# Patient Record
Sex: MALE | Race: OTHER | NOT HISPANIC OR LATINO
[De-identification: names, ages, dates, MRNs, and addresses within clinical notes are randomized per-mention and may not be internally consistent; named-entity substitution may affect disease eponyms.]

---

## 2023-03-29 ENCOUNTER — TRANSCRIPTION ENCOUNTER (OUTPATIENT)
Age: 58
End: 2023-03-29

## 2023-03-29 ENCOUNTER — APPOINTMENT (OUTPATIENT)
Dept: NEUROLOGY | Facility: HOSPITAL | Age: 58
End: 2023-03-29

## 2023-03-29 ENCOUNTER — INPATIENT (INPATIENT)
Facility: HOSPITAL | Age: 58
LOS: 12 days | Discharge: NURSING, REHAB OR LONG-TERM CARE | DRG: 23 | End: 2023-04-11
Attending: HOSPITALIST | Admitting: INTERNAL MEDICINE
Payer: MEDICAID

## 2023-03-29 VITALS
DIASTOLIC BLOOD PRESSURE: 88 MMHG | TEMPERATURE: 98 F | RESPIRATION RATE: 18 BRPM | HEIGHT: 70 IN | OXYGEN SATURATION: 96 % | HEART RATE: 102 BPM | WEIGHT: 165.35 LBS | SYSTOLIC BLOOD PRESSURE: 182 MMHG

## 2023-03-29 DIAGNOSIS — I69.30 UNSPECIFIED SEQUELAE OF CEREBRAL INFARCTION: ICD-10-CM

## 2023-03-29 DIAGNOSIS — R09.89 OTHER SPECIFIED SYMPTOMS AND SIGNS INVOLVING THE CIRCULATORY AND RESPIRATORY SYSTEMS: ICD-10-CM

## 2023-03-29 LAB
A1C WITH ESTIMATED AVERAGE GLUCOSE RESULT: 7.3 % — HIGH (ref 4–5.6)
ABO RH CONFIRMATION: SIGNIFICANT CHANGE UP
ANION GAP SERPL CALC-SCNC: 17 MMOL/L — SIGNIFICANT CHANGE UP (ref 5–17)
APTT BLD: 29.9 SEC — SIGNIFICANT CHANGE UP (ref 27.5–35.5)
BLD GP AB SCN SERPL QL: SIGNIFICANT CHANGE UP
BUN SERPL-MCNC: 9.1 MG/DL — SIGNIFICANT CHANGE UP (ref 8–20)
CALCIUM SERPL-MCNC: 8.6 MG/DL — SIGNIFICANT CHANGE UP (ref 8.4–10.5)
CHLORIDE SERPL-SCNC: 98 MMOL/L — SIGNIFICANT CHANGE UP (ref 96–108)
CHOLEST SERPL-MCNC: 270 MG/DL — HIGH
CO2 SERPL-SCNC: 23 MMOL/L — SIGNIFICANT CHANGE UP (ref 22–29)
CREAT SERPL-MCNC: 0.62 MG/DL — SIGNIFICANT CHANGE UP (ref 0.5–1.3)
EGFR: 109 ML/MIN/1.73M2 — SIGNIFICANT CHANGE UP
ESTIMATED AVERAGE GLUCOSE: 163 MG/DL — HIGH (ref 68–114)
GAS PNL BLDA: SIGNIFICANT CHANGE UP
GLUCOSE BLDC GLUCOMTR-MCNC: 196 MG/DL — HIGH (ref 70–99)
GLUCOSE BLDC GLUCOMTR-MCNC: 201 MG/DL — HIGH (ref 70–99)
GLUCOSE BLDC GLUCOMTR-MCNC: 224 MG/DL — HIGH (ref 70–99)
GLUCOSE SERPL-MCNC: 260 MG/DL — HIGH (ref 70–99)
HCT VFR BLD CALC: 45.4 % — SIGNIFICANT CHANGE UP (ref 39–50)
HDLC SERPL-MCNC: 58 MG/DL — SIGNIFICANT CHANGE UP
HGB BLD-MCNC: 15.2 G/DL — SIGNIFICANT CHANGE UP (ref 13–17)
INR BLD: 1.14 RATIO — SIGNIFICANT CHANGE UP (ref 0.88–1.16)
LACTATE SERPL-SCNC: 4.5 MMOL/L — CRITICAL HIGH (ref 0.5–2)
LIPID PNL WITH DIRECT LDL SERPL: 195 MG/DL — HIGH
MCHC RBC-ENTMCNC: 27 PG — SIGNIFICANT CHANGE UP (ref 27–34)
MCHC RBC-ENTMCNC: 33.5 GM/DL — SIGNIFICANT CHANGE UP (ref 32–36)
MCV RBC AUTO: 80.8 FL — SIGNIFICANT CHANGE UP (ref 80–100)
MRSA PCR RESULT.: SIGNIFICANT CHANGE UP
NON HDL CHOLESTEROL: 212 MG/DL — HIGH
PA ADP PRP-ACNC: 9 PRU — LOW (ref 180–376)
PLATELET # BLD AUTO: 264 K/UL — SIGNIFICANT CHANGE UP (ref 150–400)
POTASSIUM SERPL-MCNC: 3.1 MMOL/L — LOW (ref 3.5–5.3)
POTASSIUM SERPL-SCNC: 3.1 MMOL/L — LOW (ref 3.5–5.3)
PROTHROM AB SERPL-ACNC: 13.2 SEC — SIGNIFICANT CHANGE UP (ref 10.5–13.4)
RBC # BLD: 5.62 M/UL — SIGNIFICANT CHANGE UP (ref 4.2–5.8)
RBC # FLD: 13.3 % — SIGNIFICANT CHANGE UP (ref 10.3–14.5)
S AUREUS DNA NOSE QL NAA+PROBE: DETECTED
SARS-COV-2 RNA SPEC QL NAA+PROBE: SIGNIFICANT CHANGE UP
SODIUM SERPL-SCNC: 138 MMOL/L — SIGNIFICANT CHANGE UP (ref 135–145)
TRIGL SERPL-MCNC: 83 MG/DL — SIGNIFICANT CHANGE UP
TSH SERPL-MCNC: 1.62 UIU/ML — SIGNIFICANT CHANGE UP (ref 0.27–4.2)
WBC # BLD: 14.31 K/UL — HIGH (ref 3.8–10.5)
WBC # FLD AUTO: 14.31 K/UL — HIGH (ref 3.8–10.5)

## 2023-03-29 PROCEDURE — 37215 TRANSCATH STENT CCA W/EPS: CPT | Mod: RT

## 2023-03-29 PROCEDURE — 0042T: CPT

## 2023-03-29 PROCEDURE — 70450 CT HEAD/BRAIN W/O DYE: CPT | Mod: 26

## 2023-03-29 PROCEDURE — 93010 ELECTROCARDIOGRAM REPORT: CPT

## 2023-03-29 PROCEDURE — 71045 X-RAY EXAM CHEST 1 VIEW: CPT | Mod: 26,76

## 2023-03-29 PROCEDURE — 61645 PERQ ART M-THROMBECT &/NFS: CPT | Mod: 59,RT

## 2023-03-29 PROCEDURE — 99223 1ST HOSP IP/OBS HIGH 75: CPT | Mod: 25

## 2023-03-29 PROCEDURE — 99291 CRITICAL CARE FIRST HOUR: CPT

## 2023-03-29 PROCEDURE — 70450 CT HEAD/BRAIN W/O DYE: CPT | Mod: 26,77

## 2023-03-29 RX ORDER — PIPERACILLIN AND TAZOBACTAM 4; .5 G/20ML; G/20ML
3.38 INJECTION, POWDER, LYOPHILIZED, FOR SOLUTION INTRAVENOUS EVERY 8 HOURS
Refills: 0 | Status: COMPLETED | OUTPATIENT
Start: 2023-03-30 | End: 2023-04-06

## 2023-03-29 RX ORDER — CHLORHEXIDINE GLUCONATE 213 G/1000ML
1 SOLUTION TOPICAL DAILY
Refills: 0 | Status: DISCONTINUED | OUTPATIENT
Start: 2023-03-29 | End: 2023-04-11

## 2023-03-29 RX ORDER — DEXMEDETOMIDINE HYDROCHLORIDE IN 0.9% SODIUM CHLORIDE 4 UG/ML
0.2 INJECTION INTRAVENOUS
Qty: 200 | Refills: 0 | Status: DISCONTINUED | OUTPATIENT
Start: 2023-03-29 | End: 2023-03-31

## 2023-03-29 RX ORDER — VANCOMYCIN HCL 1 G
1000 VIAL (EA) INTRAVENOUS ONCE
Refills: 0 | Status: COMPLETED | OUTPATIENT
Start: 2023-03-29 | End: 2023-03-30

## 2023-03-29 RX ORDER — ASPIRIN/CALCIUM CARB/MAGNESIUM 324 MG
300 TABLET ORAL DAILY
Refills: 0 | Status: DISCONTINUED | OUTPATIENT
Start: 2023-03-29 | End: 2023-03-29

## 2023-03-29 RX ORDER — FENTANYL CITRATE 50 UG/ML
25 INJECTION INTRAVENOUS ONCE
Refills: 0 | Status: DISCONTINUED | OUTPATIENT
Start: 2023-03-29 | End: 2023-03-29

## 2023-03-29 RX ORDER — DEXMEDETOMIDINE HYDROCHLORIDE IN 0.9% SODIUM CHLORIDE 4 UG/ML
0.2 INJECTION INTRAVENOUS
Qty: 200 | Refills: 0 | Status: DISCONTINUED | OUTPATIENT
Start: 2023-03-29 | End: 2023-03-29

## 2023-03-29 RX ORDER — SENNA PLUS 8.6 MG/1
10 TABLET ORAL AT BEDTIME
Refills: 0 | Status: DISCONTINUED | OUTPATIENT
Start: 2023-03-29 | End: 2023-04-06

## 2023-03-29 RX ORDER — ASPIRIN/CALCIUM CARB/MAGNESIUM 324 MG
81 TABLET ORAL DAILY
Refills: 0 | Status: DISCONTINUED | OUTPATIENT
Start: 2023-03-29 | End: 2023-04-05

## 2023-03-29 RX ORDER — VANCOMYCIN HCL 1 G
VIAL (EA) INTRAVENOUS
Refills: 0 | Status: DISCONTINUED | OUTPATIENT
Start: 2023-03-30 | End: 2023-03-30

## 2023-03-29 RX ORDER — CANGRELOR 50 MG/1
1 INJECTION, POWDER, LYOPHILIZED, FOR SOLUTION INTRAVENOUS
Qty: 50 | Refills: 0 | Status: DISCONTINUED | OUTPATIENT
Start: 2023-03-29 | End: 2023-04-07

## 2023-03-29 RX ORDER — SODIUM CHLORIDE 9 MG/ML
1000 INJECTION, SOLUTION INTRAVENOUS
Refills: 0 | Status: DISCONTINUED | OUTPATIENT
Start: 2023-03-29 | End: 2023-04-06

## 2023-03-29 RX ORDER — PIPERACILLIN AND TAZOBACTAM 4; .5 G/20ML; G/20ML
3.38 INJECTION, POWDER, LYOPHILIZED, FOR SOLUTION INTRAVENOUS ONCE
Refills: 0 | Status: COMPLETED | OUTPATIENT
Start: 2023-03-30 | End: 2023-03-30

## 2023-03-29 RX ORDER — NOREPINEPHRINE BITARTRATE/D5W 8 MG/250ML
0.05 PLASTIC BAG, INJECTION (ML) INTRAVENOUS
Qty: 8 | Refills: 0 | Status: DISCONTINUED | OUTPATIENT
Start: 2023-03-29 | End: 2023-03-31

## 2023-03-29 RX ORDER — ACETAMINOPHEN 500 MG
1000 TABLET ORAL ONCE
Refills: 0 | Status: COMPLETED | OUTPATIENT
Start: 2023-03-29 | End: 2023-03-29

## 2023-03-29 RX ORDER — DEXTROSE 50 % IN WATER 50 %
12.5 SYRINGE (ML) INTRAVENOUS ONCE
Refills: 0 | Status: DISCONTINUED | OUTPATIENT
Start: 2023-03-29 | End: 2023-04-11

## 2023-03-29 RX ORDER — INSULIN LISPRO 100/ML
VIAL (ML) SUBCUTANEOUS EVERY 6 HOURS
Refills: 0 | Status: DISCONTINUED | OUTPATIENT
Start: 2023-03-29 | End: 2023-04-11

## 2023-03-29 RX ORDER — PANTOPRAZOLE SODIUM 20 MG/1
40 TABLET, DELAYED RELEASE ORAL DAILY
Refills: 0 | Status: DISCONTINUED | OUTPATIENT
Start: 2023-03-29 | End: 2023-04-03

## 2023-03-29 RX ORDER — SODIUM CHLORIDE 9 MG/ML
1000 INJECTION INTRAMUSCULAR; INTRAVENOUS; SUBCUTANEOUS ONCE
Refills: 0 | Status: COMPLETED | OUTPATIENT
Start: 2023-03-29 | End: 2023-03-29

## 2023-03-29 RX ORDER — DEXTROSE 50 % IN WATER 50 %
25 SYRINGE (ML) INTRAVENOUS ONCE
Refills: 0 | Status: DISCONTINUED | OUTPATIENT
Start: 2023-03-29 | End: 2023-04-11

## 2023-03-29 RX ORDER — PROPOFOL 10 MG/ML
50 INJECTION, EMULSION INTRAVENOUS
Qty: 1000 | Refills: 0 | Status: DISCONTINUED | OUTPATIENT
Start: 2023-03-29 | End: 2023-03-29

## 2023-03-29 RX ORDER — PIPERACILLIN AND TAZOBACTAM 4; .5 G/20ML; G/20ML
3.38 INJECTION, POWDER, LYOPHILIZED, FOR SOLUTION INTRAVENOUS ONCE
Refills: 0 | Status: COMPLETED | OUTPATIENT
Start: 2023-03-29 | End: 2023-03-30

## 2023-03-29 RX ORDER — GLUCAGON INJECTION, SOLUTION 0.5 MG/.1ML
1 INJECTION, SOLUTION SUBCUTANEOUS ONCE
Refills: 0 | Status: DISCONTINUED | OUTPATIENT
Start: 2023-03-29 | End: 2023-04-06

## 2023-03-29 RX ORDER — MUPIROCIN 20 MG/G
1 OINTMENT TOPICAL
Refills: 0 | Status: COMPLETED | OUTPATIENT
Start: 2023-03-29 | End: 2023-04-03

## 2023-03-29 RX ORDER — HYDRALAZINE HCL 50 MG
10 TABLET ORAL
Refills: 0 | Status: DISCONTINUED | OUTPATIENT
Start: 2023-03-29 | End: 2023-03-29

## 2023-03-29 RX ORDER — SODIUM CHLORIDE 9 MG/ML
500 INJECTION INTRAMUSCULAR; INTRAVENOUS; SUBCUTANEOUS ONCE
Refills: 0 | Status: COMPLETED | OUTPATIENT
Start: 2023-03-29 | End: 2023-03-29

## 2023-03-29 RX ORDER — DEXTROSE 50 % IN WATER 50 %
15 SYRINGE (ML) INTRAVENOUS ONCE
Refills: 0 | Status: DISCONTINUED | OUTPATIENT
Start: 2023-03-29 | End: 2023-04-06

## 2023-03-29 RX ORDER — CANGRELOR 50 MG/1
2 INJECTION, POWDER, LYOPHILIZED, FOR SOLUTION INTRAVENOUS
Qty: 50 | Refills: 0 | Status: DISCONTINUED | OUTPATIENT
Start: 2023-03-29 | End: 2023-03-29

## 2023-03-29 RX ORDER — ATORVASTATIN CALCIUM 80 MG/1
80 TABLET, FILM COATED ORAL AT BEDTIME
Refills: 0 | Status: DISCONTINUED | OUTPATIENT
Start: 2023-03-29 | End: 2023-04-05

## 2023-03-29 RX ORDER — LABETALOL HCL 100 MG
10 TABLET ORAL
Refills: 0 | Status: DISCONTINUED | OUTPATIENT
Start: 2023-03-29 | End: 2023-03-29

## 2023-03-29 RX ORDER — SODIUM CHLORIDE 9 MG/ML
1000 INJECTION INTRAMUSCULAR; INTRAVENOUS; SUBCUTANEOUS
Refills: 0 | Status: DISCONTINUED | OUTPATIENT
Start: 2023-03-29 | End: 2023-03-30

## 2023-03-29 RX ORDER — FENTANYL CITRATE 50 UG/ML
50 INJECTION INTRAVENOUS ONCE
Refills: 0 | Status: DISCONTINUED | OUTPATIENT
Start: 2023-03-29 | End: 2023-03-29

## 2023-03-29 RX ORDER — POLYETHYLENE GLYCOL 3350 17 G/17G
17 POWDER, FOR SOLUTION ORAL DAILY
Refills: 0 | Status: DISCONTINUED | OUTPATIENT
Start: 2023-03-29 | End: 2023-04-05

## 2023-03-29 RX ORDER — VANCOMYCIN HCL 1 G
1000 VIAL (EA) INTRAVENOUS EVERY 12 HOURS
Refills: 0 | Status: DISCONTINUED | OUTPATIENT
Start: 2023-03-30 | End: 2023-03-30

## 2023-03-29 RX ORDER — CHLORHEXIDINE GLUCONATE 213 G/1000ML
15 SOLUTION TOPICAL EVERY 12 HOURS
Refills: 0 | Status: DISCONTINUED | OUTPATIENT
Start: 2023-03-29 | End: 2023-03-31

## 2023-03-29 RX ADMIN — CANGRELOR 22.2 MICROGRAM(S)/KG/MIN: 50 INJECTION, POWDER, LYOPHILIZED, FOR SOLUTION INTRAVENOUS at 17:05

## 2023-03-29 RX ADMIN — DEXMEDETOMIDINE HYDROCHLORIDE IN 0.9% SODIUM CHLORIDE 3.7 MICROGRAM(S)/KG/HR: 4 INJECTION INTRAVENOUS at 15:31

## 2023-03-29 RX ADMIN — MUPIROCIN 1 APPLICATION(S): 20 OINTMENT TOPICAL at 22:42

## 2023-03-29 RX ADMIN — ATORVASTATIN CALCIUM 80 MILLIGRAM(S): 80 TABLET, FILM COATED ORAL at 22:42

## 2023-03-29 RX ADMIN — SODIUM CHLORIDE 1000 MILLILITER(S): 9 INJECTION INTRAMUSCULAR; INTRAVENOUS; SUBCUTANEOUS at 12:35

## 2023-03-29 RX ADMIN — SENNA PLUS 10 MILLILITER(S): 8.6 TABLET ORAL at 22:42

## 2023-03-29 RX ADMIN — CANGRELOR 22.2 MICROGRAM(S)/KG/MIN: 50 INJECTION, POWDER, LYOPHILIZED, FOR SOLUTION INTRAVENOUS at 17:10

## 2023-03-29 RX ADMIN — FENTANYL CITRATE 50 MICROGRAM(S): 50 INJECTION INTRAVENOUS at 15:31

## 2023-03-29 RX ADMIN — Medication 4: at 23:53

## 2023-03-29 RX ADMIN — FENTANYL CITRATE 25 MICROGRAM(S): 50 INJECTION INTRAVENOUS at 13:49

## 2023-03-29 RX ADMIN — CHLORHEXIDINE GLUCONATE 15 MILLILITER(S): 213 SOLUTION TOPICAL at 17:06

## 2023-03-29 RX ADMIN — SODIUM CHLORIDE 500 MILLILITER(S): 9 INJECTION INTRAMUSCULAR; INTRAVENOUS; SUBCUTANEOUS at 19:30

## 2023-03-29 RX ADMIN — Medication 6.94 MICROGRAM(S)/KG/MIN: at 17:04

## 2023-03-29 RX ADMIN — SODIUM CHLORIDE 75 MILLILITER(S): 9 INJECTION INTRAMUSCULAR; INTRAVENOUS; SUBCUTANEOUS at 22:16

## 2023-03-29 RX ADMIN — POLYETHYLENE GLYCOL 3350 17 GRAM(S): 17 POWDER, FOR SOLUTION ORAL at 18:32

## 2023-03-29 RX ADMIN — PANTOPRAZOLE SODIUM 40 MILLIGRAM(S): 20 TABLET, DELAYED RELEASE ORAL at 18:31

## 2023-03-29 RX ADMIN — DEXMEDETOMIDINE HYDROCHLORIDE IN 0.9% SODIUM CHLORIDE 3.7 MICROGRAM(S)/KG/HR: 4 INJECTION INTRAVENOUS at 22:45

## 2023-03-29 RX ADMIN — CHLORHEXIDINE GLUCONATE 1 APPLICATION(S): 213 SOLUTION TOPICAL at 13:21

## 2023-03-29 RX ADMIN — Medication 400 MILLIGRAM(S): at 23:35

## 2023-03-29 RX ADMIN — Medication 4: at 17:10

## 2023-03-29 RX ADMIN — FENTANYL CITRATE 25 MICROGRAM(S): 50 INJECTION INTRAVENOUS at 13:34

## 2023-03-29 RX ADMIN — Medication 300 MILLIGRAM(S): at 13:21

## 2023-03-29 NOTE — CONSULT NOTE ADULT - SUBJECTIVE AND OBJECTIVE BOX
Patient is a 58y old  Male who presents with a chief complaint of   HPI:  59 y/o male with PMHx of HTN and DM transfered from [facility] to [facility] as a code stroke. [facility] reports pt BLAYNE was last night around 20:00 and was c/o right arm pain, today his family found his around 05:00 with left sided hemiparesis, slurred speech and was incontinent of urine, they called EMS as pt arrived at [facility] at 05:56, he was found to have right ICA occlusion and transferred to [facility], code stroke called upon arrival. Pt was on Cardene however per EMS neuro wanted pressure around 200 and it was d/monet, pt sedated with propofol. (29 Mar 2023 09:40)    Patient taken for mechanical thrombectomy, TICI 2B revascularization achieved, required AYAKA stent placement 2/2 occlusive flow within AYAKA s/p thrombectomy, started on cangrelor drip. LUIS CTH post procedure showed contrast vs hemorrhage in stroke bed. Remained intubated post procedure. Course complicated by hypotension requiring pressors. Repeat CTH shows new R MCA infarcts with compression of R ventricle and some MLS. Neurosurgery consulted for hemicrani watch.       PMH:   HTN  DM      REVIEW OF SYSTEMS  Unable to perform 2/2 neurologic status      MEDICATIONS:  Antibiotics:    Neuro:  dexMEDEtomidine Infusion 0.2 MICROgram(s)/kG/Hr IV Continuous <Continuous>    Anticoagulation:  aspirin  chewable 81 milliGRAM(s) Oral daily  cangrelor Infusion 1 MICROgram(s)/kG/Min IV Continuous <Continuous>    OTHER:  atorvastatin 80 milliGRAM(s) Oral at bedtime  chlorhexidine 0.12% Liquid 15 milliLiter(s) Oral Mucosa every 12 hours  chlorhexidine 2% Cloths 1 Application(s) Topical daily  dextrose 50% Injectable 25 Gram(s) IV Push once  dextrose 50% Injectable 12.5 Gram(s) IV Push once  dextrose 50% Injectable 25 Gram(s) IV Push once  dextrose Oral Gel 15 Gram(s) Oral once PRN  glucagon  Injectable 1 milliGRAM(s) IntraMuscular once  insulin lispro (ADMELOG) corrective regimen sliding scale   SubCutaneous every 6 hours  norepinephrine Infusion 0.05 MICROgram(s)/kG/Min IV Continuous <Continuous>  pantoprazole  Injectable 40 milliGRAM(s) IV Push daily  polyethylene glycol 3350 17 Gram(s) Oral daily  senna Syrup 10 milliLiter(s) Oral at bedtime    IVF:  dextrose 5%. 1000 milliLiter(s) IV Continuous <Continuous>  dextrose 5%. 1000 milliLiter(s) IV Continuous <Continuous>  sodium chloride 0.9%. 1000 milliLiter(s) IV Continuous <Continuous>      Vital Signs Last 24 Hrs  T(C): 38.1 (29 Mar 2023 17:50), Max: 38.1 (29 Mar 2023 17:50)  T(F): 100.6 (29 Mar 2023 17:50), Max: 100.6 (29 Mar 2023 17:50)  HR: 80 (29 Mar 2023 17:50) (59 - 104)  BP: 107/64 (29 Mar 2023 17:50) (77/65 - 147/48)  BP(mean): 78 (29 Mar 2023 17:50) (67 - 102)  RR: 16 (29 Mar 2023 17:50) (12 - 25)  SpO2: 100% (29 Mar 2023 17:50) (99% - 100%)    Parameters below as of 29 Mar 2023 15:20  Patient On (Oxygen Delivery Method): ventilator    O2 Concentration (%): 50      Physical Exam:  Constitutional: NAD, lying in bed  Neuro  * Mental Status: no EO, following commands, intubated   * Cranial Nerves: L pupil 4mm brisk, R pupil 4mm sluggish, + cough/gag, + corneals, + overbreathing   * Motor: RUE/RLE AG and following commands, LUE extensor posturing, LLE TF   * Sensory: Sensation grossly intact to noxious stimuli   * Reflexes: not assessed       LABS:                        15.2   14.31 )-----------( 264      ( 29 Mar 2023 10:00 )             45.4     03-29    138  |  98  |  9.1  ----------------------------<  260<H>  3.1<L>   |  23.0  |  0.62    Ca    8.6      29 Mar 2023 10:00    PT/INR - ( 29 Mar 2023 10:00 )   PT: 13.2 sec;   INR: 1.14 ratio    PTT - ( 29 Mar 2023 10:00 )  PTT:29.9 sec      RADIOLOGY & ADDITIONAL STUDIES:  < from: CT Head No Cont (03.29.23 @ 17:47) >  IMPRESSION: New increased density in the right basal ganglia right   frontal temporal cortex and right caudate head since 9:02 AM likely   representing retained contrast in the right middle cerebral artery   infarct although a small amount of hemorrhage is not excluded. There is   new mass effect on the right frontal horn and new minimal midline shift.    --- End of Report ---    SYLVESTER ARMSTRONG MD; Attending Radiologist  This document has been electronically signed. Mar 29 2023  5:59PM    < end of copied text >

## 2023-03-29 NOTE — CONSULT NOTE ADULT - ASSESSMENT
ASSESSMENT: 58y Male with PMH HTN and DM, found by his family 0500 AM on 3/29/23 with right hemiparesis, slurred speech, and incontinent of urine.  Last well known 2000  on 3/28/23. Patient brought in by EMS to the [facility] at 0556, was not a candidate for Tenecteplase due to the time laps. Patient was transferred to [facility] for thrombectomy with NIH 28. CTA with Right ICA occlusion.     NEURO: Continue close monitoring for neurologic deterioration, with stroke checks q 1 hour, permissive HTN as per post thrombectomy protocol. Titrate statin to LDL goal less than 70, MRI Brain w/o, TTE. Physical therapy/OT/Speech eval/treatment.     ANTITHROMBOTIC THERAPY: ASA 81 mg  OH if NPO    PULMONARY:  remains on ventilatory support     CARDIOVASCULAR: check TTE, cardiac monitoring, monitor for afib                            GASTROINTESTINAL: dysphagia screen pending, aspiration precautions     Diet: NPO    RENAL: BUN/Cr 9.1/0.62, monitor urine output, hydrate as tolerated     Na Goal:  135-145     Quispe: Y    HEMATOLOGY: H/H 15.2/45.4, Platelets 264. Trend electrolytes, replenish as needed. Hypokalemic 3.1. Patient should have all age and risk malignancy screening.      DVT ppx: Heparin s.c [] LMWH [] SCD    ID: afebrile, leukocytosis, likely dehydrated or due to acute event, monitor for infection    OTHER: Lipid panel, A1C, diabetic consult, endocrinology consult (HLD  and DM from records).     DISPOSITION: Rehab or home depending on PT eval once stable and workup is complete      CORE MEASURES:        Admission NIHSS: 28     TPA: [] YES [x] NO      LDL/HDL/A1C: pending     Depression Screen: pending     Statin Therapy: pending     Dysphagia Screen: [] PASS [] FAIL pending     Smoking [] YES [] NO      Afib [] YES [] NO     Stroke Education [x] YES [] NO    Obtain screening lower extremity venous ultrasound in patients who meet 1 or more of the following criteria as patient is high risk for DVT/PE on admission:   [] History of DVT/PE  []Hypercoagulable states (Factor V Leiden, Cancer, OCP, etc. )  []Prolonged immobility (hemiplegia/hemiparesis/post operative or any other extended immobilization)  [] Transferred from outside facility (Rehab or Long term care)  [] Age </= to 50 ASSESSMENT: 58y Male with PMH HTN and DM, found by his family 0500 AM on 3/29/23 with right hemiparesis, slurred speech, and incontinent of urine.  Last well known 2000  on 3/28/23. Patient brought in by EMS to the [facility] at 0556, was noted not to be a candidate for Tenecteplase as patient out of time window. Patient was transferred to [facility] for thrombectomy due to right ICA occlusion with tandem right MCA occlusion. NIHSS 28 MRS pre - 0 . Etiology likely large artery atherosclerotic disease.     NEURO: Continue close monitoring for neurologic deterioration, with stroke checks q 1 hour, permissive HTN as per post thrombectomy protocol. Titrate statin to LDL goal less than 70, MRI Brain w/o, TTE. Physical therapy/OT/Speech eval/treatment.     ANTITHROMBOTIC THERAPY: pending post thrombectomy if no acute neuro IR contraindication and no evidence of hemorrhage or large infarct at high risk for hemorrhagic transformation on CT head.     PULMONARY:  remains on ventilatory support due to respiratory failure and aspiration risk as patient was noted to be vomiting     CARDIOVASCULAR: check TTE, cardiac monitoring, monitor for afib                            GASTROINTESTINAL: dysphagia screen pending, aspiration precautions     Diet: NPO    RENAL: BUN/Cr 9.1/0.62, monitor urine output, hydrate as tolerated     Na Goal:  135-145     Quispe: Y    HEMATOLOGY: H/H 15.2/45.4, Platelets 264. Trend electrolytes, replenish as needed. Hypokalemic 3.1. Patient should have all age and risk appropriate  malignancy screening.      DVT ppx: Heparin s.c [] LMWH [] SCD    ID: afebrile, leukocytosis, was vomiting- high concern for aspiration event, monitor for infection    OTHER: Lipid panel, A1C, diabetic consult, endocrinology consult (HLD  and DM from records).     DISPOSITION: Rehab or home depending on PT eval once stable and workup is complete      CORE MEASURES:        Admission NIHSS: 28     TPA: [] YES [x] NO      LDL/HDL/A1C: pending     Depression Screen: pending     Statin Therapy: pending     Dysphagia Screen: [] PASS [] FAIL pending     Smoking [] YES [] NO      Afib [] YES [] NO     Stroke Education [x] YES [] NO    Obtain screening lower extremity venous ultrasound in patients who meet 1 or more of the following criteria as patient is high risk for DVT/PE on admission:   [] History of DVT/PE  []Hypercoagulable states (Factor V Leiden, Cancer, OCP, etc. )  []Prolonged immobility (hemiplegia/hemiparesis/post operative or any other extended immobilization)  [] Transferred from outside facility (Rehab or Long term care)  [] Age </= to 50 ASSESSMENT: 58y Male with PMH HTN and DM, found by his family 0500 AM on 3/29/23 with right hemiparesis, slurred speech, and incontinent of urine.  Last well known 2000  on 3/28/23. Patient brought in by EMS to the [facility] at 0556, was noted not to be a candidate for Tenecteplase as patient out of time window. Patient was transferred to [facility] for thrombectomy due to right ICA occlusion with tandem right MCA occlusion. NIHSS 28 MRS pre - 0 . Etiology likely large artery atherosclerotic disease.     NEURO: Continue close monitoring for neurologic deterioration, with stroke checks q 1 hour, permissive HTN as per post thrombectomy protocol. Titrate statin to LDL goal less than 70, MRI Brain w/o, TTE. Physical therapy/OT/Speech eval/treatment.     ANTITHROMBOTIC THERAPY: post thrombectomy if no acute neuro IR contraindication and no evidence of hemorrhage or large infarct at high risk for hemorrhagic transformation on CT head.     PULMONARY:  remains on ventilatory support due to respiratory failure and aspiration risk as patient was noted to be vomiting     CARDIOVASCULAR: check TTE, cardiac monitoring, monitor for afib                            GASTROINTESTINAL: dysphagia screen pending, aspiration precautions     Diet: NPO    RENAL: BUN/Cr 9.1/0.62, monitor urine output, hydrate as tolerated     Na Goal:  135-145     Quispe: Y    HEMATOLOGY: H/H 15.2/45.4, Platelets 264. Trend electrolytes, replenish as needed. Hypokalemic 3.1. Patient should have all age and risk appropriate  malignancy screening.      DVT ppx: Heparin s.c [] LMWH [] SCD    ID: afebrile, leukocytosis, was vomiting- high concern for aspiration event, monitor for infection    OTHER: Lipid panel, A1C, diabetic consult, endocrinology consult (HLD  and DM from records).     DISPOSITION: Rehab or home depending on PT eval once stable and workup is complete      CORE MEASURES:        Admission NIHSS: 28     TPA: [] YES [x] NO      LDL/HDL/A1C: pending     Depression Screen: pending     Statin Therapy: pending     Dysphagia Screen: [] PASS [] FAIL pending     Smoking [] YES [] NO      Afib [] YES [] NO     Stroke Education [x] YES [] NO    Obtain screening lower extremity venous ultrasound in patients who meet 1 or more of the following criteria as patient is high risk for DVT/PE on admission:   [] History of DVT/PE  []Hypercoagulable states (Factor V Leiden, Cancer, OCP, etc. )  []Prolonged immobility (hemiplegia/hemiparesis/post operative or any other extended immobilization)  [] Transferred from outside facility (Rehab or Long term care)  [] Age </= to 50

## 2023-03-29 NOTE — H&P ADULT - ASSESSMENT
This is a 58y  year old Male  presents with AYAKA and RMCA occlusions. Patient s/p cerebral angiography, transferred to NSICU.        q1h NC  CTH 4 hours from MT  c/w cangrelor ggt, decreased to 1 from 2 for low P2Y12  precedex for sedation with fentanyl PRN  stroke w/u with LDL and HgA1C   SBP goal 100-140  TTE  c/w ACVC, ABG, ET tube advanced

## 2023-03-29 NOTE — H&P ADULT - HISTORY OF PRESENT ILLNESS
58 year old male presents as code biplane    ****INCOMPLETE BELOW **** 57 y/o male with PMHx of HTN and DM transfered from [facility] to [facility] as a code stroke. [facility] EMS reports pt BLAYNE was last night around 20:00 and was c/o right arm pain, today his family found his around 05:00 with left sided hemiparesis, slurred speech and was incontinent of urine, they called EMS as pt arrived at [facility] at 05:56, he was found to have right ICA occlusion and transferred to [facility], code stroke called upon arrival. Pt was on Cardene however per EMS neuro wanted pressure around 200 and it was d/monet, pt sedated with propofol.

## 2023-03-29 NOTE — CONSULT NOTE ADULT - SUBJECTIVE AND OBJECTIVE BOX
[facility]  Neurology Stroke Consult   CC:   HPI:  58y Male who presented    Stroke risk factors:    PAST MEDICAL & SURGICAL HISTORY:      MEDICATIONS  (STANDING):    MEDICATIONS  (PRN):      Allergies      Intolerances        SOCIAL HISTORY:  no tob,   no alcohol   no drugs    FAMILY HISTORY:        ROS: 14 point ROS negative other than what is present in HPI or below    Vital Signs Last 24 Hrs  T(C): --  T(F): --  HR: --  BP: --  BP(mean): --  RR: --  SpO2: --          General: NAD    Detailed Neurologic Exam:    Mental status: The patient is awake and alert and has normal attention span.  The patient is fully oriented in 3 spheres. The patient is oriented to current events. The patient is able to name objects, follow commands, repeat sentences.    Cranial nerves: Pupils equal and react symmetrically to light. There is no visual field deficit to confrontation. Extraocular motion is full with no nystagmus. There is no ptosis. Facial sensation is intact. Facial musculature is symmetric. Palate elevates symmetrically. Tongue is midline.    Motor: There is normal bulk and tone.  There is no tremor.  Strength is 5/5 in the right arm and leg.   Strength is 5/5 in the left arm and leg.    Sensation: Intact to light touch and pin in 4 extremities    Reflexes: 1-2+ throughout and plantar responses are flexor.    Cerebellar: There is no dysmetria on finger to nose testing.    Gait : deferred    NIH SS:  DATE:  TIME:  1A: Level of consciousness (0-3):   1B: Questions (0-2):   1C: Commands (0-2):   2: Gaze (0-2):   3: Visual fields (0-3):   4: Facial palsy (0-3):   MOTOR:  5A: Left arm motor drift (0-4):   5B: Right arm motor drift (0-4):   6A: Left leg motor drift (0-4):   6B: Right leg motor drift (0-4):   7: Limb ataxia (0-2):   SENSORY:  8: Sensation (0-2):   SPEECH:  9: Language (0-3):   10: Dysarthria (0-2):   EXTINCTION:  11: Extinction/inattention (0-2):     TOTAL SCORE:     prehospital mRS=      LABS:                   Lipid panel:    HgbA1c:      RADIOLOGY & ADDITIONAL STUDIES (independently reviewed unless otherwise noted):  CT head:  CTA head: no aneurysm, AVM, LVO or sig stenosis in COW  CTA neck: no sig carotid or vertebral stenosis  CT Perfusion head - CBF<30% volume 0ml, Tmax>6s volume =0ml                              [facility]  Neurology Stroke Consult     CC: left side weakness, slurred speech  HPI:  58y Male with PMH HTN and DM, found by his family 0500 AM on 3/29/23 with right hemiparesis, slurred speech, and incontinent of urine. Last well known 2000 on 3/28/23. Patient brought in by EMS to the [facility] at 0556, was not a candidate for Tenecteplase due to the time laps, intubated at [facility] due to compromised airway. Patient was transferred to [facility] for thrombectomy. NIH 28. CTA with Right ICA occlusion.     Stroke risk factors:  HTN  DM    PAST MEDICAL & SURGICAL HISTORY:  HTN  DM    MEDICATIONS  (STANDING):  MEDICATIONS  (PRN):      Allergies KNDA        SOCIAL HISTORY:  tob,   alcohol   drugs    FAMILY HISTORY:      ROS: 14 point ROS negative other than what is present in HPI or below    Vital Signs Last 24 Hrs  T(C): --  T(F): --  HR: --  BP: --  BP(mean): --  RR: --  SpO2: --        General: NAD, intubated and sedated on Propofol gtt      Detailed Neurologic Exam:    Mental status: sedated     Cranial nerves: bilateral hemianopia, total gaze paresis     Motor: posturing     Strength: left hemiparesis    Sensation: Intact to light touch and pin in 4 extremities     Cerebellar: not tested    Gait : not tested        NIH Stroke Scale Date	29-Mar-2023 09:25      NIH Stroke Scale:   · Intubated	Yes  · Sedated/Unresponsive	Yes  · NIH Stroke Scale: LOC	(3) Responds only with reflex motor or autonomic effects or totally unresponsive, flaccid, and areflexic  · NIH Stroke Scale: LOC Question	(2) Answers neither question correctly  · NIH Stroke Scale: LOC Command	(2) Performs neither task correctly  · NIH Stroke Scale: Gaze	(2) Forced deviation, or total gaze paresis not overcome by the oculocephalic maneuver  · NIH Stroke Scale: Visual	(3) Bilateral hemianopia (blind including cortical blindness)  · NIH Stroke Scale: Facial	(0) Normal symmetrical movements  · NIH Stroke Scale: Arm Left	(4) No movement  · NIH Stroke Scale: Arm Right	(2) Some effort against gravity; limb cannot get to or maintain (if cued) 90 (or 45) degrees, drifts down to bed, but has some effort against gravity  · NIH Stroke Scale: Leg Left	(4) No movement  · NIH Stroke Scale: Leg Right	(2) Some effort against gravity; leg falls to bed by 5 secs, but has some effort against gravity  · NIH Stroke Scale: Ataxia	(0) Absent  · NIH Stroke Scale: Sensory	(1) Mild-to-moderate sensory loss; patient feels pinprick is less sharp or is dull on the affected side; or there is a loss of superficial pain with pinprick, but patient is aware of being touched  · NIH Stroke Scale: Language	(3) Mute, global aphasia; no usable speech or auditory comprehension  · NIH Stroke Scale: Dysarthria	(UN) Intubated or other physical barrier  · NIH Stroke Scale: Extinct Inattention	(0) No abnormality  · NIH Stroke Scale: Total	28        prehospital mRS=0      LABS: pending    Lipid panel: pending    HgbA1c: pending      RADIOLOGY & ADDITIONAL STUDIES (independently reviewed unless otherwise noted):    IMPRESSION: 3/29/23 0912  72 mL area of core infarct in the right MCA territory. 108 cc penumbra in   the right MCA territory                              [facility]  Neurology Stroke Consult     CC: left side weakness, slurred speech  HPI:  58y Male with PMH HTN and DM, found by his family 0500 AM on 3/29/23 with right hemiparesis, slurred speech, and incontinent of urine. Last well known 2000 on 3/28/23. Patient brought in by EMS to the [facility] at 0556, was not a candidate for Tenecteplase due to the time laps, intubated at [facility] due to compromised airway. Patient was transferred to [facility] for thrombectomy. NIH 28. CTA with Right ICA occlusion.     PAST MEDICAL & SURGICAL HISTORY:  HTN  DM    MEDICATIONS  (STANDING):  MEDICATIONS  (PRN):    Allergies KNDA    SOCIAL HISTORY:  tob,   alcohol   drugs    ROS: unable to obtain, patient intubated on sedation.     Vital Signs Last 24 Hrs  see ED flowsheet     General: NAD, intubated and sedated on Propofol gtt    Detailed Neurologic Exam:    Mental status: sedated, eyes closed, no verbal output, not following commands , localizes with right hand to left     Cranial nerves: eyes in primary gaze, no blink to threat b/l, +gag, pupils equal and reactive to light     Motor: right side moves spontaneously against gravity , left hemiplegia with decorticate posturing in UE    Sensation: Intact to light touch on right, able to localize to left     Cerebellar: not tested    Gait : not tested    NIH Stroke Scale Date	29-Mar-2023 09:25  NIH Stroke Scale:   · Intubated	Yes  · Sedated/Unresponsive	Yes  · NIH Stroke Scale: LOC	(3) Responds only with reflex motor or autonomic effects or totally unresponsive, flaccid, and areflexic  · NIH Stroke Scale: LOC Question	(2) Answers neither question correctly  · NIH Stroke Scale: LOC Command	(2) Performs neither task correctly  · NIH Stroke Scale: Gaze	(2) Forced deviation, or total gaze paresis not overcome by the oculocephalic maneuver  · NIH Stroke Scale: Visual	(3) Bilateral hemianopia (blind including cortical blindness)  · NIH Stroke Scale: Facial	(0) Normal symmetrical movements  · NIH Stroke Scale: Arm Left	(4) No movement  · NIH Stroke Scale: Arm Right	(2) Some effort against gravity; limb cannot get to or maintain (if cued) 90 (or 45) degrees, drifts down to bed, but has some effort against gravity  · NIH Stroke Scale: Leg Left	(4) No movement  · NIH Stroke Scale: Leg Right	(2) Some effort against gravity; leg falls to bed by 5 secs, but has some effort against gravity  · NIH Stroke Scale: Ataxia	(0) Absent  · NIH Stroke Scale: Sensory	(1) Mild-to-moderate sensory loss; patient feels pinprick is less sharp or is dull on the affected side; or there is a loss of superficial pain with pinprick, but patient is aware of being touched  · NIH Stroke Scale: Language	(3) Mute, global aphasia; no usable speech or auditory comprehension  · NIH Stroke Scale: Dysarthria	(UN) Intubated or other physical barrier  · NIH Stroke Scale: Extinct Inattention	(0) No abnormality  · NIH Stroke Scale: Total	28      prehospital mRS=0      LABS: pending    Lipid panel: pending    HgbA1c: pending      RADIOLOGY & ADDITIONAL STUDIES (independently reviewed unless otherwise noted):  CT BRAIN STROKE PROTOCOL   03/29/2023    IMPRESSION: Dense right MCA sign with early right temporal lobe   infarction. No acute intracranial hemorrhage.    CT PERFUSION W MAPS IC    03/29/2023    CBF<30% volume: 72 ml right MCA territory.  Tmax>6.0 s volume: 180 ml  Mismatch volume: 108 ml  Mismatch ratio: 2.5   [facility]  Neurology Stroke Consult     CC: left side weakness, slurred speech  HPI:  58y Male with PMH HTN and DM, found by his family 0500 AM on 3/29/23 with right hemiparesis, slurred speech, and incontinent of urine. Last well known 2000 on 3/28/23. Patient brought in by EMS to the [facility] at 0556, was not a candidate for Tenecteplase due to the time laps, intubated at [facility] due to compromised airway. Patient was transferred to [facility] for thrombectomy. NIH 28. CTA with Right ICA occlusion.     PAST MEDICAL & SURGICAL HISTORY:  HTN  DM    MEDICATIONS  (STANDING):  MEDICATIONS  (PRN):    Allergies NKDA    SOCIAL HISTORY:  tob,   alcohol   drugs    ROS: unable to obtain, patient intubated on sedation.     Vital Signs Last 24 Hrs  see ED flowsheet     General: NAD, intubated and sedated on Propofol gtt    Detailed Neurologic Exam:    Mental status: sedated, eyes closed, no verbal output, not following commands , localizes with right hand to left     Cranial nerves: eyes in primary gaze, no blink to threat b/l, +gag, pupils equal and reactive to light     Motor: right side moves spontaneously against gravity , left hemiplegia with decorticate posturing in UE    Sensation: Intact to light touch on right, able to localize to left     Cerebellar: not tested    Gait : not tested    NIH Stroke Scale Date	29-Mar-2023 09:25  NIH Stroke Scale:   · Intubated	Yes  · Sedated/Unresponsive	Yes  · NIH Stroke Scale: LOC	(3) Responds only with reflex motor or autonomic effects or totally unresponsive, flaccid, and areflexic  · NIH Stroke Scale: LOC Question	(2) Answers neither question correctly  · NIH Stroke Scale: LOC Command	(2) Performs neither task correctly  · NIH Stroke Scale: Gaze	(2) Forced deviation, or total gaze paresis not overcome by the oculocephalic maneuver  · NIH Stroke Scale: Visual	(3) Bilateral hemianopia (blind including cortical blindness)  · NIH Stroke Scale: Facial	(0) Normal symmetrical movements  · NIH Stroke Scale: Arm Left	(4) No movement  · NIH Stroke Scale: Arm Right	(2) Some effort against gravity; limb cannot get to or maintain (if cued) 90 (or 45) degrees, drifts down to bed, but has some effort against gravity  · NIH Stroke Scale: Leg Left	(4) No movement  · NIH Stroke Scale: Leg Right	(2) Some effort against gravity; leg falls to bed by 5 secs, but has some effort against gravity  · NIH Stroke Scale: Ataxia	(0) Absent  · NIH Stroke Scale: Sensory	(1) Mild-to-moderate sensory loss; patient feels pinprick is less sharp or is dull on the affected side; or there is a loss of superficial pain with pinprick, but patient is aware of being touched  · NIH Stroke Scale: Language	(3) Mute, global aphasia; no usable speech or auditory comprehension  · NIH Stroke Scale: Dysarthria	(UN) Intubated or other physical barrier  · NIH Stroke Scale: Extinct Inattention	(0) No abnormality  · NIH Stroke Scale: Total	28      prehospital mRS=0      LABS:                       15.2   14.31 )-----------( 264      ( 29 Mar 2023 10:00 )             45.4     03-29    138  |  98  |  9.1  ----------------------------<  260<H>  3.1<L>   |  23.0  |  0.62    Ca    8.6      29 Mar 2023 10:00        PT/INR - ( 29 Mar 2023 10:00 )   PT: 13.2 sec;   INR: 1.14 ratio         PTT - ( 29 Mar 2023 10:00 )  PTT:29.9 sec    Lipid panel:   Lipid Profile (03.29.23 @ 12:31)    Cholesterol, Serum: 270 mg/dL   Triglycerides, Serum: 83: Lipemic. Interpret with caution mg/dL   HDL Cholesterol, Serum: 58 mg/dL   Non HDL Cholesterol: 212:   LDL Cholesterol Calculated: 195 mg/dL      HgbA1c: A1C with Estimated Average Glucose (03.29.23 @ 12:31)    A1C with Estimated Average Glucose Result: 7.3 %   Estimated Average Glucose: 163 mg/dL      RADIOLOGY & ADDITIONAL STUDIES (independently reviewed unless otherwise noted):  CT BRAIN STROKE PROTOCOL   03/29/2023    IMPRESSION: Dense right MCA sign with early right temporal lobe   infarction. No acute intracranial hemorrhage.    CT PERFUSION W MAPS IC    03/29/2023    CBF<30% volume: 72 ml right MCA territory.  Tmax>6.0 s volume: 180 ml  Mismatch volume: 108 ml  Mismatch ratio: 2.5

## 2023-03-29 NOTE — DISCHARGE NOTE NURSING/CASE MANAGEMENT/SOCIAL WORK - NSDCPEFALRISK_GEN_ALL_CORE
For information on Fall & Injury Prevention, visit: [email or web address] OR  [email or web address] OR  https://www.cdc.gov/steadi/patient.html

## 2023-03-29 NOTE — ED PROVIDER NOTE - CLINICAL SUMMARY MEDICAL DECISION MAKING FREE TEXT BOX
59 y/o male with PMHx of HTN and DM presents to the ED as a transfer from [facility] as a code stroke, had CTA there showing right ICA occlusion, pt intubated upon arrival to the ED, neuro IR attending Dr. Anderson at pt bedside, requesting repeat CT, disc from [facility] uploaded to system. tPA was not administered at [facility] due to unknown onset of symptoms. Dr. Anderson requesting code Biplane which was activated, Will admit patient for Biplane and further management

## 2023-03-29 NOTE — H&P ADULT - NS ATTEND AMEND GEN_ALL_CORE FT
59 yo man with HTN and DM, mRS 0 at baseline, LKN 8pm, found at 5:00am by family with L side and confusion. At [facility], NIHSS >20, intubated for persistent vomiting. CTH reportedly wnl, CTA reportedly with R carotid occlusion. Transferred to [facility], NIHSS here 28. CTH with some loss of grey-white in the R temporal lobe. Core 72, penumbra 108, s/p angio demonstrating a R proximal ICA occlusion at the bifurcation with a R M1 occlusion s/p MT with TICI 2B recanalization and IA tPA given, followed by stenting of R ICA. 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 At high risk for hemorrhagic conversion due to a large ischemic core and being on cangrelor.     On exam, intubated, on Precedex, eyes closed, does not follow commands, PERRL but R eye more sluggish than L, gaze midline, + cough, overbreathes the vent, localized with R arm, L arm extends to noxious, withdraws RLE, no movement in LLE to noxious   No R groin hematoma     q1h NC  CTH 4 hours from MT  c/w cangrelor ggt, decreased to 1 from 2 for low P2Y12  precedex for sedation with fentanyl PRN  stroke w/u with LDL and HgA1C   SBP goal 100-140  TTE  c/w ACVC, ABG, ET tube advanced   keep intubated (patient with food particles suction from ET tube)  NPO  NS at 75cc/hr  hold Lov ppx for POD 0  ISS    Patient seen and examined by attending on 3/29/2023.    Patient is critically ill due to acute R MCA stroke s/p MT and at high risk for neurological deterioration or death due to: at high risk of hemorrhagic conversion, requiring intubation due to inability to protect airway.

## 2023-03-29 NOTE — CONSULT NOTE ADULT - NS ATTEND AMEND GEN_ALL_CORE FT
Neurosurgery Attending Attestation:    Patient seen and examined at bedside. Agree with plan and note as documented above.    59 y/o M w/ PMHx significant for HTN, DM who presented with L sided weakness, and found to have tandem AYAKA/RMCA occlusions s/p stent placement and TICI 2b thrombectomy with now bleeding into the infarct area vs contrast staining. On exam, eye opening apraxia, following commands in RUE and RLE, extends in LUE and triple flexion in LLE. Recommend continued NCCU care, neurochecks q1h x8h then space to q2h, keppra, recommend Na goal >140, continue cangrelor gtt for stent per MELANIE, will keep on hemicrani watch.    -Gokul Gayle MD

## 2023-03-29 NOTE — DISCHARGE NOTE NURSING/CASE MANAGEMENT/SOCIAL WORK - NSDCPEFALRISK_GEN_ALL_CORE
For information on Fall & Injury Prevention, visit: [email or web address] OR  [email or web address] OR  https://www.cdc.gov/steadi/patient.html

## 2023-03-29 NOTE — CONSULT NOTE ADULT - NS ATTEND AMEND GEN_ALL_CORE FT
Seen and examined with the PA and NP  Plan discussed with PA and NP  I agree with as written above with modifications as below    Pt with Right ICA occlusion and Right MCA thrombus  NIHSS=28 on my exam  taken to IR suite for thrombectomy and treatment of ICA/MCA occlusions    after procedure will go to NSICU with post thrombectomy orders per MELANIE.    will follow with you    Ivan Reid MD PhD   [phone]

## 2023-03-29 NOTE — ED PROVIDER NOTE - PHYSICAL EXAMINATION
Gen: intubated  Head: NC/AT  Neck: trachea midline  Card: regular rate and rhythm  Resp:  CTAB  Abd: soft, non-distended  Ext: no deformities  Neuro:  see NIHSS  Skin:  Warm and dry as visualized

## 2023-03-29 NOTE — ED PROVIDER NOTE - OBJECTIVE STATEMENT
57 y/o male with PMHx of HTN and DM presents to the ED as a transfer from [facility] as a code stroke. [facility] reports pt BLAYNE was last night around 20:00 and was c/o right arm pain, today his family found his around 05:00 with left sided hemiparesis, slurred speech and was incontinent of urine, they called EMS as pt arrived at [facility] at 05:56, he was found to have right ICA occlusion and transferred to [facility], code stroke called upon arrival. Pt was on Cardene however per EMS neuro wanted pressure around 200 and it was d/monet, pt sedated with propofol.

## 2023-03-29 NOTE — PATIENT PROFILE ADULT - FALL HARM RISK - HARM RISK INTERVENTIONS

## 2023-03-29 NOTE — DISCHARGE NOTE NURSING/CASE MANAGEMENT/SOCIAL WORK - PATIENT PORTAL LINK FT
You can access the FollowMyHealth Patient Portal offered by [facility] by registering at the following website: [email or web address]. By joining [employer]’s FollowMyHealth portal, you will also be able to view your health information using other applications (apps) compatible with our system.
You can access the FollowMyHealth Patient Portal offered by [facility] by registering at the following website: [email or web address]. By joining [employer]’s FollowMyHealth portal, you will also be able to view your health information using other applications (apps) compatible with our system.

## 2023-03-29 NOTE — CHART NOTE - NSCHARTNOTEFT_GEN_A_CORE
Neurointerventional Surgery  Pre-Procedure Note       HPI:  59 y/o male with PMHx of HTN and DM presents to the ED as a transfer from [facility] as a code stroke. [facility] reports pt BLAYNE was last night around 20:00 and was c/o right arm pain, today his family found his around 05:00 with left sided hemiparesis, slurred speech and was incontinent of urine, they called EMS as pt arrived at [facility] at 05:56, he was found to have right ICA occlusion and transferred to [facility], code stroke called upon arrival. Pt was on Cardene however per EMS neuro wanted pressure around 200 and it was d/monet, pt sedated with propofol.      Allergies: Allergy Status Unknown      PAST MEDICAL & SURGICAL HISTORY:  Unknown      Physical Exam:  Constitutional: NAD, lying in bed  Neuro  * Mental Status:  intubated, on propofol, to EO, moving RLE spontaneously, not following commands  * Cranial Nerves: +cough/gag, PERRL  * Motor: LUE 0/5, RUE AG, LLE 0/5, RLE AG   * Sensory: Sensation grossly intact to RUE/RLE   * Reflexes: not assessed   Cardiovascular: tachycardic  Eyes: See neurologic examination with detailed examination of eyes.  ENT: No JVD, Trachea Midline  Respiratory: intubated   Gastrointestinal: Soft, nontender, nondistended.  Genitourinary: [ x  ] Quispe, [ ] No Quispe.   Musculoskeletal: No muscle wasting noted, (See neurologic assessment for full muscle strength assessment) No pretibial edema appreciated, no appreciable calf tenderness.  Skin:  no wounds, no redness, no abrasions noted  Hematologic / Lymph / Immunologic: No bleeding from IV sites or wounds, No lymphadenopathy, No Hives or allergic type skin lesions      NIH SS:  DATE: 3/29/23   TIME: 0925  · Sedated/Unresponsive	Yes  · NIH Stroke Scale: LOC	(3) Responds only with reflex motor or autonomic effects or totally unresponsive, flaccid, and areflexic  · NIH Stroke Scale: LOC Question	(2) Answers neither question correctly  · NIH Stroke Scale: LOC Command	(2) Performs neither task correctly  · NIH Stroke Scale: Gaze	(2) Forced deviation, or total gaze paresis not overcome by the oculocephalic maneuver  · NIH Stroke Scale: Visual	(3) Bilateral hemianopia (blind including cortical blindness)  · NIH Stroke Scale: Facial	(0) Normal symmetrical movements  · NIH Stroke Scale: Arm Left	(4) No movement  · NIH Stroke Scale: Arm Right	(2) Some effort against gravity; limb cannot get to or maintain (if cued) 90 (or 45) degrees, drifts down to bed, but has some effort against gravity  · NIH Stroke Scale: Leg Left	(4) No movement  · NIH Stroke Scale: Leg Right	(2) Some effort against gravity; leg falls to bed by 5 secs, but has some effort against gravity  · NIH Stroke Scale: Ataxia	(0) Absent  · NIH Stroke Scale: Sensory	(1) Mild-to-moderate sensory loss; patient feels pinprick is less sharp or is dull on the affected side; or there is a loss of superficial pain with pinprick, but patient is aware of being touched  · NIH Stroke Scale: Language	(3) Mute, global aphasia; no usable speech or auditory comprehension  · NIH Stroke Scale: Dysarthria	(UN) Intubated or other physical barrier  · NIH Stroke Scale: Extinct Inattention	(0) No abnormality  · NIH Stroke Scale: Total	28      Labs:   Pending      Assessment/Plan:   This is a 58y  year old Male  presents with AYAKA and RMCA occlusions. Patient presents to neuro-IR for selective cerebral angiography.     Procedure, goals, risks, benefits and alternatives  were discussed with patient's sons.  All questions were answered.  Risks include but are not limited to stroke, vessel injury, hemorrhage, and or groin hematoma.  Patient's son demonstrates understanding  of all risks involved with this procedure and wishes to continue.   Appropriate  consent was obtained from patient's son and consent is in the patient's chart.

## 2023-03-29 NOTE — CHART NOTE - NSCHARTNOTEFT_GEN_A_CORE
Neurointerventional Surgery Post Procedure Note    Procedure: Selective Cerebral Angiography     Pre- Procedure Diagnosis: AYAKA occlusion  Post- Procedure Diagnosis: AYAKA occlusion and R MCA occlusion s/p mechanical thrombectomy TICI 2B and 8x21 R carotid stent with angioplasty     : Dr. Allen and Dr. Griffiths  Physician Assistant: Caridad Klein  Nurse: Negar Cerrato  Anesthesiologist: Dr. Tsang                            Radiology Tech: Guille Fernandez     Sheath:  6 Kinyarwanda Sheath    I/Os: estimated blood loss less than 30cc,  IV fluids 1000cc, Urine output cc, Contrast: omnipaque 240  , heparin 4000 units, IA TPA to RMCA 10 mg    Vitals:  /92    Spo2 100 %    Preliminary Report:  Under a 6 Kinyarwanda BMX sheath via the right groin under MAC sedation via right internal carotid artery, a selective cerebral angiography  was performed and reveals AYAKA occlusion and R MCA occlusion s/p mechanical thrombectomy TICI 2B and 8x21 R carotid stent with angioplasty. ( Official note to follow).    Patient tolerated procedure well.  Patient remains hemodynamically stable, no change in neurological status compared to baseline.  Results were discussed with patient, patient's family and Neurosurgery.  Right groin sheath  was discontinued using star close device. Hemostasis was obtained with approximately 13 minutes of manual compression.     No active bleeding, no hematoma, no ecchymosis.   Quick clot and safeguard balloon dressing applied at 1120  Patient transferred to recovery room in stable condition. Neurointerventional Surgery Post Procedure Note    Procedure: Selective Cerebral Angiography     Pre- Procedure Diagnosis: AYAKA occlusion  Post- Procedure Diagnosis: AYAKA occlusion and R MCA occlusion s/p mechanical thrombectomy TICI 2B and 8x21 R carotid stent with angioplasty     : Dr. Allen and Dr. Griffiths  Physician Assistant: Caridad Klein  Nurse: Negar Cerrato  Anesthesiologist: Dr. Tsang                            Radiology Tech: Guille Fernandez     Sheath:  6 Persian Sheath    I/Os: estimated blood loss less than 30cc,  IV fluids 1000cc, Urine output 800cc, Contrast: omnipaque 240 96cc , heparin 4000 units, IA TPA to RMCA 10 mg    Vitals:  /92    Spo2 100 %    Preliminary Report:  Under a 6 Persian BMX sheath via the right groin under MAC sedation via right internal carotid artery, a selective cerebral angiography  was performed and reveals AYAKA occlusion and R MCA occlusion s/p mechanical thrombectomy TICI 2B and 8x21 R carotid stent with angioplasty. ( Official note to follow).    Patient tolerated procedure well.  Patient remains hemodynamically stable, no change in neurological status compared to baseline.  Results were discussed with patient, patient's family and Neurosurgery.  Right groin sheath  was discontinued using star close device. Hemostasis was obtained with approximately 13 minutes of manual compression.     No active bleeding, no hematoma, no ecchymosis.   Quick clot and safeguard balloon dressing applied at 1120  Patient transferred to recovery room in stable condition.

## 2023-03-29 NOTE — CONSULT NOTE ADULT - ASSESSMENT
Assessment:  58M with PMH HTN, DM who presented with L sided weakness, found to have AYAKA/RMCA occlusions s/p thrombectomy TICI 2B recanalization and AYAKA stent on 3/29. Post op CTH shows likely contrast staining within stroke bed with mass effect and MLS. Neurosurgery consulted for hemicrani watch.      Plan:   - Discussed with Dr. Anderson  - Q1 hour neuro checks  - SBP goal  2/2 risk of hemorrhage  - Cangrelor drip per neuro IR for R ICA stent  - DVT prophylaxis: SCDs only  - ASA for stroke ppx  - Recommend Na > 140 to reduce cerebral edema  - Further care per NSICU team

## 2023-03-30 LAB
ANION GAP SERPL CALC-SCNC: 11 MMOL/L — SIGNIFICANT CHANGE UP (ref 5–17)
ANION GAP SERPL CALC-SCNC: 7 MMOL/L — SIGNIFICANT CHANGE UP (ref 5–17)
ANION GAP SERPL CALC-SCNC: 9 MMOL/L — SIGNIFICANT CHANGE UP (ref 5–17)
APPEARANCE UR: ABNORMAL
BACTERIA # UR AUTO: ABNORMAL
BILIRUB UR-MCNC: NEGATIVE — SIGNIFICANT CHANGE UP
BUN SERPL-MCNC: 7.3 MG/DL — LOW (ref 8–20)
BUN SERPL-MCNC: 8.1 MG/DL — SIGNIFICANT CHANGE UP (ref 8–20)
BUN SERPL-MCNC: 9.8 MG/DL — SIGNIFICANT CHANGE UP (ref 8–20)
CALCIUM SERPL-MCNC: 7.6 MG/DL — LOW (ref 8.4–10.5)
CALCIUM SERPL-MCNC: 7.8 MG/DL — LOW (ref 8.4–10.5)
CALCIUM SERPL-MCNC: 8 MG/DL — LOW (ref 8.4–10.5)
CHLORIDE SERPL-SCNC: 106 MMOL/L — SIGNIFICANT CHANGE UP (ref 96–108)
CHLORIDE SERPL-SCNC: 109 MMOL/L — HIGH (ref 96–108)
CHLORIDE SERPL-SCNC: 113 MMOL/L — HIGH (ref 96–108)
CO2 SERPL-SCNC: 22 MMOL/L — SIGNIFICANT CHANGE UP (ref 22–29)
CO2 SERPL-SCNC: 24 MMOL/L — SIGNIFICANT CHANGE UP (ref 22–29)
CO2 SERPL-SCNC: 25 MMOL/L — SIGNIFICANT CHANGE UP (ref 22–29)
COLOR SPEC: YELLOW — SIGNIFICANT CHANGE UP
CREAT SERPL-MCNC: 0.54 MG/DL — SIGNIFICANT CHANGE UP (ref 0.5–1.3)
CREAT SERPL-MCNC: 0.69 MG/DL — SIGNIFICANT CHANGE UP (ref 0.5–1.3)
CREAT SERPL-MCNC: 0.7 MG/DL — SIGNIFICANT CHANGE UP (ref 0.5–1.3)
DIFF PNL FLD: ABNORMAL
EGFR: 105 ML/MIN/1.73M2 — SIGNIFICANT CHANGE UP
EGFR: 106 ML/MIN/1.73M2 — SIGNIFICANT CHANGE UP
EGFR: 114 ML/MIN/1.73M2 — SIGNIFICANT CHANGE UP
EPI CELLS # UR: SIGNIFICANT CHANGE UP
GLUCOSE BLDC GLUCOMTR-MCNC: 181 MG/DL — HIGH (ref 70–99)
GLUCOSE BLDC GLUCOMTR-MCNC: 213 MG/DL — HIGH (ref 70–99)
GLUCOSE SERPL-MCNC: 133 MG/DL — HIGH (ref 70–99)
GLUCOSE SERPL-MCNC: 151 MG/DL — HIGH (ref 70–99)
GLUCOSE SERPL-MCNC: 224 MG/DL — HIGH (ref 70–99)
GLUCOSE UR QL: 100 MG/DL
HCT VFR BLD CALC: 35.9 % — LOW (ref 39–50)
HCV AB S/CO SERPL IA: 0.17 S/CO — SIGNIFICANT CHANGE UP (ref 0–0.99)
HCV AB SERPL-IMP: SIGNIFICANT CHANGE UP
HGB BLD-MCNC: 12 G/DL — LOW (ref 13–17)
KETONES UR-MCNC: ABNORMAL
LACTATE SERPL-SCNC: 2.2 MMOL/L — HIGH (ref 0.5–2)
LEUKOCYTE ESTERASE UR-ACNC: ABNORMAL
MAGNESIUM SERPL-MCNC: 1.7 MG/DL — SIGNIFICANT CHANGE UP (ref 1.6–2.6)
MCHC RBC-ENTMCNC: 27.1 PG — SIGNIFICANT CHANGE UP (ref 27–34)
MCHC RBC-ENTMCNC: 33.4 GM/DL — SIGNIFICANT CHANGE UP (ref 32–36)
MCV RBC AUTO: 81.2 FL — SIGNIFICANT CHANGE UP (ref 80–100)
NITRITE UR-MCNC: NEGATIVE — SIGNIFICANT CHANGE UP
PH UR: 6 — SIGNIFICANT CHANGE UP (ref 5–8)
PHOSPHATE SERPL-MCNC: 2.7 MG/DL — SIGNIFICANT CHANGE UP (ref 2.4–4.7)
PLATELET # BLD AUTO: 255 K/UL — SIGNIFICANT CHANGE UP (ref 150–400)
POTASSIUM SERPL-MCNC: 3.6 MMOL/L — SIGNIFICANT CHANGE UP (ref 3.5–5.3)
POTASSIUM SERPL-MCNC: 4 MMOL/L — SIGNIFICANT CHANGE UP (ref 3.5–5.3)
POTASSIUM SERPL-MCNC: 4 MMOL/L — SIGNIFICANT CHANGE UP (ref 3.5–5.3)
POTASSIUM SERPL-SCNC: 3.6 MMOL/L — SIGNIFICANT CHANGE UP (ref 3.5–5.3)
POTASSIUM SERPL-SCNC: 4 MMOL/L — SIGNIFICANT CHANGE UP (ref 3.5–5.3)
POTASSIUM SERPL-SCNC: 4 MMOL/L — SIGNIFICANT CHANGE UP (ref 3.5–5.3)
PROT UR-MCNC: 30 MG/DL
RBC # BLD: 4.42 M/UL — SIGNIFICANT CHANGE UP (ref 4.2–5.8)
RBC # FLD: 13.7 % — SIGNIFICANT CHANGE UP (ref 10.3–14.5)
RBC CASTS # UR COMP ASSIST: ABNORMAL /HPF (ref 0–4)
SODIUM SERPL-SCNC: 139 MMOL/L — SIGNIFICANT CHANGE UP (ref 135–145)
SODIUM SERPL-SCNC: 143 MMOL/L — SIGNIFICANT CHANGE UP (ref 135–145)
SODIUM SERPL-SCNC: 143 MMOL/L — SIGNIFICANT CHANGE UP (ref 135–145)
SP GR SPEC: 1.02 — SIGNIFICANT CHANGE UP (ref 1.01–1.02)
UROBILINOGEN FLD QL: NEGATIVE MG/DL — SIGNIFICANT CHANGE UP
WBC # BLD: 15.23 K/UL — HIGH (ref 3.8–10.5)
WBC # FLD AUTO: 15.23 K/UL — HIGH (ref 3.8–10.5)
WBC UR QL: ABNORMAL /HPF (ref 0–5)

## 2023-03-30 PROCEDURE — 99232 SBSQ HOSP IP/OBS MODERATE 35: CPT

## 2023-03-30 PROCEDURE — 99233 SBSQ HOSP IP/OBS HIGH 50: CPT | Mod: 24

## 2023-03-30 PROCEDURE — 71045 X-RAY EXAM CHEST 1 VIEW: CPT | Mod: 26

## 2023-03-30 PROCEDURE — 70450 CT HEAD/BRAIN W/O DYE: CPT | Mod: 26

## 2023-03-30 PROCEDURE — 99291 CRITICAL CARE FIRST HOUR: CPT

## 2023-03-30 RX ORDER — HUMAN INSULIN 100 [IU]/ML
2 INJECTION, SUSPENSION SUBCUTANEOUS EVERY 6 HOURS
Refills: 0 | Status: DISCONTINUED | OUTPATIENT
Start: 2023-03-30 | End: 2023-03-31

## 2023-03-30 RX ORDER — ACETAMINOPHEN 500 MG
650 TABLET ORAL EVERY 6 HOURS
Refills: 0 | Status: DISCONTINUED | OUTPATIENT
Start: 2023-03-30 | End: 2023-03-30

## 2023-03-30 RX ORDER — MAGNESIUM SULFATE 500 MG/ML
2 VIAL (ML) INJECTION ONCE
Refills: 0 | Status: COMPLETED | OUTPATIENT
Start: 2023-03-30 | End: 2023-03-30

## 2023-03-30 RX ORDER — ACETYLCYSTEINE 200 MG/ML
4 VIAL (ML) MISCELLANEOUS EVERY 12 HOURS
Refills: 0 | Status: DISCONTINUED | OUTPATIENT
Start: 2023-03-30 | End: 2023-03-31

## 2023-03-30 RX ORDER — ACETAMINOPHEN 500 MG
650 TABLET ORAL EVERY 6 HOURS
Refills: 0 | Status: DISCONTINUED | OUTPATIENT
Start: 2023-03-30 | End: 2023-04-01

## 2023-03-30 RX ORDER — SODIUM CHLORIDE 5 G/100ML
1000 INJECTION, SOLUTION INTRAVENOUS
Refills: 0 | Status: DISCONTINUED | OUTPATIENT
Start: 2023-03-30 | End: 2023-03-31

## 2023-03-30 RX ADMIN — POLYETHYLENE GLYCOL 3350 17 GRAM(S): 17 POWDER, FOR SOLUTION ORAL at 11:14

## 2023-03-30 RX ADMIN — CHLORHEXIDINE GLUCONATE 1 APPLICATION(S): 213 SOLUTION TOPICAL at 11:19

## 2023-03-30 RX ADMIN — Medication 650 MILLIGRAM(S): at 13:16

## 2023-03-30 RX ADMIN — Medication 4: at 11:15

## 2023-03-30 RX ADMIN — HUMAN INSULIN 2 UNIT(S): 100 INJECTION, SUSPENSION SUBCUTANEOUS at 12:18

## 2023-03-30 RX ADMIN — Medication 1 DROP(S): at 18:44

## 2023-03-30 RX ADMIN — MUPIROCIN 1 APPLICATION(S): 20 OINTMENT TOPICAL at 16:47

## 2023-03-30 RX ADMIN — Medication 2: at 16:46

## 2023-03-30 RX ADMIN — Medication 4 MILLILITER(S): at 18:17

## 2023-03-30 RX ADMIN — Medication 1 APPLICATION(S): at 18:46

## 2023-03-30 RX ADMIN — PIPERACILLIN AND TAZOBACTAM 200 GRAM(S): 4; .5 INJECTION, POWDER, LYOPHILIZED, FOR SOLUTION INTRAVENOUS at 01:43

## 2023-03-30 RX ADMIN — Medication 650 MILLIGRAM(S): at 14:15

## 2023-03-30 RX ADMIN — SODIUM CHLORIDE 75 MILLILITER(S): 9 INJECTION INTRAMUSCULAR; INTRAVENOUS; SUBCUTANEOUS at 08:10

## 2023-03-30 RX ADMIN — CANGRELOR 22.2 MICROGRAM(S)/KG/MIN: 50 INJECTION, POWDER, LYOPHILIZED, FOR SOLUTION INTRAVENOUS at 05:31

## 2023-03-30 RX ADMIN — DEXMEDETOMIDINE HYDROCHLORIDE IN 0.9% SODIUM CHLORIDE 3.7 MICROGRAM(S)/KG/HR: 4 INJECTION INTRAVENOUS at 05:31

## 2023-03-30 RX ADMIN — Medication 4: at 05:29

## 2023-03-30 RX ADMIN — DEXMEDETOMIDINE HYDROCHLORIDE IN 0.9% SODIUM CHLORIDE 3.7 MICROGRAM(S)/KG/HR: 4 INJECTION INTRAVENOUS at 11:14

## 2023-03-30 RX ADMIN — PIPERACILLIN AND TAZOBACTAM 25 GRAM(S): 4; .5 INJECTION, POWDER, LYOPHILIZED, FOR SOLUTION INTRAVENOUS at 05:28

## 2023-03-30 RX ADMIN — MUPIROCIN 1 APPLICATION(S): 20 OINTMENT TOPICAL at 05:31

## 2023-03-30 RX ADMIN — HUMAN INSULIN 2 UNIT(S): 100 INJECTION, SUSPENSION SUBCUTANEOUS at 16:46

## 2023-03-30 RX ADMIN — Medication 81 MILLIGRAM(S): at 11:14

## 2023-03-30 RX ADMIN — Medication 1000 MILLIGRAM(S): at 01:07

## 2023-03-30 RX ADMIN — SODIUM CHLORIDE 75 MILLILITER(S): 5 INJECTION, SOLUTION INTRAVENOUS at 13:15

## 2023-03-30 RX ADMIN — SENNA PLUS 10 MILLILITER(S): 8.6 TABLET ORAL at 21:52

## 2023-03-30 RX ADMIN — DEXMEDETOMIDINE HYDROCHLORIDE IN 0.9% SODIUM CHLORIDE 3.7 MICROGRAM(S)/KG/HR: 4 INJECTION INTRAVENOUS at 18:47

## 2023-03-30 RX ADMIN — CHLORHEXIDINE GLUCONATE 15 MILLILITER(S): 213 SOLUTION TOPICAL at 16:48

## 2023-03-30 RX ADMIN — PIPERACILLIN AND TAZOBACTAM 25 GRAM(S): 4; .5 INJECTION, POWDER, LYOPHILIZED, FOR SOLUTION INTRAVENOUS at 13:15

## 2023-03-30 RX ADMIN — Medication 62.5 MILLIMOLE(S): at 08:10

## 2023-03-30 RX ADMIN — CHLORHEXIDINE GLUCONATE 15 MILLILITER(S): 213 SOLUTION TOPICAL at 05:30

## 2023-03-30 RX ADMIN — ATORVASTATIN CALCIUM 80 MILLIGRAM(S): 80 TABLET, FILM COATED ORAL at 21:51

## 2023-03-30 RX ADMIN — Medication 6.94 MICROGRAM(S)/KG/MIN: at 05:32

## 2023-03-30 RX ADMIN — Medication 250 MILLIGRAM(S): at 00:20

## 2023-03-30 RX ADMIN — PIPERACILLIN AND TAZOBACTAM 25 GRAM(S): 4; .5 INJECTION, POWDER, LYOPHILIZED, FOR SOLUTION INTRAVENOUS at 21:52

## 2023-03-30 RX ADMIN — PANTOPRAZOLE SODIUM 40 MILLIGRAM(S): 20 TABLET, DELAYED RELEASE ORAL at 11:15

## 2023-03-30 RX ADMIN — Medication 25 GRAM(S): at 06:13

## 2023-03-30 NOTE — PROGRESS NOTE ADULT - SUBJECTIVE AND OBJECTIVE BOX
Preliminary note, offical recommendations pending attending review/signature   [facility] Stroke Team  Progress Note     HPI:  59 y/o male with PMHx of HTN and DM transfered from [facility] to [facility] as a code stroke. [facility] EMS reports pt LKW was  night prior to admission around 20:00 and was c/o right arm pain, day of admission his family found his around 05:00 with left sided hemiparesis, slurred speech and was incontinent of urine, they called EMS as pt arrived at [facility] at 05:56, he was found to have right ICA occlusion and transferred to [facility], code stroke called upon arrival. Transferred to  for mechanical thrombectomy.    SUBJECTIVE: No events overnight.  No new neurologic complaints.  ROS reported negative unless otherwise noted.    aspirin  chewable 81 milliGRAM(s) Oral daily  atorvastatin 80 milliGRAM(s) Oral at bedtime  cangrelor Infusion 1 MICROgram(s)/kG/Min IV Continuous <Continuous>  chlorhexidine 0.12% Liquid 15 milliLiter(s) Oral Mucosa every 12 hours  chlorhexidine 2% Cloths 1 Application(s) Topical daily  dexMEDEtomidine Infusion 0.2 MICROgram(s)/kG/Hr IV Continuous <Continuous>  dextrose 5%. 1000 milliLiter(s) IV Continuous <Continuous>  dextrose 5%. 1000 milliLiter(s) IV Continuous <Continuous>  dextrose 50% Injectable 25 Gram(s) IV Push once  dextrose 50% Injectable 12.5 Gram(s) IV Push once  dextrose 50% Injectable 25 Gram(s) IV Push once  dextrose Oral Gel 15 Gram(s) Oral once PRN  glucagon  Injectable 1 milliGRAM(s) IntraMuscular once  insulin lispro (ADMELOG) corrective regimen sliding scale   SubCutaneous every 6 hours  mupirocin 2% Nasal 1 Application(s) Both Nostrils two times a day  norepinephrine Infusion 0.05 MICROgram(s)/kG/Min IV Continuous <Continuous>  pantoprazole  Injectable 40 milliGRAM(s) IV Push daily  piperacillin/tazobactam IVPB.. 3.375 Gram(s) IV Intermittent every 8 hours  polyethylene glycol 3350 17 Gram(s) Oral daily  senna Syrup 10 milliLiter(s) Oral at bedtime  sodium chloride 0.9%. 1000 milliLiter(s) IV Continuous <Continuous>  sodium phosphate 15 milliMole(s)/250 mL IVPB 15 milliMole(s) IV Intermittent once  vancomycin  IVPB      vancomycin  IVPB 1000 milliGRAM(s) IV Intermittent every 12 hours      PHYSICAL EXAM:   Vital Signs Last 24 Hrs  T(C): 37.8 (30 Mar 2023 06:45), Max: 38.3 (29 Mar 2023 22:45)  T(F): 100 (30 Mar 2023 06:45), Max: 100.9 (29 Mar 2023 22:45)  HR: 66 (30 Mar 2023 06:45) (52 - 104)  BP: 97/51 (30 Mar 2023 06:45) (77/65 - 147/48)  BP(mean): 64 (30 Mar 2023 06:45) (64 - 102)  RR: 17 (30 Mar 2023 06:45) (12 - 30)  SpO2: 100% (30 Mar 2023 06:45) (99% - 100%)    Parameters below as of 30 Mar 2023 04:00  Patient On (Oxygen Delivery Method): ventilator    O2 Concentration (%): 50    EXAM PENDING   General: NAD, intubated and sedated on Propofol gtt    Detailed Neurologic Exam:    Mental status: sedated, eyes closed, no verbal output, not following commands , localizes with right hand to left     Cranial nerves: eyes in primary gaze, no blink to threat b/l, +gag, pupils equal and reactive to light     Motor: right side moves spontaneously against gravity , left hemiplegia with decorticate posturing in UE    Sensation: Intact to light touch on right, able to localize to left     Cerebellar: not tested    Gait : not tested    LABS:                        12.0   15.23 )-----------( 255      ( 30 Mar 2023 04:20 )             35.9    03-30    139  |  106  |  9.8  ----------------------------<  224<H>  4.0   |  22.0  |  0.70    Ca    7.8<L>      30 Mar 2023 04:20  Phos  2.7     03-30  Mg     1.7     03-30    PT/INR - ( 29 Mar 2023 10:00 )   PT: 13.2 sec;   INR: 1.14 ratio         PTT - ( 29 Mar 2023 10:00 )  PTT:29.9 sec      IMAGING: Reviewed.   RADIOLOGY & ADDITIONAL STUDIES (independently reviewed unless otherwise noted):      CT BRAIN STROKE PROTOCOL   03/29/2023    IMPRESSION: Dense right MCA sign with early right temporal lobe   infarction. No acute intracranial hemorrhage.    CT PERFUSION W MAPS IC    03/29/2023    CBF<30% volume: 72 ml right MCA territory.  Tmax>6.0 s volume: 180 ml  Mismatch volume: 108 ml  Mismatch ratio: 2.5   Preliminary note, offical recommendations pending attending review/signature   [facility] Stroke Team  Progress Note     HPI:  59 y/o male with PMHx of HTN and DM transfered from [facility] to [facility] as a code stroke. [facility] EMS reports pt LKW was  night prior to admission around 20:00 and was c/o right arm pain, day of admission his family found his around 05:00 with left sided hemiparesis, slurred speech and was incontinent of urine, they called EMS as pt arrived at [facility] at 05:56, he was found to have right ICA occlusion and transferred to [facility], code stroke called upon arrival. Transferred to  for mechanical thrombectomy.    SUBJECTIVE: No events overnight.  No new neurologic complaints.  ROS reported negative unless otherwise noted.    aspirin  chewable 81 milliGRAM(s) Oral daily  atorvastatin 80 milliGRAM(s) Oral at bedtime  cangrelor Infusion 1 MICROgram(s)/kG/Min IV Continuous <Continuous>  chlorhexidine 0.12% Liquid 15 milliLiter(s) Oral Mucosa every 12 hours  chlorhexidine 2% Cloths 1 Application(s) Topical daily  dexMEDEtomidine Infusion 0.2 MICROgram(s)/kG/Hr IV Continuous <Continuous>  dextrose 5%. 1000 milliLiter(s) IV Continuous <Continuous>  dextrose 5%. 1000 milliLiter(s) IV Continuous <Continuous>  dextrose 50% Injectable 25 Gram(s) IV Push once  dextrose 50% Injectable 12.5 Gram(s) IV Push once  dextrose 50% Injectable 25 Gram(s) IV Push once  dextrose Oral Gel 15 Gram(s) Oral once PRN  glucagon  Injectable 1 milliGRAM(s) IntraMuscular once  insulin lispro (ADMELOG) corrective regimen sliding scale   SubCutaneous every 6 hours  mupirocin 2% Nasal 1 Application(s) Both Nostrils two times a day  norepinephrine Infusion 0.05 MICROgram(s)/kG/Min IV Continuous <Continuous>  pantoprazole  Injectable 40 milliGRAM(s) IV Push daily  piperacillin/tazobactam IVPB.. 3.375 Gram(s) IV Intermittent every 8 hours  polyethylene glycol 3350 17 Gram(s) Oral daily  senna Syrup 10 milliLiter(s) Oral at bedtime  sodium chloride 0.9%. 1000 milliLiter(s) IV Continuous <Continuous>  sodium phosphate 15 milliMole(s)/250 mL IVPB 15 milliMole(s) IV Intermittent once  vancomycin  IVPB      vancomycin  IVPB 1000 milliGRAM(s) IV Intermittent every 12 hours      PHYSICAL EXAM:   Vital Signs Last 24 Hrs  T(C): 37.8 (30 Mar 2023 06:45), Max: 38.3 (29 Mar 2023 22:45)  T(F): 100 (30 Mar 2023 06:45), Max: 100.9 (29 Mar 2023 22:45)  HR: 66 (30 Mar 2023 06:45) (52 - 104)  BP: 97/51 (30 Mar 2023 06:45) (77/65 - 147/48)  BP(mean): 64 (30 Mar 2023 06:45) (64 - 102)  RR: 17 (30 Mar 2023 06:45) (12 - 30)  SpO2: 100% (30 Mar 2023 06:45) (99% - 100%)    Parameters below as of 30 Mar 2023 04:00  Patient On (Oxygen Delivery Method): ventilator    O2 Concentration (%): 50    General: NAD, intubated and sedated      Detailed Neurologic Exam:    Mental status: sedated, eyes closed, no verbal output, not following commands      Cranial nerves: right eye in primary gaze, left eye exodeviated, pupils 3mm b/l sluggish,  no blink to threat b/l, +gag, pupils equal and reactive to light     Motor: right side moves spontaneously  to noxious stimuli  , left hemiplegia with trace flexion at shoulder and in foot     Reflexes: left equivocal, babinski absent on right     Sensation: Intact to light touch on right, able to localize to left     Cerebellar: not tested    Gait : not tested    LABS:                        12.0   15.23 )-----------( 255      ( 30 Mar 2023 04:20 )             35.9    03-30    139  |  106  |  9.8  ----------------------------<  224<H>  4.0   |  22.0  |  0.70    Ca    7.8<L>      30 Mar 2023 04:20  Phos  2.7     03-30  Mg     1.7     03-30    PT/INR - ( 29 Mar 2023 10:00 )   PT: 13.2 sec;   INR: 1.14 ratio         PTT - ( 29 Mar 2023 10:00 )  PTT:29.9 sec      IMAGING: Reviewed.   RADIOLOGY & ADDITIONAL STUDIES (independently reviewed unless otherwise noted):    CT Head No Cont (03.30.23 @ 03:40)   Compared with 3/29/2023 there is decreasing retained contrast   in the right basal ganglia and right frontal parietal cortex with   evolving lucency consistent with an acute right artery infarct. Residual   high density although decreased compared to the prior exam may represent   retained contrast as well as a small amount of hemorrhage. No increased   hemorrhage identified.    CT BRAIN STROKE PROTOCOL   03/29/2023    IMPRESSION: Dense right MCA sign with early right temporal lobe   infarction. No acute intracranial hemorrhage.    CT PERFUSION W MAPS IC    03/29/2023    CBF<30% volume: 72 ml right MCA territory.  Tmax>6.0 s volume: 180 ml  Mismatch volume: 108 ml  Mismatch ratio: 2.5   [facility] Stroke Team  Progress Note     HPI:  57 y/o male with PMHx of HTN and DM transfered from [facility] to [facility] as a code stroke. [facility] EMS reports pt LKW was  night prior to admission around 20:00 and was c/o right arm pain, day of admission his family found his around 05:00 with left sided hemiparesis, slurred speech and was incontinent of urine, they called EMS as pt arrived at [facility] at 05:56, he was found to have right ICA occlusion and transferred to [facility], code stroke called upon arrival. Transferred to IR for mechanical thrombectomy.    SUBJECTIVE: No events overnight.  No new neurologic complaints. s/p Right MCA thrombectomy, Right ICA stent ROS reported negative unless otherwise noted.    aspirin  chewable 81 milliGRAM(s) Oral daily  atorvastatin 80 milliGRAM(s) Oral at bedtime  cangrelor Infusion 1 MICROgram(s)/kG/Min IV Continuous <Continuous>  chlorhexidine 0.12% Liquid 15 milliLiter(s) Oral Mucosa every 12 hours  chlorhexidine 2% Cloths 1 Application(s) Topical daily  dexMEDEtomidine Infusion 0.2 MICROgram(s)/kG/Hr IV Continuous <Continuous>  dextrose 5%. 1000 milliLiter(s) IV Continuous <Continuous>  dextrose 5%. 1000 milliLiter(s) IV Continuous <Continuous>  dextrose 50% Injectable 25 Gram(s) IV Push once  dextrose 50% Injectable 12.5 Gram(s) IV Push once  dextrose 50% Injectable 25 Gram(s) IV Push once  dextrose Oral Gel 15 Gram(s) Oral once PRN  glucagon  Injectable 1 milliGRAM(s) IntraMuscular once  insulin lispro (ADMELOG) corrective regimen sliding scale   SubCutaneous every 6 hours  mupirocin 2% Nasal 1 Application(s) Both Nostrils two times a day  norepinephrine Infusion 0.05 MICROgram(s)/kG/Min IV Continuous <Continuous>  pantoprazole  Injectable 40 milliGRAM(s) IV Push daily  piperacillin/tazobactam IVPB.. 3.375 Gram(s) IV Intermittent every 8 hours  polyethylene glycol 3350 17 Gram(s) Oral daily  senna Syrup 10 milliLiter(s) Oral at bedtime  sodium chloride 0.9%. 1000 milliLiter(s) IV Continuous <Continuous>  sodium phosphate 15 milliMole(s)/250 mL IVPB 15 milliMole(s) IV Intermittent once  vancomycin  IVPB      vancomycin  IVPB 1000 milliGRAM(s) IV Intermittent every 12 hours      PHYSICAL EXAM:   Vital Signs Last 24 Hrs  T(C): 37.8 (30 Mar 2023 06:45), Max: 38.3 (29 Mar 2023 22:45)  T(F): 100 (30 Mar 2023 06:45), Max: 100.9 (29 Mar 2023 22:45)  HR: 66 (30 Mar 2023 06:45) (52 - 104)  BP: 97/51 (30 Mar 2023 06:45) (77/65 - 147/48)  BP(mean): 64 (30 Mar 2023 06:45) (64 - 102)  RR: 17 (30 Mar 2023 06:45) (12 - 30)  SpO2: 100% (30 Mar 2023 06:45) (99% - 100%)    Parameters below as of 30 Mar 2023 04:00  Patient On (Oxygen Delivery Method): ventilator    O2 Concentration (%): 50    General: NAD, intubated and sedated      Detailed Neurologic Exam:    Mental status: sedated, eyes closed, no verbal output, not following commands      Cranial nerves: right eye in primary gaze, left eye exodeviated, pupils 3mm b/l sluggish,  no blink to threat b/l, +gag, pupils equal and reactive to light     Motor: right side moves spontaneously  to noxious stimuli  , left hemiplegia with trace flexion at shoulder and in foot     Reflexes: left equivocal, babinski absent on right     Sensation: Intact to light touch on right, able to localize to left     Cerebellar: not tested    Gait : not tested    LABS:                        12.0   15.23 )-----------( 255      ( 30 Mar 2023 04:20 )             35.9    03-30    139  |  106  |  9.8  ----------------------------<  224<H>  4.0   |  22.0  |  0.70    Ca    7.8<L>      30 Mar 2023 04:20  Phos  2.7     03-30  Mg     1.7     03-30    PT/INR - ( 29 Mar 2023 10:00 )   PT: 13.2 sec;   INR: 1.14 ratio         PTT - ( 29 Mar 2023 10:00 )  PTT:29.9 sec      IMAGING: Reviewed.   RADIOLOGY & ADDITIONAL STUDIES (independently reviewed unless otherwise noted):    CT Head No Cont (03.30.23 @ 03:40)   Compared with 3/29/2023 there is decreasing retained contrast   in the right basal ganglia and right frontal parietal cortex with   evolving lucency consistent with an acute right artery infarct. Residual   high density although decreased compared to the prior exam may represent   retained contrast as well as a small amount of hemorrhage. No increased   hemorrhage identified.    CT BRAIN STROKE PROTOCOL   03/29/2023    IMPRESSION: Dense right MCA sign with early right temporal lobe   infarction. No acute intracranial hemorrhage.    CT PERFUSION W MAPS IC    03/29/2023    CBF<30% volume: 72 ml right MCA territory.  Tmax>6.0 s volume: 180 ml  Mismatch volume: 108 ml  Mismatch ratio: 2.5

## 2023-03-30 NOTE — PROGRESS NOTE ADULT - SUBJECTIVE AND OBJECTIVE BOX
HPI:  59 y/o male with PMHx of HTN and DM transfered from [facility] to [facility] as a code stroke. [facility] reports pt BLAYNE was last night around 20:00 and was c/o right arm pain, today his family found his around 05:00 with left sided hemiparesis, slurred speech and was incontinent of urine, they called EMS as pt arrived at [facility] at 05:56, he was found to have right ICA occlusion and transferred to [facility], code stroke called upon arrival. Pt was on Cardene however per EMS neuro wanted pressure around 200 and it was d/monet, pt sedated with propofol.   (29 Mar 2023 09:40)    OVERNIGHT EVENTS: Patient remains on levophed gtt. Febrile, cultures sent, started on vanc/zosyn. Sedated on precedex.     Vital Signs Last 24 Hrs  T(C): 38.1 (30 Mar 2023 01:00), Max: 38.3 (29 Mar 2023 22:45)  T(F): 100.6 (30 Mar 2023 01:00), Max: 100.9 (29 Mar 2023 22:45)  HR: 65 (30 Mar 2023 01:00) (54 - 104)  BP: 118/78 (30 Mar 2023 01:00) (77/65 - 147/48)  BP(mean): 90 (30 Mar 2023 01:00) (67 - 102)  RR: 15 (30 Mar 2023 01:00) (12 - 30)  SpO2: 100% (30 Mar 2023 01:00) (99% - 100%)    Parameters below as of 30 Mar 2023 00:00  Patient On (Oxygen Delivery Method): ventilator    O2 Concentration (%): 50    I&O's Summary    29 Mar 2023 07:01  -  30 Mar 2023 01:38  --------------------------------------------------------  IN: 3056.2 mL / OUT: 572 mL / NET: 2484.2 mL        PHYSICAL EXAM:  Gen:  HEENT:  Neck:  Lungs:  Heart:  Abd:  Exts:  Neuro:  Wound/drains:    TUBES/LINES:  [] Quispe  [] Wound Drains  [] Others      DIET:  [] NPO  [] Mechanical  [] Tube feeds    LABS:                        15.2   14.31 )-----------( 264      ( 29 Mar 2023 10:00 )             45.4         138  |  98  |  9.1  ----------------------------<  260<H>  3.1<L>   |  23.0  |  0.62    Ca    8.6      29 Mar 2023 10:00      PT/INR - ( 29 Mar 2023 10:00 )   PT: 13.2 sec;   INR: 1.14 ratio         PTT - ( 29 Mar 2023 10:00 )  PTT:29.9 sec  Urinalysis Basic - ( 29 Mar 2023 23:40 )    Color: Yellow / Appearance: Slightly Turbid / S.020 / pH: x  Gluc: x / Ketone: Trace  / Bili: Negative / Urobili: Negative mg/dL   Blood: x / Protein: 30 mg/dL / Nitrite: Negative   Leuk Esterase: Small / RBC: 11-25 /HPF / WBC 6-10 /HPF   Sq Epi: x / Non Sq Epi: Few / Bacteria: Occasional          CAPILLARY BLOOD GLUCOSE  249 (29 Mar 2023 09:33)      POCT Blood Glucose.: 201 mg/dL (29 Mar 2023 23:52)  POCT Blood Glucose.: 224 mg/dL (29 Mar 2023 17:07)  POCT Blood Glucose.: 196 mg/dL (29 Mar 2023 12:00)  POCT Blood Glucose.: 249 mg/dL (29 Mar 2023 08:45)      Drug Levels: [] N/A    CSF Analysis: [] N/A      Allergies    Allergy Status Unknown    Intolerances      MEDICATIONS:  Antibiotics:  piperacillin/tazobactam IVPB. 3.375 Gram(s) IV Intermittent once  piperacillin/tazobactam IVPB.- 3.375 Gram(s) IV Intermittent once  piperacillin/tazobactam IVPB.. 3.375 Gram(s) IV Intermittent every 8 hours  vancomycin  IVPB      vancomycin  IVPB 1000 milliGRAM(s) IV Intermittent every 12 hours    Neuro:  dexMEDEtomidine Infusion 0.2 MICROgram(s)/kG/Hr IV Continuous <Continuous>    Anticoagulation:  aspirin  chewable 81 milliGRAM(s) Oral daily  cangrelor Infusion 1 MICROgram(s)/kG/Min IV Continuous <Continuous>    OTHER:  atorvastatin 80 milliGRAM(s) Oral at bedtime  chlorhexidine 0.12% Liquid 15 milliLiter(s) Oral Mucosa every 12 hours  chlorhexidine 2% Cloths 1 Application(s) Topical daily  dextrose 50% Injectable 25 Gram(s) IV Push once  dextrose 50% Injectable 12.5 Gram(s) IV Push once  dextrose 50% Injectable 25 Gram(s) IV Push once  dextrose Oral Gel 15 Gram(s) Oral once PRN  glucagon  Injectable 1 milliGRAM(s) IntraMuscular once  insulin lispro (ADMELOG) corrective regimen sliding scale   SubCutaneous every 6 hours  mupirocin 2% Nasal 1 Application(s) Both Nostrils two times a day  norepinephrine Infusion 0.05 MICROgram(s)/kG/Min IV Continuous <Continuous>  pantoprazole  Injectable 40 milliGRAM(s) IV Push daily  polyethylene glycol 3350 17 Gram(s) Oral daily  senna Syrup 10 milliLiter(s) Oral at bedtime    IVF:  dextrose 5%. 1000 milliLiter(s) IV Continuous <Continuous>  dextrose 5%. 1000 milliLiter(s) IV Continuous <Continuous>  sodium chloride 0.9%. 1000 milliLiter(s) IV Continuous <Continuous>    CULTURES:    RADIOLOGY & ADDITIONAL TESTS:      ASSESSMENT:  58y Male s/p    PLAN:  NEURO:    CARDIOVASCULAR:    PULMONARY:    RENAL:    GI:    HEME:    ID:    ENDO:    DVT PROPHYLAXIS:  [] Venodynes                                [] Heparin/Lovenox    DISPOSITION:   HPI:  57 y/o male with PMHx of HTN and DM transfered from [facility] to [facility] as a code stroke. [facility] EMS reports pt BLAYNE was last night around 20:00 and was c/o right arm pain, today his family found his around 05:00 with left sided hemiparesis, slurred speech and was incontinent of urine, they called EMS as pt arrived at [facility] at 05:56, he was found to have right ICA occlusion and transferred to [facility], code stroke called upon arrival. Pt was on Cardene however per EMS neuro wanted pressure around 200 and it was d/monet, pt sedated with propofol.   (29 Mar 2023 09:40)    OVERNIGHT EVENTS: Patient remains on levophed gtt. Febrile, cultures sent, started on vanc/zosyn. Sedated on precedex.     Vital Signs Last 24 Hrs  T(C): 38.1 (30 Mar 2023 01:00), Max: 38.3 (29 Mar 2023 22:45)  T(F): 100.6 (30 Mar 2023 01:00), Max: 100.9 (29 Mar 2023 22:45)  HR: 65 (30 Mar 2023 01:00) (54 - 104)  BP: 118/78 (30 Mar 2023 01:00) (77/65 - 147/48)  BP(mean): 90 (30 Mar 2023 01:00) (67 - 102)  RR: 15 (30 Mar 2023 01:00) (12 - 30)  SpO2: 100% (30 Mar 2023 01:00) (99% - 100%)    Parameters below as of 30 Mar 2023 00:00  Patient On (Oxygen Delivery Method): ventilator    O2 Concentration (%): 50    I&O's Summary    29 Mar 2023 07:01  -  30 Mar 2023 01:38  --------------------------------------------------------  IN: 3056.2 mL / OUT: 572 mL / NET: 2484.2 mL        PHYSICAL EXAM:  General: NAD, sedated on precedex  HEENT: atraumatic head, PERRL 4mm b/l, R eye chemosis  Cardiovascular: Regular rate and rhythm  Respiratory: nonlabored breathing, normal chest rise, intubated  GI: abdomen soft, nontender, nondistended  Neuro: attempts to open R eye to voice limited by edema, does not open L eye  +cough/gag/corneals, blinks to threat, unable to track   RUE 5/5, RLE 2/5, follows commands on R (squeeze hand, 2 fingers, wiggles toes)  LUE extensor, LLE TF  Extremities: distal pulses 2+ x4  Wound/incision: R groin site c/d/i        TUBES/LINES:  [X] Quispe  [] Wound Drains  [] Others      DIET:  [x] NPO  [] Mechanical  [] Tube feeds    LABS:                        15.2   14.31 )-----------( 264      ( 29 Mar 2023 10:00 )             45.4         138  |  98  |  9.1  ----------------------------<  260<H>  3.1<L>   |  23.0  |  0.62    Ca    8.6      29 Mar 2023 10:00      PT/INR - ( 29 Mar 2023 10:00 )   PT: 13.2 sec;   INR: 1.14 ratio         PTT - ( 29 Mar 2023 10:00 )  PTT:29.9 sec  Urinalysis Basic - ( 29 Mar 2023 23:40 )    Color: Yellow / Appearance: Slightly Turbid / S.020 / pH: x  Gluc: x / Ketone: Trace  / Bili: Negative / Urobili: Negative mg/dL   Blood: x / Protein: 30 mg/dL / Nitrite: Negative   Leuk Esterase: Small / RBC: 11-25 /HPF / WBC 6-10 /HPF   Sq Epi: x / Non Sq Epi: Few / Bacteria: Occasional          CAPILLARY BLOOD GLUCOSE  249 (29 Mar 2023 09:33)      POCT Blood Glucose.: 201 mg/dL (29 Mar 2023 23:52)  POCT Blood Glucose.: 224 mg/dL (29 Mar 2023 17:07)  POCT Blood Glucose.: 196 mg/dL (29 Mar 2023 12:00)  POCT Blood Glucose.: 249 mg/dL (29 Mar 2023 08:45)        Allergies  Allergy Status Unknown    MEDICATIONS:  Antibiotics:  piperacillin/tazobactam IVPB. 3.375 Gram(s) IV Intermittent once  piperacillin/tazobactam IVPB.- 3.375 Gram(s) IV Intermittent once  piperacillin/tazobactam IVPB.. 3.375 Gram(s) IV Intermittent every 8 hours  vancomycin  IVPB      vancomycin  IVPB 1000 milliGRAM(s) IV Intermittent every 12 hours    Neuro:  dexMEDEtomidine Infusion 0.2 MICROgram(s)/kG/Hr IV Continuous <Continuous>    Anticoagulation:  aspirin  chewable 81 milliGRAM(s) Oral daily  cangrelor Infusion 1 MICROgram(s)/kG/Min IV Continuous <Continuous>    OTHER:  atorvastatin 80 milliGRAM(s) Oral at bedtime  chlorhexidine 0.12% Liquid 15 milliLiter(s) Oral Mucosa every 12 hours  chlorhexidine 2% Cloths 1 Application(s) Topical daily  dextrose 50% Injectable 25 Gram(s) IV Push once  dextrose 50% Injectable 12.5 Gram(s) IV Push once  dextrose 50% Injectable 25 Gram(s) IV Push once  dextrose Oral Gel 15 Gram(s) Oral once PRN  glucagon  Injectable 1 milliGRAM(s) IntraMuscular once  insulin lispro (ADMELOG) corrective regimen sliding scale   SubCutaneous every 6 hours  mupirocin 2% Nasal 1 Application(s) Both Nostrils two times a day  norepinephrine Infusion 0.05 MICROgram(s)/kG/Min IV Continuous <Continuous>  pantoprazole  Injectable 40 milliGRAM(s) IV Push daily  polyethylene glycol 3350 17 Gram(s) Oral daily  senna Syrup 10 milliLiter(s) Oral at bedtime    IVF:  dextrose 5%. 1000 milliLiter(s) IV Continuous <Continuous>  dextrose 5%. 1000 milliLiter(s) IV Continuous <Continuous>  sodium chloride 0.9%. 1000 milliLiter(s) IV Continuous <Continuous>      RADIOLOGY & ADDITIONAL TESTS:  < from: CT Head No Cont (23 @ 17:47) >    IMPRESSION: New increased density in the right basal ganglia right   frontal temporal cortex and right caudate head since 9:02 AM likely   representing retained contrast in the right middle cerebral artery   infarct although a small amount of hemorrhage is not excluded. There is   new mass effect on the right frontal horn and new minimal midline shift.        ASSESSMENT:  57 y/o male with PMHx of HTN and DM transfered from [facility] on 3/29, [id number] ~20:00 c/o right arm pain, found by family ~05:00 with L hemiparesis, slurred speech and was incontinent of urine, found to have AYAKA/RMCA occlusions s/p thrombectomy TICI 2B recanalization and AYAKA stent on 3/29. Post op CTH shows likely contrast staining within stroke bed with mass effect and MLS. Neurosurgery consulted for hemicrani watch.      Plan:   - Q1 hour neuro checks  - SBP goal 100-140 2/2 risk of hemorrhage, levophed gtt  - Cangrelor drip per neuro IR for R ICA stent  - DVT prophylaxis: SCDs only  - ASA for stroke ppx  - Recommend Na > 140 to reduce cerebral edema  - Further medical management as per NSICU   - pend discussion with surgeon in AM       HPI:  57 y/o male with PMHx of HTN and DM transfered from [facility] to [facility] as a code stroke. [facility] EMS reports pt BLAYNE was last night around 20:00 and was c/o right arm pain, today his family found his around 05:00 with left sided hemiparesis, slurred speech and was incontinent of urine, they called EMS as pt arrived at [facility] at 05:56, he was found to have right ICA occlusion and transferred to [facility], code stroke called upon arrival. Pt was on Cardene however per EMS neuro wanted pressure around 200 and it was d/monet, pt sedated with propofol.   (29 Mar 2023 09:40)    OVERNIGHT EVENTS: Patient remains on levophed and cangrelor gtt. Febrile, cultures sent, started on vanc/zosyn. Sedated on precedex.    Vital Signs Last 24 Hrs  T(C): 38.1 (30 Mar 2023 01:00), Max: 38.3 (29 Mar 2023 22:45)  T(F): 100.6 (30 Mar 2023 01:00), Max: 100.9 (29 Mar 2023 22:45)  HR: 65 (30 Mar 2023 01:00) (54 - 104)  BP: 118/78 (30 Mar 2023 01:00) (77/65 - 147/48)  BP(mean): 90 (30 Mar 2023 01:00) (67 - 102)  RR: 15 (30 Mar 2023 01:00) (12 - 30)  SpO2: 100% (30 Mar 2023 01:00) (99% - 100%)    Parameters below as of 30 Mar 2023 00:00  Patient On (Oxygen Delivery Method): ventilator    O2 Concentration (%): 50    I&O's Summary    29 Mar 2023 07:01  -  30 Mar 2023 01:38  --------------------------------------------------------  IN: 3056.2 mL / OUT: 572 mL / NET: 2484.2 mL        PHYSICAL EXAM:  General: NAD, sedated on precedex  HEENT: atraumatic head, PERRL 4mm b/l, R eye chemosis  Cardiovascular: Regular rate and rhythm  Respiratory: nonlabored breathing, normal chest rise, intubated  GI: abdomen soft, nontender, nondistended  Neuro: attempts to open R eye to voice limited by edema, does not open L eye  +cough/gag/corneals, blinks to threat, unable to track   RUE 5/5, RLE 2/5, follows commands on R (squeeze hand, 2 fingers, wiggles toes)  LUE extensor, LLE TF  Extremities: distal pulses 2+ x4  Wound/incision: R groin site c/d/i        TUBES/LINES:  [X] Quispe  [] Wound Drains  [] Others      DIET:  [x] NPO  [] Mechanical  [] Tube feeds    LABS:                        15.2   14.31 )-----------( 264      ( 29 Mar 2023 10:00 )             45.4         138  |  98  |  9.1  ----------------------------<  260<H>  3.1<L>   |  23.0  |  0.62    Ca    8.6      29 Mar 2023 10:00      PT/INR - ( 29 Mar 2023 10:00 )   PT: 13.2 sec;   INR: 1.14 ratio         PTT - ( 29 Mar 2023 10:00 )  PTT:29.9 sec  Urinalysis Basic - ( 29 Mar 2023 23:40 )    Color: Yellow / Appearance: Slightly Turbid / S.020 / pH: x  Gluc: x / Ketone: Trace  / Bili: Negative / Urobili: Negative mg/dL   Blood: x / Protein: 30 mg/dL / Nitrite: Negative   Leuk Esterase: Small / RBC: 11-25 /HPF / WBC 6-10 /HPF   Sq Epi: x / Non Sq Epi: Few / Bacteria: Occasional          CAPILLARY BLOOD GLUCOSE  249 (29 Mar 2023 09:33)      POCT Blood Glucose.: 201 mg/dL (29 Mar 2023 23:52)  POCT Blood Glucose.: 224 mg/dL (29 Mar 2023 17:07)  POCT Blood Glucose.: 196 mg/dL (29 Mar 2023 12:00)  POCT Blood Glucose.: 249 mg/dL (29 Mar 2023 08:45)        Allergies  Allergy Status Unknown    MEDICATIONS:  Antibiotics:  piperacillin/tazobactam IVPB. 3.375 Gram(s) IV Intermittent once  piperacillin/tazobactam IVPB.- 3.375 Gram(s) IV Intermittent once  piperacillin/tazobactam IVPB.. 3.375 Gram(s) IV Intermittent every 8 hours  vancomycin  IVPB      vancomycin  IVPB 1000 milliGRAM(s) IV Intermittent every 12 hours    Neuro:  dexMEDEtomidine Infusion 0.2 MICROgram(s)/kG/Hr IV Continuous <Continuous>    Anticoagulation:  aspirin  chewable 81 milliGRAM(s) Oral daily  cangrelor Infusion 1 MICROgram(s)/kG/Min IV Continuous <Continuous>    OTHER:  atorvastatin 80 milliGRAM(s) Oral at bedtime  chlorhexidine 0.12% Liquid 15 milliLiter(s) Oral Mucosa every 12 hours  chlorhexidine 2% Cloths 1 Application(s) Topical daily  dextrose 50% Injectable 25 Gram(s) IV Push once  dextrose 50% Injectable 12.5 Gram(s) IV Push once  dextrose 50% Injectable 25 Gram(s) IV Push once  dextrose Oral Gel 15 Gram(s) Oral once PRN  glucagon  Injectable 1 milliGRAM(s) IntraMuscular once  insulin lispro (ADMELOG) corrective regimen sliding scale   SubCutaneous every 6 hours  mupirocin 2% Nasal 1 Application(s) Both Nostrils two times a day  norepinephrine Infusion 0.05 MICROgram(s)/kG/Min IV Continuous <Continuous>  pantoprazole  Injectable 40 milliGRAM(s) IV Push daily  polyethylene glycol 3350 17 Gram(s) Oral daily  senna Syrup 10 milliLiter(s) Oral at bedtime    IVF:  dextrose 5%. 1000 milliLiter(s) IV Continuous <Continuous>  dextrose 5%. 1000 milliLiter(s) IV Continuous <Continuous>  sodium chloride 0.9%. 1000 milliLiter(s) IV Continuous <Continuous>      RADIOLOGY & ADDITIONAL TESTS:  < from: CT Head No Cont (23 @ 17:47) >    IMPRESSION: New increased density in the right basal ganglia right   frontal temporal cortex and right caudate head since 9:02 AM likely   representing retained contrast in the right middle cerebral artery   infarct although a small amount of hemorrhage is not excluded. There is   new mass effect on the right frontal horn and new minimal midline shift.        ASSESSMENT:  57 y/o male with PMHx of HTN and DM transfered from [facility] on 3/29, LKW ~20:00 c/o right arm pain, found by family ~05:00 with L hemiparesis, slurred speech and was incontinent of urine, found to have AYAKA/RMCA occlusions s/p thrombectomy TICI 2B recanalization and AYAKA stent on 3/29. Post op CTH shows likely contrast staining within stroke bed with mass effect and MLS. Neurosurgery consulted for hemicrani watch.      Plan:   - Q1 hour neuro checks  - pend CTH in AM   - pend MRI   - SBP goal 100-140 2/2 risk of hemorrhage, levophed gtt  - Cangrelor drip per neuro IR for R ICA stent, will transition to DAPT  - DVT prophylaxis: SCDs only  - ASA for stroke ppx  - Recommend Na > 140 to reduce cerebral edema  - Further medical management as per NSICU   - pend discussion with surgeon in AM

## 2023-03-30 NOTE — PROGRESS NOTE ADULT - SUBJECTIVE AND OBJECTIVE BOX
Interval events:  CTH with some hemorrhagic transformation in the R temporal lobe and some hyperdensity (contrast) in the R caudate and basal ganglia, as well as patchy strokes throughout the R MCA territory     VITALS:  T(C): , Max: 38.3 (03-29-23 @ 22:45)  HR:  (52 - 104)  BP:  (77/65 - 147/48)  ABP: --  RR:  (12 - 30)  SpO2:  (99% - 100%)  Wt(kg): --  Device: Avea, Mode: CPAP with PS, FiO2: 50, PEEP: 6, PS: 10, MAP: 9    03-29-23 @ 07:01  -  03-30-23 @ 07:00  --------------------------------------------------------  IN: 4061.9 mL / OUT: 867 mL / NET: 3194.9 mL      LABS:  Na: 139 (03-30 @ 04:20), 138 (03-29 @ 10:00)  K: 4.0 (03-30 @ 04:20), 3.1 (03-29 @ 10:00)  Cl: 106 (03-30 @ 04:20), 98 (03-29 @ 10:00)  CO2: 22.0 (03-30 @ 04:20), 23.0 (03-29 @ 10:00)  BUN: 9.8 (03-30 @ 04:20), 9.1 (03-29 @ 10:00)  Cr: 0.70 (03-30 @ 04:20), 0.62 (03-29 @ 10:00)  Glu: 224(03-30 @ 04:20), 260(03-29 @ 10:00)    Hgb: 12.0 (03-30 @ 04:20), 15.2 (03-29 @ 10:00)  Hct: 35.9 (03-30 @ 04:20), 45.4 (03-29 @ 10:00)  WBC: 15.23 (03-30 @ 04:20), 14.31 (03-29 @ 10:00)  Plt: 255 (03-30 @ 04:20), 264 (03-29 @ 10:00)    INR: 1.14 03-29-23 @ 10:00  PTT: 29.9 03-29-23 @ 10:00    MEDICATIONS:  aspirin  chewable 81 milliGRAM(s) Oral daily  atorvastatin 80 milliGRAM(s) Oral at bedtime  cangrelor Infusion 1 MICROgram(s)/kG/Min IV Continuous <Continuous>  chlorhexidine 0.12% Liquid 15 milliLiter(s) Oral Mucosa every 12 hours  chlorhexidine 2% Cloths 1 Application(s) Topical daily  dexMEDEtomidine Infusion 0.2 MICROgram(s)/kG/Hr IV Continuous <Continuous>  dextrose 5%. 1000 milliLiter(s) IV Continuous <Continuous>  dextrose 5%. 1000 milliLiter(s) IV Continuous <Continuous>  dextrose 50% Injectable 25 Gram(s) IV Push once  dextrose 50% Injectable 12.5 Gram(s) IV Push once  dextrose 50% Injectable 25 Gram(s) IV Push once  dextrose Oral Gel 15 Gram(s) Oral once PRN  glucagon  Injectable 1 milliGRAM(s) IntraMuscular once  insulin lispro (ADMELOG) corrective regimen sliding scale   SubCutaneous every 6 hours  mupirocin 2% Nasal 1 Application(s) Both Nostrils two times a day  norepinephrine Infusion 0.05 MICROgram(s)/kG/Min IV Continuous <Continuous>  pantoprazole  Injectable 40 milliGRAM(s) IV Push daily  piperacillin/tazobactam IVPB.. 3.375 Gram(s) IV Intermittent every 8 hours  polyethylene glycol 3350 17 Gram(s) Oral daily  senna Syrup 10 milliLiter(s) Oral at bedtime  sodium chloride 0.9%. 1000 milliLiter(s) IV Continuous <Continuous>  sodium phosphate 15 milliMole(s)/250 mL IVPB 15 milliMole(s) IV Intermittent once  vancomycin  IVPB      vancomycin  IVPB 1000 milliGRAM(s) IV Intermittent every 12 hours    EXAMINATION:  General:  in NAD  HEENT:  MMM  Neuro:  awake, alert, oriented x 3, follows commands, EOMI, face symmetric, no PD, DF 5/5   Cards:  RRR  Respiratory:  no respiratory distress  Abdomen:  soft  Extremities:  no LE edema    Assessment/Plan:   57 yo man with HTN and DM, mRS 0 at baseline, LKN 8pm, found at 5:00am by family with L side and confusion. At [facility], NIHSS >20, intubated for persistent vomiting. CTH reportedly wnl, CTA reportedly with R carotid occlusion. Transferred to [facility], NIHSS here 28. CTH with some loss of grey-white in the R temporal lobe. Core 72, penumbra 108, s/p angio demonstrating a R proximal ICA occlusion at the bifurcation with a R M1 occlusion s/p MT with TICI 2B recanalization and IA tPA given, followed by stenting of R ICA. 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 At high risk for hemorrhagic conversion due to a large ischemic core and being on cangrelor.     Neuro:  q1h NC  CTH 4 hours from MT  c/w cangrelor ggt, decreased to 1 from 2 for low P2Y12  start Atorvastatin 80 mg daily  precedex for sedation with fentanyl PRN  stroke w/u with LDL and HgA1C     CV:   SBP goal 100-140  TTE    Pulm:    GI:    Renal:  Na goal    Heme:  SCDs    ID:    Endo: HgA1C 7.3  FS goal 120-180  ISS    Patient seen and examined by attending on 3/30/2023.    Patient is critically ill due to acute R MCA stroke s/p MT and at high risk for neurological deterioration or death due to: at high risk of hemorrhagic conversion, requiring intubation due to inability to protect airway.    Interval events:  CTH with some hemorrhagic transformation in the R temporal lobe and some hyperdensity (contrast) in the R caudate and basal ganglia, as well as patchy strokes throughout the R MCA territory.  Febrile to 38.3. Abx started overnight.     VITALS:  T(C): , Max: 38.3 (03-29-23 @ 22:45)  HR:  (52 - 104)  BP:  (77/65 - 147/48)  ABP: --  RR:  (12 - 30)  SpO2:  (99% - 100%)  Wt(kg): --  Device: Avea, Mode: CPAP with PS, FiO2: 50, PEEP: 6, PS: 10, MAP: 9    03-29-23 @ 07:01  -  03-30-23 @ 07:00  --------------------------------------------------------  IN: 4061.9 mL / OUT: 867 mL / NET: 3194.9 mL      LABS:  Na: 139 (03-30 @ 04:20), 138 (03-29 @ 10:00)  K: 4.0 (03-30 @ 04:20), 3.1 (03-29 @ 10:00)  Cl: 106 (03-30 @ 04:20), 98 (03-29 @ 10:00)  CO2: 22.0 (03-30 @ 04:20), 23.0 (03-29 @ 10:00)  BUN: 9.8 (03-30 @ 04:20), 9.1 (03-29 @ 10:00)  Cr: 0.70 (03-30 @ 04:20), 0.62 (03-29 @ 10:00)  Glu: 224(03-30 @ 04:20), 260(03-29 @ 10:00)    Hgb: 12.0 (03-30 @ 04:20), 15.2 (03-29 @ 10:00)  Hct: 35.9 (03-30 @ 04:20), 45.4 (03-29 @ 10:00)  WBC: 15.23 (03-30 @ 04:20), 14.31 (03-29 @ 10:00)  Plt: 255 (03-30 @ 04:20), 264 (03-29 @ 10:00)    INR: 1.14 03-29-23 @ 10:00  PTT: 29.9 03-29-23 @ 10:00    MEDICATIONS:  aspirin  chewable 81 milliGRAM(s) Oral daily  atorvastatin 80 milliGRAM(s) Oral at bedtime  cangrelor Infusion 1 MICROgram(s)/kG/Min IV Continuous <Continuous>  chlorhexidine 0.12% Liquid 15 milliLiter(s) Oral Mucosa every 12 hours  chlorhexidine 2% Cloths 1 Application(s) Topical daily  dexMEDEtomidine Infusion 0.2 MICROgram(s)/kG/Hr IV Continuous <Continuous>  dextrose 5%. 1000 milliLiter(s) IV Continuous <Continuous>  dextrose 5%. 1000 milliLiter(s) IV Continuous <Continuous>  dextrose 50% Injectable 25 Gram(s) IV Push once  dextrose 50% Injectable 12.5 Gram(s) IV Push once  dextrose 50% Injectable 25 Gram(s) IV Push once  dextrose Oral Gel 15 Gram(s) Oral once PRN  glucagon  Injectable 1 milliGRAM(s) IntraMuscular once  insulin lispro (ADMELOG) corrective regimen sliding scale   SubCutaneous every 6 hours  mupirocin 2% Nasal 1 Application(s) Both Nostrils two times a day  norepinephrine Infusion 0.05 MICROgram(s)/kG/Min IV Continuous <Continuous>  pantoprazole  Injectable 40 milliGRAM(s) IV Push daily  piperacillin/tazobactam IVPB.. 3.375 Gram(s) IV Intermittent every 8 hours  polyethylene glycol 3350 17 Gram(s) Oral daily  senna Syrup 10 milliLiter(s) Oral at bedtime  sodium chloride 0.9%. 1000 milliLiter(s) IV Continuous <Continuous>  sodium phosphate 15 milliMole(s)/250 mL IVPB 15 milliMole(s) IV Intermittent once  vancomycin  IVPB      vancomycin  IVPB 1000 milliGRAM(s) IV Intermittent every 12 hours    EXAMINATION:  General:  in NAD  HEENT:  MMM  Neuro:  awake, alert, oriented x 3, follows commands, EOMI, face symmetric, no PD, DF 5/5   Cards:  RRR  Respiratory:  no respiratory distress  Abdomen:  soft  Extremities:  no LE edema    Assessment/Plan:   57 yo man with HTN and DM, mRS 0 at baseline, LKN 8pm, found at 5:00am by family with L side and confusion. At [facility], NIHSS >20, intubated for persistent vomiting. CTH reportedly wnl, CTA reportedly with R carotid occlusion. Transferred to [facility], NIHSS here 28. CTH with some loss of grey-white in the R temporal lobe. Core 72, penumbra 108, s/p angio demonstrating a R proximal ICA occlusion at the bifurcation with a R M1 occlusion s/p MT with TICI 2B recanalization and IA tPA given, followed by stenting of R ICA. 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 At high risk for hemorrhagic conversion due to a large ischemic core and being on cangrelor.     Neuro:  q1h NC  c/w ASA 81 mg daily   c/w cangrelor ggt at 1 mcg/kg/min   start Atorvastatin 80 mg daily  precedex for sedation with fentanyl PRN  hemicraniectomy watch     CV: , TTE with EF 75%  SBP goal 100-140, on Levo  Levo    Pulm:  SBT trial 6/6, 35%  CXR today     GI:  NPO   with NG tube     Renal:  Na goal 145-155  HTS 2% at 75cc/hr, BMP q6h     Heme:  SCDs, hold Lov ppx     ID: MRSA neg  c/w Zosyn, d/c vanc     Endo: HgA1C 7.3  start NPH 2U q6h, moderate ISS   FS goal 120-180    ambrocio bradford     Patient seen and examined by attending on 3/30/2023.    Patient is critically ill due to acute R MCA stroke s/p MT and at high risk for neurological deterioration or death due to: at high risk of hemorrhagic conversion, requiring intubation due to inability to protect airway.    Interval events:  CTH with some hemorrhagic transformation in the R temporal lobe and some hyperdensity (likely contrast) in the R caudate and basal ganglia, as well as patchy strokes throughout the R MCA territory, with minimal MLS.  Febrile to 38.3. Abx started overnight.   Tolerating CPAP 10/6. Minimal secretions.     VITALS:  T(C): , Max: 38.3 (03-29-23 @ 22:45)  HR:  (52 - 104)  BP:  (77/65 - 147/48)  ABP: --  RR:  (12 - 30)  SpO2:  (99% - 100%)  Wt(kg): --  Device: Avea, Mode: CPAP with PS, FiO2: 50, PEEP: 6, PS: 10, MAP: 9    03-29-23 @ 07:01  -  03-30-23 @ 07:00  --------------------------------------------------------  IN: 4061.9 mL / OUT: 867 mL / NET: 3194.9 mL      LABS:  Na: 139 (03-30 @ 04:20), 138 (03-29 @ 10:00)  K: 4.0 (03-30 @ 04:20), 3.1 (03-29 @ 10:00)  Cl: 106 (03-30 @ 04:20), 98 (03-29 @ 10:00)  CO2: 22.0 (03-30 @ 04:20), 23.0 (03-29 @ 10:00)  BUN: 9.8 (03-30 @ 04:20), 9.1 (03-29 @ 10:00)  Cr: 0.70 (03-30 @ 04:20), 0.62 (03-29 @ 10:00)  Glu: 224(03-30 @ 04:20), 260(03-29 @ 10:00)    Hgb: 12.0 (03-30 @ 04:20), 15.2 (03-29 @ 10:00)  Hct: 35.9 (03-30 @ 04:20), 45.4 (03-29 @ 10:00)  WBC: 15.23 (03-30 @ 04:20), 14.31 (03-29 @ 10:00)  Plt: 255 (03-30 @ 04:20), 264 (03-29 @ 10:00)    INR: 1.14 03-29-23 @ 10:00  PTT: 29.9 03-29-23 @ 10:00    MEDICATIONS:  aspirin  chewable 81 milliGRAM(s) Oral daily  atorvastatin 80 milliGRAM(s) Oral at bedtime  cangrelor Infusion 1 MICROgram(s)/kG/Min IV Continuous <Continuous>  chlorhexidine 0.12% Liquid 15 milliLiter(s) Oral Mucosa every 12 hours  chlorhexidine 2% Cloths 1 Application(s) Topical daily  dexMEDEtomidine Infusion 0.2 MICROgram(s)/kG/Hr IV Continuous <Continuous>  dextrose 5%. 1000 milliLiter(s) IV Continuous <Continuous>  dextrose 5%. 1000 milliLiter(s) IV Continuous <Continuous>  dextrose 50% Injectable 25 Gram(s) IV Push once  dextrose 50% Injectable 12.5 Gram(s) IV Push once  dextrose 50% Injectable 25 Gram(s) IV Push once  dextrose Oral Gel 15 Gram(s) Oral once PRN  glucagon  Injectable 1 milliGRAM(s) IntraMuscular once  insulin lispro (ADMELOG) corrective regimen sliding scale   SubCutaneous every 6 hours  mupirocin 2% Nasal 1 Application(s) Both Nostrils two times a day  norepinephrine Infusion 0.05 MICROgram(s)/kG/Min IV Continuous <Continuous>  pantoprazole  Injectable 40 milliGRAM(s) IV Push daily  piperacillin/tazobactam IVPB.. 3.375 Gram(s) IV Intermittent every 8 hours  polyethylene glycol 3350 17 Gram(s) Oral daily  senna Syrup 10 milliLiter(s) Oral at bedtime  sodium chloride 0.9%. 1000 milliLiter(s) IV Continuous <Continuous>  sodium phosphate 15 milliMole(s)/250 mL IVPB 15 milliMole(s) IV Intermittent once  vancomycin  IVPB      vancomycin  IVPB 1000 milliGRAM(s) IV Intermittent every 12 hours    EXAMINATION:  General:  in NAD  HEENT:  intubated  Neuro:  with eyelid opening apraxia, able to follow simple commands including showing a thumbs up, 2 fingers and wiggling toes, pupils 3 mm and reactive but R is more sluggish, EOMI, no BTT on the L, + cough, able to raise R side AG, L arm 1/5 spontaneously, L leg 2/5 spontaneously   Cards:  RRR  Respiratory:  no respiratory distress  Abdomen:  soft  Extremities:  no LE edema    Assessment/Plan:   57 yo man with HTN and DM, mRS 0 at baseline, admitted 3/29 with L side and confusion, NIHSS 28, CTH with some loss of grey-white in the R temporal lobe. Core 72, penumbra 108, s/p angio demonstrating a R proximal ICA occlusion at the bifurcation with a R M1 occlusion s/p MT with TICI 2B recanalization and IA tPA given, followed by stenting of R ICA. 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 At risk for hemorrhagic conversion due to a large ischemic core and being on cangrelor. On hemicraniectomy watch.     Neuro:  q1h NC  c/w ASA 81 mg daily   c/w cangrelor ggt at 1 mcg/kg/min   c/w Atorvastatin 80 mg daily  precedex for sedation with fentanyl PRN  hemicraniectomy watch     CV: , TTE with EF 75%  MAP >65, on Levo    Pulm:  SBT trial 6/6, 35%  CXR today     GI:  NPO for hemicrani watch   with NG tube     Renal:  Na goal 145-155  HTS 2% at 75cc/hr, BMP q6h     Heme:  SCDs, hold Lov ppx for hemicrani watch     ID: MRSA neg  c/w Zosyn, d/c vanc     Endo: HgA1C 7.3  start NPH 2U q6h, moderate ISS   FS goal 120-180    bradford, peripherals (unable to place A line into b/l radial arteries)    Patient seen and examined by attending on 3/30/2023.    Patient is critically ill due to acute R MCA stroke s/p MT and at high risk for neurological deterioration or death due to: at high risk of hemorrhagic conversion, requiring intubation due to inability to protect airway.

## 2023-03-30 NOTE — PROGRESS NOTE ADULT - ASSESSMENT
INCOMPLETE   ASSESSMENT: 58y Male with PMH HTN and DM, found by his family 0500 AM on 3/29/23 with right hemiparesis, slurred speech, and incontinent of urine.  Last well known 2000  on 3/28/23. Patient brought in by EMS to the [facility] at 0556, was noted not to be a candidate for Tenecteplase as patient out of time window. Patient was transferred to [facility] for thrombectomy due to right ICA occlusion with tandem right MCA occlusion. NIHSS 28 MRS pre - 0 . Etiology likely large artery atherosclerotic disease. Post mechanical thrombectomy TICI 2B and Right carotid stent with angioplasty.       NEURO: Continue close monitoring for neurologic deterioration, with stroke checks per protocol, then per ICU, permissive HTN as per post thrombectomy recommendations. Titrate statin to LDL goal less than 70, MRI Brain w/o, TTE. Physical therapy/OT/Speech eval/treatment.     ANTITHROMBOTIC THERAPY: ASA 81mg daily, cangrelor gtt post stenting- transition to PO  agent pending clinical course and P2Y12 monitoring.    PULMONARY:  remains on ventilatory support due to respiratory failure     CARDIOVASCULAR: check TTE, cardiac monitoring     GASTROINTESTINAL: dysphagia screen pending, aspiration precautions     Diet: NPO/NGT per ICU    RENAL: BUN/Cr within range, maintain adequate hydration,  monitor urine output       Na Goal:  135-145     Quispe: Y    HEMATOLOGY: H/H with downtrend- monitor for si/sx of bleeding, serial CBC, Platelets 255.   Patient should have all age and risk appropriate  malignancy screening.      DVT ppx     ID: afebrile, leukocytosis, was vomiting- high concern for aspiration event, monitor for infection    OTHER:  , A1C 7.3- continue glycemic control    DISPOSITION: Rehab or home depending on PT eval once stable and workup is complete      CORE MEASURES:        Admission NIHSS: 28     TPA: [] YES [x] NO      LDL/HDL/A1C: 165/58/7.3     Depression Screen: pending     Statin Therapy: pending     Dysphagia Screen: [] PASS [] FAIL pending     Smoking [] YES [] NO      Afib [] YES [x] NO     Stroke Education [x] YES [] NO    Obtain screening lower extremity venous ultrasound in patients who meet 1 or more of the following criteria as patient is high risk for DVT/PE on admission:   [] History of DVT/PE  []Hypercoagulable states (Factor V Leiden, Cancer, OCP, etc. )  []Prolonged immobility (hemiplegia/hemiparesis/post operative or any other extended immobilization)  [] Transferred from outside facility (Rehab or Long term care)  [] Age </= to 50 INCOMPLETE   ASSESSMENT: 58y Male with PMH HTN and DM, found by his family 0500 AM on 3/29/23 with right hemiparesis, slurred speech, and incontinent of urine.  Last well known 2000  on 3/28/23. Patient brought in by EMS to the [facility] at 0556, was noted not to be a candidate for Tenecteplase as patient out of time window. Patient was transferred to [facility] for thrombectomy due to right ICA occlusion with tandem right MCA occlusion. NIHSS 28 MRS pre - 0 . Etiology likely large artery atherosclerotic disease. Post mechanical thrombectomy TICI 2B and Right carotid stent with angioplasty.       NEURO: Continue close monitoring for neurologic deterioration, with stroke checks per protocol, then per ICU, q1 there after minimum in setting of cerebral edema with mass effect and brain compression, hypertonic saline to Na goal gradually of 140-145- avoid hyponatremia and rapid fluctuations,  hemicraniectomy watch, permissive HTN as per post thrombectomy recommendations. Titrate statin to LDL goal less than 70, MRI Brain w/o, TTE. Physical therapy/OT/Speech eval/treatment.     ANTITHROMBOTIC THERAPY: ASA 81mg daily, cangrelor gtt post stenting- transition to PO  agent pending clinical course and P2Y12 monitoring.    PULMONARY:  remains on ventilatory support due to respiratory failure     CARDIOVASCULAR: check TTE, cardiac monitoring     GASTROINTESTINAL: dysphagia screen pending, aspiration precautions     Diet: NPO/NGT per ICU    RENAL: BUN/Cr within range, maintain adequate hydration,  monitor urine output       Na Goal:  135-145     Quispe: Y    HEMATOLOGY: H/H with downtrend- monitor for si/sx of bleeding, serial CBC, Platelets 255.   Patient should have all age and risk appropriate  malignancy screening.      DVT ppx     ID: afebrile, leukocytosis, was vomiting- high concern for aspiration event, monitor for infection    OTHER:  , A1C 7.3- continue glycemic control    DISPOSITION: Rehab or home depending on PT eval once stable and workup is complete      CORE MEASURES:        Admission NIHSS: 28     TPA: [] YES [x] NO      LDL/HDL/A1C: 165/58/7.3     Depression Screen: pending     Statin Therapy: pending     Dysphagia Screen: [] PASS [] FAIL pending     Smoking [] YES [] NO      Afib [] YES [x] NO     Stroke Education [x] YES [] NO    Obtain screening lower extremity venous ultrasound in patients who meet 1 or more of the following criteria as patient is high risk for DVT/PE on admission:   [] History of DVT/PE  []Hypercoagulable states (Factor V Leiden, Cancer, OCP, etc. )  []Prolonged immobility (hemiplegia/hemiparesis/post operative or any other extended immobilization)  [] Transferred from outside facility (Rehab or Long term care)  [] Age </= to 50 INCOMPLETE   ASSESSMENT: 58y Male with PMH HTN and DM, found by his family 0500 AM on 3/29/23 with right hemiparesis, slurred speech, and incontinent of urine.  Last well known 2000  on 3/28/23. Patient brought in by EMS to the [facility] at 0556, was noted not to be a candidate for Tenecteplase as patient out of time window. Patient was transferred to [facility] for thrombectomy due to right ICA occlusion with tandem right MCA occlusion. NIHSS 28 MRS pre - 0 . Etiology likely large artery atherosclerotic disease. Post mechanical thrombectomy TICI 2B and Right carotid stent with angioplasty.       NEURO: Continue close monitoring for neurologic deterioration, with stroke checks per protocol, then per ICU, q1 there after minimum in setting of cerebral edema with mass effect and brain compression, hypertonic saline to Na goal gradually of 140-145 for now- avoid hyponatremia and rapid fluctuations,  q 6 hr bmp, hemicraniectomy watch, permissive HTN as per post thrombectomy recommendations 110-140mmHg in setting of recent revascularization as to avoid hypo/hyperperfusion injury. Titrate statin to LDL goal less than 70, MRI Brain w/o, TTE. Physical therapy/OT/Speech eval/treatment.     ANTITHROMBOTIC THERAPY: ASA 81mg daily, cangrelor gtt post stenting- transition to PO  agent pending clinical course and P2Y12 monitoring.    PULMONARY:  remains on ventilatory support due to respiratory failure     CARDIOVASCULAR: check TTE, cardiac monitoring     GASTROINTESTINAL: dysphagia screen pending, aspiration precautions     Diet: NPO/NGT per ICU    RENAL: BUN/Cr within range, maintain adequate hydration,  monitor urine output       Na Goal:  135-145     Quispe: Y    HEMATOLOGY: H/H with downtrend- monitor for si/sx of bleeding, serial CBC, Platelets 255.   Patient should have all age and risk appropriate  malignancy screening.      DVT ppx     ID: afebrile, leukocytosis, was vomiting- high concern for aspiration event, monitor for infection    OTHER:  , A1C 7.3- continue glycemic control    DISPOSITION: Rehab or home depending on PT eval once stable and workup is complete      CORE MEASURES:        Admission NIHSS: 28     TPA: [] YES [x] NO      LDL/HDL/A1C: 165/58/7.3     Depression Screen: pending     Statin Therapy: pending     Dysphagia Screen: [] PASS [] FAIL pending     Smoking [] YES [] NO      Afib [] YES [x] NO     Stroke Education [x] YES [] NO    Obtain screening lower extremity venous ultrasound in patients who meet 1 or more of the following criteria as patient is high risk for DVT/PE on admission:   [] History of DVT/PE  []Hypercoagulable states (Factor V Leiden, Cancer, OCP, etc. )  []Prolonged immobility (hemiplegia/hemiparesis/post operative or any other extended immobilization)  [] Transferred from outside facility (Rehab or Long term care)  [] Age </= to 50

## 2023-03-30 NOTE — PROGRESS NOTE ADULT - ASSESSMENT
Assessment:  58M with PMH HTN, DM who presented with L sided weakness, found to have AYAKA/RMCA occlusions s/p thrombectomy TICI 2B recanalization and AYAKA stent on 3/29. Post op CTH showed contrast vs hemorrhage.   - PSD 1/2, POD 1  - Exam improving  - Repeat CTH stable      Plan:  - Discussed with Dr. Allen  - Q1 hour neuro checks  - MAP >65  - CTH reviewed, likely contrast staining, possible slight hemorrhage in R frontal/parietal region   - Repeat CTH in am   - Continue cangrelor @ 1, possible transition to Brilinta tomorrow pending CTH results  - Appreciate neurosurgery consult for possible hemicrani  - , on atorvastatin 80  - A1C 7.3%  - TTE results as above  - Further care per NSICU team  - PA only visit  Assessment:  58M with PMH HTN, DM who presented with L sided weakness, found to have AYAKA/RMCA occlusions s/p thrombectomy TICI 2B recanalization and AYAKA stent on 3/29. Post op CTH showed contrast vs hemorrhage.   - PSD 1/2, POD 1  - Exam improving  - Repeat CTH stable      Plan:  - Discussed with Dr. Allen  - Q1 hour neuro checks  - MAP >65  - CTH reviewed, likely contrast staining, possible slight hemorrhage in R frontal/parietal region   - Repeat CTH in am   - Continue cangrelor @ 1, possible transition to Brilinta tomorrow pending CTH results  - Hypertonic saline per NSICU to reduce cerebral edema  - Appreciate neurosurgery consult for possible hemicrani  - , on atorvastatin 80  - A1C 7.3%  - TTE results as above  - Further care per NSICU team  - PA only visit

## 2023-03-30 NOTE — PROGRESS NOTE ADULT - SUBJECTIVE AND OBJECTIVE BOX
Patient is a 58y old  Male who presents with a chief complaint of thrombectomy (29 Mar 2023 09:42)    HPI:  59 y/o male with PMHx of HTN and DM transfered from [facility] to [facility] as a code stroke. [facility] reports pt BLAYNE was last night around 20:00 and was c/o right arm pain, today his family found his around 05:00 with left sided hemiparesis, slurred speech and was incontinent of urine, they called EMS as pt arrived at [facility] at 05:56, he was found to have right ICA occlusion and transferred to [facility], code stroke called upon arrival. Pt was on Cardene however per EMS neuro wanted pressure around 200 and it was d/monet, pt sedated with propofol. (29 Mar 2023 09:40)      Interval history:  Patient seen and examined by neuro IR team. Patient is doing well, neuro exam improving. CTH performed this am grossly stable. No other acute events reported.       Vital Signs Last 24 Hrs  T(C): 37.9 (30 Mar 2023 15:20), Max: 38.3 (29 Mar 2023 22:45)  T(F): 100.2 (30 Mar 2023 15:20), Max: 100.9 (29 Mar 2023 22:45)  HR: 72 (30 Mar 2023 15:20) (52 - 95)  BP: 110/94 (30 Mar 2023 15:20) (87/52 - 142/67)  BP(mean): 101 (30 Mar 2023 15:20) (63 - 101)  RR: 22 (30 Mar 2023 15:20) (13 - 30)  SpO2: 100% (30 Mar 2023 15:20) (100% - 100%)    Parameters below as of 30 Mar 2023 14:50  Patient On (Oxygen Delivery Method): ventilator    O2 Concentration (%): 35      Physical Exam:  Constitutional: NAD, lying in bed  Neuro  * Mental Status: EO to voice, following commands, intubated, nodding appropriately  * Cranial Nerves: L pupil 3mm brisk, R pupil 4mm sluggish, slight dysconjugate gaze, + cough/gag, + overbreathing   * Motor: RUE 5/5, LUE HG 1/5 & able to show thumbs up, otherwise 0/5, RLE 5/5, LLE 2/5  * Sensory: Sensation grossly intact to noxious   * Reflexes: not assessed       LABS:                        12.0   15.23 )-----------( 255      ( 30 Mar 2023 04:20 )             35.9     03-30    139  |  106  |  9.8  ----------------------------<  224<H>  4.0   |  22.0  |  0.70    Ca    7.8<L>      30 Mar 2023 04:20  Phos  2.7     03-30  Mg     1.7     03-30    PT/INR - ( 29 Mar 2023 10:00 )   PT: 13.2 sec;   INR: 1.14 ratio    PTT - ( 29 Mar 2023 10:00 )  PTT:29.9 sec    Urinalysis Basic - ( 29 Mar 2023 23:40 )  Color: Yellow / Appearance: Slightly Turbid / S.020 / pH: x  Gluc: x / Ketone: Trace  / Bili: Negative / Urobili: Negative mg/dL   Blood: x / Protein: 30 mg/dL / Nitrite: Negative   Leuk Esterase: Small / RBC: 11-25 /HPF / WBC 6-10 /HPF   Sq Epi: x / Non Sq Epi: Few / Bacteria: Occasional      Medications:  MEDICATIONS  (STANDING):  aspirin  chewable 81 milliGRAM(s) Oral daily  atorvastatin 80 milliGRAM(s) Oral at bedtime  cangrelor Infusion 1 MICROgram(s)/kG/Min (22.2 mL/Hr) IV Continuous <Continuous>  chlorhexidine 0.12% Liquid 15 milliLiter(s) Oral Mucosa every 12 hours  chlorhexidine 2% Cloths 1 Application(s) Topical daily  dexMEDEtomidine Infusion 0.2 MICROgram(s)/kG/Hr (3.7 mL/Hr) IV Continuous <Continuous>  dextrose 5%. 1000 milliLiter(s) (100 mL/Hr) IV Continuous <Continuous>  dextrose 5%. 1000 milliLiter(s) (50 mL/Hr) IV Continuous <Continuous>  dextrose 50% Injectable 25 Gram(s) IV Push once  dextrose 50% Injectable 12.5 Gram(s) IV Push once  dextrose 50% Injectable 25 Gram(s) IV Push once  glucagon  Injectable 1 milliGRAM(s) IntraMuscular once  insulin lispro (ADMELOG) corrective regimen sliding scale   SubCutaneous every 6 hours  insulin NPH human recombinant 2 Unit(s) SubCutaneous every 6 hours  mupirocin 2% Nasal 1 Application(s) Both Nostrils two times a day  norepinephrine Infusion 0.05 MICROgram(s)/kG/Min (6.94 mL/Hr) IV Continuous <Continuous>  pantoprazole  Injectable 40 milliGRAM(s) IV Push daily  piperacillin/tazobactam IVPB.. 3.375 Gram(s) IV Intermittent every 8 hours  polyethylene glycol 3350 17 Gram(s) Oral daily  senna Syrup 10 milliLiter(s) Oral at bedtime  sodium chloride 2% . 1000 milliLiter(s) (75 mL/Hr) IV Continuous <Continuous>    MEDICATIONS  (PRN):  acetaminophen     Tablet .. 650 milliGRAM(s) Oral every 6 hours PRN Temp greater or equal to 38C (100.4F), Mild Pain (1 - 3)  dextrose Oral Gel 15 Gram(s) Oral once PRN Blood Glucose LESS THAN 70 milliGRAM(s)/deciliter      RADIOLOGY & ADDITIONAL STUDIES:  < from: CT Head No Cont (23 @ 03:40) >  IMPRESSION: Compared with 3/29/2023 there is decreasing retained contrast   in the right basal ganglia and right frontal parietal cortex with   evolving lucency consistent with an acute right artery infarct. Residual   high density although decreased compared to the prior exam may represent   retained contrast as well as a small amount of hemorrhage. No increased   hemorrhage identified.    --- End of Report ---    SYLVESTER ARMSTRONG MD; Attending Radiologist  This document has been electronically signed. Mar 30 2023  9:02AM    < end of copied text >    TTE 3/29  Summary:   1. Left ventricular ejection fraction, by visual estimation, is >75%.   2. Technically difficult study.   3. Hyperdynamic global left ventricular systolic function.   4. The left ventricular diastolic function could not be assessed in this   study.   5. There is mild concentric left ventricular hypertrophy.   6. There is no evidence of pericardial effusion.   7. Mild mitral annular calcification.   8. Trace mitral valve regurgitation.    MD Tangela Electronically signed on 3/30/2023 at 8:22:02 AM

## 2023-03-31 LAB
ANION GAP SERPL CALC-SCNC: 10 MMOL/L — SIGNIFICANT CHANGE UP (ref 5–17)
ANION GAP SERPL CALC-SCNC: 8 MMOL/L — SIGNIFICANT CHANGE UP (ref 5–17)
ANION GAP SERPL CALC-SCNC: 9 MMOL/L — SIGNIFICANT CHANGE UP (ref 5–17)
BUN SERPL-MCNC: 10.3 MG/DL — SIGNIFICANT CHANGE UP (ref 8–20)
BUN SERPL-MCNC: 10.7 MG/DL — SIGNIFICANT CHANGE UP (ref 8–20)
BUN SERPL-MCNC: 9.9 MG/DL — SIGNIFICANT CHANGE UP (ref 8–20)
CALCIUM SERPL-MCNC: 7.8 MG/DL — LOW (ref 8.4–10.5)
CALCIUM SERPL-MCNC: 8 MG/DL — LOW (ref 8.4–10.5)
CALCIUM SERPL-MCNC: 8 MG/DL — LOW (ref 8.4–10.5)
CHLORIDE SERPL-SCNC: 111 MMOL/L — HIGH (ref 96–108)
CHLORIDE SERPL-SCNC: 115 MMOL/L — HIGH (ref 96–108)
CHLORIDE SERPL-SCNC: 115 MMOL/L — HIGH (ref 96–108)
CO2 SERPL-SCNC: 22 MMOL/L — SIGNIFICANT CHANGE UP (ref 22–29)
CO2 SERPL-SCNC: 22 MMOL/L — SIGNIFICANT CHANGE UP (ref 22–29)
CO2 SERPL-SCNC: 23 MMOL/L — SIGNIFICANT CHANGE UP (ref 22–29)
CREAT SERPL-MCNC: 0.45 MG/DL — LOW (ref 0.5–1.3)
CREAT SERPL-MCNC: 0.58 MG/DL — SIGNIFICANT CHANGE UP (ref 0.5–1.3)
CREAT SERPL-MCNC: 0.62 MG/DL — SIGNIFICANT CHANGE UP (ref 0.5–1.3)
EGFR: 109 ML/MIN/1.73M2 — SIGNIFICANT CHANGE UP
EGFR: 112 ML/MIN/1.73M2 — SIGNIFICANT CHANGE UP
EGFR: 121 ML/MIN/1.73M2 — SIGNIFICANT CHANGE UP
GLUCOSE BLDC GLUCOMTR-MCNC: 104 MG/DL — HIGH (ref 70–99)
GLUCOSE BLDC GLUCOMTR-MCNC: 104 MG/DL — HIGH (ref 70–99)
GLUCOSE SERPL-MCNC: 120 MG/DL — HIGH (ref 70–99)
GLUCOSE SERPL-MCNC: 131 MG/DL — HIGH (ref 70–99)
GLUCOSE SERPL-MCNC: 144 MG/DL — HIGH (ref 70–99)
GRAM STN FLD: SIGNIFICANT CHANGE UP
HCT VFR BLD CALC: 29.8 % — LOW (ref 39–50)
HGB BLD-MCNC: 9.8 G/DL — LOW (ref 13–17)
MAGNESIUM SERPL-MCNC: 2.2 MG/DL — SIGNIFICANT CHANGE UP (ref 1.6–2.6)
MCHC RBC-ENTMCNC: 26.9 PG — LOW (ref 27–34)
MCHC RBC-ENTMCNC: 32.9 GM/DL — SIGNIFICANT CHANGE UP (ref 32–36)
MCV RBC AUTO: 81.9 FL — SIGNIFICANT CHANGE UP (ref 80–100)
PA ADP PRP-ACNC: 117 PRU — LOW (ref 180–376)
PHOSPHATE SERPL-MCNC: 2.7 MG/DL — SIGNIFICANT CHANGE UP (ref 2.4–4.7)
PLATELET # BLD AUTO: 172 K/UL — SIGNIFICANT CHANGE UP (ref 150–400)
POTASSIUM SERPL-MCNC: 3.6 MMOL/L — SIGNIFICANT CHANGE UP (ref 3.5–5.3)
POTASSIUM SERPL-MCNC: 3.7 MMOL/L — SIGNIFICANT CHANGE UP (ref 3.5–5.3)
POTASSIUM SERPL-MCNC: 4.2 MMOL/L — SIGNIFICANT CHANGE UP (ref 3.5–5.3)
POTASSIUM SERPL-SCNC: 3.6 MMOL/L — SIGNIFICANT CHANGE UP (ref 3.5–5.3)
POTASSIUM SERPL-SCNC: 3.7 MMOL/L — SIGNIFICANT CHANGE UP (ref 3.5–5.3)
POTASSIUM SERPL-SCNC: 4.2 MMOL/L — SIGNIFICANT CHANGE UP (ref 3.5–5.3)
RBC # BLD: 3.64 M/UL — LOW (ref 4.2–5.8)
RBC # FLD: 14.2 % — SIGNIFICANT CHANGE UP (ref 10.3–14.5)
SODIUM SERPL-SCNC: 143 MMOL/L — SIGNIFICANT CHANGE UP (ref 135–145)
SODIUM SERPL-SCNC: 145 MMOL/L — SIGNIFICANT CHANGE UP (ref 135–145)
SODIUM SERPL-SCNC: 147 MMOL/L — HIGH (ref 135–145)
SPECIMEN SOURCE: SIGNIFICANT CHANGE UP
WBC # BLD: 9.07 K/UL — SIGNIFICANT CHANGE UP (ref 3.8–10.5)
WBC # FLD AUTO: 9.07 K/UL — SIGNIFICANT CHANGE UP (ref 3.8–10.5)

## 2023-03-31 PROCEDURE — 99232 SBSQ HOSP IP/OBS MODERATE 35: CPT | Mod: 24

## 2023-03-31 PROCEDURE — 99291 CRITICAL CARE FIRST HOUR: CPT

## 2023-03-31 PROCEDURE — 99232 SBSQ HOSP IP/OBS MODERATE 35: CPT

## 2023-03-31 PROCEDURE — 70450 CT HEAD/BRAIN W/O DYE: CPT | Mod: 26

## 2023-03-31 RX ORDER — POTASSIUM CHLORIDE 20 MEQ
40 PACKET (EA) ORAL ONCE
Refills: 0 | Status: COMPLETED | OUTPATIENT
Start: 2023-03-31 | End: 2023-03-31

## 2023-03-31 RX ORDER — SODIUM CHLORIDE 5 G/100ML
1000 INJECTION, SOLUTION INTRAVENOUS
Refills: 0 | Status: DISCONTINUED | OUTPATIENT
Start: 2023-03-31 | End: 2023-04-04

## 2023-03-31 RX ORDER — IPRATROPIUM/ALBUTEROL SULFATE 18-103MCG
3 AEROSOL WITH ADAPTER (GRAM) INHALATION ONCE
Refills: 0 | Status: COMPLETED | OUTPATIENT
Start: 2023-03-31 | End: 2023-03-31

## 2023-03-31 RX ORDER — ACETAMINOPHEN 500 MG
1000 TABLET ORAL ONCE
Refills: 0 | Status: COMPLETED | OUTPATIENT
Start: 2023-03-31 | End: 2023-03-31

## 2023-03-31 RX ORDER — HYDRALAZINE HCL 50 MG
10 TABLET ORAL
Refills: 0 | Status: DISCONTINUED | OUTPATIENT
Start: 2023-03-31 | End: 2023-04-08

## 2023-03-31 RX ORDER — POTASSIUM CHLORIDE 20 MEQ
30 PACKET (EA) ORAL ONCE
Refills: 0 | Status: COMPLETED | OUTPATIENT
Start: 2023-03-31 | End: 2023-03-31

## 2023-03-31 RX ORDER — IPRATROPIUM/ALBUTEROL SULFATE 18-103MCG
3 AEROSOL WITH ADAPTER (GRAM) INHALATION EVERY 6 HOURS
Refills: 0 | Status: DISCONTINUED | OUTPATIENT
Start: 2023-03-31 | End: 2023-04-07

## 2023-03-31 RX ORDER — HUMAN INSULIN 100 [IU]/ML
4 INJECTION, SUSPENSION SUBCUTANEOUS EVERY 6 HOURS
Refills: 0 | Status: DISCONTINUED | OUTPATIENT
Start: 2023-03-31 | End: 2023-03-31

## 2023-03-31 RX ADMIN — SODIUM CHLORIDE 75 MILLILITER(S): 5 INJECTION, SOLUTION INTRAVENOUS at 05:40

## 2023-03-31 RX ADMIN — HUMAN INSULIN 2 UNIT(S): 100 INJECTION, SUSPENSION SUBCUTANEOUS at 06:30

## 2023-03-31 RX ADMIN — HUMAN INSULIN 2 UNIT(S): 100 INJECTION, SUSPENSION SUBCUTANEOUS at 00:19

## 2023-03-31 RX ADMIN — SODIUM CHLORIDE 75 MILLILITER(S): 5 INJECTION, SOLUTION INTRAVENOUS at 13:09

## 2023-03-31 RX ADMIN — Medication 3 MILLILITER(S): at 10:10

## 2023-03-31 RX ADMIN — PIPERACILLIN AND TAZOBACTAM 25 GRAM(S): 4; .5 INJECTION, POWDER, LYOPHILIZED, FOR SOLUTION INTRAVENOUS at 21:14

## 2023-03-31 RX ADMIN — CANGRELOR 22.2 MICROGRAM(S)/KG/MIN: 50 INJECTION, POWDER, LYOPHILIZED, FOR SOLUTION INTRAVENOUS at 05:40

## 2023-03-31 RX ADMIN — ATORVASTATIN CALCIUM 80 MILLIGRAM(S): 80 TABLET, FILM COATED ORAL at 21:14

## 2023-03-31 RX ADMIN — MUPIROCIN 1 APPLICATION(S): 20 OINTMENT TOPICAL at 18:36

## 2023-03-31 RX ADMIN — Medication 400 MILLIGRAM(S): at 18:42

## 2023-03-31 RX ADMIN — Medication 1 DROP(S): at 05:42

## 2023-03-31 RX ADMIN — Medication 1 DROP(S): at 18:36

## 2023-03-31 RX ADMIN — PIPERACILLIN AND TAZOBACTAM 25 GRAM(S): 4; .5 INJECTION, POWDER, LYOPHILIZED, FOR SOLUTION INTRAVENOUS at 14:29

## 2023-03-31 RX ADMIN — Medication 40 MILLIEQUIVALENT(S): at 21:14

## 2023-03-31 RX ADMIN — MUPIROCIN 1 APPLICATION(S): 20 OINTMENT TOPICAL at 05:22

## 2023-03-31 RX ADMIN — PANTOPRAZOLE SODIUM 40 MILLIGRAM(S): 20 TABLET, DELAYED RELEASE ORAL at 12:42

## 2023-03-31 RX ADMIN — Medication 10 MILLIGRAM(S): at 18:42

## 2023-03-31 RX ADMIN — Medication 1000 MILLIGRAM(S): at 19:13

## 2023-03-31 RX ADMIN — CHLORHEXIDINE GLUCONATE 15 MILLILITER(S): 213 SOLUTION TOPICAL at 05:21

## 2023-03-31 RX ADMIN — Medication 81 MILLIGRAM(S): at 12:41

## 2023-03-31 RX ADMIN — SENNA PLUS 10 MILLILITER(S): 8.6 TABLET ORAL at 21:15

## 2023-03-31 RX ADMIN — PIPERACILLIN AND TAZOBACTAM 25 GRAM(S): 4; .5 INJECTION, POWDER, LYOPHILIZED, FOR SOLUTION INTRAVENOUS at 05:48

## 2023-03-31 RX ADMIN — CHLORHEXIDINE GLUCONATE 1 APPLICATION(S): 213 SOLUTION TOPICAL at 12:42

## 2023-03-31 RX ADMIN — POLYETHYLENE GLYCOL 3350 17 GRAM(S): 17 POWDER, FOR SOLUTION ORAL at 12:42

## 2023-03-31 RX ADMIN — DEXMEDETOMIDINE HYDROCHLORIDE IN 0.9% SODIUM CHLORIDE 3.7 MICROGRAM(S)/KG/HR: 4 INJECTION INTRAVENOUS at 06:09

## 2023-03-31 RX ADMIN — Medication 3 MILLILITER(S): at 21:30

## 2023-03-31 RX ADMIN — Medication 1 APPLICATION(S): at 18:36

## 2023-03-31 RX ADMIN — Medication 30 MILLIEQUIVALENT(S): at 14:29

## 2023-03-31 RX ADMIN — CANGRELOR 22.2 MICROGRAM(S)/KG/MIN: 50 INJECTION, POWDER, LYOPHILIZED, FOR SOLUTION INTRAVENOUS at 16:05

## 2023-03-31 RX ADMIN — Medication 1 APPLICATION(S): at 05:41

## 2023-03-31 NOTE — PROGRESS NOTE ADULT - ASSESSMENT
Assessment:  58M with PMH HTN, DM who presented with L sided weakness, found to have AYAKA/RMCA occlusions s/p thrombectomy TICI 2B recanalization and AYAKA stent on 3/29. Post op CTH showed contrast vs hemorrhage.   - PSD 2/3, POD 2  - Repeat CTH stable      Plan:  - Discussed with Dr. Allen  - Q1 hour neuro checks  - MAP >65  - CTH reviewed, hemorrhage stable    - Continue cangrelor @ 1 during hemicrani watch period   - Hypertonic saline per NSICU to reduce cerebral edema, Na goal 145-155  - Appreciate neurosurgery consult for possible hemicrani  - , on atorvastatin 80  - A1C 7.3%  - TTE results as above  - Further care per NSICU team

## 2023-03-31 NOTE — DIETITIAN INITIAL EVALUATION ADULT - PERTINENT LABORATORY DATA
03-31    145  |  115<H>  |  9.9  ----------------------------<  131<H>  4.2   |  22.0  |  0.45<L>    Ca    7.8<L>      31 Mar 2023 05:39  Phos  2.7     03-31  Mg     2.2     03-31    POCT Blood Glucose.: 181 mg/dL (03-30-23 @ 16:29)  A1C with Estimated Average Glucose Result: 7.3 % (03-29-23 @ 12:31)

## 2023-03-31 NOTE — PROGRESS NOTE ADULT - SUBJECTIVE AND OBJECTIVE BOX
Interval events:  Tolerating CPAP 10/6. Minimal secretions.     VITALS:  T(C): , Max: 38.1 (03-30-23 @ 12:20)  HR:  (59 - 93)  BP:  (87/52 - 128/64)  ABP: --  RR:  (16 - 24)  SpO2:  (94% - 100%)  Wt(kg): --  Device: Avea, Mode: CPAP with PS, FiO2: 35, PEEP: 6, PS: 6, MAP: 9    03-30-23 @ 07:01  -  03-31-23 @ 07:00  --------------------------------------------------------  IN: 3105 mL / OUT: 942 mL / NET: 2163 mL      LABS:  Na: 145 (03-31 @ 05:39), 143 (03-30 @ 23:11), 143 (03-30 @ 17:20), 139 (03-30 @ 04:20), 138 (03-29 @ 10:00)  K: 4.2 (03-31 @ 05:39), 3.6 (03-30 @ 23:11), 4.0 (03-30 @ 17:20), 4.0 (03-30 @ 04:20), 3.1 (03-29 @ 10:00)  Cl: 115 (03-31 @ 05:39), 113 (03-30 @ 23:11), 109 (03-30 @ 17:20), 106 (03-30 @ 04:20), 98 (03-29 @ 10:00)  CO2: 22.0 (03-31 @ 05:39), 24.0 (03-30 @ 23:11), 25.0 (03-30 @ 17:20), 22.0 (03-30 @ 04:20), 23.0 (03-29 @ 10:00)  BUN: 9.9 (03-31 @ 05:39), 8.1 (03-30 @ 23:11), 7.3 (03-30 @ 17:20), 9.8 (03-30 @ 04:20), 9.1 (03-29 @ 10:00)  Cr: 0.45 (03-31 @ 05:39), 0.54 (03-30 @ 23:11), 0.69 (03-30 @ 17:20), 0.70 (03-30 @ 04:20), 0.62 (03-29 @ 10:00)  Glu: 131(03-31 @ 05:39), 151(03-30 @ 23:11), 133(03-30 @ 17:20), 224(03-30 @ 04:20), 260(03-29 @ 10:00)    Hgb: 9.8 (03-31 @ 05:39), 12.0 (03-30 @ 04:20), 15.2 (03-29 @ 10:00)  Hct: 29.8 (03-31 @ 05:39), 35.9 (03-30 @ 04:20), 45.4 (03-29 @ 10:00)  WBC: 9.07 (03-31 @ 05:39), 15.23 (03-30 @ 04:20), 14.31 (03-29 @ 10:00)  Plt: 172 (03-31 @ 05:39), 255 (03-30 @ 04:20), 264 (03-29 @ 10:00)    INR: 1.14 03-29-23 @ 10:00  PTT: 29.9 03-29-23 @ 10:00    MEDICATIONS:  acetaminophen     Tablet .. 650 milliGRAM(s) Oral every 6 hours PRN  artificial  tears Solution 1 Drop(s) Both EYES two times a day  aspirin  chewable 81 milliGRAM(s) Oral daily  atorvastatin 80 milliGRAM(s) Oral at bedtime  cangrelor Infusion 1 MICROgram(s)/kG/Min IV Continuous <Continuous>  chlorhexidine 0.12% Liquid 15 milliLiter(s) Oral Mucosa every 12 hours  chlorhexidine 2% Cloths 1 Application(s) Topical daily  dexMEDEtomidine Infusion 0.2 MICROgram(s)/kG/Hr IV Continuous <Continuous>  dextrose 5%. 1000 milliLiter(s) IV Continuous <Continuous>  dextrose 5%. 1000 milliLiter(s) IV Continuous <Continuous>  dextrose 50% Injectable 25 Gram(s) IV Push once  dextrose 50% Injectable 12.5 Gram(s) IV Push once  dextrose 50% Injectable 25 Gram(s) IV Push once  dextrose Oral Gel 15 Gram(s) Oral once PRN  glucagon  Injectable 1 milliGRAM(s) IntraMuscular once  insulin lispro (ADMELOG) corrective regimen sliding scale   SubCutaneous every 6 hours  insulin NPH human recombinant 2 Unit(s) SubCutaneous every 6 hours  mupirocin 2% Nasal 1 Application(s) Both Nostrils two times a day  pantoprazole  Injectable 40 milliGRAM(s) IV Push daily  petrolatum Ophthalmic Ointment 1 Application(s) Right EYE every 12 hours  piperacillin/tazobactam IVPB.. 3.375 Gram(s) IV Intermittent every 8 hours  polyethylene glycol 3350 17 Gram(s) Oral daily  senna Syrup 10 milliLiter(s) Oral at bedtime  sodium chloride 2% . 1000 milliLiter(s) IV Continuous <Continuous>    EXAMINATION:  General:  in NAD  HEENT:  intubated  Neuro:  with eyelid opening apraxia, able to follow simple commands including showing a thumbs up, 2 fingers and wiggling toes, pupils 3 mm and reactive but R is more sluggish, EOMI, no BTT on the L, + cough, able to raise R side AG, L arm 1/5 spontaneously, L leg 2/5 spontaneously   Cards:  RRR  Respiratory:  no respiratory distress  Abdomen:  soft  Extremities:  no LE edema    Assessment/Plan:   59 yo man with HTN and DM, mRS 0 at baseline, admitted 3/29 with L side and confusion, NIHSS 28, CTH with some loss of grey-white in the R temporal lobe. Core 72, penumbra 108, s/p angio demonstrating a R proximal ICA occlusion at the bifurcation with a R M1 occlusion s/p MT with TICI 2B recanalization and IA tPA given, followed by stenting of R ICA. 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 At risk for hemorrhagic conversion due to a large ischemic core and being on cangrelor. On hemicraniectomy watch.     Neuro:  q1h NC  c/w ASA 81 mg daily   c/w cangrelor ggt at 1 mcg/kg/min   c/w Atorvastatin 80 mg daily  precedex for sedation with fentanyl PRN  hemicraniectomy watch     CV: , TTE with EF 75%  MAP >65, on Levo    Pulm:  SBT trial 6/6, 35%  CXR today     GI:  NPO for hemicrani watch   with NG tube     Renal:  Na goal 145-155  HTS 2% at 75cc/hr, BMP q6h     Heme:  SCDs, hold Lov ppx for hemicrani watch     ID: MRSA neg  c/w Zosyn, d/c vanc     Endo: HgA1C 7.3  start NPH 2U q6h, moderate ISS   FS goal 120-180    bradford, peripherals (unable to place A line into b/l radial arteries)    Patient seen and examined by attending on 3/31/2023.    Patient is critically ill due to acute R MCA stroke s/p MT and at high risk for neurological deterioration or death due to: at high risk of hemorrhagic conversion, requiring intubation due to inability to protect airway.      Interval events:  NAEO.  CTH this AM stable, with 7mm MLS.   Afebrile. Tolerating CPAP 6/6 at 35%. Minimal secretions.     VITALS:  T(C): , Max: 38.1 (03-30-23 @ 12:20)  HR:  (59 - 93)  BP:  (87/52 - 128/64)  ABP: --  RR:  (16 - 24)  SpO2:  (94% - 100%)  Wt(kg): --  Device: Avea, Mode: CPAP with PS, FiO2: 35, PEEP: 6, PS: 6, MAP: 9    03-30-23 @ 07:01  -  03-31-23 @ 07:00  --------------------------------------------------------  IN: 3105 mL / OUT: 942 mL / NET: 2163 mL      LABS:  Na: 145 (03-31 @ 05:39), 143 (03-30 @ 23:11), 143 (03-30 @ 17:20), 139 (03-30 @ 04:20), 138 (03-29 @ 10:00)  K: 4.2 (03-31 @ 05:39), 3.6 (03-30 @ 23:11), 4.0 (03-30 @ 17:20), 4.0 (03-30 @ 04:20), 3.1 (03-29 @ 10:00)  Cl: 115 (03-31 @ 05:39), 113 (03-30 @ 23:11), 109 (03-30 @ 17:20), 106 (03-30 @ 04:20), 98 (03-29 @ 10:00)  CO2: 22.0 (03-31 @ 05:39), 24.0 (03-30 @ 23:11), 25.0 (03-30 @ 17:20), 22.0 (03-30 @ 04:20), 23.0 (03-29 @ 10:00)  BUN: 9.9 (03-31 @ 05:39), 8.1 (03-30 @ 23:11), 7.3 (03-30 @ 17:20), 9.8 (03-30 @ 04:20), 9.1 (03-29 @ 10:00)  Cr: 0.45 (03-31 @ 05:39), 0.54 (03-30 @ 23:11), 0.69 (03-30 @ 17:20), 0.70 (03-30 @ 04:20), 0.62 (03-29 @ 10:00)  Glu: 131(03-31 @ 05:39), 151(03-30 @ 23:11), 133(03-30 @ 17:20), 224(03-30 @ 04:20), 260(03-29 @ 10:00)    Hgb: 9.8 (03-31 @ 05:39), 12.0 (03-30 @ 04:20), 15.2 (03-29 @ 10:00)  Hct: 29.8 (03-31 @ 05:39), 35.9 (03-30 @ 04:20), 45.4 (03-29 @ 10:00)  WBC: 9.07 (03-31 @ 05:39), 15.23 (03-30 @ 04:20), 14.31 (03-29 @ 10:00)  Plt: 172 (03-31 @ 05:39), 255 (03-30 @ 04:20), 264 (03-29 @ 10:00)    INR: 1.14 03-29-23 @ 10:00  PTT: 29.9 03-29-23 @ 10:00    MEDICATIONS:  acetaminophen     Tablet .. 650 milliGRAM(s) Oral every 6 hours PRN  artificial  tears Solution 1 Drop(s) Both EYES two times a day  aspirin  chewable 81 milliGRAM(s) Oral daily  atorvastatin 80 milliGRAM(s) Oral at bedtime  cangrelor Infusion 1 MICROgram(s)/kG/Min IV Continuous <Continuous>  chlorhexidine 0.12% Liquid 15 milliLiter(s) Oral Mucosa every 12 hours  chlorhexidine 2% Cloths 1 Application(s) Topical daily  dexMEDEtomidine Infusion 0.2 MICROgram(s)/kG/Hr IV Continuous <Continuous>  dextrose 5%. 1000 milliLiter(s) IV Continuous <Continuous>  dextrose 5%. 1000 milliLiter(s) IV Continuous <Continuous>  dextrose 50% Injectable 25 Gram(s) IV Push once  dextrose 50% Injectable 12.5 Gram(s) IV Push once  dextrose 50% Injectable 25 Gram(s) IV Push once  dextrose Oral Gel 15 Gram(s) Oral once PRN  glucagon  Injectable 1 milliGRAM(s) IntraMuscular once  insulin lispro (ADMELOG) corrective regimen sliding scale   SubCutaneous every 6 hours  insulin NPH human recombinant 2 Unit(s) SubCutaneous every 6 hours  mupirocin 2% Nasal 1 Application(s) Both Nostrils two times a day  pantoprazole  Injectable 40 milliGRAM(s) IV Push daily  petrolatum Ophthalmic Ointment 1 Application(s) Right EYE every 12 hours  piperacillin/tazobactam IVPB.. 3.375 Gram(s) IV Intermittent every 8 hours  polyethylene glycol 3350 17 Gram(s) Oral daily  senna Syrup 10 milliLiter(s) Oral at bedtime  sodium chloride 2% . 1000 milliLiter(s) IV Continuous <Continuous>    EXAMINATION:  General:  in NAD  HEENT:  intubated  Neuro:  with eyelid opening apraxia, able to follow simple commands including showing a thumbs up, 2 fingers and wiggling toes, pupils 3 mm and reactive but R is more sluggish, EOMI, no BTT on the L, + cough, able to raise R side AG, L arm 1/5 spontaneously, L leg 2/5 spontaneously   Cards:  RRR  Respiratory:  no respiratory distress  Abdomen:  soft  Extremities:  no LE edema    Assessment/Plan:   59 yo man with HTN and DM, mRS 0 at baseline, admitted 3/29 with L side and confusion, NIHSS 28, CTH with some loss of grey-white in the R temporal lobe. Core 72, penumbra 108, s/p angio demonstrating a R proximal ICA occlusion at the bifurcation with a R M1 occlusion s/p MT with TICI 2B recanalization and IA tPA given, followed by stenting of R ICA. 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 At risk for hemorrhagic conversion due to a large ischemic core and being on cangrelor. On hemicraniectomy watch.     Neuro:  q1h NC  c/w ASA 81 mg daily   c/w cangrelor ggt at 1 mcg/kg/min while on hemicrani watch   c/w Atorvastatin 80 mg daily  precedex for sedation with fentanyl PRN  hemicraniectomy watch     CV: , TTE with EF 75%  MAP >65, on Levo    Pulm:  trail of extubation today to HFNC    GI:  NPO for hemicrani watch and extubation   with NG tube     Renal:  Na goal 145-155  HTS 2% at 75cc/hr with acetate, BMP q6h     Heme:  SCDs, hold Lov ppx for hemicrani watch     ID: MRSA neg  c/w Zosyn for aspiration PNA    Endo: HgA1C 7.3  start NPH 4U q6h, moderate ISS   FS goal 120-180    remove bradford, TOV, peripherals (unable to place A line into b/l radial arteries)    Patient seen and examined by attending on 3/31/2023.    Patient is critically ill due to acute R MCA stroke s/p MT and at high risk for neurological deterioration or death due to: at high risk of hemorrhagic conversion, requiring intubation due to inability to protect airway.      Interval events:  NAEO.  CTH this AM stable, with 7mm MLS.   Afebrile. Tolerating CPAP 6/6 at 35%. Minimal secretions.     VITALS:  T(C): , Max: 38.1 (03-30-23 @ 12:20)  HR:  (59 - 93)  BP:  (87/52 - 128/64)  ABP: --  RR:  (16 - 24)  SpO2:  (94% - 100%)  Wt(kg): --  Device: Avea, Mode: CPAP with PS, FiO2: 35, PEEP: 6, PS: 6, MAP: 9    03-30-23 @ 07:01  -  03-31-23 @ 07:00  --------------------------------------------------------  IN: 3105 mL / OUT: 942 mL / NET: 2163 mL      LABS:  Na: 145 (03-31 @ 05:39), 143 (03-30 @ 23:11), 143 (03-30 @ 17:20), 139 (03-30 @ 04:20), 138 (03-29 @ 10:00)  K: 4.2 (03-31 @ 05:39), 3.6 (03-30 @ 23:11), 4.0 (03-30 @ 17:20), 4.0 (03-30 @ 04:20), 3.1 (03-29 @ 10:00)  Cl: 115 (03-31 @ 05:39), 113 (03-30 @ 23:11), 109 (03-30 @ 17:20), 106 (03-30 @ 04:20), 98 (03-29 @ 10:00)  CO2: 22.0 (03-31 @ 05:39), 24.0 (03-30 @ 23:11), 25.0 (03-30 @ 17:20), 22.0 (03-30 @ 04:20), 23.0 (03-29 @ 10:00)  BUN: 9.9 (03-31 @ 05:39), 8.1 (03-30 @ 23:11), 7.3 (03-30 @ 17:20), 9.8 (03-30 @ 04:20), 9.1 (03-29 @ 10:00)  Cr: 0.45 (03-31 @ 05:39), 0.54 (03-30 @ 23:11), 0.69 (03-30 @ 17:20), 0.70 (03-30 @ 04:20), 0.62 (03-29 @ 10:00)  Glu: 131(03-31 @ 05:39), 151(03-30 @ 23:11), 133(03-30 @ 17:20), 224(03-30 @ 04:20), 260(03-29 @ 10:00)    Hgb: 9.8 (03-31 @ 05:39), 12.0 (03-30 @ 04:20), 15.2 (03-29 @ 10:00)  Hct: 29.8 (03-31 @ 05:39), 35.9 (03-30 @ 04:20), 45.4 (03-29 @ 10:00)  WBC: 9.07 (03-31 @ 05:39), 15.23 (03-30 @ 04:20), 14.31 (03-29 @ 10:00)  Plt: 172 (03-31 @ 05:39), 255 (03-30 @ 04:20), 264 (03-29 @ 10:00)    INR: 1.14 03-29-23 @ 10:00  PTT: 29.9 03-29-23 @ 10:00    MEDICATIONS:  acetaminophen     Tablet .. 650 milliGRAM(s) Oral every 6 hours PRN  artificial  tears Solution 1 Drop(s) Both EYES two times a day  aspirin  chewable 81 milliGRAM(s) Oral daily  atorvastatin 80 milliGRAM(s) Oral at bedtime  cangrelor Infusion 1 MICROgram(s)/kG/Min IV Continuous <Continuous>  chlorhexidine 0.12% Liquid 15 milliLiter(s) Oral Mucosa every 12 hours  chlorhexidine 2% Cloths 1 Application(s) Topical daily  dexMEDEtomidine Infusion 0.2 MICROgram(s)/kG/Hr IV Continuous <Continuous>  dextrose 5%. 1000 milliLiter(s) IV Continuous <Continuous>  dextrose 5%. 1000 milliLiter(s) IV Continuous <Continuous>  dextrose 50% Injectable 25 Gram(s) IV Push once  dextrose 50% Injectable 12.5 Gram(s) IV Push once  dextrose 50% Injectable 25 Gram(s) IV Push once  dextrose Oral Gel 15 Gram(s) Oral once PRN  glucagon  Injectable 1 milliGRAM(s) IntraMuscular once  insulin lispro (ADMELOG) corrective regimen sliding scale   SubCutaneous every 6 hours  insulin NPH human recombinant 2 Unit(s) SubCutaneous every 6 hours  mupirocin 2% Nasal 1 Application(s) Both Nostrils two times a day  pantoprazole  Injectable 40 milliGRAM(s) IV Push daily  petrolatum Ophthalmic Ointment 1 Application(s) Right EYE every 12 hours  piperacillin/tazobactam IVPB.. 3.375 Gram(s) IV Intermittent every 8 hours  polyethylene glycol 3350 17 Gram(s) Oral daily  senna Syrup 10 milliLiter(s) Oral at bedtime  sodium chloride 2% . 1000 milliLiter(s) IV Continuous <Continuous>    EXAMINATION:  General:  in NAD  HEENT:  intubated  Neuro:  with eyelid opening apraxia but able to open lids slightly, consistently follows simple commands on both sides, pupils 3 mm and reactive but R is more sluggish, EOMI, no BTT on the L, able to slightly open his mouth, + cough, able to raise R side AG, L arm 2/5 spontaneously, R leg 3/5, L leg 2/5 spontaneously   Cards:  RRR  Respiratory:  no respiratory distress, with some rhonchi   Abdomen:  soft  Extremities:  no LE edema    Assessment/Plan:   57 yo man with HTN and DM, mRS 0 at baseline, admitted 3/29 with L sided weakness and confusion, NIHSS 28, CTH with some loss of grey-white in the R temporal lobe. Core 72, penumbra 108, s/p angio demonstrating a R proximal ICA occlusion at the bifurcation with a R M1 occlusion s/p MT with TICI 2B recanalization and IA tPA given, followed by stenting of R ICA. 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 At risk for hemorrhagic conversion due to a large ischemic core and being on cangrelor. On hemicraniectomy watch.     Neuro:  q1h NC  hemicraniectomy watch   c/w ASA 81 mg daily   c/w cangrelor ggt at 1 mcg/kg/min while on hemicrani watch, send P2Y12   carotid doppler   c/w Atorvastatin 80 mg daily  off sedation     CV: , TTE with EF 75%  MAP >65, off Levo     Pulm:  trail of extubation today to HFNC, pretreat with duo nebs     GI:  NPO for hemicrani watch and extubation   with NG tube     Renal:  Na goal 145-155  HTS 2% at 75cc/hr, add acetate, BMP q6h     Heme:  SCDs, hold Lov ppx for hemicrani watch     ID: MRSA neg  c/w Zosyn for aspiration PNA    Endo: HgA1C 7.3  increase NPH to 4U q6h, moderate ISS   FS goal 120-180    remove bradford, TOV, peripherals    Patient seen and examined by attending on 3/31/2023.    Patient is critically ill due to acute R MCA stroke s/p MT and at high risk for neurological deterioration or death due to: at high risk of hemorrhagic conversion, with midline shift of brain, requiring intubation due to inability to protect airway.

## 2023-03-31 NOTE — PROGRESS NOTE ADULT - SUBJECTIVE AND OBJECTIVE BOX
[facility] Stroke Team    Progress Note    Preliminary note, official note pending attending review/signature     HPI:  59 y/o male with PMHx of HTN and DM transfered from [facility] to [facility] as a code stroke. [facility] reports pt BLAYNE was last night around 20:00 and was c/o right arm pain, today his family found his around 05:00 with left sided hemiparesis, slurred speech and was incontinent of urine, they called EMS as pt arrived at [facility] at 05:56, he was found to have right ICA occlusion and transferred to [facility], code stroke called upon arrival. Pt was on Cardene however per EMS neuro wanted pressure around 200 and it was d/monet, pt sedated with propofol.         (29 Mar 2023 09:40)      Admission NIHSS  Pre-MRS  ICH Score (if applicable)    SUBJECTIVE: No events overnight.  No new neurologic complaints.  ROS reported negative unless otherwise noted.    acetaminophen     Tablet .. 650 milliGRAM(s) Oral every 6 hours PRN  artificial  tears Solution 1 Drop(s) Both EYES two times a day  aspirin  chewable 81 milliGRAM(s) Oral daily  atorvastatin 80 milliGRAM(s) Oral at bedtime  cangrelor Infusion 1 MICROgram(s)/kG/Min IV Continuous <Continuous>  chlorhexidine 0.12% Liquid 15 milliLiter(s) Oral Mucosa every 12 hours  chlorhexidine 2% Cloths 1 Application(s) Topical daily  dexMEDEtomidine Infusion 0.2 MICROgram(s)/kG/Hr IV Continuous <Continuous>  dextrose 5%. 1000 milliLiter(s) IV Continuous <Continuous>  dextrose 5%. 1000 milliLiter(s) IV Continuous <Continuous>  dextrose 50% Injectable 25 Gram(s) IV Push once  dextrose 50% Injectable 12.5 Gram(s) IV Push once  dextrose 50% Injectable 25 Gram(s) IV Push once  dextrose Oral Gel 15 Gram(s) Oral once PRN  glucagon  Injectable 1 milliGRAM(s) IntraMuscular once  insulin lispro (ADMELOG) corrective regimen sliding scale   SubCutaneous every 6 hours  insulin NPH human recombinant 2 Unit(s) SubCutaneous every 6 hours  mupirocin 2% Nasal 1 Application(s) Both Nostrils two times a day  pantoprazole  Injectable 40 milliGRAM(s) IV Push daily  petrolatum Ophthalmic Ointment 1 Application(s) Right EYE every 12 hours  piperacillin/tazobactam IVPB.. 3.375 Gram(s) IV Intermittent every 8 hours  polyethylene glycol 3350 17 Gram(s) Oral daily  senna Syrup 10 milliLiter(s) Oral at bedtime  sodium chloride 2% . 1000 milliLiter(s) IV Continuous <Continuous>      PHYSICAL EXAM:   Vital Signs Last 24 Hrs  T(C): 37.4 (31 Mar 2023 06:30), Max: 38.1 (30 Mar 2023 12:20)  T(F): 99.3 (31 Mar 2023 06:30), Max: 100.6 (30 Mar 2023 12:20)  HR: 65 (31 Mar 2023 06:30) (59 - 93)  BP: 101/62 (31 Mar 2023 06:30) (87/52 - 128/64)  BP(mean): 74 (31 Mar 2023 06:30) (63 - 101)  RR: 17 (31 Mar 2023 06:30) (16 - 24)  SpO2: 100% (31 Mar 2023 06:30) (94% - 100%)    Parameters below as of 31 Mar 2023 06:00  Patient On (Oxygen Delivery Method): ventilator        General: No acute distress    NEUROLOGICAL EXAM:  Mental status: Awake, alert, oriented x3, speech fluent, follows commands, no neglect, normal memory   Cranial Nerves: No facial asymmetry, no nystagmus, no dysarthria,  tongue midline  Motor exam: Normal tone, no drift, 5/5 RUE, 5/5 RLE, 5/5 LUE, 5/5 LLE, normal fine finger movements.  Sensation: Intact to light touch   Coordination/ Gait: No dysmetria, gait not tested    LABS:                        9.8    9.07  )-----------( 172      ( 31 Mar 2023 05:39 )             29.8    03-31    145  |  115<H>  |  9.9  ----------------------------<  131<H>  4.2   |  22.0  |  0.45<L>    Ca    7.8<L>      31 Mar 2023 05:39  Phos  2.7     03-31  Mg     2.2     03-31    PT/INR - ( 29 Mar 2023 10:00 )   PT: 13.2 sec;   INR: 1.14 ratio         PTT - ( 29 Mar 2023 10:00 )  PTT:29.9 sec      IMAGING: Reviewed by me.      [facility] Stroke Team    Progress Note    Preliminary note, official note pending attending review/signature     HPI:  57 y/o male with PMHx of HTN and DM transfered from [facility] to [facility] as a code stroke. [facility] reports pt BLAYNE was last night around 20:00 and was c/o right arm pain, today his family found his around 05:00 with left sided hemiparesis, slurred speech and was incontinent of urine, they called EMS as pt arrived at [facility] at 05:56, he was found to have right ICA occlusion and transferred to [facility], code stroke called upon arrival. Pt was on Cardene however per EMS neuro wanted pressure around 200 and it was d/monet, pt sedated with propofol.    SUBJECTIVE: No events overnight.  No new neurologic complaints. Pt remains on cangrelor drip, will transition to PO with dual antiplatelets. P2y12: 9. Repeat CTH 3/30 stable. MRI brain pending. ROS reported negative unless otherwise noted.    acetaminophen     Tablet .. 650 milliGRAM(s) Oral every 6 hours PRN  artificial  tears Solution 1 Drop(s) Both EYES two times a day  aspirin  chewable 81 milliGRAM(s) Oral daily  atorvastatin 80 milliGRAM(s) Oral at bedtime  cangrelor Infusion 1 MICROgram(s)/kG/Min IV Continuous <Continuous>  chlorhexidine 0.12% Liquid 15 milliLiter(s) Oral Mucosa every 12 hours  chlorhexidine 2% Cloths 1 Application(s) Topical daily  dexMEDEtomidine Infusion 0.2 MICROgram(s)/kG/Hr IV Continuous <Continuous>  dextrose 5%. 1000 milliLiter(s) IV Continuous <Continuous>  dextrose 5%. 1000 milliLiter(s) IV Continuous <Continuous>  dextrose 50% Injectable 25 Gram(s) IV Push once  dextrose 50% Injectable 12.5 Gram(s) IV Push once  dextrose 50% Injectable 25 Gram(s) IV Push once  dextrose Oral Gel 15 Gram(s) Oral once PRN  glucagon  Injectable 1 milliGRAM(s) IntraMuscular once  insulin lispro (ADMELOG) corrective regimen sliding scale   SubCutaneous every 6 hours  insulin NPH human recombinant 2 Unit(s) SubCutaneous every 6 hours  mupirocin 2% Nasal 1 Application(s) Both Nostrils two times a day  pantoprazole  Injectable 40 milliGRAM(s) IV Push daily  petrolatum Ophthalmic Ointment 1 Application(s) Right EYE every 12 hours  piperacillin/tazobactam IVPB.. 3.375 Gram(s) IV Intermittent every 8 hours  polyethylene glycol 3350 17 Gram(s) Oral daily  senna Syrup 10 milliLiter(s) Oral at bedtime  sodium chloride 2% . 1000 milliLiter(s) IV Continuous <Continuous>    PHYSICAL EXAM:   Vital Signs Last 24 Hrs  T(C): 37.4 (31 Mar 2023 06:30), Max: 38.1 (30 Mar 2023 12:20)  T(F): 99.3 (31 Mar 2023 06:30), Max: 100.6 (30 Mar 2023 12:20)  HR: 65 (31 Mar 2023 06:30) (59 - 93)  BP: 101/62 (31 Mar 2023 06:30) (87/52 - 128/64)  BP(mean): 74 (31 Mar 2023 06:30) (63 - 101)  RR: 17 (31 Mar 2023 06:30) (16 - 24)  SpO2: 100% (31 Mar 2023 06:30) (94% - 100%)    Parameters below as of 31 Mar 2023 06:00  Patient On (Oxygen Delivery Method): ventilator    General: No acute distress    NEUROLOGICAL EXAM:  Pending    LABS:                        9.8    9.07  )-----------( 172      ( 31 Mar 2023 05:39 )             29.8    03-31    145  |  115<H>  |  9.9  ----------------------------<  131<H>  4.2   |  22.0  |  0.45<L>    Ca    7.8<L>      31 Mar 2023 05:39  Phos  2.7     03-31  Mg     2.2     03-31    PT/INR - ( 29 Mar 2023 10:00 )   PT: 13.2 sec;   INR: 1.14 ratio       PTT - ( 29 Mar 2023 10:00 )  PTT:29.9 sec    IMAGING: Reviewed by me.   CT Head No Cont (03.30.23 @ 03:40)   Compared with 3/29/2023 there is decreasing retained contrast   in the right basal ganglia and right frontal parietal cortex with   evolving lucency consistent with an acute right artery infarct. Residual   high density although decreased compared to the prior exam may represent   retained contrast as well as a small amount of hemorrhage. No increased   hemorrhage identified.    CT BRAIN STROKE PROTOCOL   03/29/2023    IMPRESSION: Dense right MCA sign with early right temporal lobe   infarction. No acute intracranial hemorrhage.    CT PERFUSION W MAPS IC    03/29/2023    CBF<30% volume: 72 ml right MCA territory.  Tmax>6.0 s volume: 180 ml  Mismatch volume: 108 ml  Mismatch ratio: 2.5    TTE  3/29/2023  Left ventricular ejection fraction, by visual estimation, is >75%. Technically difficult study. Hyperdynamic global left ventricular systolic function. The left ventricular diastolic function could not be assessed in this study. There is mild concentric left ventricular hypertrophy. There is no evidence of pericardial effusion. Mild mitral annular calcification. Trace mitral valve regurgitation. [facility] Stroke Team  Progress Note    HPI:  57 y/o male with PMHx of HTN and DM transferred from [facility] to [facility] as a code stroke. [facility] reports pt BLAYNE was last night around 20:00 and was c/o right arm pain, today his family found his around 05:00 with left sided hemiparesis, slurred speech and was incontinent of urine, they called EMS as pt arrived at [facility] at 05:56, he was found to have right ICA occlusion and transferred to [facility], code stroke called upon arrival. Pt was on Cardene however per EMS neuro wanted pressure around 200 and it was d/monet, pt sedated with propofol.    SUBJECTIVE: No events overnight.  No new neurologic complaints. Pt remains on cangrelor drip, will transition to PO with dual antiplatelets. P2y12: 9. Repeat CTH 3/30 stable. MRI brain pending. ROS reported negative unless otherwise noted.    acetaminophen     Tablet .. 650 milliGRAM(s) Oral every 6 hours PRN  artificial  tears Solution 1 Drop(s) Both EYES two times a day  aspirin  chewable 81 milliGRAM(s) Oral daily  atorvastatin 80 milliGRAM(s) Oral at bedtime  cangrelor Infusion 1 MICROgram(s)/kG/Min IV Continuous <Continuous>  chlorhexidine 0.12% Liquid 15 milliLiter(s) Oral Mucosa every 12 hours  chlorhexidine 2% Cloths 1 Application(s) Topical daily  dexMEDEtomidine Infusion 0.2 MICROgram(s)/kG/Hr IV Continuous <Continuous>  dextrose 5%. 1000 milliLiter(s) IV Continuous <Continuous>  dextrose 5%. 1000 milliLiter(s) IV Continuous <Continuous>  dextrose 50% Injectable 25 Gram(s) IV Push once  dextrose 50% Injectable 12.5 Gram(s) IV Push once  dextrose 50% Injectable 25 Gram(s) IV Push once  dextrose Oral Gel 15 Gram(s) Oral once PRN  glucagon  Injectable 1 milliGRAM(s) IntraMuscular once  insulin lispro (ADMELOG) corrective regimen sliding scale   SubCutaneous every 6 hours  insulin NPH human recombinant 2 Unit(s) SubCutaneous every 6 hours  mupirocin 2% Nasal 1 Application(s) Both Nostrils two times a day  pantoprazole  Injectable 40 milliGRAM(s) IV Push daily  petrolatum Ophthalmic Ointment 1 Application(s) Right EYE every 12 hours  piperacillin/tazobactam IVPB.. 3.375 Gram(s) IV Intermittent every 8 hours  polyethylene glycol 3350 17 Gram(s) Oral daily  senna Syrup 10 milliLiter(s) Oral at bedtime  sodium chloride 2% . 1000 milliLiter(s) IV Continuous <Continuous>    PHYSICAL EXAM:   Vital Signs Last 24 Hrs  T(C): 37.4 (31 Mar 2023 06:30), Max: 38.1 (30 Mar 2023 12:20)  T(F): 99.3 (31 Mar 2023 06:30), Max: 100.6 (30 Mar 2023 12:20)  HR: 65 (31 Mar 2023 06:30) (59 - 93)  BP: 101/62 (31 Mar 2023 06:30) (87/52 - 128/64)  BP(mean): 74 (31 Mar 2023 06:30) (63 - 101)  RR: 17 (31 Mar 2023 06:30) (16 - 24)  SpO2: 100% (31 Mar 2023 06:30) (94% - 100%)    Parameters below as of 31 Mar 2023 06:00  Patient On (Oxygen Delivery Method): ventilator    General: No acute distress    NEUROLOGICAL EXAM:  Mental status: Alert follows commands, oriented x 3 no aphasia  CN: perrl, blinks to threat on right, not left. right gaze preference, UMN left facial weakness,   motor: moves right UE/LE 5/5  Moves left UE 0-1/5, LLE 4+5  Sensory left neglect  coord no dymetria on roght  gait deferred      LABS:                        9.8    9.07  )-----------( 172      ( 31 Mar 2023 05:39 )             29.8    03-31    145  |  115<H>  |  9.9  ----------------------------<  131<H>  4.2   |  22.0  |  0.45<L>    Ca    7.8<L>      31 Mar 2023 05:39  Phos  2.7     03-31  Mg     2.2     03-31    PT/INR - ( 29 Mar 2023 10:00 )   PT: 13.2 sec;   INR: 1.14 ratio       PTT - ( 29 Mar 2023 10:00 )  PTT:29.9 sec    IMAGING: Reviewed by me.   CT Head No Cont (03.30.23 @ 03:40)   Compared with 3/29/2023 there is decreasing retained contrast   in the right basal ganglia and right frontal parietal cortex with   evolving lucency consistent with an acute right artery infarct. Residual   high density although decreased compared to the prior exam may represent   retained contrast as well as a small amount of hemorrhage. No increased   hemorrhage identified.    CT BRAIN STROKE PROTOCOL   03/29/2023    IMPRESSION: Dense right MCA sign with early right temporal lobe   infarction. No acute intracranial hemorrhage.    CT PERFUSION W MAPS IC    03/29/2023    CBF<30% volume: 72 ml right MCA territory.  Tmax>6.0 s volume: 180 ml  Mismatch volume: 108 ml  Mismatch ratio: 2.5    TTE  3/29/2023  Left ventricular ejection fraction, by visual estimation, is >75%. Technically difficult study. Hyperdynamic global left ventricular systolic function. The left ventricular diastolic function could not be assessed in this study. There is mild concentric left ventricular hypertrophy. There is no evidence of pericardial effusion. Mild mitral annular calcification. Trace mitral valve regurgitation.

## 2023-03-31 NOTE — PROGRESS NOTE ADULT - ASSESSMENT
ASSESSMENT: 58y Male with PMH HTN and DM, found by his family 0500 AM on 3/29/23 with right hemiparesis, slurred speech, and incontinent of urine.  Last well known 2000  on 3/28/23. Patient brought in by EMS to the [facility] at 0556, was noted not to be a candidate for Tenecteplase as patient out of time window. Patient was transferred to [facility] for thrombectomy due to right ICA occlusion with tandem right MCA occlusion. NIHSS 28 MRS pre - 0 . Etiology likely large artery atherosclerotic disease. Post mechanical thrombectomy TICI 2B and Right carotid stent with angioplasty.     NEURO: Continue close monitoring for neurologic deterioration, with stroke checks per protocol, then per ICU, q1 there after minimum in setting of cerebral edema with mass effect and brain compression, hypertonic saline to Na goal gradually of 140-145 for now- avoid hyponatremia and rapid fluctuations, q 6 hr bmp, hemicraniectomy watch, permissive HTN as per post thrombectomy recommendations 110-140mmHg in setting of recent revascularization as to avoid hypo/hyperperfusion injury. Titrate statin to LDL goal less than 70, MRI Brain w/o. Recommend post stent carotid duplex. Physical therapy/OT/Speech eval/treatment.     ANTITHROMBOTIC THERAPY: ASA 81mg daily, cangrelor gtt post stenting- transition to PO agent pending clinical course. P2y12: 9.    PULMONARY:  remains on ventilatory support due to respiratory failure     CARDIOVASCULAR: cardiac monitoring     GASTROINTESTINAL: dysphagia screen pending, aspiration precautions     Diet: NPO/NGT per ICU    RENAL: BUN/Cr within range, maintain adequate hydration,  monitor urine output       Na Goal:  135-145     Quispe: Y    HEMATOLOGY: H/H with downtrend- monitor for si/sx of bleeding, serial CBC, Platelets 170. Patient should have all age and risk appropriate malignancy screening.      DVT ppx     ID: Tmax 100.9, leukocytosis. Continues on zosyn, likely UTI. CXR 3/30 stable.     OTHER:  , A1C 7.3- continue glycemic control    DISPOSITION: Rehab or home depending on PT eval once stable and workup is complete    CORE MEASURES:        Admission NIHSS: 28     TPA: [] YES [x] NO      LDL/HDL/A1C: 165/58/7.3     Depression Screen: pending     Statin Therapy: pending     Dysphagia Screen: [] PASS [] FAIL pending     Smoking [] YES [] NO      Afib [] YES [x] NO     Stroke Education [x] YES [] NO    Obtain screening lower extremity venous ultrasound in patients who meet 1 or more of the following criteria as patient is high risk for DVT/PE on admission:   [] History of DVT/PE  []Hypercoagulable states (Factor V Leiden, Cancer, OCP, etc. )  []Prolonged immobility (hemiplegia/hemiparesis/post operative or any other extended immobilization)  [] Transferred from outside facility (Rehab or Long term care)  [] Age </= to 50

## 2023-03-31 NOTE — DIETITIAN INITIAL EVALUATION ADULT - OTHER INFO
Pt is a 59 yo man with HTN and DM, mRS 0 at baseline, admitted 3/29 with L side and confusion, NIHSS 28, CTH with some loss of grey-white in the R temporal lobe. Core 72, penumbra 108, s/p angio demonstrating a R proximal ICA occlusion at the bifurcation with a R M1 occlusion s/p MT with TICI 2B recanalization and IA tPA given, followed by stenting of R ICA. 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 At risk for hemorrhagic conversion due to a large ischemic core and being on cangrelor. On hemicraniectomy watch.

## 2023-03-31 NOTE — PROGRESS NOTE ADULT - SUBJECTIVE AND OBJECTIVE BOX
HPI:  59 y/o male with PMHx of HTN and DM transfered from [facility] to [facility] as a code stroke. [facility] EMS reports pt BLAYNE was last night around 20:00 and was c/o right arm pain, today his family found his around 05:00 with left sided hemiparesis, slurred speech and was incontinent of urine, they called EMS as pt arrived at [facility] at 05:56, he was found to have right ICA occlusion and transferred to [facility], code stroke called upon arrival. Pt was on Cardene however per EMS neuro wanted pressure around 200 and it was d/monet, pt sedated with propofol.   (29 Mar 2023 09:40)    OVERNIGHT EVENTS: Remains on cangrelor gtt. On low dose precedex for vent synchrony, tolerating CPAP 6/6. Neuro exam improving.       Vital Signs Last 24 Hrs  T(C): 37.7 (31 Mar 2023 01:00), Max: 38.1 (30 Mar 2023 01:30)  T(F): 99.9 (31 Mar 2023 01:), Max: 100.6 (30 Mar 2023 01:30)  HR: 64 (31 Mar 2023 01:) (52 - 93)  BP: 96/55 (31 Mar 2023 01:) (87/52 - 128/71)  BP(mean): 67 (31 Mar 2023 01:00) (63 - 101)  RR: 19 (31 Mar 2023 01:) (13 - 24)  SpO2: 100% (31 Mar 2023 01:) (100% - 100%)    Parameters below as of 31 Mar 2023 01:00  Patient On (Oxygen Delivery Method): ventilator        I&O's Summary    29 Mar 2023 07:01  -  30 Mar 2023 07:00  --------------------------------------------------------  IN: 4061.9 mL / OUT: 867 mL / NET: 3194.9 mL    30 Mar 2023 07:01  -  31 Mar 2023 01:24  --------------------------------------------------------  IN: 2097.8 mL / OUT: 547 mL / NET: 1550.8 mL        PHYSICAL EXAM:  General: NAD, sedated on precedex  HEENT: atraumatic head, PERRL 4mm b/l, R eye chemosis  Cardiovascular: Regular rate and rhythm  Respiratory: nonlabored breathing, normal chest rise, intubated  GI: abdomen soft, nontender, nondistended  Neuro: OE to voice, +cough/gag/corneals, blinks to threat, unable to track   RUE 5/5, RLE 3/5, able to follow commands   LUE D/B/T 2/5, HG 1/5, attempts to lift LUE antigrav distally, LLE 3/5  Extremities: distal pulses 2+ x4  Wound/incision: R groin site c/d/i        TUBES/LINES:  [X] Quispe  [] Wound Drains  [x] NGT      DIET:  [x] NPO  [] Mechanical  [] Tube feeds    LABS:                        12.0   15.23 )-----------( 255      ( 30 Mar 2023 04:20 )             35.9     03-30    143  |  113<H>  |  8.1  ----------------------------<  151<H>  3.6   |  24.0  |  0.54    Ca    7.6<L>      30 Mar 2023 23:11  Phos  2.7     03-30  Mg     1.7     03-30      PT/INR - ( 29 Mar 2023 10:00 )   PT: 13.2 sec;   INR: 1.14 ratio         PTT - ( 29 Mar 2023 10:00 )  PTT:29.9 sec  Urinalysis Basic - ( 29 Mar 2023 23:40 )    Color: Yellow / Appearance: Slightly Turbid / S.020 / pH: x  Gluc: x / Ketone: Trace  / Bili: Negative / Urobili: Negative mg/dL   Blood: x / Protein: 30 mg/dL / Nitrite: Negative   Leuk Esterase: Small / RBC: 11-25 /HPF / WBC 6-10 /HPF   Sq Epi: x / Non Sq Epi: Few / Bacteria: Occasional          CAPILLARY BLOOD GLUCOSE  POCT Blood Glucose.: 181 mg/dL (30 Mar 2023 16:29)  POCT Blood Glucose.: 213 mg/dL (30 Mar 2023 10:55)      Allergies  Allergy Status Unknown      MEDICATIONS:  Antibiotics:  piperacillin/tazobactam IVPB.. 3.375 Gram(s) IV Intermittent every 8 hours    Neuro:  acetaminophen     Tablet .. 650 milliGRAM(s) Oral every 6 hours PRN  dexMEDEtomidine Infusion 0.2 MICROgram(s)/kG/Hr IV Continuous <Continuous>    Anticoagulation:  aspirin  chewable 81 milliGRAM(s) Oral daily  cangrelor Infusion 1 MICROgram(s)/kG/Min IV Continuous <Continuous>    OTHER:  acetylcysteine 20%  Inhalation 4 milliLiter(s) Inhalation every 12 hours  artificial  tears Solution 1 Drop(s) Both EYES two times a day  atorvastatin 80 milliGRAM(s) Oral at bedtime  chlorhexidine 0.12% Liquid 15 milliLiter(s) Oral Mucosa every 12 hours  chlorhexidine 2% Cloths 1 Application(s) Topical daily  dextrose 50% Injectable 25 Gram(s) IV Push once  dextrose 50% Injectable 12.5 Gram(s) IV Push once  dextrose 50% Injectable 25 Gram(s) IV Push once  dextrose Oral Gel 15 Gram(s) Oral once PRN  glucagon  Injectable 1 milliGRAM(s) IntraMuscular once  insulin lispro (ADMELOG) corrective regimen sliding scale   SubCutaneous every 6 hours  insulin NPH human recombinant 2 Unit(s) SubCutaneous every 6 hours  mupirocin 2% Nasal 1 Application(s) Both Nostrils two times a day  norepinephrine Infusion 0.05 MICROgram(s)/kG/Min IV Continuous <Continuous>  pantoprazole  Injectable 40 milliGRAM(s) IV Push daily  petrolatum Ophthalmic Ointment 1 Application(s) Right EYE every 12 hours  polyethylene glycol 3350 17 Gram(s) Oral daily  senna Syrup 10 milliLiter(s) Oral at bedtime    IVF:  dextrose 5%. 1000 milliLiter(s) IV Continuous <Continuous>  dextrose 5%. 1000 milliLiter(s) IV Continuous <Continuous>  sodium chloride 2% . 1000 milliLiter(s) IV Continuous <Continuous>    CULTURES:    RADIOLOGY & ADDITIONAL TESTS:  < from: CT Head No Cont (23 @ 03:40) >    IMPRESSION: Compared with 3/29/2023 there is decreasing retained contrast   in the right basal ganglia and right frontal parietal cortex with   evolving lucency consistent with an acute right artery infarct. Residual   high density although decreased compared to the prior exam may represent   retained contrast as well as a small amount of hemorrhage. No increased   hemorrhage identified.          ASSESSMENT:  59 y/o male with PMHx of HTN and DM transfered from [facility] on 3/29, LKW ~20:00 c/o right arm pain, found by family ~05:00 with L hemiparesis, slurred speech and was incontinent of urine, found to have AYAKA/RMCA occlusions s/p thrombectomy TICI 2B recanalization and AYAKA stent on 3/29. Post op CTH shows likely contrast staining within stroke bed with mass effect and MLS. Neurosurgery consulted for hemicrani watch.      Plan:   - Q1 hour neuro checks  - CTH 3/30 stable   - precedex gtt for sedation, avoid oversedation   - pend MRI   - SBP goal 100-140 2/2 risk of hemorrhage, levophed gtt  - Cangrelor drip per neuro IR for R ICA stent, will transition to DAPT  - DVT prophylaxis: SCDs only  - ASA for stroke ppx  - NS 2% @75, Na goal >140  - Further medical management as per NSICU   - pend discussion with surgeon in AM

## 2023-03-31 NOTE — PROGRESS NOTE ADULT - SUBJECTIVE AND OBJECTIVE BOX
Patient is a 58y old  Male who presents with a chief complaint of Sequelae of cerebral infarction     (31 Mar 2023 09:05)    HPI:  59 y/o male with PMHx of HTN and DM transfered from [facility] to [facility] as a code stroke. [facility] EMS reports pt BLAYNE was last night around 20:00 and was c/o right arm pain, today his family found his around 05:00 with left sided hemiparesis, slurred speech and was incontinent of urine, they called EMS as pt arrived at [facility] at 05:56, he was found to have right ICA occlusion and transferred to [facility], code stroke called upon arrival. Pt was on Cardene however per EMS neuro wanted pressure around 200 and it was d/monet, pt sedated with propofol. (29 Mar 2023 09:40)      Interval history:  Patient seen and examined by neuro IR team. Patient extubated to high flow NC today, tolerating well. Repeat CTH performed this am, stable. No other acute events reported.       Vital Signs Last 24 Hrs  T(C): 37.3 (31 Mar 2023 11:00), Max: 38.1 (30 Mar 2023 14:20)  T(F): 99.1 (31 Mar 2023 11:00), Max: 100.6 (30 Mar 2023 14:20)  HR: 70 (31 Mar 2023 13:00) (62 - 93)  BP: 138/77 (31 Mar 2023 13:00) (91/55 - 138/77)  BP(mean): 97 (31 Mar 2023 13:00) (64 - 101)  RR: 22 (31 Mar 2023 13:00) (16 - 24)  SpO2: 100% (31 Mar 2023 13:00) (94% - 100%)    Parameters below as of 31 Mar 2023 13:00  O2 Concentration (%): 40      Physical Exam:  Constitutional: NAD, lying in bed  Neuro  * Mental Status: EO to voice, following commands, nodding appropriately  * Cranial Nerves: L pupil 3mm brisk, R pupil 4mm sluggish, no facial, L visual field deficit   * Motor: RUE 5/5, LUE HG extensor with intermittent FC thumbs up, RLE 5/5, LLE 2/5  * Sensory: Sensation grossly intact to noxious   * Reflexes: not assessed       LABS:                        9.8    9.07  )-----------( 172      ( 31 Mar 2023 05:39 )             29.8     -    147<H>  |  115<H>  |  10.7  ----------------------------<  120<H>  3.7   |  22.0  |  0.62    Ca    8.0<L>      31 Mar 2023 12:39  Phos  2.7       Mg     2.2         Urinalysis Basic - ( 29 Mar 2023 23:40 )  Color: Yellow / Appearance: Slightly Turbid / S.020 / pH: x  Gluc: x / Ketone: Trace  / Bili: Negative / Urobili: Negative mg/dL   Blood: x / Protein: 30 mg/dL / Nitrite: Negative   Leuk Esterase: Small / RBC: 11-25 /HPF / WBC 6-10 /HPF   Sq Epi: x / Non Sq Epi: Few / Bacteria: Occasional    CULTURES:  Culture Results:   No growth to date. ( @ 23:30)  Culture Results:   No growth to date. ( @ 23:30)      Medications:  MEDICATIONS  (STANDING):  artificial  tears Solution 1 Drop(s) Both EYES two times a day  aspirin  chewable 81 milliGRAM(s) Oral daily  atorvastatin 80 milliGRAM(s) Oral at bedtime  cangrelor Infusion 1 MICROgram(s)/kG/Min (22.2 mL/Hr) IV Continuous <Continuous>  chlorhexidine 2% Cloths 1 Application(s) Topical daily  dextrose 5%. 1000 milliLiter(s) (100 mL/Hr) IV Continuous <Continuous>  dextrose 5%. 1000 milliLiter(s) (50 mL/Hr) IV Continuous <Continuous>  dextrose 50% Injectable 25 Gram(s) IV Push once  dextrose 50% Injectable 12.5 Gram(s) IV Push once  dextrose 50% Injectable 25 Gram(s) IV Push once  glucagon  Injectable 1 milliGRAM(s) IntraMuscular once  insulin lispro (ADMELOG) corrective regimen sliding scale   SubCutaneous every 6 hours  insulin NPH human recombinant 4 Unit(s) SubCutaneous every 6 hours  mupirocin 2% Nasal 1 Application(s) Both Nostrils two times a day  pantoprazole  Injectable 40 milliGRAM(s) IV Push daily  petrolatum Ophthalmic Ointment 1 Application(s) Right EYE every 12 hours  piperacillin/tazobactam IVPB.. 3.375 Gram(s) IV Intermittent every 8 hours  polyethylene glycol 3350 17 Gram(s) Oral daily  potassium chloride   Powder 30 milliEquivalent(s) Oral once  senna Syrup 10 milliLiter(s) Oral at bedtime  sodium chloride 2% + sodium acetate 50:50 1000 milliLiter(s) (75 mL/Hr) IV Continuous <Continuous>    MEDICATIONS  (PRN):  acetaminophen     Tablet .. 650 milliGRAM(s) Oral every 6 hours PRN Temp greater or equal to 38C (100.4F), Mild Pain (1 - 3)  dextrose Oral Gel 15 Gram(s) Oral once PRN Blood Glucose LESS THAN 70 milliGRAM(s)/deciliter      RADIOLOGY & ADDITIONAL STUDIES:  < from: CT Head No Cont (23 @ 03:02) >  IMPRESSION: Hemorrhagic infarct involving the right middle cerebral   artery territory is again seen.    --- End of Report ---    MAKENNA DENSON MD; Attending Radiologist  This document has been electronically signed. Mar 31 2023  8:07AM    < end of copied text >   Patient is a 58y old  Male who presents with a chief complaint of Sequelae of cerebral infarction     (31 Mar 2023 09:05)    HPI:  59 y/o male with PMHx of HTN and DM transfered from [facility] to [facility] as a code stroke. [facility] EMS reports pt BLAYNE was last night around 20:00 and was c/o right arm pain, today his family found his around 05:00 with left sided hemiparesis, slurred speech and was incontinent of urine, they called EMS as pt arrived at [facility] at 05:56, he was found to have right ICA occlusion and transferred to [facility], code stroke called upon arrival. Pt was on Cardene however per EMS neuro wanted pressure around 200 and it was d/monet, pt sedated with propofol. (29 Mar 2023 09:40)      Interval history:  Patient seen and examined by neuro IR team. Patient extubated to high flow NC today, tolerating well. Repeat CTH performed this am, stable. No other acute events reported.       Vital Signs Last 24 Hrs  T(C): 37.3 (31 Mar 2023 11:00), Max: 38.1 (30 Mar 2023 14:20)  T(F): 99.1 (31 Mar 2023 11:00), Max: 100.6 (30 Mar 2023 14:20)  HR: 70 (31 Mar 2023 13:00) (62 - 93)  BP: 138/77 (31 Mar 2023 13:00) (91/55 - 138/77)  BP(mean): 97 (31 Mar 2023 13:00) (64 - 101)  RR: 22 (31 Mar 2023 13:00) (16 - 24)  SpO2: 100% (31 Mar 2023 13:00) (94% - 100%)    Parameters below as of 31 Mar 2023 13:00  O2 Concentration (%): 40      Physical Exam:  Constitutional: NAD, lying in bed  Neuro  * Mental Status: EO to voice, following commands, nodding appropriately  * Cranial Nerves: L pupil 3mm brisk, R pupil 4mm sluggish, no facial, L visual field deficit   * Motor: RUE 5/5, LUE HG extensor with intermittent FC thumbs up, RLE 5/5, LLE 2/5  * Sensory: Sensation grossly intact to noxious   * Reflexes: not assessed       LABS:                        9.8    9.07  )-----------( 172      ( 31 Mar 2023 05:39 )             29.8         147<H>  |  115<H>  |  10.7  ----------------------------<  120<H>  3.7   |  22.0  |  0.62    Ca    8.0<L>      31 Mar 2023 12:39  Phos  2.7       Mg     2.2         P2Y12   117       Urinalysis Basic - ( 29 Mar 2023 23:40 )  Color: Yellow / Appearance: Slightly Turbid / S.020 / pH: x  Gluc: x / Ketone: Trace  / Bili: Negative / Urobili: Negative mg/dL   Blood: x / Protein: 30 mg/dL / Nitrite: Negative   Leuk Esterase: Small / RBC: 11-25 /HPF / WBC 6-10 /HPF   Sq Epi: x / Non Sq Epi: Few / Bacteria: Occasional    CULTURES:  Culture Results:   No growth to date. ( @ 23:30)  Culture Results:   No growth to date. ( @ 23:30)      Medications:  MEDICATIONS  (STANDING):  artificial  tears Solution 1 Drop(s) Both EYES two times a day  aspirin  chewable 81 milliGRAM(s) Oral daily  atorvastatin 80 milliGRAM(s) Oral at bedtime  cangrelor Infusion 1 MICROgram(s)/kG/Min (22.2 mL/Hr) IV Continuous <Continuous>  chlorhexidine 2% Cloths 1 Application(s) Topical daily  dextrose 5%. 1000 milliLiter(s) (100 mL/Hr) IV Continuous <Continuous>  dextrose 5%. 1000 milliLiter(s) (50 mL/Hr) IV Continuous <Continuous>  dextrose 50% Injectable 25 Gram(s) IV Push once  dextrose 50% Injectable 12.5 Gram(s) IV Push once  dextrose 50% Injectable 25 Gram(s) IV Push once  glucagon  Injectable 1 milliGRAM(s) IntraMuscular once  insulin lispro (ADMELOG) corrective regimen sliding scale   SubCutaneous every 6 hours  insulin NPH human recombinant 4 Unit(s) SubCutaneous every 6 hours  mupirocin 2% Nasal 1 Application(s) Both Nostrils two times a day  pantoprazole  Injectable 40 milliGRAM(s) IV Push daily  petrolatum Ophthalmic Ointment 1 Application(s) Right EYE every 12 hours  piperacillin/tazobactam IVPB.. 3.375 Gram(s) IV Intermittent every 8 hours  polyethylene glycol 3350 17 Gram(s) Oral daily  potassium chloride   Powder 30 milliEquivalent(s) Oral once  senna Syrup 10 milliLiter(s) Oral at bedtime  sodium chloride 2% + sodium acetate 50:50 1000 milliLiter(s) (75 mL/Hr) IV Continuous <Continuous>    MEDICATIONS  (PRN):  acetaminophen     Tablet .. 650 milliGRAM(s) Oral every 6 hours PRN Temp greater or equal to 38C (100.4F), Mild Pain (1 - 3)  dextrose Oral Gel 15 Gram(s) Oral once PRN Blood Glucose LESS THAN 70 milliGRAM(s)/deciliter      RADIOLOGY & ADDITIONAL STUDIES:  < from: CT Head No Cont (23 @ 03:02) >  IMPRESSION: Hemorrhagic infarct involving the right middle cerebral   artery territory is again seen.    --- End of Report ---    MAKENNA DENSON MD; Attending Radiologist  This document has been electronically signed. Mar 31 2023  8:07AM    < end of copied text >

## 2023-04-01 LAB
ANION GAP SERPL CALC-SCNC: 10 MMOL/L — SIGNIFICANT CHANGE UP (ref 5–17)
ANION GAP SERPL CALC-SCNC: 13 MMOL/L — SIGNIFICANT CHANGE UP (ref 5–17)
ANION GAP SERPL CALC-SCNC: 13 MMOL/L — SIGNIFICANT CHANGE UP (ref 5–17)
ANION GAP SERPL CALC-SCNC: 14 MMOL/L — SIGNIFICANT CHANGE UP (ref 5–17)
APPEARANCE UR: CLEAR — SIGNIFICANT CHANGE UP
BACTERIA # UR AUTO: ABNORMAL
BASE EXCESS BLDA CALC-SCNC: 3.2 MMOL/L — HIGH (ref -2–3)
BILIRUB UR-MCNC: NEGATIVE — SIGNIFICANT CHANGE UP
BLOOD GAS COMMENTS ARTERIAL: SIGNIFICANT CHANGE UP
BUN SERPL-MCNC: 10.1 MG/DL — SIGNIFICANT CHANGE UP (ref 8–20)
BUN SERPL-MCNC: 10.8 MG/DL — SIGNIFICANT CHANGE UP (ref 8–20)
BUN SERPL-MCNC: 9.3 MG/DL — SIGNIFICANT CHANGE UP (ref 8–20)
BUN SERPL-MCNC: 9.7 MG/DL — SIGNIFICANT CHANGE UP (ref 8–20)
CALCIUM SERPL-MCNC: 7.6 MG/DL — LOW (ref 8.4–10.5)
CALCIUM SERPL-MCNC: 7.9 MG/DL — LOW (ref 8.4–10.5)
CALCIUM SERPL-MCNC: 8 MG/DL — LOW (ref 8.4–10.5)
CALCIUM SERPL-MCNC: 8.1 MG/DL — LOW (ref 8.4–10.5)
CHLORIDE SERPL-SCNC: 105 MMOL/L — SIGNIFICANT CHANGE UP (ref 96–108)
CHLORIDE SERPL-SCNC: 107 MMOL/L — SIGNIFICANT CHANGE UP (ref 96–108)
CHLORIDE SERPL-SCNC: 111 MMOL/L — HIGH (ref 96–108)
CHLORIDE SERPL-SCNC: 112 MMOL/L — HIGH (ref 96–108)
CO2 SERPL-SCNC: 20 MMOL/L — LOW (ref 22–29)
CO2 SERPL-SCNC: 21 MMOL/L — LOW (ref 22–29)
CO2 SERPL-SCNC: 21 MMOL/L — LOW (ref 22–29)
CO2 SERPL-SCNC: 22 MMOL/L — SIGNIFICANT CHANGE UP (ref 22–29)
COLOR SPEC: YELLOW — SIGNIFICANT CHANGE UP
CREAT SERPL-MCNC: 0.55 MG/DL — SIGNIFICANT CHANGE UP (ref 0.5–1.3)
CREAT SERPL-MCNC: 0.55 MG/DL — SIGNIFICANT CHANGE UP (ref 0.5–1.3)
CREAT SERPL-MCNC: 0.59 MG/DL — SIGNIFICANT CHANGE UP (ref 0.5–1.3)
CREAT SERPL-MCNC: 0.6 MG/DL — SIGNIFICANT CHANGE UP (ref 0.5–1.3)
CULTURE RESULTS: SIGNIFICANT CHANGE UP
DIFF PNL FLD: ABNORMAL
EGFR: 111 ML/MIN/1.73M2 — SIGNIFICANT CHANGE UP
EGFR: 111 ML/MIN/1.73M2 — SIGNIFICANT CHANGE UP
EGFR: 113 ML/MIN/1.73M2 — SIGNIFICANT CHANGE UP
EGFR: 113 ML/MIN/1.73M2 — SIGNIFICANT CHANGE UP
EPI CELLS # UR: SIGNIFICANT CHANGE UP
FERRITIN SERPL-MCNC: 221 NG/ML — SIGNIFICANT CHANGE UP (ref 30–400)
FOLATE SERPL-MCNC: 13 NG/ML — SIGNIFICANT CHANGE UP
GLUCOSE BLDC GLUCOMTR-MCNC: 104 MG/DL — HIGH (ref 70–99)
GLUCOSE BLDC GLUCOMTR-MCNC: 126 MG/DL — HIGH (ref 70–99)
GLUCOSE BLDC GLUCOMTR-MCNC: 133 MG/DL — HIGH (ref 70–99)
GLUCOSE BLDC GLUCOMTR-MCNC: 145 MG/DL — HIGH (ref 70–99)
GLUCOSE SERPL-MCNC: 139 MG/DL — HIGH (ref 70–99)
GLUCOSE SERPL-MCNC: 143 MG/DL — HIGH (ref 70–99)
GLUCOSE SERPL-MCNC: 150 MG/DL — HIGH (ref 70–99)
GLUCOSE SERPL-MCNC: 202 MG/DL — HIGH (ref 70–99)
GLUCOSE UR QL: NEGATIVE MG/DL — SIGNIFICANT CHANGE UP
HCO3 BLDA-SCNC: 26 MMOL/L — SIGNIFICANT CHANGE UP (ref 21–28)
HCT VFR BLD CALC: 29.8 % — LOW (ref 39–50)
HGB BLD-MCNC: 9.9 G/DL — LOW (ref 13–17)
HOROWITZ INDEX BLDA+IHG-RTO: SIGNIFICANT CHANGE UP
KETONES UR-MCNC: NEGATIVE — SIGNIFICANT CHANGE UP
LACTATE SERPL-SCNC: 1.5 MMOL/L — SIGNIFICANT CHANGE UP (ref 0.5–2)
LEUKOCYTE ESTERASE UR-ACNC: NEGATIVE — SIGNIFICANT CHANGE UP
MAGNESIUM SERPL-MCNC: 2.1 MG/DL — SIGNIFICANT CHANGE UP (ref 1.6–2.6)
MCHC RBC-ENTMCNC: 27 PG — SIGNIFICANT CHANGE UP (ref 27–34)
MCHC RBC-ENTMCNC: 33.2 GM/DL — SIGNIFICANT CHANGE UP (ref 32–36)
MCV RBC AUTO: 81.4 FL — SIGNIFICANT CHANGE UP (ref 80–100)
NITRITE UR-MCNC: NEGATIVE — SIGNIFICANT CHANGE UP
PCO2 BLDA: 30 MMHG — LOW (ref 35–48)
PH BLDA: 7.54 — HIGH (ref 7.35–7.45)
PH UR: 8 — SIGNIFICANT CHANGE UP (ref 5–8)
PHOSPHATE SERPL-MCNC: 2.6 MG/DL — SIGNIFICANT CHANGE UP (ref 2.4–4.7)
PLATELET # BLD AUTO: 170 K/UL — SIGNIFICANT CHANGE UP (ref 150–400)
PO2 BLDA: 68 MMHG — LOW (ref 83–108)
POTASSIUM SERPL-MCNC: 3.3 MMOL/L — LOW (ref 3.5–5.3)
POTASSIUM SERPL-MCNC: 3.3 MMOL/L — LOW (ref 3.5–5.3)
POTASSIUM SERPL-MCNC: 3.5 MMOL/L — SIGNIFICANT CHANGE UP (ref 3.5–5.3)
POTASSIUM SERPL-MCNC: 3.6 MMOL/L — SIGNIFICANT CHANGE UP (ref 3.5–5.3)
POTASSIUM SERPL-SCNC: 3.3 MMOL/L — LOW (ref 3.5–5.3)
POTASSIUM SERPL-SCNC: 3.3 MMOL/L — LOW (ref 3.5–5.3)
POTASSIUM SERPL-SCNC: 3.5 MMOL/L — SIGNIFICANT CHANGE UP (ref 3.5–5.3)
POTASSIUM SERPL-SCNC: 3.6 MMOL/L — SIGNIFICANT CHANGE UP (ref 3.5–5.3)
PROCALCITONIN SERPL-MCNC: 0.06 NG/ML — SIGNIFICANT CHANGE UP (ref 0.02–0.1)
PROT UR-MCNC: NEGATIVE — SIGNIFICANT CHANGE UP
RBC # BLD: 3.66 M/UL — LOW (ref 4.2–5.8)
RBC # BLD: 3.95 M/UL — LOW (ref 4.2–5.8)
RBC # FLD: 13.9 % — SIGNIFICANT CHANGE UP (ref 10.3–14.5)
RBC CASTS # UR COMP ASSIST: ABNORMAL /HPF (ref 0–4)
RETICS #: 58.1 K/UL — SIGNIFICANT CHANGE UP (ref 25–125)
RETICS/RBC NFR: 1.5 % — SIGNIFICANT CHANGE UP (ref 0.5–2.5)
SAO2 % BLDA: 97.9 % — SIGNIFICANT CHANGE UP (ref 94–98)
SODIUM SERPL-SCNC: 139 MMOL/L — SIGNIFICANT CHANGE UP (ref 135–145)
SODIUM SERPL-SCNC: 141 MMOL/L — SIGNIFICANT CHANGE UP (ref 135–145)
SODIUM SERPL-SCNC: 144 MMOL/L — SIGNIFICANT CHANGE UP (ref 135–145)
SODIUM SERPL-SCNC: 145 MMOL/L — SIGNIFICANT CHANGE UP (ref 135–145)
SP GR SPEC: 1.01 — SIGNIFICANT CHANGE UP (ref 1.01–1.02)
SPECIMEN SOURCE: SIGNIFICANT CHANGE UP
TRANSFERRIN SERPL-MCNC: 209 MG/DL — SIGNIFICANT CHANGE UP (ref 180–329)
UROBILINOGEN FLD QL: NEGATIVE MG/DL — SIGNIFICANT CHANGE UP
VIT B12 SERPL-MCNC: 343 PG/ML — SIGNIFICANT CHANGE UP (ref 232–1245)
WBC # BLD: 6.44 K/UL — SIGNIFICANT CHANGE UP (ref 3.8–10.5)
WBC # FLD AUTO: 6.44 K/UL — SIGNIFICANT CHANGE UP (ref 3.8–10.5)
WBC UR QL: SIGNIFICANT CHANGE UP /HPF (ref 0–5)

## 2023-04-01 PROCEDURE — 99233 SBSQ HOSP IP/OBS HIGH 50: CPT | Mod: 24

## 2023-04-01 PROCEDURE — 99232 SBSQ HOSP IP/OBS MODERATE 35: CPT

## 2023-04-01 PROCEDURE — 99291 CRITICAL CARE FIRST HOUR: CPT

## 2023-04-01 PROCEDURE — 93880 EXTRACRANIAL BILAT STUDY: CPT | Mod: 26

## 2023-04-01 PROCEDURE — 71045 X-RAY EXAM CHEST 1 VIEW: CPT | Mod: 26

## 2023-04-01 PROCEDURE — 93970 EXTREMITY STUDY: CPT | Mod: 26

## 2023-04-01 RX ORDER — ALBUMIN HUMAN 25 %
50 VIAL (ML) INTRAVENOUS ONCE
Refills: 0 | Status: COMPLETED | OUTPATIENT
Start: 2023-04-01 | End: 2023-04-01

## 2023-04-01 RX ORDER — POTASSIUM CHLORIDE 20 MEQ
40 PACKET (EA) ORAL ONCE
Refills: 0 | Status: COMPLETED | OUTPATIENT
Start: 2023-04-01 | End: 2023-04-01

## 2023-04-01 RX ORDER — FUROSEMIDE 40 MG
10 TABLET ORAL ONCE
Refills: 0 | Status: COMPLETED | OUTPATIENT
Start: 2023-04-01 | End: 2023-04-01

## 2023-04-01 RX ORDER — ACETYLCYSTEINE 200 MG/ML
4 VIAL (ML) MISCELLANEOUS EVERY 6 HOURS
Refills: 0 | Status: DISCONTINUED | OUTPATIENT
Start: 2023-04-01 | End: 2023-04-06

## 2023-04-01 RX ORDER — ACETAMINOPHEN 500 MG
975 TABLET ORAL EVERY 6 HOURS
Refills: 0 | Status: DISCONTINUED | OUTPATIENT
Start: 2023-04-01 | End: 2023-04-06

## 2023-04-01 RX ORDER — POTASSIUM CHLORIDE 20 MEQ
40 PACKET (EA) ORAL ONCE
Refills: 0 | Status: DISCONTINUED | OUTPATIENT
Start: 2023-04-01 | End: 2023-04-01

## 2023-04-01 RX ADMIN — Medication 1 APPLICATION(S): at 06:08

## 2023-04-01 RX ADMIN — Medication 10 MILLIGRAM(S): at 15:34

## 2023-04-01 RX ADMIN — Medication 62.5 MILLIMOLE(S): at 06:07

## 2023-04-01 RX ADMIN — ATORVASTATIN CALCIUM 80 MILLIGRAM(S): 80 TABLET, FILM COATED ORAL at 21:34

## 2023-04-01 RX ADMIN — Medication 1 APPLICATION(S): at 17:07

## 2023-04-01 RX ADMIN — Medication 3 MILLILITER(S): at 04:14

## 2023-04-01 RX ADMIN — Medication 10 MILLIGRAM(S): at 06:08

## 2023-04-01 RX ADMIN — PIPERACILLIN AND TAZOBACTAM 25 GRAM(S): 4; .5 INJECTION, POWDER, LYOPHILIZED, FOR SOLUTION INTRAVENOUS at 13:05

## 2023-04-01 RX ADMIN — Medication 4 MILLILITER(S): at 20:10

## 2023-04-01 RX ADMIN — Medication 1 DROP(S): at 06:08

## 2023-04-01 RX ADMIN — Medication 3 MILLILITER(S): at 08:21

## 2023-04-01 RX ADMIN — Medication 4 MILLILITER(S): at 14:52

## 2023-04-01 RX ADMIN — PIPERACILLIN AND TAZOBACTAM 25 GRAM(S): 4; .5 INJECTION, POWDER, LYOPHILIZED, FOR SOLUTION INTRAVENOUS at 06:07

## 2023-04-01 RX ADMIN — MUPIROCIN 1 APPLICATION(S): 20 OINTMENT TOPICAL at 17:06

## 2023-04-01 RX ADMIN — PIPERACILLIN AND TAZOBACTAM 25 GRAM(S): 4; .5 INJECTION, POWDER, LYOPHILIZED, FOR SOLUTION INTRAVENOUS at 21:34

## 2023-04-01 RX ADMIN — PANTOPRAZOLE SODIUM 40 MILLIGRAM(S): 20 TABLET, DELAYED RELEASE ORAL at 11:08

## 2023-04-01 RX ADMIN — SODIUM CHLORIDE 75 MILLILITER(S): 5 INJECTION, SOLUTION INTRAVENOUS at 17:08

## 2023-04-01 RX ADMIN — CANGRELOR 22.2 MICROGRAM(S)/KG/MIN: 50 INJECTION, POWDER, LYOPHILIZED, FOR SOLUTION INTRAVENOUS at 17:08

## 2023-04-01 RX ADMIN — Medication 40 MILLIEQUIVALENT(S): at 21:34

## 2023-04-01 RX ADMIN — POLYETHYLENE GLYCOL 3350 17 GRAM(S): 17 POWDER, FOR SOLUTION ORAL at 11:08

## 2023-04-01 RX ADMIN — Medication 1 DROP(S): at 17:06

## 2023-04-01 RX ADMIN — Medication 50 MILLILITER(S): at 15:35

## 2023-04-01 RX ADMIN — Medication 650 MILLIGRAM(S): at 18:05

## 2023-04-01 RX ADMIN — Medication 3 MILLILITER(S): at 14:52

## 2023-04-01 RX ADMIN — Medication 3 MILLILITER(S): at 20:10

## 2023-04-01 RX ADMIN — MUPIROCIN 1 APPLICATION(S): 20 OINTMENT TOPICAL at 06:06

## 2023-04-01 RX ADMIN — Medication 10 MILLIGRAM(S): at 09:05

## 2023-04-01 RX ADMIN — Medication 10 MILLIGRAM(S): at 10:57

## 2023-04-01 RX ADMIN — Medication 650 MILLIGRAM(S): at 17:07

## 2023-04-01 RX ADMIN — Medication 40 MILLIEQUIVALENT(S): at 15:54

## 2023-04-01 RX ADMIN — Medication 81 MILLIGRAM(S): at 11:07

## 2023-04-01 RX ADMIN — Medication 40 MILLIEQUIVALENT(S): at 06:07

## 2023-04-01 RX ADMIN — Medication 10 MILLIGRAM(S): at 15:08

## 2023-04-01 NOTE — PROGRESS NOTE ADULT - ASSESSMENT
Assessment/Plan:   57 yo M with acute CVA due to R ICA/MCA occlusion, with hemorrhagic transformation, vasogenic edema and MLS; no thrombolytics as was out of the window for administration.  S/p TICI 2b MT, AYAKA stenting 3/29.  Respiratory distress due to bulbar impairment, pulm congestion; concern for aspiration PNA vs pneumonitis.   PMH of HTN and DM.    Neuro:  neurochecks  hemicraniectomy watch   CTh in am  c/w ASA 81 mg daily; c/w cangrelor ggt at 1 mcg/kg/min while on hemicrani watch  carotid doppler pending  c/w Atorvastatin 80 mg daily    CV: , TTE with EF 75%  MAP >65, off Levo     Pulm:  cont Duoneb + mucomyst, chest PT  lasix IV  maintain OSats >92, monitor EtCO2    GI:  NPO for now for hemicrani watch   with NG tube   BM regimen    Renal:  Na goal 145-155  cont HTS 2% at 75cc/hr, BMP q6h     Heme:  SCDs, hold Lov ppx for hemicrani watch     ID: MRSA neg  c/w Zosyn for aspiration PNA    Endo: HgA1C 7.3  cont ISS  FS goal 120-180    remove bradford, TOV, peripherals    Patient seen and examined by attending on 3/31/2023.    Patient is critically ill due to acute R MCA stroke s/p MT and at high risk for neurological deterioration or death due to: at high risk of hemorrhagic conversion, with midline shift of brain, requiring intubation due to inability to protect airway.      Assessment/Plan:   57 yo M with acute CVA due to R ICA/MCA occlusion, with hemorrhagic transformation, vasogenic edema and MLS; no thrombolytics as was out of the window for administration.  S/p TICI 2b MT, AYAKA stenting 3/29.  Respiratory distress due to bulbar impairment, pulm congestion; concern for aspiration PNA vs pneumonitis.   PMH of HTN and DM.    Neuro:  neurochecks  hemicraniectomy watch   CTh in am  c/w ASA 81 mg daily; c/w cangrelor ggt at 1 mcg/kg/min while on hemicrani watch  carotid doppler pending  c/w Atorvastatin 80 mg daily    CV: , TTE with EF 75%  MAP >65, off Levo     Pulm:  cont Duoneb + mucomyst, chest PT  lasix IV  maintain OSats >92, monitor EtCO2    GI:  NPO for now for hemicrani watch   with NG tube   BM regimen    Renal:  Na goal 145-155  cont HTS 2% at 75cc/hr, BMP q6h     Heme:  SCDs, hold Lov ppx for hemicrani watch     ID: MRSA neg  c/w Zosyn for aspiration PNA    Endo: HgA1C 7.3  cont ISS  FS goal 120-180

## 2023-04-01 NOTE — PROGRESS NOTE ADULT - SUBJECTIVE AND OBJECTIVE BOX
[facility] Stroke Team  Progress Note    HPI:  59 y/o male with PMHx of HTN and DM transferred from [facility] to [facility] as a code stroke. [facility] EMS reports pt BLAYNE was last night around 20:00 and was c/o right arm pain, today his family found his around 05:00 with left sided hemiparesis, slurred speech and was incontinent of urine, they called EMS as pt arrived at [facility] at 05:56, he was found to have right ICA occlusion and transferred to [facility], code stroke called upon arrival. Pt was on Cardene however per EMS neuro wanted pressure around 200 and it was d/monet, pt sedated with propofol.    SUBJECTIVE: No changes overnight.  No new neurologic complaints. Pt remains on cangrelor drip as he's still unable to swallow,  MRI brain pending. ROS reported negative unless otherwise noted.    acetaminophen     Tablet .. 650 milliGRAM(s) Oral every 6 hours PRN  artificial  tears Solution 1 Drop(s) Both EYES two times a day  aspirin  chewable 81 milliGRAM(s) Oral daily  atorvastatin 80 milliGRAM(s) Oral at bedtime  cangrelor Infusion 1 MICROgram(s)/kG/Min IV Continuous <Continuous>  chlorhexidine 0.12% Liquid 15 milliLiter(s) Oral Mucosa every 12 hours  chlorhexidine 2% Cloths 1 Application(s) Topical daily  dexMEDEtomidine Infusion 0.2 MICROgram(s)/kG/Hr IV Continuous <Continuous>  dextrose 5%. 1000 milliLiter(s) IV Continuous <Continuous>  dextrose 5%. 1000 milliLiter(s) IV Continuous <Continuous>  dextrose 50% Injectable 25 Gram(s) IV Push once  dextrose 50% Injectable 12.5 Gram(s) IV Push once  dextrose 50% Injectable 25 Gram(s) IV Push once  dextrose Oral Gel 15 Gram(s) Oral once PRN  glucagon  Injectable 1 milliGRAM(s) IntraMuscular once  insulin lispro (ADMELOG) corrective regimen sliding scale   SubCutaneous every 6 hours  insulin NPH human recombinant 2 Unit(s) SubCutaneous every 6 hours  mupirocin 2% Nasal 1 Application(s) Both Nostrils two times a day  pantoprazole  Injectable 40 milliGRAM(s) IV Push daily  petrolatum Ophthalmic Ointment 1 Application(s) Right EYE every 12 hours  piperacillin/tazobactam IVPB.. 3.375 Gram(s) IV Intermittent every 8 hours  polyethylene glycol 3350 17 Gram(s) Oral daily  senna Syrup 10 milliLiter(s) Oral at bedtime  sodium chloride 2% . 1000 milliLiter(s) IV Continuous <Continuous>    PHYSICAL EXAM:   Vital Signs Last 24 Hrs  ICU Vital Signs Last 24 Hrs  T(C): 37.8 (01 Apr 2023 13:00), Max: 37.8 (01 Apr 2023 12:00)  T(F): 100 (01 Apr 2023 13:00), Max: 100 (01 Apr 2023 12:00)  HR: 80 (01 Apr 2023 14:52) (65 - 92)  BP: 137/72 (01 Apr 2023 13:00) (107/75 - 147/90)  BP(mean): 90 (01 Apr 2023 13:00) (76 - 106)  ABP: --  ABP(mean): --  RR: 28 (01 Apr 2023 13:00) (19 - 33)  SpO2: 98% (01 Apr 2023 14:52) (95% - 100%)    O2 Parameters below as of 01 Apr 2023 14:52  Patient On (Oxygen Delivery Method): nasal cannula    General: No acute distress  NEUROLOGICAL EXAM:  Mental status: Alert follows commands, oriented x 3 no aphasia  CN: perrl, blinks to threat on right, not left. gaze midline, UMN left facial weakness,   motor: moves right UE/LE 5/5  Moves left UE 1/5, LLE 3/5  Sensory: left hemineglect  coord no dymetria on roght  gait deferred      LABS:  CBC:            9.9    6.44  )-----------( 170      ( 04-01-23 @ 01:25 )             29.8         Chem:         ( 04-01-23 @ 11:15 )    145  |  111<H>  |  9.7  ----------------------------<  139<H>  3.6   |  20.0<L>  |  0.55        Liver Functions:     Type & Screen:               IMAGING: Reviewed by me.   CT Head No Cont (03.30.23 @ 03:40)   Compared with 3/29/2023 there is decreasing retained contrast   in the right basal ganglia and right frontal parietal cortex with   evolving lucency consistent with an acute right artery infarct. Residual   high density although decreased compared to the prior exam may represent   retained contrast as well as a small amount of hemorrhage. No increased   hemorrhage identified.    CT BRAIN STROKE PROTOCOL   03/29/2023    IMPRESSION: Dense right MCA sign with early right temporal lobe   infarction. No acute intracranial hemorrhage.    CT PERFUSION W MAPS IC    03/29/2023    CBF<30% volume: 72 ml right MCA territory.  Tmax>6.0 s volume: 180 ml  Mismatch volume: 108 ml  Mismatch ratio: 2.5    TTE  3/29/2023  Left ventricular ejection fraction, by visual estimation, is >75%. Technically difficult study. Hyperdynamic global left ventricular systolic function. The left ventricular diastolic function could not be assessed in this study. There is mild concentric left ventricular hypertrophy. There is no evidence of pericardial effusion. Mild mitral annular calcification. Trace mitral valve regurgitation.

## 2023-04-01 NOTE — PROGRESS NOTE ADULT - ASSESSMENT
ASSESSMENT:  59 y/o male with PMHx of HTN and DM transfered from [facility] on 3/29, LKW ~20:00 c/o right arm pain, found by family ~05:00 with L hemiparesis, slurred speech and was incontinent of urine, found to have AYAKA/RMCA occlusions s/p thrombectomy TICI 2B recanalization and AYAKA stent on 3/29. Post op CTH shows likely contrast staining within stroke bed with mass effect and MLS. Neurosurgery consulted for hemicrani watch.      Plan:   - Q1 hour neuro checks  - CTH 3/31 stable   - pend MRI   - SBP goal 100-140 2/2 risk of hemorrhage,   - Cangrelor drip per neuro IR for R ICA stent, will transition to DAPT  - DVT prophylaxis: SCDs only  - ASA for stroke ppx  - NS 2% @75, Na goal >140  - Further medical management as per NSICU   - pend discussion with surgeon in AM

## 2023-04-01 NOTE — PROGRESS NOTE ADULT - ASSESSMENT
ASSESSMENT: 58y Male with PMH HTN and DM, found by his family 0500 AM on 3/29/23 with right hemiparesis, slurred speech, and incontinent of urine.  Last well known 2000  on 3/28/23. Patient brought in by EMS to the [facility] at 0556, was noted not to be a candidate for Tenecteplase as patient out of time window. Patient was transferred to [facility] for thrombectomy due to right ICA occlusion with tandem right MCA occlusion. NIHSS 28 MRS pre - 0 . Etiology likely large artery atherosclerotic disease. Post mechanical thrombectomy TICI 2B and Right carotid stent with angioplasty.     NEURO: Continue close monitoring for neurologic deterioration, with stroke checks per protocol, then per ICU, q1 there after minimum in setting of cerebral edema with mass effect and brain compression, hypertonic saline to Na goal gradually of 140-145 for now- avoid hyponatremia and rapid fluctuations, q 6 hr bmp, hemicraniectomy watch, permissive HTN as per post thrombectomy recommendations 110-140mmHg in setting of recent revascularization as to avoid hypo/hyperperfusion injury. Titrate statin to LDL goal less than 70,   - MRI Brain w/o pending  - post stent carotid duplex  - Physical therapy/OT/Speech eval/treatment.   - Swallow study on Monday - if passes then transition Cangrelor IV to Brilinta PO    ANTITHROMBOTIC THERAPY: ASA 81mg daily, cangrelor gtt post stenting- transition to PO agent pending clinical course. P2y12: 9.    PULMONARY:  extubated but remains O2 support due to respiratory issues    CARDIOVASCULAR: cardiac monitoring     GASTROINTESTINAL: dysphagia screen pending, aspiration precautions     Diet: NPO/NGT per ICU    RENAL: BUN/Cr within range, maintain adequate hydration,  monitor urine output       Na Goal:  135-145     Quispe: Y    HEMATOLOGY: H/H with downtrend- monitor for si/sx of bleeding, serial CBC, Platelets 170. Patient should have all age and risk appropriate malignancy screening.      DVT ppx     ID: Tmax 100.9, leukocytosis. Continues on zosyn, likely UTI. CXR 3/30 stable.     OTHER:  , A1C 7.3- continue glycemic control    DISPOSITION: Rehab or home depending on PT eval once stable and workup is complete    CORE MEASURES:        Admission NIHSS: 28     TPA: [] YES [x] NO      LDL/HDL/A1C: 165/58/7.3     Depression Screen: pending     Statin Therapy: pending     Dysphagia Screen: [] PASS [] FAIL pending     Smoking [] YES [] NO      Afib [] YES [x] NO     Stroke Education [x] YES [] NO    Obtain screening lower extremity venous ultrasound in patients who meet 1 or more of the following criteria as patient is high risk for DVT/PE on admission:   [] History of DVT/PE  []Hypercoagulable states (Factor V Leiden, Cancer, OCP, etc. )  []Prolonged immobility (hemiplegia/hemiparesis/post operative or any other extended immobilization)  [] Transferred from outside facility (Rehab or Long term care)  [] Age </= to 50

## 2023-04-01 NOTE — PROGRESS NOTE ADULT - SUBJECTIVE AND OBJECTIVE BOX
HPI:  57 y/o male with PMHx of HTN and DM transfered from [facility] to [facility] as a code stroke. [facility] EMS reports pt BLAYNE was last night around 20:00 and was c/o right arm pain, today his family found his around 05:00 with left sided hemiparesis, slurred speech and was incontinent of urine, they called EMS as pt arrived at [facility] at 05:56, he was found to have right ICA occlusion and transferred to [facility], code stroke called upon arrival. Pt was on Cardene however per EMS neuro wanted pressure around 200 and it was d/monet, pt sedated with propofol. (29 Mar 2023 09:40)    O/n events: no events.    ICU Vital Signs Last 24 Hrs  T(C): 37.8 (01 Apr 2023 13:00), Max: 37.8 (01 Apr 2023 12:00)  T(F): 100 (01 Apr 2023 13:00), Max: 100 (01 Apr 2023 12:00)  HR: 79 (01 Apr 2023 13:00) (65 - 92)  BP: 137/72 (01 Apr 2023 13:00) (107/75 - 147/90)  BP(mean): 90 (01 Apr 2023 13:00) (76 - 106)  ABP: --  ABP(mean): --  RR: 28 (01 Apr 2023 13:00) (19 - 33)  SpO2: 99% (01 Apr 2023 13:00) (95% - 100%)    O2 Parameters below as of 01 Apr 2023 13:00  Patient On (Oxygen Delivery Method): nasal cannula  O2 Flow (L/min): 3  O2 Concentration (%): 29      EXAMINATION:  General:  in NAD  Neuro:  with eyelid opening apraxia but able to open lids slightly, remains alert and awake during exam,  follows simple commands on both sides, pupils 3 mm and reactive but R is more sluggish, EOMI, no BTT on the L, able to slightly open his mouth, + but weak cough, able to raise R side AG, L arm 2/5 spontaneously, R leg 3/5, L leg 2/5 spontaneously   Cards:  RRR  Respiratory:  no respiratory distress, with some rhonchi   Abdomen:  soft  Extremities:  no LE edema    Bedside sono with BL B-lines, small BL pleural effusion.

## 2023-04-01 NOTE — PROGRESS NOTE ADULT - SUBJECTIVE AND OBJECTIVE BOX
HPI:  HPI:  57 y/o male with PMHx of HTN and DM transfered from [facility] to [facility] as a code stroke. [facility] EMS reports pt BLAYNE was last night around 20:00 and was c/o right arm pain, today his family found his around 05:00 with left sided hemiparesis, slurred speech and was incontinent of urine, they called EMS as pt arrived at [facility] at 05:56, he was found to have right ICA occlusion and transferred to [facility], code stroke called upon arrival. Pt was on Cardene however per EMS neuro wanted pressure around 200 and it was d/monet, pt sedated with propofol.         (29 Mar 2023 09:40)        INTERVAL HPI/OVERNIGHT EVENTS:    OVERNIGHT EVENTS: Remains on cangrelor gtt. extubated on 3/31, on high flow NC. patient tolerating extubation. Neuro exam stable.     Vital Signs Last 24 Hrs  T(C): 36.8 (31 Mar 2023 23:47), Max: 37.7 (31 Mar 2023 00:30)  T(F): 98.3 (31 Mar 2023 23:47), Max: 99.9 (31 Mar 2023 00:30)  HR: 74 (31 Mar 2023 23:00) (62 - 80)  BP: 107/75 (31 Mar 2023 23:00) (96/54 - 145/72)  BP(mean): 87 (31 Mar 2023 23:00) (64 - 97)  RR: 24 (31 Mar 2023 23:00) (16 - 27)  SpO2: 99% (31 Mar 2023 23:00) (94% - 100%)    Parameters below as of 31 Mar 2023 21:34  Patient On (Oxygen Delivery Method): nasal cannula    PHYSICAL EXAM:  General: NAD,  HEENT: atraumatic head, PERRL 4mm b/l, R eye chemosis  Cardiovascular: Regular rate and rhythm  Respiratory: nonlabored breathing, normal chest rise, intubated  GI: abdomen soft, nontender, nondistended  Neuro: OE to voice, +cough/gag/corneals, blinks to threat, unable to track   RUE 5/5, RLE 3/5, able to follow commands   LUE D/B/T 2/5, HG 1/5, attempts to lift LUE antigrav distally, LLE 3/5  Extremities: distal pulses 2+ x4  Wound/incision: R groin site c/d/i        LABS:                        9.8    9.07  )-----------( 172      ( 31 Mar 2023 05:39 )             29.8     03-31    143  |  111<H>  |  10.3  ----------------------------<  144<H>  3.6   |  23.0  |  0.58    Ca    8.0<L>      31 Mar 2023 18:03  Phos  2.7     03-31  Mg     2.2     03-31 03-30 @ 07:01  -  03-31 @ 07:00  --------------------------------------------------------  IN: 3105 mL / OUT: 942 mL / NET: 2163 mL    03-31 @ 07:01 - 04-01 @ 00:16  --------------------------------------------------------  IN: 1420.1 mL / OUT: 610 mL / NET: 810.1 mL        RADIOLOGY & ADDITIONAL TESTS:

## 2023-04-02 LAB
ANION GAP SERPL CALC-SCNC: 12 MMOL/L — SIGNIFICANT CHANGE UP (ref 5–17)
ANION GAP SERPL CALC-SCNC: 13 MMOL/L — SIGNIFICANT CHANGE UP (ref 5–17)
BUN SERPL-MCNC: 10 MG/DL — SIGNIFICANT CHANGE UP (ref 8–20)
BUN SERPL-MCNC: 10.2 MG/DL — SIGNIFICANT CHANGE UP (ref 8–20)
BUN SERPL-MCNC: 9.3 MG/DL — SIGNIFICANT CHANGE UP (ref 8–20)
BUN SERPL-MCNC: 9.8 MG/DL — SIGNIFICANT CHANGE UP (ref 8–20)
CALCIUM SERPL-MCNC: 8.1 MG/DL — LOW (ref 8.4–10.5)
CALCIUM SERPL-MCNC: 8.1 MG/DL — LOW (ref 8.4–10.5)
CALCIUM SERPL-MCNC: 8.4 MG/DL — SIGNIFICANT CHANGE UP (ref 8.4–10.5)
CALCIUM SERPL-MCNC: 8.6 MG/DL — SIGNIFICANT CHANGE UP (ref 8.4–10.5)
CHLORIDE SERPL-SCNC: 105 MMOL/L — SIGNIFICANT CHANGE UP (ref 96–108)
CHLORIDE SERPL-SCNC: 106 MMOL/L — SIGNIFICANT CHANGE UP (ref 96–108)
CHLORIDE SERPL-SCNC: 107 MMOL/L — SIGNIFICANT CHANGE UP (ref 96–108)
CHLORIDE SERPL-SCNC: 108 MMOL/L — SIGNIFICANT CHANGE UP (ref 96–108)
CO2 SERPL-SCNC: 21 MMOL/L — LOW (ref 22–29)
CO2 SERPL-SCNC: 22 MMOL/L — SIGNIFICANT CHANGE UP (ref 22–29)
CREAT SERPL-MCNC: 0.49 MG/DL — LOW (ref 0.5–1.3)
CREAT SERPL-MCNC: 0.51 MG/DL — SIGNIFICANT CHANGE UP (ref 0.5–1.3)
CREAT SERPL-MCNC: 0.53 MG/DL — SIGNIFICANT CHANGE UP (ref 0.5–1.3)
CREAT SERPL-MCNC: 0.59 MG/DL — SIGNIFICANT CHANGE UP (ref 0.5–1.3)
EGFR: 111 ML/MIN/1.73M2 — SIGNIFICANT CHANGE UP
EGFR: 115 ML/MIN/1.73M2 — SIGNIFICANT CHANGE UP
EGFR: 116 ML/MIN/1.73M2 — SIGNIFICANT CHANGE UP
EGFR: 117 ML/MIN/1.73M2 — SIGNIFICANT CHANGE UP
GLUCOSE BLDC GLUCOMTR-MCNC: 111 MG/DL — HIGH (ref 70–99)
GLUCOSE BLDC GLUCOMTR-MCNC: 115 MG/DL — HIGH (ref 70–99)
GLUCOSE BLDC GLUCOMTR-MCNC: 119 MG/DL — HIGH (ref 70–99)
GLUCOSE BLDC GLUCOMTR-MCNC: 131 MG/DL — HIGH (ref 70–99)
GLUCOSE BLDC GLUCOMTR-MCNC: 133 MG/DL — HIGH (ref 70–99)
GLUCOSE SERPL-MCNC: 120 MG/DL — HIGH (ref 70–99)
GLUCOSE SERPL-MCNC: 126 MG/DL — HIGH (ref 70–99)
GLUCOSE SERPL-MCNC: 134 MG/DL — HIGH (ref 70–99)
GLUCOSE SERPL-MCNC: 134 MG/DL — HIGH (ref 70–99)
HCT VFR BLD CALC: 33.3 % — LOW (ref 39–50)
HGB BLD-MCNC: 11.2 G/DL — LOW (ref 13–17)
MAGNESIUM SERPL-MCNC: 2 MG/DL — SIGNIFICANT CHANGE UP (ref 1.6–2.6)
MCHC RBC-ENTMCNC: 27.2 PG — SIGNIFICANT CHANGE UP (ref 27–34)
MCHC RBC-ENTMCNC: 33.6 GM/DL — SIGNIFICANT CHANGE UP (ref 32–36)
MCV RBC AUTO: 80.8 FL — SIGNIFICANT CHANGE UP (ref 80–100)
OSMOLALITY SERPL: 293 MOSMOL/KG — SIGNIFICANT CHANGE UP (ref 280–301)
OSMOLALITY UR: 256 MOSM/KG — LOW (ref 300–1000)
PHOSPHATE SERPL-MCNC: 3.5 MG/DL — SIGNIFICANT CHANGE UP (ref 2.4–4.7)
PLATELET # BLD AUTO: 226 K/UL — SIGNIFICANT CHANGE UP (ref 150–400)
POTASSIUM SERPL-MCNC: 3.4 MMOL/L — LOW (ref 3.5–5.3)
POTASSIUM SERPL-MCNC: 3.5 MMOL/L — SIGNIFICANT CHANGE UP (ref 3.5–5.3)
POTASSIUM SERPL-MCNC: 3.7 MMOL/L — SIGNIFICANT CHANGE UP (ref 3.5–5.3)
POTASSIUM SERPL-MCNC: 3.7 MMOL/L — SIGNIFICANT CHANGE UP (ref 3.5–5.3)
POTASSIUM SERPL-SCNC: 3.4 MMOL/L — LOW (ref 3.5–5.3)
POTASSIUM SERPL-SCNC: 3.5 MMOL/L — SIGNIFICANT CHANGE UP (ref 3.5–5.3)
POTASSIUM SERPL-SCNC: 3.7 MMOL/L — SIGNIFICANT CHANGE UP (ref 3.5–5.3)
POTASSIUM SERPL-SCNC: 3.7 MMOL/L — SIGNIFICANT CHANGE UP (ref 3.5–5.3)
RBC # BLD: 4.12 M/UL — LOW (ref 4.2–5.8)
RBC # FLD: 13.6 % — SIGNIFICANT CHANGE UP (ref 10.3–14.5)
SODIUM SERPL-SCNC: 139 MMOL/L — SIGNIFICANT CHANGE UP (ref 135–145)
SODIUM SERPL-SCNC: 139 MMOL/L — SIGNIFICANT CHANGE UP (ref 135–145)
SODIUM SERPL-SCNC: 140 MMOL/L — SIGNIFICANT CHANGE UP (ref 135–145)
SODIUM SERPL-SCNC: 141 MMOL/L — SIGNIFICANT CHANGE UP (ref 135–145)
WBC # BLD: 8.25 K/UL — SIGNIFICANT CHANGE UP (ref 3.8–10.5)
WBC # FLD AUTO: 8.25 K/UL — SIGNIFICANT CHANGE UP (ref 3.8–10.5)

## 2023-04-02 PROCEDURE — 99291 CRITICAL CARE FIRST HOUR: CPT

## 2023-04-02 PROCEDURE — 71045 X-RAY EXAM CHEST 1 VIEW: CPT | Mod: 26,76

## 2023-04-02 PROCEDURE — 70450 CT HEAD/BRAIN W/O DYE: CPT | Mod: 26

## 2023-04-02 PROCEDURE — 99232 SBSQ HOSP IP/OBS MODERATE 35: CPT

## 2023-04-02 PROCEDURE — 99233 SBSQ HOSP IP/OBS HIGH 50: CPT | Mod: 24

## 2023-04-02 RX ORDER — POTASSIUM CHLORIDE 20 MEQ
40 PACKET (EA) ORAL EVERY 4 HOURS
Refills: 0 | Status: DISCONTINUED | OUTPATIENT
Start: 2023-04-02 | End: 2023-04-02

## 2023-04-02 RX ORDER — SODIUM CHLORIDE 9 MG/ML
2 INJECTION INTRAMUSCULAR; INTRAVENOUS; SUBCUTANEOUS EVERY 6 HOURS
Refills: 0 | Status: DISCONTINUED | OUTPATIENT
Start: 2023-04-02 | End: 2023-04-02

## 2023-04-02 RX ORDER — SODIUM CHLORIDE 9 MG/ML
2 INJECTION INTRAMUSCULAR; INTRAVENOUS; SUBCUTANEOUS ONCE
Refills: 0 | Status: COMPLETED | OUTPATIENT
Start: 2023-04-02 | End: 2023-04-02

## 2023-04-02 RX ORDER — POTASSIUM CHLORIDE 20 MEQ
10 PACKET (EA) ORAL
Refills: 0 | Status: COMPLETED | OUTPATIENT
Start: 2023-04-02 | End: 2023-04-02

## 2023-04-02 RX ORDER — SODIUM CHLORIDE 9 MG/ML
250 INJECTION INTRAMUSCULAR; INTRAVENOUS; SUBCUTANEOUS ONCE
Refills: 0 | Status: COMPLETED | OUTPATIENT
Start: 2023-04-02 | End: 2023-04-02

## 2023-04-02 RX ORDER — SODIUM CHLORIDE 9 MG/ML
2 INJECTION INTRAMUSCULAR; INTRAVENOUS; SUBCUTANEOUS EVERY 6 HOURS
Refills: 0 | Status: DISCONTINUED | OUTPATIENT
Start: 2023-04-02 | End: 2023-04-05

## 2023-04-02 RX ORDER — POTASSIUM CHLORIDE 20 MEQ
40 PACKET (EA) ORAL ONCE
Refills: 0 | Status: DISCONTINUED | OUTPATIENT
Start: 2023-04-02 | End: 2023-04-02

## 2023-04-02 RX ORDER — POTASSIUM CHLORIDE 20 MEQ
20 PACKET (EA) ORAL ONCE
Refills: 0 | Status: COMPLETED | OUTPATIENT
Start: 2023-04-02 | End: 2023-04-02

## 2023-04-02 RX ADMIN — Medication 100 MILLIEQUIVALENT(S): at 10:00

## 2023-04-02 RX ADMIN — Medication 20 MILLIEQUIVALENT(S): at 15:18

## 2023-04-02 RX ADMIN — Medication 81 MILLIGRAM(S): at 12:33

## 2023-04-02 RX ADMIN — MUPIROCIN 1 APPLICATION(S): 20 OINTMENT TOPICAL at 18:12

## 2023-04-02 RX ADMIN — ATORVASTATIN CALCIUM 80 MILLIGRAM(S): 80 TABLET, FILM COATED ORAL at 23:23

## 2023-04-02 RX ADMIN — Medication 3 MILLILITER(S): at 08:43

## 2023-04-02 RX ADMIN — Medication 10 MILLIGRAM(S): at 13:00

## 2023-04-02 RX ADMIN — PANTOPRAZOLE SODIUM 40 MILLIGRAM(S): 20 TABLET, DELAYED RELEASE ORAL at 12:33

## 2023-04-02 RX ADMIN — SODIUM CHLORIDE 250 MILLILITER(S): 9 INJECTION INTRAMUSCULAR; INTRAVENOUS; SUBCUTANEOUS at 23:23

## 2023-04-02 RX ADMIN — Medication 4 MILLILITER(S): at 08:43

## 2023-04-02 RX ADMIN — PIPERACILLIN AND TAZOBACTAM 25 GRAM(S): 4; .5 INJECTION, POWDER, LYOPHILIZED, FOR SOLUTION INTRAVENOUS at 05:52

## 2023-04-02 RX ADMIN — Medication 4 MILLILITER(S): at 03:13

## 2023-04-02 RX ADMIN — CHLORHEXIDINE GLUCONATE 1 APPLICATION(S): 213 SOLUTION TOPICAL at 05:52

## 2023-04-02 RX ADMIN — SODIUM CHLORIDE 2 GRAM(S): 9 INJECTION INTRAMUSCULAR; INTRAVENOUS; SUBCUTANEOUS at 18:14

## 2023-04-02 RX ADMIN — Medication 100 MILLIEQUIVALENT(S): at 09:11

## 2023-04-02 RX ADMIN — Medication 1 DROP(S): at 18:12

## 2023-04-02 RX ADMIN — Medication 4 MILLILITER(S): at 15:04

## 2023-04-02 RX ADMIN — SODIUM CHLORIDE 2 GRAM(S): 9 INJECTION INTRAMUSCULAR; INTRAVENOUS; SUBCUTANEOUS at 13:24

## 2023-04-02 RX ADMIN — SODIUM CHLORIDE 2 GRAM(S): 9 INJECTION INTRAMUSCULAR; INTRAVENOUS; SUBCUTANEOUS at 23:23

## 2023-04-02 RX ADMIN — Medication 1 APPLICATION(S): at 06:00

## 2023-04-02 RX ADMIN — CANGRELOR 22.2 MICROGRAM(S)/KG/MIN: 50 INJECTION, POWDER, LYOPHILIZED, FOR SOLUTION INTRAVENOUS at 12:46

## 2023-04-02 RX ADMIN — Medication 1 APPLICATION(S): at 18:14

## 2023-04-02 RX ADMIN — PIPERACILLIN AND TAZOBACTAM 25 GRAM(S): 4; .5 INJECTION, POWDER, LYOPHILIZED, FOR SOLUTION INTRAVENOUS at 15:18

## 2023-04-02 RX ADMIN — SENNA PLUS 10 MILLILITER(S): 8.6 TABLET ORAL at 23:24

## 2023-04-02 RX ADMIN — Medication 1 DROP(S): at 05:52

## 2023-04-02 RX ADMIN — Medication 3 MILLILITER(S): at 03:13

## 2023-04-02 RX ADMIN — POLYETHYLENE GLYCOL 3350 17 GRAM(S): 17 POWDER, FOR SOLUTION ORAL at 12:33

## 2023-04-02 RX ADMIN — MUPIROCIN 1 APPLICATION(S): 20 OINTMENT TOPICAL at 05:52

## 2023-04-02 RX ADMIN — Medication 100 MILLIEQUIVALENT(S): at 10:39

## 2023-04-02 RX ADMIN — Medication 3 MILLILITER(S): at 20:32

## 2023-04-02 RX ADMIN — PIPERACILLIN AND TAZOBACTAM 25 GRAM(S): 4; .5 INJECTION, POWDER, LYOPHILIZED, FOR SOLUTION INTRAVENOUS at 23:23

## 2023-04-02 RX ADMIN — Medication 4 MILLILITER(S): at 20:32

## 2023-04-02 NOTE — PROGRESS NOTE ADULT - ASSESSMENT
ASSESSMENT: 58y Male with PMH HTN and DM, found by his family 0500 AM on 3/29/23 with right hemiparesis, slurred speech, and incontinent of urine.  Last well known 2000  on 3/28/23. Patient brought in by EMS to the [facility] at 0556, was noted not to be a candidate for Tenecteplase as patient out of time window. Patient was transferred to [facility] for thrombectomy due to right ICA occlusion with tandem right MCA occlusion. NIHSS 28 MRS pre - 0 . Etiology likely large artery atherosclerotic disease. Post mechanical thrombectomy TICI 2B and Right carotid stent with angioplasty.     NEURO: Continue close monitoring for neurologic deterioration, with stroke checks per protocol, then per ICU, q1 there after minimum in setting of cerebral edema with mass effect and brain compression, hypertonic saline to Na goal gradually of 140-145 for now- avoid hyponatremia and rapid fluctuations, q 6 hr bmp, hemicraniectomy watch, permissive HTN as per post thrombectomy recommendations 110-140mmHg in setting of recent revascularization as to avoid hypo/hyperperfusion injury. Titrate statin to LDL goal less than 70,   - CT head from this AM reviewed - stable right hemispheric hemorrhage  - MRI Brain w/o pending  - post stent carotid duplex  - Physical therapy/OT/Speech eval/treatment.   - Swallow study on Monday - if passes then transition Cangrelor IV to Brilinta PO    ANTITHROMBOTIC THERAPY: ASA 81mg daily, cangrelor gtt post stenting- transition to PO agent pending clinical course. P2y12: 9.    PULMONARY:  extubated but remains O2 support due to respiratory issues    CARDIOVASCULAR: cardiac monitoring     GASTROINTESTINAL: dysphagia screen pending, aspiration precautions     Diet: NPO/NGT per ICU    RENAL: BUN/Cr within range, maintain adequate hydration,  monitor urine output       Na Goal:  135-145     Quispe: Y    HEMATOLOGY: H/H with downtrend- monitor for si/sx of bleeding, serial CBC, Platelets 170. Patient should have all age and risk appropriate malignancy screening.      DVT ppx     ID: Tmax 100.9, leukocytosis. Continues on zosyn, likely UTI. CXR 3/30 stable.     OTHER:  , A1C 7.3- continue glycemic control    DISPOSITION: Rehab or home depending on PT eval once stable and workup is complete    CORE MEASURES:        Admission NIHSS: 28     TPA: [] YES [x] NO      LDL/HDL/A1C: 165/58/7.3     Depression Screen: pending     Statin Therapy: pending     Dysphagia Screen: [] PASS [] FAIL pending     Smoking [] YES [] NO      Afib [] YES [x] NO     Stroke Education [x] YES [] NO

## 2023-04-02 NOTE — PROGRESS NOTE ADULT - SUBJECTIVE AND OBJECTIVE BOX
Sakina was awake in bed and family members were present as I fit his LUE with a Resting hand brace, and his LLE with a PRAFO. Interface socks for both braces were provided. Braces should be wiped clean daily.   [facility]  [phone]

## 2023-04-02 NOTE — PROGRESS NOTE ADULT - SUBJECTIVE AND OBJECTIVE BOX
HPI:    59 y/o male with PMHx of HTN and DM transfered from [facility] to [facility] as a code stroke. [facility] EMS reports pt BLAYNE was last night around 20:00 and was c/o right arm pain, today his family found his around 05:00 with left sided hemiparesis, slurred speech and was incontinent of urine, they called EMS as pt arrived at [facility] at 05:56, he was found to have right ICA occlusion and transferred to [facility], code stroke called upon arrival. Pt was on Cardene however per EMS neuro wanted pressure around 200 and it was d/monet, pt sedated with propofol.         (29 Mar 2023 09:40)        INTERVAL HPI:  OVERNIGHT EVENTS: patient is conversant asking about his family members. Tachypnic at intervals but able to cough secretions.     Vital Signs Last 24 Hrs  T(C): 37.9 (2023 21:00), Max: 38 (2023 17:00)  T(F): 100.2 (2023 21:00), Max: 100.4 (2023 17:00)  HR: 79 (2023 21:00) (71 - 109)  BP: 126/74 (2023 21:00) (115/60 - 149/74)  BP(mean): 90 (2023 21:00) (76 - 106)  RR: 29 (2023 21:00) (19 - 33)  SpO2: 99% (2023 21:00) (95% - 100%)    Parameters below as of 2023 20:10  Patient On (Oxygen Delivery Method): nasal cannula, high flow  O2 Flow (L/min): 30  O2 Concentration (%): 30          PHYSICAL EXAM:  General: NAD,  HEENT: atraumatic head, PERRL 4mm b/l, R eye chemosis  Cardiovascular: Regular rate and rhythm  Respiratory: tachypnic intermittently.   GI: abdomen soft, nontender, nondistended  Neuro: OE to voice, +cough/gag/corneals, blinks to threat, unable to track   RUE 5/5, RLE 3/5, able to follow commands   LUE D/B/T 2/5, HG 1/5, attempts to lift LUE antigrav distally, LLE 3/5  Extremities: distal pulses 2+ x4  Wound/incision: R groin site c/d/i    LABS:                        9.9    6.44  )-----------( 170      ( 2023 01:25 )             29.8     04-    139  |  105  |  9.3  ----------------------------<  143<H>  3.3<L>   |  21.0<L>  |  0.59    Ca    8.1<L>      2023 17:25  Phos  2.6     04  Mg     2.1             Urinalysis Basic - ( 2023 17:25 )    Color: Yellow / Appearance: Clear / S.010 / pH: x  Gluc: x / Ketone: Negative  / Bili: Negative / Urobili: Negative mg/dL   Blood: x / Protein: Negative / Nitrite: Negative   Leuk Esterase: Negative / RBC: 6-10 /HPF / WBC 0-2 /HPF   Sq Epi: x / Non Sq Epi: Occasional / Bacteria: Occasional         @ 07: @ 07:00  --------------------------------------------------------  IN: 2973.8 mL / OUT: 1210 mL / NET: 1763.8 mL     @ 07:02 @ 00:15  --------------------------------------------------------  IN: 2177.5 mL / OUT: 3375 mL / NET: -1197.5 mL        RADIOLOGY & ADDITIONAL TESTS:

## 2023-04-02 NOTE — PROGRESS NOTE ADULT - ASSESSMENT
Assessment/Plan:   59 yo M with acute CVA due to R ICA/MCA occlusion, with hemorrhagic transformation, vasogenic edema and MLS; no thrombolytics as was out of the window for administration.  S/p TICI 2b MT, AYAKA stenting 3/29.  Respiratory distress due to bulbar impairment, pulm congestion; concern for aspiration PNA vs pneumonitis.   PMH of HTN and DM.    Neuro:  neurochecks  hemicraniectomy watch   c/w ASA 81 mg daily; c/w cangrelor ggt at 1 mcg/kg/min while on hemicrani watch; plan to switch to Brilinta likely 4/3  c/w Atorvastatin 80 mg daily    CV: , TTE with EF 75%  MAP >65, off Levo     Pulm:  cont Duoneb + mucomyst, chest PT  maintain OSats >92, monitor EtCO2; HFNC    GI:  start low-rate TF for 24 hrs  BM regimen    Renal:  Na goal 145-155  cont HTS 2% at 75cc/hr, BMP q6h     Heme:  SCDs, hold Lov ppx for hemicrani watch     ID: MRSA neg  c/w Zosyn for aspiration PNA    Endo: HgA1C 7.3  cont ISS  FS goal 120-180

## 2023-04-02 NOTE — PROGRESS NOTE ADULT - SUBJECTIVE AND OBJECTIVE BOX
[facility] Stroke Team  Progress Note    HPI:  59 y/o male with PMHx of HTN and DM transferred from [facility] to [facility] as a code stroke. [facility] EMS reports pt BLAYNE was last night around 20:00 and was c/o right arm pain, today his family found his around 05:00 with left sided hemiparesis, slurred speech and was incontinent of urine, they called EMS as pt arrived at [facility] at 05:56, he was found to have right ICA occlusion and transferred to [facility], code stroke called upon arrival. Pt was on Cardene however per EMS neuro wanted pressure around 200 and it was d/monet, pt sedated with propofol.    SUBJECTIVE: No changes overnight. MRI brain pending. ROS reported negative unless otherwise noted.    acetaminophen     Tablet .. 650 milliGRAM(s) Oral every 6 hours PRN  artificial  tears Solution 1 Drop(s) Both EYES two times a day  aspirin  chewable 81 milliGRAM(s) Oral daily  atorvastatin 80 milliGRAM(s) Oral at bedtime  cangrelor Infusion 1 MICROgram(s)/kG/Min IV Continuous <Continuous>  chlorhexidine 0.12% Liquid 15 milliLiter(s) Oral Mucosa every 12 hours  chlorhexidine 2% Cloths 1 Application(s) Topical daily  dexMEDEtomidine Infusion 0.2 MICROgram(s)/kG/Hr IV Continuous <Continuous>  dextrose 5%. 1000 milliLiter(s) IV Continuous <Continuous>  dextrose 5%. 1000 milliLiter(s) IV Continuous <Continuous>  dextrose 50% Injectable 25 Gram(s) IV Push once  dextrose 50% Injectable 12.5 Gram(s) IV Push once  dextrose 50% Injectable 25 Gram(s) IV Push once  dextrose Oral Gel 15 Gram(s) Oral once PRN  glucagon  Injectable 1 milliGRAM(s) IntraMuscular once  insulin lispro (ADMELOG) corrective regimen sliding scale   SubCutaneous every 6 hours  insulin NPH human recombinant 2 Unit(s) SubCutaneous every 6 hours  mupirocin 2% Nasal 1 Application(s) Both Nostrils two times a day  pantoprazole  Injectable 40 milliGRAM(s) IV Push daily  petrolatum Ophthalmic Ointment 1 Application(s) Right EYE every 12 hours  piperacillin/tazobactam IVPB.. 3.375 Gram(s) IV Intermittent every 8 hours  polyethylene glycol 3350 17 Gram(s) Oral daily  senna Syrup 10 milliLiter(s) Oral at bedtime  sodium chloride 2% . 1000 milliLiter(s) IV Continuous <Continuous>    PHYSICAL EXAM:   ICU Vital Signs Last 24 Hrs  T(C): 37.6 (02 Apr 2023 15:00), Max: 38 (01 Apr 2023 17:55)  T(F): 99.7 (02 Apr 2023 15:00), Max: 100.4 (01 Apr 2023 17:55)  HR: 71 (02 Apr 2023 15:06) (60 - 88)  BP: 139/76 (02 Apr 2023 15:00) (118/85 - 152/107)  BP(mean): 97 (02 Apr 2023 15:00) (73 - 118)  ABP: --  ABP(mean): --  RR: 28 (02 Apr 2023 15:00) (16 - 34)  SpO2: 100% (02 Apr 2023 15:06) (93% - 100%)    O2 Parameters below as of 02 Apr 2023 15:06  Patient On (Oxygen Delivery Method): nasal cannula, high flow      General: No acute distress  NEUROLOGICAL EXAM:  Mental status: Alert follows commands, oriented x 3, +dysarthria, no aphasia  CN: perrl, blinks to threat on right, not left. gaze midline, UMN left facial weakness,   motor: moves right UE/LE 5/5  Moves left UE 1/5, LLE 3/5  Sensory: left hemineglect  coord no dysmetria on right  gait deferred      LABS:    CBC:            11.2   8.25  )-----------( 226      ( 04-02-23 @ 05:45 )             33.3         Chem:         ( 04-02-23 @ 12:25 )    139  |  105  |  9.3  ----------------------------<  126<H>  3.7   |  22.0  |  0.49<L>        Liver Functions:     Type & Screen:                   IMAGING: Reviewed by me.   CT Head No Cont (03.30.23 @ 03:40)   Compared with 3/29/2023 there is decreasing retained contrast   in the right basal ganglia and right frontal parietal cortex with   evolving lucency consistent with an acute right artery infarct. Residual   high density although decreased compared to the prior exam may represent   retained contrast as well as a small amount of hemorrhage. No increased   hemorrhage identified.    CT BRAIN STROKE PROTOCOL   03/29/2023    IMPRESSION: Dense right MCA sign with early right temporal lobe   infarction. No acute intracranial hemorrhage.    CT PERFUSION W MAPS IC    03/29/2023    CBF<30% volume: 72 ml right MCA territory.  Tmax>6.0 s volume: 180 ml  Mismatch volume: 108 ml  Mismatch ratio: 2.5    TTE  3/29/2023  Left ventricular ejection fraction, by visual estimation, is >75%. Technically difficult study. Hyperdynamic global left ventricular systolic function. The left ventricular diastolic function could not be assessed in this study. There is mild concentric left ventricular hypertrophy. There is no evidence of pericardial effusion. Mild mitral annular calcification. Trace mitral valve regurgitation.    CT head from 4/2/23  IMPRESSION: Unchanged hemorrhagic RIGHT MCA infarction with effacement of the RIGHT lateral ventricle and 6 mm subfalcine herniation to the LEFT.

## 2023-04-02 NOTE — PROGRESS NOTE ADULT - SUBJECTIVE AND OBJECTIVE BOX
HPI:  59 y/o male with PMHx of HTN and DM transfered from [facility] to [facility] as a code stroke. [facility] EMS reports pt BLAYNE was last night around 20:00 and was c/o right arm pain, today his family found his around 05:00 with left sided hemiparesis, slurred speech and was incontinent of urine, they called EMS as pt arrived at [facility] at 05:56, he was found to have right ICA occlusion and transferred to [facility], code stroke called upon arrival. Pt was on Cardene however per EMS neuro wanted pressure around 200 and it was d/monet, pt sedated with propofol. (29 Mar 2023 09:40)    O/n events: started on HFNC - concern for increased WOB.    ICU Vital Signs Last 24 Hrs  T(C): 37.6 (02 Apr 2023 11:00), Max: 38 (01 Apr 2023 17:00)  T(F): 99.7 (02 Apr 2023 11:00), Max: 100.4 (01 Apr 2023 17:00)  HR: 60 (02 Apr 2023 11:00) (60 - 109)  BP: 138/94 (02 Apr 2023 11:00) (115/78 - 149/74)  BP(mean): 106 (02 Apr 2023 11:00) (73 - 111)  ABP: --  ABP(mean): --  RR: 26 (02 Apr 2023 11:00) (19 - 34)  SpO2: 100% (02 Apr 2023 11:00) (95% - 100%)    O2 Parameters below as of 02 Apr 2023 08:45  Patient On (Oxygen Delivery Method): nasal cannula, high flow      EXAMINATION: stable  General:  NAD  Neuro:  with eyelid opening apraxia but able to open lids slightly, remains alert and awake during exam,  follows simple commands on both sides, pupils 3 mm and reactive but R is more sluggish, EOMI, no BTT on the L, able to slightly open his mouth, + but weak cough, able to raise R side AG, L arm 2/5 spontaneously, R leg 3/5, L leg 2/5 spontaneously   Cards:  RRR  Respiratory:  no respiratory distress, with some rhonchi   Abdomen:  soft  Extremities:  no LE edema    Bedside sono with BL B-lines, small BL pleural effusion.

## 2023-04-02 NOTE — PROGRESS NOTE ADULT - ASSESSMENT
ASSESSMENT:  59 y/o male with PMHx of HTN and DM transfered from [facility] on 3/29, LKW ~20:00 c/o right arm pain, found by family ~05:00 with L hemiparesis, slurred speech and was incontinent of urine, found to have AYAKA/RMCA occlusions s/p thrombectomy TICI 2B recanalization and AYAKA stent on 3/29. Post op CTH shows likely contrast staining within stroke bed with mass effect and MLS. Neurosurgery consulted for hemicrani watch.      Plan:   - Q1 hour neuro checks  - CTH 3/31 stable   - pend MRI   - SBP goal 100-140 2/2 risk of hemorrhage,  -  low threshold for reintubation.   - currently on high flow, intermittent Chest PT, mucomyst x 24 hours.    - Cangrelor drip per neuro IR for R ICA stent, will transition to DAPT  - DVT prophylaxis: SCDs only  - ASA for stroke ppx  - NS 2% @75, Na goal >140  - Further medical management as per NSICU   - pend discussion with surgeon in AM

## 2023-04-03 LAB
ALBUMIN SERPL ELPH-MCNC: 3.4 G/DL — SIGNIFICANT CHANGE UP (ref 3.3–5.2)
ALP SERPL-CCNC: 30 U/L — LOW (ref 40–120)
ALT FLD-CCNC: 23 U/L — SIGNIFICANT CHANGE UP
ANION GAP SERPL CALC-SCNC: 12 MMOL/L — SIGNIFICANT CHANGE UP (ref 5–17)
ANION GAP SERPL CALC-SCNC: 12 MMOL/L — SIGNIFICANT CHANGE UP (ref 5–17)
ANION GAP SERPL CALC-SCNC: 13 MMOL/L — SIGNIFICANT CHANGE UP (ref 5–17)
ANION GAP SERPL CALC-SCNC: 13 MMOL/L — SIGNIFICANT CHANGE UP (ref 5–17)
AST SERPL-CCNC: 25 U/L — SIGNIFICANT CHANGE UP
BASE EXCESS BLDA CALC-SCNC: 0.7 MMOL/L — SIGNIFICANT CHANGE UP (ref -2–3)
BILIRUB SERPL-MCNC: 1.2 MG/DL — SIGNIFICANT CHANGE UP (ref 0.4–2)
BLOOD GAS COMMENTS ARTERIAL: SIGNIFICANT CHANGE UP
BUN SERPL-MCNC: 10.9 MG/DL — SIGNIFICANT CHANGE UP (ref 8–20)
BUN SERPL-MCNC: 11.4 MG/DL — SIGNIFICANT CHANGE UP (ref 8–20)
BUN SERPL-MCNC: 11.7 MG/DL — SIGNIFICANT CHANGE UP (ref 8–20)
BUN SERPL-MCNC: 11.9 MG/DL — SIGNIFICANT CHANGE UP (ref 8–20)
CALCIUM SERPL-MCNC: 8.1 MG/DL — LOW (ref 8.4–10.5)
CALCIUM SERPL-MCNC: 8.1 MG/DL — LOW (ref 8.4–10.5)
CALCIUM SERPL-MCNC: 8.4 MG/DL — SIGNIFICANT CHANGE UP (ref 8.4–10.5)
CALCIUM SERPL-MCNC: 8.4 MG/DL — SIGNIFICANT CHANGE UP (ref 8.4–10.5)
CHLORIDE SERPL-SCNC: 104 MMOL/L — SIGNIFICANT CHANGE UP (ref 96–108)
CHLORIDE SERPL-SCNC: 106 MMOL/L — SIGNIFICANT CHANGE UP (ref 96–108)
CHLORIDE SERPL-SCNC: 107 MMOL/L — SIGNIFICANT CHANGE UP (ref 96–108)
CHLORIDE SERPL-SCNC: 109 MMOL/L — HIGH (ref 96–108)
CO2 SERPL-SCNC: 21 MMOL/L — LOW (ref 22–29)
CO2 SERPL-SCNC: 21 MMOL/L — LOW (ref 22–29)
CO2 SERPL-SCNC: 22 MMOL/L — SIGNIFICANT CHANGE UP (ref 22–29)
CO2 SERPL-SCNC: 22 MMOL/L — SIGNIFICANT CHANGE UP (ref 22–29)
CREAT SERPL-MCNC: 0.49 MG/DL — LOW (ref 0.5–1.3)
CREAT SERPL-MCNC: 0.5 MG/DL — SIGNIFICANT CHANGE UP (ref 0.5–1.3)
CREAT SERPL-MCNC: 0.53 MG/DL — SIGNIFICANT CHANGE UP (ref 0.5–1.3)
CREAT SERPL-MCNC: 0.54 MG/DL — SIGNIFICANT CHANGE UP (ref 0.5–1.3)
EGFR: 114 ML/MIN/1.73M2 — SIGNIFICANT CHANGE UP
EGFR: 115 ML/MIN/1.73M2 — SIGNIFICANT CHANGE UP
EGFR: 117 ML/MIN/1.73M2 — SIGNIFICANT CHANGE UP
EGFR: 117 ML/MIN/1.73M2 — SIGNIFICANT CHANGE UP
GAS PNL BLDA: SIGNIFICANT CHANGE UP
GLUCOSE BLDC GLUCOMTR-MCNC: 121 MG/DL — HIGH (ref 70–99)
GLUCOSE BLDC GLUCOMTR-MCNC: 134 MG/DL — HIGH (ref 70–99)
GLUCOSE SERPL-MCNC: 117 MG/DL — HIGH (ref 70–99)
GLUCOSE SERPL-MCNC: 121 MG/DL — HIGH (ref 70–99)
GLUCOSE SERPL-MCNC: 124 MG/DL — HIGH (ref 70–99)
GLUCOSE SERPL-MCNC: 131 MG/DL — HIGH (ref 70–99)
HCO3 BLDA-SCNC: 23 MMOL/L — SIGNIFICANT CHANGE UP (ref 21–28)
HCT VFR BLD CALC: 35.5 % — LOW (ref 39–50)
HGB BLD-MCNC: 11.8 G/DL — LOW (ref 13–17)
HOROWITZ INDEX BLDA+IHG-RTO: 0.3 — SIGNIFICANT CHANGE UP
MAGNESIUM SERPL-MCNC: 2.3 MG/DL — SIGNIFICANT CHANGE UP (ref 1.6–2.6)
MCHC RBC-ENTMCNC: 26.5 PG — LOW (ref 27–34)
MCHC RBC-ENTMCNC: 33.2 GM/DL — SIGNIFICANT CHANGE UP (ref 32–36)
MCV RBC AUTO: 79.6 FL — LOW (ref 80–100)
OSMOLALITY SERPL: 289 MOSMOL/KG — SIGNIFICANT CHANGE UP (ref 280–301)
OSMOLALITY UR: 457 MOSM/KG — SIGNIFICANT CHANGE UP (ref 300–1000)
PCO2 BLDA: 28 MMHG — LOW (ref 35–48)
PH BLDA: 7.53 — HIGH (ref 7.35–7.45)
PHOSPHATE SERPL-MCNC: 3.9 MG/DL — SIGNIFICANT CHANGE UP (ref 2.4–4.7)
PLATELET # BLD AUTO: 255 K/UL — SIGNIFICANT CHANGE UP (ref 150–400)
PO2 BLDA: 122 MMHG — HIGH (ref 83–108)
POTASSIUM SERPL-MCNC: 3.4 MMOL/L — LOW (ref 3.5–5.3)
POTASSIUM SERPL-MCNC: 3.5 MMOL/L — SIGNIFICANT CHANGE UP (ref 3.5–5.3)
POTASSIUM SERPL-MCNC: 3.5 MMOL/L — SIGNIFICANT CHANGE UP (ref 3.5–5.3)
POTASSIUM SERPL-MCNC: 3.7 MMOL/L — SIGNIFICANT CHANGE UP (ref 3.5–5.3)
POTASSIUM SERPL-SCNC: 3.4 MMOL/L — LOW (ref 3.5–5.3)
POTASSIUM SERPL-SCNC: 3.5 MMOL/L — SIGNIFICANT CHANGE UP (ref 3.5–5.3)
POTASSIUM SERPL-SCNC: 3.5 MMOL/L — SIGNIFICANT CHANGE UP (ref 3.5–5.3)
POTASSIUM SERPL-SCNC: 3.7 MMOL/L — SIGNIFICANT CHANGE UP (ref 3.5–5.3)
PROT SERPL-MCNC: 6.4 G/DL — LOW (ref 6.6–8.7)
RBC # BLD: 4.46 M/UL — SIGNIFICANT CHANGE UP (ref 4.2–5.8)
RBC # FLD: 13.3 % — SIGNIFICANT CHANGE UP (ref 10.3–14.5)
SODIUM SERPL-SCNC: 139 MMOL/L — SIGNIFICANT CHANGE UP (ref 135–145)
SODIUM SERPL-SCNC: 140 MMOL/L — SIGNIFICANT CHANGE UP (ref 135–145)
SODIUM SERPL-SCNC: 141 MMOL/L — SIGNIFICANT CHANGE UP (ref 135–145)
SODIUM SERPL-SCNC: 141 MMOL/L — SIGNIFICANT CHANGE UP (ref 135–145)
WBC # BLD: 7.48 K/UL — SIGNIFICANT CHANGE UP (ref 3.8–10.5)
WBC # FLD AUTO: 7.48 K/UL — SIGNIFICANT CHANGE UP (ref 3.8–10.5)

## 2023-04-03 PROCEDURE — 99233 SBSQ HOSP IP/OBS HIGH 50: CPT | Mod: 24

## 2023-04-03 PROCEDURE — 95720 EEG PHY/QHP EA INCR W/VEEG: CPT

## 2023-04-03 PROCEDURE — 71045 X-RAY EXAM CHEST 1 VIEW: CPT | Mod: 26

## 2023-04-03 PROCEDURE — 99291 CRITICAL CARE FIRST HOUR: CPT

## 2023-04-03 RX ORDER — PANTOPRAZOLE SODIUM 20 MG/1
40 TABLET, DELAYED RELEASE ORAL DAILY
Refills: 0 | Status: DISCONTINUED | OUTPATIENT
Start: 2023-04-03 | End: 2023-04-05

## 2023-04-03 RX ORDER — LABETALOL HCL 100 MG
10 TABLET ORAL ONCE
Refills: 0 | Status: COMPLETED | OUTPATIENT
Start: 2023-04-03 | End: 2023-04-03

## 2023-04-03 RX ORDER — LANOLIN ALCOHOL/MO/W.PET/CERES
5 CREAM (GRAM) TOPICAL AT BEDTIME
Refills: 0 | Status: DISCONTINUED | OUTPATIENT
Start: 2023-04-03 | End: 2023-04-05

## 2023-04-03 RX ADMIN — Medication 10 MILLIGRAM(S): at 16:35

## 2023-04-03 RX ADMIN — Medication 975 MILLIGRAM(S): at 08:51

## 2023-04-03 RX ADMIN — Medication 975 MILLIGRAM(S): at 07:51

## 2023-04-03 RX ADMIN — MUPIROCIN 1 APPLICATION(S): 20 OINTMENT TOPICAL at 06:27

## 2023-04-03 RX ADMIN — PANTOPRAZOLE SODIUM 40 MILLIGRAM(S): 20 TABLET, DELAYED RELEASE ORAL at 11:52

## 2023-04-03 RX ADMIN — Medication 81 MILLIGRAM(S): at 11:49

## 2023-04-03 RX ADMIN — Medication 4 MILLILITER(S): at 20:12

## 2023-04-03 RX ADMIN — Medication 3 MILLILITER(S): at 08:58

## 2023-04-03 RX ADMIN — Medication 5 MILLIGRAM(S): at 23:27

## 2023-04-03 RX ADMIN — CHLORHEXIDINE GLUCONATE 1 APPLICATION(S): 213 SOLUTION TOPICAL at 05:28

## 2023-04-03 RX ADMIN — PIPERACILLIN AND TAZOBACTAM 25 GRAM(S): 4; .5 INJECTION, POWDER, LYOPHILIZED, FOR SOLUTION INTRAVENOUS at 08:56

## 2023-04-03 RX ADMIN — Medication 10 MILLIGRAM(S): at 16:06

## 2023-04-03 RX ADMIN — Medication 1 DROP(S): at 05:28

## 2023-04-03 RX ADMIN — Medication 975 MILLIGRAM(S): at 18:00

## 2023-04-03 RX ADMIN — Medication 4 MILLILITER(S): at 08:58

## 2023-04-03 RX ADMIN — SODIUM CHLORIDE 75 MILLILITER(S): 5 INJECTION, SOLUTION INTRAVENOUS at 12:04

## 2023-04-03 RX ADMIN — Medication 3 MILLILITER(S): at 20:12

## 2023-04-03 RX ADMIN — PIPERACILLIN AND TAZOBACTAM 25 GRAM(S): 4; .5 INJECTION, POWDER, LYOPHILIZED, FOR SOLUTION INTRAVENOUS at 23:27

## 2023-04-03 RX ADMIN — Medication 1 APPLICATION(S): at 17:25

## 2023-04-03 RX ADMIN — ATORVASTATIN CALCIUM 80 MILLIGRAM(S): 80 TABLET, FILM COATED ORAL at 23:27

## 2023-04-03 RX ADMIN — Medication 10 MILLIGRAM(S): at 17:25

## 2023-04-03 RX ADMIN — SODIUM CHLORIDE 2 GRAM(S): 9 INJECTION INTRAMUSCULAR; INTRAVENOUS; SUBCUTANEOUS at 23:27

## 2023-04-03 RX ADMIN — Medication 1 DROP(S): at 17:26

## 2023-04-03 RX ADMIN — SODIUM CHLORIDE 2 GRAM(S): 9 INJECTION INTRAMUSCULAR; INTRAVENOUS; SUBCUTANEOUS at 11:51

## 2023-04-03 RX ADMIN — SODIUM CHLORIDE 2 GRAM(S): 9 INJECTION INTRAMUSCULAR; INTRAVENOUS; SUBCUTANEOUS at 17:25

## 2023-04-03 RX ADMIN — Medication 975 MILLIGRAM(S): at 19:00

## 2023-04-03 RX ADMIN — Medication 4 MILLILITER(S): at 15:31

## 2023-04-03 RX ADMIN — Medication 3 MILLILITER(S): at 15:31

## 2023-04-03 RX ADMIN — MUPIROCIN 1 APPLICATION(S): 20 OINTMENT TOPICAL at 17:25

## 2023-04-03 RX ADMIN — Medication 4 MILLILITER(S): at 04:02

## 2023-04-03 RX ADMIN — POLYETHYLENE GLYCOL 3350 17 GRAM(S): 17 POWDER, FOR SOLUTION ORAL at 11:49

## 2023-04-03 RX ADMIN — Medication 1 APPLICATION(S): at 05:29

## 2023-04-03 RX ADMIN — CANGRELOR 22.2 MICROGRAM(S)/KG/MIN: 50 INJECTION, POWDER, LYOPHILIZED, FOR SOLUTION INTRAVENOUS at 15:17

## 2023-04-03 RX ADMIN — Medication 10 MILLIGRAM(S): at 09:08

## 2023-04-03 RX ADMIN — Medication 3 MILLILITER(S): at 04:02

## 2023-04-03 RX ADMIN — SODIUM CHLORIDE 2 GRAM(S): 9 INJECTION INTRAMUSCULAR; INTRAVENOUS; SUBCUTANEOUS at 05:27

## 2023-04-03 RX ADMIN — PIPERACILLIN AND TAZOBACTAM 25 GRAM(S): 4; .5 INJECTION, POWDER, LYOPHILIZED, FOR SOLUTION INTRAVENOUS at 15:16

## 2023-04-03 NOTE — PROGRESS NOTE ADULT - SUBJECTIVE AND OBJECTIVE BOX
HPI:    57 y/o male with PMHx of HTN and DM transfered from [facility] to [facility] as a code stroke. [facility] EMS reports pt BLAYNE was last night around 20:00 and was c/o right arm pain, today his family found his around 05:00 with left sided hemiparesis, slurred speech and was incontinent of urine, they called EMS as pt arrived at [facility] at 05:56, he was found to have right ICA occlusion and transferred to [facility], code stroke called upon arrival. Pt was on Cardene however per EMS neuro wanted pressure around 200 and it was d/monet, pt sedated with propofol.         (29 Mar 2023 09:40)        INTERVAL HPI/OVERNIGHT EVENTS:  OVERNIGHT EVENTS: patient seen and examined at bedside. States he feels overall better. Respiratory status is improving despite tachypnea.   Vital Signs Last 24 Hrs  T(C): 37.6 (2023 23:00), Max: 37.9 (2023 17:00)  T(F): 99.7 (2023 23:00), Max: 100.2 (2023 17:00)  HR: 82 (2023 23:00) (60 - 86)  BP: 134/83 (2023 23:00) (121/74 - 152/107)  BP(mean): 98 (2023 23:00) (73 - 118)  RR: 28 (2023 23:58) (16 - 34)  SpO2: 98% (2023 23:58) (93% - 100%)    Parameters below as of 2023 23:58  Patient On (Oxygen Delivery Method): nasal cannula, high flow  O2 Flow (L/min): 35  O2 Concentration (%): 30  PHYSICAL EXAM:  General: NAD,  HEENT: atraumatic head, PERRL 4mm b/l, R eye chemosis  Cardiovascular: Regular rate and rhythm  Respiratory: tachypnic  GI: abdomen soft, nontender, nondistended  Neuro: OE to voice, +cough/gag/corneals, blinks to threat tack.   RUE 5/5, RLE 3/5, able to follow commands   LUE D/B/T 2/5, HG 1/5, attempts to lift LUE antigrav distally, LLE 3/5  Extremities: distal pulses 2+ x4  Wound/incision: R groin site c/d/i    LABS:                        11.2   8.25  )-----------( 226      ( 2023 05:45 )             33.3     04-02    141  |  109<H>  |  10.9  ----------------------------<  121<H>  3.5   |  21.0<L>  |  0.50    Ca    8.1<L>      2023 23:35  Phos  3.5     04-  Mg     2.0     04-02        Urinalysis Basic - ( 2023 17:25 )    Color: Yellow / Appearance: Clear / S.010 / pH: x  Gluc: x / Ketone: Negative  / Bili: Negative / Urobili: Negative mg/dL   Blood: x / Protein: Negative / Nitrite: Negative   Leuk Esterase: Negative / RBC: 6-10 /HPF / WBC 0-2 /HPF   Sq Epi: x / Non Sq Epi: Occasional / Bacteria: Occasional         @ 07: @ 07:00  --------------------------------------------------------  IN: 3050.5 mL / OUT: 4175 mL / NET: -1124.5 mL     @ 07:03 @ 00:15  --------------------------------------------------------  IN: 2292 mL / OUT: 3500 mL / NET: -1208 mL        RADIOLOGY & ADDITIONAL TESTS:

## 2023-04-03 NOTE — PROGRESS NOTE ADULT - ASSESSMENT
ASSESSMENT:  59 y/o male with PMHx of HTN and DM transfered from [facility] on 3/29, LKW ~20:00 c/o right arm pain, found by family ~05:00 with L hemiparesis, slurred speech and was incontinent of urine, found to have AYAKA/RMCA occlusions s/p thrombectomy TICI 2B recanalization and AYAKA stent on 3/29. Post op CTH shows likely contrast staining within stroke bed with mass effect and MLS. Neurosurgery consulted for hemicrani watch.      Plan:   - Q1 hour neuro checks  - CTH 4/2 stable   - pend MRI   - SBP goal 100-140 2/2 risk of hemorrhage,  -  low threshold for reintubation.   - Pulm toileting currently on high flow, intermittent Chest PT, mucomyst x 24 hours.     - Cangrelor drip per neuro IR for R ICA stent, will transition to DAPT  - DVT prophylaxis: SCDs only  - ASA for stroke ppx  - NS 2% @75, Na goal >140  - Further medical management as per NSICU   - pend discussion with surgeon in AM

## 2023-04-03 NOTE — CHART NOTE - NSCHARTNOTEFT_GEN_A_CORE
EEG preliminary read (not final) on the initial recording hour(s) = x 3    No seizures recorded.  Right sided attenuation and slowing noted, nonspecific.  Final report to follow tomorrow morning after completion of study.    [facility] EEG Reading Room Ph#: [phone]  Epilepsy Answering Service after 5PM and before 8:30AM: Ph#: [phone]

## 2023-04-03 NOTE — PROGRESS NOTE ADULT - ASSESSMENT
Assessment/Plan:   59 yo M with acute CVA due to R ICA/MCA occlusion, with hemorrhagic transformation, vasogenic edema and MLS; no thrombolytics as was out of the window for administration.  S/p TICI 2b MT, AYAKA stenting 3/29. On Cangrelor and ASA now.  Respiratory distress due to bulbar impairment, pulm congestion; concern for aspiration PNA vs pneumonitis.   PMH of HTN and DM.    Neuro:  neurochecks  hemicraniectomy watch   cEEG for 24 hrs in view of episode of worsening neuro exam  c/w ASA 81 mg daily; c/w cangrelor ggt at 1 mcg/kg/min while on hemicrani watch; plan to keep on for another 24 hrs  c/w Atorvastatin 80 mg daily    CV: , TTE with EF 75%  MAP >65    Pulm:  cont Duoneb + mucomyst, chest PT  maintain OSats >92, monitor EtCO2; switch to NC    GI:  cont TF; BM regimen  S/S eval, will likely need PEG    Renal:  Na goal 145-155  cont HTS 2% at 75cc/hr, BMP q6h   monitor UOP, get Uosm    Heme:  SCDs, hold Lov ppx for hemicrani watch     ID: MRSA neg  c/w Zosyn for aspiration PNA, 7 days in total    Endo: HgA1C 7.3  cont ISS  FS goal 120-180

## 2023-04-03 NOTE — PROGRESS NOTE ADULT - ASSESSMENT
ASSESSMENT: 58y Male with PMH HTN and DM, found by his family 0500 AM on 3/29/23 with right hemiparesis, slurred speech, and incontinent of urine.  Last well known 2000  on 3/28/23. Patient brought in by EMS to the [facility] at 0556, was noted not to be a candidate for Tenecteplase as patient out of time window. 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 NIHSS 28 MRS pre - 0 . Etiology likely large artery atherosclerotic disease. Post mechanical thrombectomy TICI 2B and Right carotid stent with angioplasty.     NEURO: Continue close monitoring for neurologic deterioration, with stroke checks per ICU, no acute changes noted on CT head of cerebral edema with mass effect, hemorrhagic transformation,  and brain compression, hypertonic saline to Na goal gradually of 140-145 for now- avoid hyponatremia and rapid fluctuations, q 6 hr bmp,   permissive HTN as per post thrombectomy recommendations 110-140mmHg in setting of recent revascularization/hemorrhagic transformation as to avoid hypo/hyperperfusion injury. Titrate statin to LDL goal less than 70,   - MRI Brain w/o pending  - Physical therapy/OT/Speech eval/treatment.       ANTITHROMBOTIC THERAPY: ASA 81mg daily, cangrelor gtt post stenting- transition to PO agent (Brilinta) pending clinical course/stable enteral access and stable repeat imaging. P2y12: 9.     PULMONARY:  extubated, care per ICU, aspiration precautions, incentive spirometry as tolerated     CARDIOVASCULAR: cardiac monitoring     GASTROINTESTINAL: dysphagia screen flailed SLP eval     Diet: NPO/NGT per ICU    RENAL: BUN/Cr within range, maintain adequate hydration,  monitor urine output       Na Goal:  135-145     Quispe: Y    HEMATOLOGY: H/H with downtrend- monitor for si/sx of bleeding, serial CBC, Platelets 170. Patient should have all age and risk appropriate malignancy screening.      DVT ppx      ID: previously febrile., leukocytosis resolved. Continues on zosyn for anticipated  7 day course     OTHER:  , A1C 7.3- continue glycemic control    DISPOSITION: Rehab or home depending on PT eval once stable and workup is complete    CORE MEASURES:        Admission NIHSS: 28     TPA: [] YES [x] NO      LDL/HDL/A1C: 165/58/7.3     Depression Screen: pending     Statin Therapy: pending     Dysphagia Screen: [] PASS [x] FAIL       Smoking [] YES [] NO      Afib [] YES [x] NO     Stroke Education [x] YES [] NO   ASSESSMENT: 58y Male with PMH HTN and DM, found by his family 0500 AM on 3/29/23 with right hemiparesis, slurred speech, and incontinent of urine.  Last well known 2000  on 3/28/23. Patient brought in by EMS to the [facility] at 0556, was noted not to be a candidate for Tenecteplase as patient out of time window. 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 NIHSS 28 MRS pre - 0 . Etiology likely large artery atherosclerotic disease. Post mechanical thrombectomy TICI 2B and Right carotid stent with angioplasty.     NEURO: Continue close monitoring for neurologic deterioration, with stroke checks per ICU, no acute changes noted on CT head of cerebral edema with mass effect, hemorrhagic transformation,  and brain compression, hypertonic saline to Na goal gradually of 140-145 for now- avoid hyponatremia and rapid fluctuations, frequent bmp,   permissive HTN as per post thrombectomy recommendations 110-140mmHg in setting of recent revascularization/hemorrhagic transformation as to avoid hypo/hyperperfusion injury. Titrate statin to LDL goal less than 70,   - MRI Brain w/o pending  - Physical therapy/OT/Speech eval/treatment.       ANTITHROMBOTIC THERAPY: ASA 81mg daily, cangrelor gtt post stenting- transition to PO agent (Brilinta) pending clinical course/stable enteral access and stable repeat imaging. P2y12: 9.     PULMONARY:  extubated, care per ICU, aspiration precautions, incentive spirometry as tolerated     CARDIOVASCULAR: cardiac monitoring     GASTROINTESTINAL: dysphagia screen flailed SLP eval     Diet: NPO/NGT per ICU    RENAL: BUN/Cr within range, maintain adequate hydration,  monitor urine output       Na Goal:  135-145     Quispe: Y    HEMATOLOGY: H/H with downtrend- monitor for si/sx of bleeding, serial CBC, Platelets 255. Patient should have all age and risk appropriate malignancy screening.      DVT ppx      ID: previously febrile., leukocytosis resolved. Continues on zosyn for noted 7 day course     OTHER:  , A1C 7.3- continue glycemic control    DISPOSITION: Rehab or home depending on PT eval once stable and workup is complete    CORE MEASURES:        Admission NIHSS: 28     TPA: [] YES [x] NO      LDL/HDL/A1C: 165/58/7.3     Depression Screen: pending     Statin Therapy: pending     Dysphagia Screen: [] PASS [x] FAIL       Smoking [] YES [x] NO      Afib [] YES [x] NO     Stroke Education [x] YES [] NO

## 2023-04-03 NOTE — PROGRESS NOTE ADULT - SUBJECTIVE AND OBJECTIVE BOX
HPI:  57 y/o male with PMHx of HTN and DM transfered from [facility] to [facility] as a code stroke. [facility] reports pt BLAYNE was last night around 20:00 and was c/o right arm pain, today his family found his around 05:00 with left sided hemiparesis, slurred speech and was incontinent of urine, they called EMS as pt arrived at [facility] at 05:56, he was found to have right ICA occlusion and transferred to [facility], code stroke called upon arrival. Pt was on Cardene however per EMS neuro wanted pressure around 200 and it was d/monet, pt sedated with propofol. (29 Mar 2023 09:40)    O/n events: episode of being somnolent this am, difficult to wake up; improved.    ICU Vital Signs Last 24 Hrs  T(C): 36.4 (03 Apr 2023 11:59), Max: 37.9 (02 Apr 2023 17:00)  T(F): 97.6 (03 Apr 2023 11:59), Max: 100.2 (02 Apr 2023 17:00)  HR: 83 (03 Apr 2023 12:00) (65 - 91)  BP: 116/80 (03 Apr 2023 12:00) (110/85 - 152/107)  BP(mean): 88 (03 Apr 2023 12:00) (83 - 118)    RR: 27 (03 Apr 2023 12:00) (16 - 31)  SpO2: 97% (03 Apr 2023 12:00) (93% - 100%)    O2 Parameters below as of 03 Apr 2023 12:00  Patient On (Oxygen Delivery Method): nasal cannula  O2 Flow (L/min): 2    Labs, imaging reviewed.    EXAMINATION: stable  General:  NAD  Neuro:  with eyelid opening apraxia but able to open lids slightly, remains alert and awake during exam,  follows simple commands on both sides, pupils 3 mm and reactive but R is more sluggish, EOMI, no BTT on the L, able to open his mouth, better cough today, lifts head off the bed, RUE 4-/5, L arm 2/5 spontaneously, R leg 3/5, L leg 2/5 spontaneously   Cards:  RRR  Respiratory:  mild tachypnea, with some rhonchi   Abdomen:  soft, NT, ND  Extremities:  no LE edema

## 2023-04-03 NOTE — PROGRESS NOTE ADULT - SUBJECTIVE AND OBJECTIVE BOX
Preliminary note, offical recommendations pending attending review/signature   [facility] Stroke Team  Progress Note     HPI:  57 y/o male with PMHx of HTN and DM transferred from [facility] to [facility] as a code stroke. [facility] EMS reports pt BLAYNE was last night around 20:00 and was c/o right arm pain, today his family found his around 05:00 with left sided hemiparesis, slurred speech and was incontinent of urine, they called EMS as pt arrived at [facility] at 05:56, he was found to have right ICA occlusion and transferred to [facility], code stroke called upon arrival. Pt was on Cardene however per EMS neuro wanted pressure around 200 and it was d/monet, pt sedated with propofol.    SUBJECTIVE: No events overnight.  No new neurologic complaints.  ROS reported negative unless otherwise noted.    acetaminophen     Tablet .. 975 milliGRAM(s) Oral every 6 hours PRN  acetylcysteine 20%  Inhalation 4 milliLiter(s) Inhalation every 6 hours  albuterol/ipratropium for Nebulization 3 milliLiter(s) Nebulizer every 6 hours  artificial  tears Solution 1 Drop(s) Both EYES two times a day  aspirin  chewable 81 milliGRAM(s) Oral daily  atorvastatin 80 milliGRAM(s) Oral at bedtime  bisacodyl Suppository 10 milliGRAM(s) Rectal daily PRN  cangrelor Infusion 1 MICROgram(s)/kG/Min IV Continuous <Continuous>  chlorhexidine 2% Cloths 1 Application(s) Topical daily  dextrose 5%. 1000 milliLiter(s) IV Continuous <Continuous>  dextrose 5%. 1000 milliLiter(s) IV Continuous <Continuous>  dextrose 50% Injectable 25 Gram(s) IV Push once  dextrose 50% Injectable 12.5 Gram(s) IV Push once  dextrose 50% Injectable 25 Gram(s) IV Push once  dextrose Oral Gel 15 Gram(s) Oral once PRN  glucagon  Injectable 1 milliGRAM(s) IntraMuscular once  hydrALAZINE Injectable 10 milliGRAM(s) IV Push every 2 hours PRN  insulin lispro (ADMELOG) corrective regimen sliding scale   SubCutaneous every 6 hours  mupirocin 2% Nasal 1 Application(s) Both Nostrils two times a day  pantoprazole   Suspension 40 milliGRAM(s) Oral daily  petrolatum Ophthalmic Ointment 1 Application(s) Right EYE every 12 hours  piperacillin/tazobactam IVPB.. 3.375 Gram(s) IV Intermittent every 8 hours  polyethylene glycol 3350 17 Gram(s) Oral daily  senna Syrup 10 milliLiter(s) Oral at bedtime  sodium chloride 2 Gram(s) Oral every 6 hours  sodium chloride 2% + sodium acetate 50:50 1000 milliLiter(s) IV Continuous <Continuous>      PHYSICAL EXAM:   Vital Signs Last 24 Hrs  T(C): 36.4 (03 Apr 2023 11:59), Max: 37.9 (02 Apr 2023 17:00)  T(F): 97.6 (03 Apr 2023 11:59), Max: 100.2 (02 Apr 2023 17:00)  HR: 83 (03 Apr 2023 12:00) (65 - 91)  BP: 116/80 (03 Apr 2023 12:00) (110/85 - 152/107)  BP(mean): 88 (03 Apr 2023 12:00) (83 - 118)  RR: 27 (03 Apr 2023 12:00) (16 - 31)  SpO2: 97% (03 Apr 2023 12:00) (93% - 100%)    Parameters below as of 03 Apr 2023 12:00  Patient On (Oxygen Delivery Method): nasal cannula  O2 Flow (L/min): 2    EXAM PENDING   General: No acute distress    NEUROLOGICAL EXAM:  Mental status: Alert follows commands, oriented x 3 no aphasia  CN: perrl, blinks to threat on right, not left. right gaze preference, UMN left facial weakness,   motor: moves right UE/LE 5/5  Moves left UE 0-1/5, LLE 4+5  Sensory left neglect  coord no dysmetria on roght  gait deferred    LABS:                        11.8   7.48  )-----------( 255      ( 03 Apr 2023 05:37 )             35.5    04-03    140  |  107  |  11.7  ----------------------------<  117<H>  3.7   |  21.0<L>  |  0.54    Ca    8.4      03 Apr 2023 05:37  Phos  3.9     04-03  Mg     2.3     04-03    TPro  6.4<L>  /  Alb  3.4  /  TBili  1.2  /  DBili  x   /  AST  25  /  ALT  23  /  AlkPhos  30<L>  04-03        IMAGING: Reviewed by me.     CT Head No Cont (04.02.23 @ 08:08)   Unchanged hemorrhagic RIGHT MCA infarction with effacement   of the RIGHT lateral ventricle and 6 mm subfalcine herniation to the LEFT.    CT Head No Cont (03.30.23 @ 03:40)   Compared with 3/29/2023 there is decreasing retained contrast   in the right basal ganglia and right frontal parietal cortex with   evolving lucency consistent with an acute right artery infarct. Residual   high density although decreased compared to the prior exam may represent   retained contrast as well as a small amount of hemorrhage. No increased   hemorrhage identified.    CT BRAIN STROKE PROTOCOL   03/29/2023    IMPRESSION: Dense right MCA sign with early right temporal lobe   infarction. No acute intracranial hemorrhage.    CT PERFUSION W MAPS IC    03/29/2023    CBF<30% volume: 72 ml right MCA territory.  Tmax>6.0 s volume: 180 ml  Mismatch volume: 108 ml  Mismatch ratio: 2.5    TTE  3/29/2023  Left ventricular ejection fraction, by visual estimation, is >75%. Technically difficult study. Hyperdynamic global left ventricular systolic function. The left ventricular diastolic function could not be assessed in this study. There is mild concentric left ventricular hypertrophy. There is no evidence of pericardial effusion. Mild mitral annular calcification. Trace mitral valve regurgitation.       Preliminary note, offical recommendations pending attending review/signature   [facility] Stroke Team  Progress Note     HPI:  57 y/o male with PMHx of HTN and DM transferred from [facility] to [facility] as a code stroke. [facility] EMS reports pt BLAYNE was last night around 20:00 and was c/o right arm pain, today his family found his around 05:00 with left sided hemiparesis, slurred speech and was incontinent of urine, they called EMS as pt arrived at [facility] at 05:56, he was found to have right ICA occlusion and transferred to [facility], code stroke called upon arrival. Pt was on Cardene however per EMS neuro wanted pressure around 200 and it was d/monet, pt sedated with propofol.    SUBJECTIVE: No events overnight.  No new neurologic complaints.  ROS reported negative unless otherwise noted.    acetaminophen     Tablet .. 975 milliGRAM(s) Oral every 6 hours PRN  acetylcysteine 20%  Inhalation 4 milliLiter(s) Inhalation every 6 hours  albuterol/ipratropium for Nebulization 3 milliLiter(s) Nebulizer every 6 hours  artificial  tears Solution 1 Drop(s) Both EYES two times a day  aspirin  chewable 81 milliGRAM(s) Oral daily  atorvastatin 80 milliGRAM(s) Oral at bedtime  bisacodyl Suppository 10 milliGRAM(s) Rectal daily PRN  cangrelor Infusion 1 MICROgram(s)/kG/Min IV Continuous <Continuous>  chlorhexidine 2% Cloths 1 Application(s) Topical daily  dextrose 5%. 1000 milliLiter(s) IV Continuous <Continuous>  dextrose 5%. 1000 milliLiter(s) IV Continuous <Continuous>  dextrose 50% Injectable 25 Gram(s) IV Push once  dextrose 50% Injectable 12.5 Gram(s) IV Push once  dextrose 50% Injectable 25 Gram(s) IV Push once  dextrose Oral Gel 15 Gram(s) Oral once PRN  glucagon  Injectable 1 milliGRAM(s) IntraMuscular once  hydrALAZINE Injectable 10 milliGRAM(s) IV Push every 2 hours PRN  insulin lispro (ADMELOG) corrective regimen sliding scale   SubCutaneous every 6 hours  mupirocin 2% Nasal 1 Application(s) Both Nostrils two times a day  pantoprazole   Suspension 40 milliGRAM(s) Oral daily  petrolatum Ophthalmic Ointment 1 Application(s) Right EYE every 12 hours  piperacillin/tazobactam IVPB.. 3.375 Gram(s) IV Intermittent every 8 hours  polyethylene glycol 3350 17 Gram(s) Oral daily  senna Syrup 10 milliLiter(s) Oral at bedtime  sodium chloride 2 Gram(s) Oral every 6 hours  sodium chloride 2% + sodium acetate 50:50 1000 milliLiter(s) IV Continuous <Continuous>      PHYSICAL EXAM:   Vital Signs Last 24 Hrs  T(C): 36.4 (03 Apr 2023 11:59), Max: 37.9 (02 Apr 2023 17:00)  T(F): 97.6 (03 Apr 2023 11:59), Max: 100.2 (02 Apr 2023 17:00)  HR: 83 (03 Apr 2023 12:00) (65 - 91)  BP: 116/80 (03 Apr 2023 12:00) (110/85 - 152/107)  BP(mean): 88 (03 Apr 2023 12:00) (83 - 118)  RR: 27 (03 Apr 2023 12:00) (16 - 31)  SpO2: 97% (03 Apr 2023 12:00) (93% - 100%)    Parameters below as of 03 Apr 2023 12:00  Patient On (Oxygen Delivery Method): nasal cannula  O2 Flow (L/min): 2      General: No acute distress    NEUROLOGICAL EXAM:  Mental status: awake, alert, conversive, able to ID hand, follow simple commands   CN: left gaze palsy, able to count fingers on left, left gaze palsy, UMN left facial weakness  motor: moves right UE/LE 5/5  Moves left UE 1/5, LLE moves trace against gravity   Sensory:  left sensory extinction   gait deferred    LABS:                        11.8   7.48  )-----------( 255      ( 03 Apr 2023 05:37 )             35.5    04-03    140  |  107  |  11.7  ----------------------------<  117<H>  3.7   |  21.0<L>  |  0.54    Ca    8.4      03 Apr 2023 05:37  Phos  3.9     04-03  Mg     2.3     04-03    TPro  6.4<L>  /  Alb  3.4  /  TBili  1.2  /  DBili  x   /  AST  25  /  ALT  23  /  AlkPhos  30<L>  04-03        IMAGING: Reviewed by me.     CT Head No Cont (04.02.23 @ 08:08)   Unchanged hemorrhagic RIGHT MCA infarction with effacement   of the RIGHT lateral ventricle and 6 mm subfalcine herniation to the LEFT.    CT Head No Cont (03.30.23 @ 03:40)   Compared with 3/29/2023 there is decreasing retained contrast   in the right basal ganglia and right frontal parietal cortex with   evolving lucency consistent with an acute right artery infarct. Residual   high density although decreased compared to the prior exam may represent   retained contrast as well as a small amount of hemorrhage. No increased   hemorrhage identified.    CT BRAIN STROKE PROTOCOL   03/29/2023    IMPRESSION: Dense right MCA sign with early right temporal lobe   infarction. No acute intracranial hemorrhage.    CT PERFUSION W MAPS IC    03/29/2023    CBF<30% volume: 72 ml right MCA territory.  Tmax>6.0 s volume: 180 ml  Mismatch volume: 108 ml  Mismatch ratio: 2.5    TTE  3/29/2023  Left ventricular ejection fraction, by visual estimation, is >75%. Technically difficult study. Hyperdynamic global left ventricular systolic function. The left ventricular diastolic function could not be assessed in this study. There is mild concentric left ventricular hypertrophy. There is no evidence of pericardial effusion. Mild mitral annular calcification. Trace mitral valve regurgitation.       Preliminary note, offical recommendations pending attending review/signature   [facility] Stroke Team  Progress Note     HPI:  57 y/o male with PMHx of HTN and DM transferred from [facility] to [facility] as a code stroke. [facility] EMS reports pt BLAYNE was last night around 20:00 and was c/o right arm pain, today his family found his around 05:00 with left sided hemiparesis, slurred speech and was incontinent of urine, they called EMS as pt arrived at [facility] at 05:56, he was found to have right ICA occlusion and transferred to [facility], code stroke called upon arrival. Pt was on Cardene however per EMS neuro wanted pressure around 200 and it was d/monet, pt sedated with propofol.    SUBJECTIVE: No events overnight.  No new neurologic complaints.  ROS reported negative unless otherwise noted.    acetaminophen     Tablet .. 975 milliGRAM(s) Oral every 6 hours PRN  acetylcysteine 20%  Inhalation 4 milliLiter(s) Inhalation every 6 hours  albuterol/ipratropium for Nebulization 3 milliLiter(s) Nebulizer every 6 hours  artificial  tears Solution 1 Drop(s) Both EYES two times a day  aspirin  chewable 81 milliGRAM(s) Oral daily  atorvastatin 80 milliGRAM(s) Oral at bedtime  bisacodyl Suppository 10 milliGRAM(s) Rectal daily PRN  cangrelor Infusion 1 MICROgram(s)/kG/Min IV Continuous <Continuous>  chlorhexidine 2% Cloths 1 Application(s) Topical daily  dextrose 5%. 1000 milliLiter(s) IV Continuous <Continuous>  dextrose 5%. 1000 milliLiter(s) IV Continuous <Continuous>  dextrose 50% Injectable 25 Gram(s) IV Push once  dextrose 50% Injectable 12.5 Gram(s) IV Push once  dextrose 50% Injectable 25 Gram(s) IV Push once  dextrose Oral Gel 15 Gram(s) Oral once PRN  glucagon  Injectable 1 milliGRAM(s) IntraMuscular once  hydrALAZINE Injectable 10 milliGRAM(s) IV Push every 2 hours PRN  insulin lispro (ADMELOG) corrective regimen sliding scale   SubCutaneous every 6 hours  mupirocin 2% Nasal 1 Application(s) Both Nostrils two times a day  pantoprazole   Suspension 40 milliGRAM(s) Oral daily  petrolatum Ophthalmic Ointment 1 Application(s) Right EYE every 12 hours  piperacillin/tazobactam IVPB.. 3.375 Gram(s) IV Intermittent every 8 hours  polyethylene glycol 3350 17 Gram(s) Oral daily  senna Syrup 10 milliLiter(s) Oral at bedtime  sodium chloride 2 Gram(s) Oral every 6 hours  sodium chloride 2% + sodium acetate 50:50 1000 milliLiter(s) IV Continuous <Continuous>      PHYSICAL EXAM:   Vital Signs Last 24 Hrs  T(C): 36.4 (03 Apr 2023 11:59), Max: 37.9 (02 Apr 2023 17:00)  T(F): 97.6 (03 Apr 2023 11:59), Max: 100.2 (02 Apr 2023 17:00)  HR: 83 (03 Apr 2023 12:00) (65 - 91)  BP: 116/80 (03 Apr 2023 12:00) (110/85 - 152/107)  BP(mean): 88 (03 Apr 2023 12:00) (83 - 118)  RR: 27 (03 Apr 2023 12:00) (16 - 31)  SpO2: 97% (03 Apr 2023 12:00) (93% - 100%)    Parameters below as of 03 Apr 2023 12:00  Patient On (Oxygen Delivery Method): nasal cannula  O2 Flow (L/min): 2      General: No acute distress    NEUROLOGICAL EXAM:  Mental status: awake, alert, somewhat conversive, able to ID hand, follow simple commands   CN: left gaze palsy, able to count fingers on left, left gaze palsy, UMN left facial weakness  motor: moves right UE/LE 5/5  Moves left UE 1/5, LLE moves trace against gravity   Sensory:  left sensory extinction   gait deferred    LABS:                        11.8   7.48  )-----------( 255      ( 03 Apr 2023 05:37 )             35.5    04-03    140  |  107  |  11.7  ----------------------------<  117<H>  3.7   |  21.0<L>  |  0.54    Ca    8.4      03 Apr 2023 05:37  Phos  3.9     04-03  Mg     2.3     04-03    TPro  6.4<L>  /  Alb  3.4  /  TBili  1.2  /  DBili  x   /  AST  25  /  ALT  23  /  AlkPhos  30<L>  04-03        IMAGING: Reviewed by me.     CT Head No Cont (04.02.23 @ 08:08)   Unchanged hemorrhagic RIGHT MCA infarction with effacement   of the RIGHT lateral ventricle and 6 mm subfalcine herniation to the LEFT.    CT Head No Cont (03.30.23 @ 03:40)   Compared with 3/29/2023 there is decreasing retained contrast   in the right basal ganglia and right frontal parietal cortex with   evolving lucency consistent with an acute right artery infarct. Residual   high density although decreased compared to the prior exam may represent   retained contrast as well as a small amount of hemorrhage. No increased   hemorrhage identified.    CT BRAIN STROKE PROTOCOL   03/29/2023    IMPRESSION: Dense right MCA sign with early right temporal lobe   infarction. No acute intracranial hemorrhage.    CT PERFUSION W MAPS IC    03/29/2023    CBF<30% volume: 72 ml right MCA territory.  Tmax>6.0 s volume: 180 ml  Mismatch volume: 108 ml  Mismatch ratio: 2.5    TTE  3/29/2023  Left ventricular ejection fraction, by visual estimation, is >75%. Technically difficult study. Hyperdynamic global left ventricular systolic function. The left ventricular diastolic function could not be assessed in this study. There is mild concentric left ventricular hypertrophy. There is no evidence of pericardial effusion. Mild mitral annular calcification. Trace mitral valve regurgitation.       [facility] Stroke Team  Progress Note     HPI:  57 y/o male with PMHx of HTN and DM transferred from [facility] to [facility] as a code stroke. [facility] EMS reports pt BLAYNE was last night around 20:00 and was c/o right arm pain, today his family found his around 05:00 with left sided hemiparesis, slurred speech and was incontinent of urine, they called EMS as pt arrived at [facility] at 05:56, he was found to have right ICA occlusion and transferred to [facility], code stroke called upon arrival. Pt was on Cardene however per EMS neuro wanted pressure around 200 and it was d/monet, pt sedated with propofol.    SUBJECTIVE: No events overnight.  No new neurologic complaints.  ROS reported negative unless otherwise noted.    acetaminophen     Tablet .. 975 milliGRAM(s) Oral every 6 hours PRN  acetylcysteine 20%  Inhalation 4 milliLiter(s) Inhalation every 6 hours  albuterol/ipratropium for Nebulization 3 milliLiter(s) Nebulizer every 6 hours  artificial  tears Solution 1 Drop(s) Both EYES two times a day  aspirin  chewable 81 milliGRAM(s) Oral daily  atorvastatin 80 milliGRAM(s) Oral at bedtime  bisacodyl Suppository 10 milliGRAM(s) Rectal daily PRN  cangrelor Infusion 1 MICROgram(s)/kG/Min IV Continuous <Continuous>  chlorhexidine 2% Cloths 1 Application(s) Topical daily  dextrose 5%. 1000 milliLiter(s) IV Continuous <Continuous>  dextrose 5%. 1000 milliLiter(s) IV Continuous <Continuous>  dextrose 50% Injectable 25 Gram(s) IV Push once  dextrose 50% Injectable 12.5 Gram(s) IV Push once  dextrose 50% Injectable 25 Gram(s) IV Push once  dextrose Oral Gel 15 Gram(s) Oral once PRN  glucagon  Injectable 1 milliGRAM(s) IntraMuscular once  hydrALAZINE Injectable 10 milliGRAM(s) IV Push every 2 hours PRN  insulin lispro (ADMELOG) corrective regimen sliding scale   SubCutaneous every 6 hours  mupirocin 2% Nasal 1 Application(s) Both Nostrils two times a day  pantoprazole   Suspension 40 milliGRAM(s) Oral daily  petrolatum Ophthalmic Ointment 1 Application(s) Right EYE every 12 hours  piperacillin/tazobactam IVPB.. 3.375 Gram(s) IV Intermittent every 8 hours  polyethylene glycol 3350 17 Gram(s) Oral daily  senna Syrup 10 milliLiter(s) Oral at bedtime  sodium chloride 2 Gram(s) Oral every 6 hours  sodium chloride 2% + sodium acetate 50:50 1000 milliLiter(s) IV Continuous <Continuous>      PHYSICAL EXAM:   Vital Signs Last 24 Hrs  T(C): 36.4 (03 Apr 2023 11:59), Max: 37.9 (02 Apr 2023 17:00)  T(F): 97.6 (03 Apr 2023 11:59), Max: 100.2 (02 Apr 2023 17:00)  HR: 83 (03 Apr 2023 12:00) (65 - 91)  BP: 116/80 (03 Apr 2023 12:00) (110/85 - 152/107)  BP(mean): 88 (03 Apr 2023 12:00) (83 - 118)  RR: 27 (03 Apr 2023 12:00) (16 - 31)  SpO2: 97% (03 Apr 2023 12:00) (93% - 100%)    Parameters below as of 03 Apr 2023 12:00  Patient On (Oxygen Delivery Method): nasal cannula  O2 Flow (L/min): 2      General: No acute distress    NEUROLOGICAL EXAM:  Mental status: awake, alert, somewhat conversive, able to ID hand, follow simple commands   CN: left gaze palsy, able to count fingers on left, left gaze palsy, UMN left facial weakness  motor: moves right UE/LE 5/5  Moves left UE 1/5, LLE moves trace against gravity   Sensory:  left sensory extinction   gait deferred    LABS:                        11.8   7.48  )-----------( 255      ( 03 Apr 2023 05:37 )             35.5    04-03    140  |  107  |  11.7  ----------------------------<  117<H>  3.7   |  21.0<L>  |  0.54    Ca    8.4      03 Apr 2023 05:37  Phos  3.9     04-03  Mg     2.3     04-03    TPro  6.4<L>  /  Alb  3.4  /  TBili  1.2  /  DBili  x   /  AST  25  /  ALT  23  /  AlkPhos  30<L>  04-03        IMAGING: Reviewed by me.     CT Head No Cont (04.02.23 @ 08:08)   Unchanged hemorrhagic RIGHT MCA infarction with effacement   of the RIGHT lateral ventricle and 6 mm subfalcine herniation to the LEFT.    CT Head No Cont (03.30.23 @ 03:40)   Compared with 3/29/2023 there is decreasing retained contrast   in the right basal ganglia and right frontal parietal cortex with   evolving lucency consistent with an acute right artery infarct. Residual   high density although decreased compared to the prior exam may represent   retained contrast as well as a small amount of hemorrhage. No increased   hemorrhage identified.    CT BRAIN STROKE PROTOCOL   03/29/2023    IMPRESSION: Dense right MCA sign with early right temporal lobe   infarction. No acute intracranial hemorrhage.    CT PERFUSION W MAPS IC    03/29/2023    CBF<30% volume: 72 ml right MCA territory.  Tmax>6.0 s volume: 180 ml  Mismatch volume: 108 ml  Mismatch ratio: 2.5    TTE  3/29/2023  Left ventricular ejection fraction, by visual estimation, is >75%. Technically difficult study. Hyperdynamic global left ventricular systolic function. The left ventricular diastolic function could not be assessed in this study. There is mild concentric left ventricular hypertrophy. There is no evidence of pericardial effusion. Mild mitral annular calcification. Trace mitral valve regurgitation.

## 2023-04-04 LAB
ANION GAP SERPL CALC-SCNC: 12 MMOL/L — SIGNIFICANT CHANGE UP (ref 5–17)
ANION GAP SERPL CALC-SCNC: 12 MMOL/L — SIGNIFICANT CHANGE UP (ref 5–17)
ANION GAP SERPL CALC-SCNC: 14 MMOL/L — SIGNIFICANT CHANGE UP (ref 5–17)
BUN SERPL-MCNC: 12.3 MG/DL — SIGNIFICANT CHANGE UP (ref 8–20)
BUN SERPL-MCNC: 13.5 MG/DL — SIGNIFICANT CHANGE UP (ref 8–20)
BUN SERPL-MCNC: 13.5 MG/DL — SIGNIFICANT CHANGE UP (ref 8–20)
CALCIUM SERPL-MCNC: 8.1 MG/DL — LOW (ref 8.4–10.5)
CALCIUM SERPL-MCNC: 8.2 MG/DL — LOW (ref 8.4–10.5)
CALCIUM SERPL-MCNC: 8.2 MG/DL — LOW (ref 8.4–10.5)
CHLORIDE SERPL-SCNC: 106 MMOL/L — SIGNIFICANT CHANGE UP (ref 96–108)
CHLORIDE SERPL-SCNC: 108 MMOL/L — SIGNIFICANT CHANGE UP (ref 96–108)
CHLORIDE SERPL-SCNC: 109 MMOL/L — HIGH (ref 96–108)
CO2 SERPL-SCNC: 20 MMOL/L — LOW (ref 22–29)
CO2 SERPL-SCNC: 20 MMOL/L — LOW (ref 22–29)
CO2 SERPL-SCNC: 22 MMOL/L — SIGNIFICANT CHANGE UP (ref 22–29)
CREAT SERPL-MCNC: 0.49 MG/DL — LOW (ref 0.5–1.3)
CREAT SERPL-MCNC: 0.5 MG/DL — SIGNIFICANT CHANGE UP (ref 0.5–1.3)
CREAT SERPL-MCNC: 0.51 MG/DL — SIGNIFICANT CHANGE UP (ref 0.5–1.3)
CULTURE RESULTS: SIGNIFICANT CHANGE UP
CULTURE RESULTS: SIGNIFICANT CHANGE UP
EGFR: 116 ML/MIN/1.73M2 — SIGNIFICANT CHANGE UP
EGFR: 117 ML/MIN/1.73M2 — SIGNIFICANT CHANGE UP
EGFR: 117 ML/MIN/1.73M2 — SIGNIFICANT CHANGE UP
GLUCOSE BLDC GLUCOMTR-MCNC: 124 MG/DL — HIGH (ref 70–99)
GLUCOSE BLDC GLUCOMTR-MCNC: 168 MG/DL — HIGH (ref 70–99)
GLUCOSE BLDC GLUCOMTR-MCNC: 209 MG/DL — HIGH (ref 70–99)
GLUCOSE SERPL-MCNC: 120 MG/DL — HIGH (ref 70–99)
GLUCOSE SERPL-MCNC: 157 MG/DL — HIGH (ref 70–99)
GLUCOSE SERPL-MCNC: 157 MG/DL — HIGH (ref 70–99)
HCT VFR BLD CALC: 38.1 % — LOW (ref 39–50)
HGB BLD-MCNC: 13.1 G/DL — SIGNIFICANT CHANGE UP (ref 13–17)
MAGNESIUM SERPL-MCNC: 2.3 MG/DL — SIGNIFICANT CHANGE UP (ref 1.6–2.6)
MCHC RBC-ENTMCNC: 27.2 PG — SIGNIFICANT CHANGE UP (ref 27–34)
MCHC RBC-ENTMCNC: 34.4 GM/DL — SIGNIFICANT CHANGE UP (ref 32–36)
MCV RBC AUTO: 79.2 FL — LOW (ref 80–100)
OSMOLALITY UR: 761 MOSM/KG — SIGNIFICANT CHANGE UP (ref 300–1000)
PHOSPHATE SERPL-MCNC: 3.4 MG/DL — SIGNIFICANT CHANGE UP (ref 2.4–4.7)
PLATELET # BLD AUTO: 312 K/UL — SIGNIFICANT CHANGE UP (ref 150–400)
POTASSIUM SERPL-MCNC: 3.2 MMOL/L — LOW (ref 3.5–5.3)
POTASSIUM SERPL-MCNC: 3.4 MMOL/L — LOW (ref 3.5–5.3)
POTASSIUM SERPL-MCNC: 3.5 MMOL/L — SIGNIFICANT CHANGE UP (ref 3.5–5.3)
POTASSIUM SERPL-SCNC: 3.2 MMOL/L — LOW (ref 3.5–5.3)
POTASSIUM SERPL-SCNC: 3.4 MMOL/L — LOW (ref 3.5–5.3)
POTASSIUM SERPL-SCNC: 3.5 MMOL/L — SIGNIFICANT CHANGE UP (ref 3.5–5.3)
RBC # BLD: 4.81 M/UL — SIGNIFICANT CHANGE UP (ref 4.2–5.8)
RBC # FLD: 13.4 % — SIGNIFICANT CHANGE UP (ref 10.3–14.5)
SODIUM SERPL-SCNC: 140 MMOL/L — SIGNIFICANT CHANGE UP (ref 135–145)
SODIUM SERPL-SCNC: 140 MMOL/L — SIGNIFICANT CHANGE UP (ref 135–145)
SODIUM SERPL-SCNC: 142 MMOL/L — SIGNIFICANT CHANGE UP (ref 135–145)
SPECIMEN SOURCE: SIGNIFICANT CHANGE UP
SPECIMEN SOURCE: SIGNIFICANT CHANGE UP
WBC # BLD: 11.97 K/UL — HIGH (ref 3.8–10.5)
WBC # FLD AUTO: 11.97 K/UL — HIGH (ref 3.8–10.5)

## 2023-04-04 PROCEDURE — 99291 CRITICAL CARE FIRST HOUR: CPT

## 2023-04-04 PROCEDURE — 95813 EEG EXTND MNTR 61-119 MIN: CPT | Mod: 26

## 2023-04-04 PROCEDURE — 99232 SBSQ HOSP IP/OBS MODERATE 35: CPT

## 2023-04-04 PROCEDURE — 99222 1ST HOSP IP/OBS MODERATE 55: CPT

## 2023-04-04 PROCEDURE — 70450 CT HEAD/BRAIN W/O DYE: CPT | Mod: 26

## 2023-04-04 PROCEDURE — 71045 X-RAY EXAM CHEST 1 VIEW: CPT | Mod: 26

## 2023-04-04 RX ORDER — HALOPERIDOL DECANOATE 100 MG/ML
2 INJECTION INTRAMUSCULAR ONCE
Refills: 0 | Status: COMPLETED | OUTPATIENT
Start: 2023-04-04 | End: 2023-04-04

## 2023-04-04 RX ORDER — TRAZODONE HCL 50 MG
50 TABLET ORAL AT BEDTIME
Refills: 0 | Status: DISCONTINUED | OUTPATIENT
Start: 2023-04-04 | End: 2023-04-05

## 2023-04-04 RX ORDER — POTASSIUM CHLORIDE 20 MEQ
30 PACKET (EA) ORAL EVERY 4 HOURS
Refills: 0 | Status: DISCONTINUED | OUTPATIENT
Start: 2023-04-04 | End: 2023-04-04

## 2023-04-04 RX ORDER — DEXMEDETOMIDINE HYDROCHLORIDE IN 0.9% SODIUM CHLORIDE 4 UG/ML
0.2 INJECTION INTRAVENOUS
Qty: 200 | Refills: 0 | Status: DISCONTINUED | OUTPATIENT
Start: 2023-04-04 | End: 2023-04-04

## 2023-04-04 RX ORDER — LABETALOL HCL 100 MG
10 TABLET ORAL
Refills: 0 | Status: DISCONTINUED | OUTPATIENT
Start: 2023-04-04 | End: 2023-04-08

## 2023-04-04 RX ORDER — POTASSIUM CHLORIDE 20 MEQ
30 PACKET (EA) ORAL EVERY 4 HOURS
Refills: 0 | Status: COMPLETED | OUTPATIENT
Start: 2023-04-04 | End: 2023-04-04

## 2023-04-04 RX ORDER — TRAZODONE HCL 50 MG
50 TABLET ORAL AT BEDTIME
Refills: 0 | Status: DISCONTINUED | OUTPATIENT
Start: 2023-04-04 | End: 2023-04-04

## 2023-04-04 RX ORDER — POTASSIUM CHLORIDE 20 MEQ
40 PACKET (EA) ORAL ONCE
Refills: 0 | Status: COMPLETED | OUTPATIENT
Start: 2023-04-04 | End: 2023-04-04

## 2023-04-04 RX ORDER — LABETALOL HCL 100 MG
10 TABLET ORAL ONCE
Refills: 0 | Status: COMPLETED | OUTPATIENT
Start: 2023-04-04 | End: 2023-04-04

## 2023-04-04 RX ADMIN — HALOPERIDOL DECANOATE 2 MILLIGRAM(S): 100 INJECTION INTRAMUSCULAR at 23:38

## 2023-04-04 RX ADMIN — Medication 40 MILLIEQUIVALENT(S): at 05:55

## 2023-04-04 RX ADMIN — Medication 10 MILLIGRAM(S): at 16:25

## 2023-04-04 RX ADMIN — Medication 30 MILLIEQUIVALENT(S): at 17:40

## 2023-04-04 RX ADMIN — Medication 10 MILLIGRAM(S): at 03:23

## 2023-04-04 RX ADMIN — Medication 1 DROP(S): at 17:43

## 2023-04-04 RX ADMIN — Medication 4: at 23:18

## 2023-04-04 RX ADMIN — PANTOPRAZOLE SODIUM 40 MILLIGRAM(S): 20 TABLET, DELAYED RELEASE ORAL at 11:30

## 2023-04-04 RX ADMIN — Medication 3 MILLILITER(S): at 20:41

## 2023-04-04 RX ADMIN — CHLORHEXIDINE GLUCONATE 1 APPLICATION(S): 213 SOLUTION TOPICAL at 05:56

## 2023-04-04 RX ADMIN — PIPERACILLIN AND TAZOBACTAM 25 GRAM(S): 4; .5 INJECTION, POWDER, LYOPHILIZED, FOR SOLUTION INTRAVENOUS at 09:18

## 2023-04-04 RX ADMIN — Medication 10 MILLIGRAM(S): at 23:38

## 2023-04-04 RX ADMIN — SODIUM CHLORIDE 2 GRAM(S): 9 INJECTION INTRAMUSCULAR; INTRAVENOUS; SUBCUTANEOUS at 11:30

## 2023-04-04 RX ADMIN — PIPERACILLIN AND TAZOBACTAM 25 GRAM(S): 4; .5 INJECTION, POWDER, LYOPHILIZED, FOR SOLUTION INTRAVENOUS at 23:17

## 2023-04-04 RX ADMIN — CANGRELOR 22.2 MICROGRAM(S)/KG/MIN: 50 INJECTION, POWDER, LYOPHILIZED, FOR SOLUTION INTRAVENOUS at 16:24

## 2023-04-04 RX ADMIN — Medication 1 APPLICATION(S): at 05:56

## 2023-04-04 RX ADMIN — Medication 1 DROP(S): at 05:56

## 2023-04-04 RX ADMIN — Medication 3 MILLILITER(S): at 15:26

## 2023-04-04 RX ADMIN — HALOPERIDOL DECANOATE 2 MILLIGRAM(S): 100 INJECTION INTRAMUSCULAR at 02:37

## 2023-04-04 RX ADMIN — Medication 10 MILLIGRAM(S): at 00:10

## 2023-04-04 RX ADMIN — SODIUM CHLORIDE 75 MILLILITER(S): 5 INJECTION, SOLUTION INTRAVENOUS at 04:08

## 2023-04-04 RX ADMIN — Medication 1 APPLICATION(S): at 17:39

## 2023-04-04 RX ADMIN — ATORVASTATIN CALCIUM 80 MILLIGRAM(S): 80 TABLET, FILM COATED ORAL at 21:40

## 2023-04-04 RX ADMIN — HALOPERIDOL DECANOATE 2 MILLIGRAM(S): 100 INJECTION INTRAMUSCULAR at 03:24

## 2023-04-04 RX ADMIN — Medication 10 MILLIGRAM(S): at 22:03

## 2023-04-04 RX ADMIN — Medication 3 MILLILITER(S): at 08:55

## 2023-04-04 RX ADMIN — Medication 3 MILLILITER(S): at 03:25

## 2023-04-04 RX ADMIN — Medication 4 MILLILITER(S): at 20:41

## 2023-04-04 RX ADMIN — Medication 10 MILLIGRAM(S): at 15:31

## 2023-04-04 RX ADMIN — CANGRELOR 22.2 MICROGRAM(S)/KG/MIN: 50 INJECTION, POWDER, LYOPHILIZED, FOR SOLUTION INTRAVENOUS at 04:08

## 2023-04-04 RX ADMIN — Medication 81 MILLIGRAM(S): at 11:30

## 2023-04-04 RX ADMIN — POLYETHYLENE GLYCOL 3350 17 GRAM(S): 17 POWDER, FOR SOLUTION ORAL at 11:30

## 2023-04-04 RX ADMIN — SODIUM CHLORIDE 2 GRAM(S): 9 INJECTION INTRAMUSCULAR; INTRAVENOUS; SUBCUTANEOUS at 05:56

## 2023-04-04 RX ADMIN — SENNA PLUS 10 MILLILITER(S): 8.6 TABLET ORAL at 21:39

## 2023-04-04 RX ADMIN — SODIUM CHLORIDE 2 GRAM(S): 9 INJECTION INTRAMUSCULAR; INTRAVENOUS; SUBCUTANEOUS at 23:18

## 2023-04-04 RX ADMIN — DEXMEDETOMIDINE HYDROCHLORIDE IN 0.9% SODIUM CHLORIDE 3.7 MICROGRAM(S)/KG/HR: 4 INJECTION INTRAVENOUS at 04:08

## 2023-04-04 RX ADMIN — Medication 30 MILLIEQUIVALENT(S): at 21:39

## 2023-04-04 RX ADMIN — Medication 2: at 17:40

## 2023-04-04 RX ADMIN — Medication 4 MILLILITER(S): at 08:56

## 2023-04-04 RX ADMIN — Medication 50 MILLIGRAM(S): at 21:39

## 2023-04-04 RX ADMIN — Medication 5 MILLIGRAM(S): at 21:40

## 2023-04-04 RX ADMIN — Medication 4 MILLILITER(S): at 15:27

## 2023-04-04 RX ADMIN — Medication 4 MILLILITER(S): at 03:25

## 2023-04-04 RX ADMIN — PIPERACILLIN AND TAZOBACTAM 25 GRAM(S): 4; .5 INJECTION, POWDER, LYOPHILIZED, FOR SOLUTION INTRAVENOUS at 15:36

## 2023-04-04 RX ADMIN — SODIUM CHLORIDE 2 GRAM(S): 9 INJECTION INTRAMUSCULAR; INTRAVENOUS; SUBCUTANEOUS at 17:40

## 2023-04-04 NOTE — PROGRESS NOTE PEDS - NS ATTEND AMEND GEN_ALL_CORE FT
Neurosurgery Attending Attestation:    Patient seen and examined at bedside. Agree with plan and note as documented above.    59 y/o M w/ PMHx significant for HTN, DM who presented with L sided weakness, and found to have tandem AYAKA/RMCA occlusions s/p stent placement and TICI 2b thrombectomy with now bleeding into the infarct area vs contrast staining. On exam, eyes open to voice, following commands in RUE and RLE, LUE no movement, LLE lifts at least 3/5 to command. Recommend continued NCCU care, neurochecks q2h, keppra, recommend Na goal >140, continue cangrelor gtt for stent per MELANIE, transition to oral DAPT as appropriate, will keep on hemicrani watch.    -Gokul Gayle MD Neurosurgery Attending Attestation:    Patient seen and examined at bedside. Agree with plan and note as documented above.    59 y/o M w/ PMHx significant for HTN, DM who presented with L sided weakness, and found to have tandem AYAKA/RMCA occlusions s/p stent placement and TICI 2b thrombectomy with now bleeding into the infarct area vs contrast staining. On exam, eyes open to voice, following commands in RUE and RLE, LUE no movement, LLE lifts at least 3/5 to command. Recommend continued NCCU care, neurochecks q2h, keppra, recommend Na goal >140, continue cangrelor gtt for stent per MELANIE, transition to oral DAPT as appropriate, will follow.    -Gokul Gayle MD

## 2023-04-04 NOTE — EEG REPORT - NS EEG TEXT BOX
CONG AGUAYO [id number]   Location: [place] [id number]  Provider: Sanchez Allen      Study Start Date: 04/03/2023 12:12  Study End Date: 04/04/2023 04:00  Duration x Hours: 15 hours  --------------------------------------------------------------------------------------------------    History:  CC/ HPI Patient is a 58y old  Male who presents with a chief complaint of Stroke (03 Apr 2023 12:45)    MEDICATIONS  (STANDING):  acetylcysteine 20%  Inhalation 4 milliLiter(s) Inhalation every 6 hours  albuterol/ipratropium for Nebulization 3 milliLiter(s) Nebulizer every 6 hours  artificial  tears Solution 1 Drop(s) Both EYES two times a day  aspirin  chewable 81 milliGRAM(s) Oral daily  atorvastatin 80 milliGRAM(s) Oral at bedtime  cangrelor Infusion 1 MICROgram(s)/kG/Min (22.2 mL/Hr) IV Continuous <Continuous>  chlorhexidine 2% Cloths 1 Application(s) Topical daily  dexMEDEtomidine Infusion 0.2 MICROgram(s)/kG/Hr (3.7 mL/Hr) IV Continuous <Continuous>  glucagon  Injectable 1 milliGRAM(s) IntraMuscular once  insulin lispro (ADMELOG) corrective regimen sliding scale   SubCutaneous every 6 hours  melatonin 5 milliGRAM(s) Oral at bedtime  pantoprazole   Suspension 40 milliGRAM(s) Oral daily  petrolatum Ophthalmic Ointment 1 Application(s) Right EYE every 12 hours  piperacillin/tazobactam IVPB.. 3.375 Gram(s) IV Intermittent every 8 hours  polyethylene glycol 3350 17 Gram(s) Oral daily  senna Syrup 10 milliLiter(s) Oral at bedtime    dexMEDEtomidine Infusion 0.2 MICROgram(s)/kG/Hr (3.7 mL/Hr) IV Continuous <Continuous>  melatonin 5 milliGRAM(s) Oral at bedtime    --------------------------------------------------------------------------------------------------  Study Interpretation:    [[[Abbreviation Key:  PDR=alpha rhythm/posterior dominant rhythm. A-P=anterior posterior.  Amplitude: ‘very low’:<20; ‘low’:20-49; ‘medium’:; ‘high’:>150uV.  Persistence for periodic/rhythmic patterns (% of epoch) ‘rare’:<1%; ‘occasional’:1-10%; ‘frequent’:10-50%; ‘abundant’:50-90%; ‘continuous’:>90%.  Persistence for sporadic discharges: ‘rare’:<1/hr; ‘occasional’:1/min-1/hr; ‘frequent’:>1/min; ‘abundant’:>1/10 sec.  RPP=rhythmic and periodic patterns; GRDA=generalized rhythmic delta activity; FIRDA=frontal intermittent GRDA; LRDA=lateralized rhythmic delta activity; TIRDA=temporal intermittent rhythmic delta activity;  LPD=PLED=lateralized periodic discharges; GPD=generalized periodic discharges; BIPDs =bilateral independent periodic discharges; Mf=multifocal; SIRPDs=stimulus induced rhythmic, periodic, or ictal appearing discharges; BIRDs=brief potentially ictal rhythmic discharges >4 Hz, lasting .5-10s; PFA (paroxysmal bursts >13 Hz or =8 Hz <10s).  Modifiers: +F=with fast component; +S=with spike component; +R=with rhythmic component.  S-B=burst suppression pattern.  Max=maximal. N1-drowsy; N2-stage II sleep; N3-slow wave sleep. SSS/BETS=small sharp spikes/benign epileptiform transients of sleep. HV=hyperventilation; PS=photic stimulation]]]    Daily EEG Visual Analysis    FINDINGS:      Background:  Continuous: continuous  Symmetry: asymmetric with predominance of slower frequencies over the right hemisphere  PDR: 9 Hz activity, with amplitude to 30 uV, that attenuated to eye opening.  Low amplitude frontal beta noted in wakefulness.  Reactivity: present  Voltage: normal, mostly 20-150uV  Anterior Posterior Gradient: present  Other background findings: none  Breach: absent    Background Slowing:  Generalized slowing: none was present.  Focal slowing: Persistent, right hemispheric polymorphic delta activity was present.    State Changes:   Drowsiness noted with increased slowing, attenuation of fast activity, vertex transients.  Present with N2 sleep transients with K-complexes and sleep spindles better represented over the left hemisphere    Sporadic Epileptiform Discharges:    None    Rhythmic and Periodic Patterns (RPPs):  None     Electrographic and Electroclinical seizures:  None    Other Clinical Events:  None    Activation Procedures:   Hyperventilation was not performed.    Photic stimulation was performed and did not elicit any abnormalities.      Artifacts:  Intermittent myogenic and movement artifacts were noted.    ECG:  The heart rate on single channel ECG was predominantly between 70-80 BPM.    EEG Classification / Summary:    Abnormal EEG in the awake, drowsy, and asleep states due to:  1. Persistent, right hemispheric polymorphic delta activity  2. Asymmetry and attenuation of sleep spindles from the right hemisphere    Clinical Impression:    This is an abnormal study indicative of a focal cortical dysfunction in the right hemisphere, consistent with history of R MCA infarction. No epileptiform abnormalities or seizures were seen.    Ashley Olivera MD  PGY-6 Pediatric Epilepsy    ***THIS IS A PRELIMINARY FELLOW REPORT PENDING REVIEW WITH ATTENDING EPILEPTOLOGIST***    -------------------------------------------------------------------------------------------------------  [facility] EEG Reading Room Ph#: [phone]  Epilepsy Answering Service after 5PM and before 8:30AM: Ph#: [phone]     CONG AGUAYO [id number]   Location: [address] [address]  Provider: Sanchez Allen    Date/Time: 04/03/2023 13:27 - 04/04/2023 02:09 AND 04/04/2023 09:22-10:55  Duration x Hours: 14 hr 13 min  --------------------------------------------------------------------------------------------------    History:  CC/ HPI Patient is a 58y old  Male who presents with a chief complaint of Stroke (03 Apr 2023 12:45)    MEDICATIONS  (STANDING):  acetylcysteine 20%  Inhalation 4 milliLiter(s) Inhalation every 6 hours  albuterol/ipratropium for Nebulization 3 milliLiter(s) Nebulizer every 6 hours  artificial  tears Solution 1 Drop(s) Both EYES two times a day  aspirin  chewable 81 milliGRAM(s) Oral daily  atorvastatin 80 milliGRAM(s) Oral at bedtime  cangrelor Infusion 1 MICROgram(s)/kG/Min (22.2 mL/Hr) IV Continuous <Continuous>  chlorhexidine 2% Cloths 1 Application(s) Topical daily  dexMEDEtomidine Infusion 0.2 MICROgram(s)/kG/Hr (3.7 mL/Hr) IV Continuous <Continuous>  glucagon  Injectable 1 milliGRAM(s) IntraMuscular once  insulin lispro (ADMELOG) corrective regimen sliding scale   SubCutaneous every 6 hours  melatonin 5 milliGRAM(s) Oral at bedtime  pantoprazole   Suspension 40 milliGRAM(s) Oral daily  petrolatum Ophthalmic Ointment 1 Application(s) Right EYE every 12 hours  piperacillin/tazobactam IVPB.. 3.375 Gram(s) IV Intermittent every 8 hours  polyethylene glycol 3350 17 Gram(s) Oral daily  senna Syrup 10 milliLiter(s) Oral at bedtime    dexMEDEtomidine Infusion 0.2 MICROgram(s)/kG/Hr (3.7 mL/Hr) IV Continuous <Continuous>  melatonin 5 milliGRAM(s) Oral at bedtime    --------------------------------------------------------------------------------------------------  Study Interpretation:    [[[Abbreviation Key:  PDR=alpha rhythm/posterior dominant rhythm. A-P=anterior posterior.  Amplitude: ‘very low’:<20; ‘low’:20-49; ‘medium’:; ‘high’:>150uV.  Persistence for periodic/rhythmic patterns (% of epoch) ‘rare’:<1%; ‘occasional’:1-10%; ‘frequent’:10-50%; ‘abundant’:50-90%; ‘continuous’:>90%.  Persistence for sporadic discharges: ‘rare’:<1/hr; ‘occasional’:1/min-1/hr; ‘frequent’:>1/min; ‘abundant’:>1/10 sec.  RPP=rhythmic and periodic patterns; GRDA=generalized rhythmic delta activity; FIRDA=frontal intermittent GRDA; LRDA=lateralized rhythmic delta activity; TIRDA=temporal intermittent rhythmic delta activity;  LPD=PLED=lateralized periodic discharges; GPD=generalized periodic discharges; BIPDs =bilateral independent periodic discharges; Mf=multifocal; SIRPDs=stimulus induced rhythmic, periodic, or ictal appearing discharges; BIRDs=brief potentially ictal rhythmic discharges >4 Hz, lasting .5-10s; PFA (paroxysmal bursts >13 Hz or =8 Hz <10s).  Modifiers: +F=with fast component; +S=with spike component; +R=with rhythmic component.  S-B=burst suppression pattern.  Max=maximal. N1-drowsy; N2-stage II sleep; N3-slow wave sleep. SSS/BETS=small sharp spikes/benign epileptiform transients of sleep. HV=hyperventilation; PS=photic stimulation]]]    Daily EEG Visual Analysis    FINDINGS:      Background:  Continuous: continuous  Symmetry: asymmetric with predominance of slower frequencies over the right hemisphere  PDR: 9 Hz activity on the left, fragments on the right, with amplitude to 30 uV, that attenuated to eye opening.  Low amplitude frontal beta noted in wakefulness.  State chcange: present  Voltage: normal, mostly 20-150uV  Anterior Posterior Gradient: present  Other background findings: none  Breach: absent    Background Slowing:  Generalized slowing: None  Focal slowing: Continuous right hemispheric polymorphic delta activity was present.    State Changes:   Drowsiness was characterized by fragmentation, attenuation, and slowing of the background activity.  Stage 2 sleep was characterized by K-complexes and sleep spindles that were better represented over the left hemisphere.  Slow-wave sleep was characterized by diffuse delta activity.    Sporadic Epileptiform Discharges:    None    Rhythmic and Periodic Patterns (RPPs):  None     Electrographic and Electroclinical seizures:  None    Other Clinical Events:  None    Activation Procedures:   Hyperventilation was not performed.    Photic stimulation was performed and did not elicit any abnormalities.      Artifacts:  Intermittent myogenic and movement artifacts were present. Artifact progressively increases starting 4/3/23 22:15, limiting interpretation. The study becomes uninterpretable at 4/4/23 02:09.    ECG:  The heart rate on single channel ECG was predominantly between 70-90 BPM with regular rhythm.    EEG Classification / Summary:    Abnormal EEG in the awake, drowsy, and asleep states due to:  1. Continuous, right hemispheric focal polymorphic delta activity  2. Asymmetry and attenuation of sleep spindles from the right hemisphere    Clinical Impression:  This is an abnormal study indicative of a focal cerebral dysfunction in the right hemisphere, consistent with history of R MCA infarction. No epileptiform abnormalities or seizures were captured.    Ashley Olivera MD  PGY-6 Pediatric Epilepsy    Opal Mae MD  Attending Physician, [facility]    -------------------------------------------------------------------------------------------------------  [facility] EEG Reading Room Ph#: [phone]  Epilepsy Answering Service after 5PM and before 8:30AM: Ph#: [phone]     CONG AGUAYO [id number]   Location: [address] [address]  Provider: Sanchez Allen    Date/Time: 04/03/2023 13:27 - 04/04/2023 02:09 AND 04/04/2023 09:22-10:55  Duration x Hours: 14 hr 13 min  --------------------------------------------------------------------------------------------------    History:  CC/ HPI Patient is a 58y old  Male who presents with a chief complaint of Stroke (03 Apr 2023 12:45)    MEDICATIONS  (STANDING):  acetylcysteine 20%  Inhalation 4 milliLiter(s) Inhalation every 6 hours  albuterol/ipratropium for Nebulization 3 milliLiter(s) Nebulizer every 6 hours  artificial  tears Solution 1 Drop(s) Both EYES two times a day  aspirin  chewable 81 milliGRAM(s) Oral daily  atorvastatin 80 milliGRAM(s) Oral at bedtime  cangrelor Infusion 1 MICROgram(s)/kG/Min (22.2 mL/Hr) IV Continuous <Continuous>  chlorhexidine 2% Cloths 1 Application(s) Topical daily  dexMEDEtomidine Infusion 0.2 MICROgram(s)/kG/Hr (3.7 mL/Hr) IV Continuous <Continuous>  glucagon  Injectable 1 milliGRAM(s) IntraMuscular once  insulin lispro (ADMELOG) corrective regimen sliding scale   SubCutaneous every 6 hours  melatonin 5 milliGRAM(s) Oral at bedtime  pantoprazole   Suspension 40 milliGRAM(s) Oral daily  petrolatum Ophthalmic Ointment 1 Application(s) Right EYE every 12 hours  piperacillin/tazobactam IVPB.. 3.375 Gram(s) IV Intermittent every 8 hours  polyethylene glycol 3350 17 Gram(s) Oral daily  senna Syrup 10 milliLiter(s) Oral at bedtime    dexMEDEtomidine Infusion 0.2 MICROgram(s)/kG/Hr (3.7 mL/Hr) IV Continuous <Continuous>  melatonin 5 milliGRAM(s) Oral at bedtime    --------------------------------------------------------------------------------------------------  Study Interpretation:    [[[Abbreviation Key:  PDR=alpha rhythm/posterior dominant rhythm. A-P=anterior posterior.  Amplitude: ‘very low’:<20; ‘low’:20-49; ‘medium’:; ‘high’:>150uV.  Persistence for periodic/rhythmic patterns (% of epoch) ‘rare’:<1%; ‘occasional’:1-10%; ‘frequent’:10-50%; ‘abundant’:50-90%; ‘continuous’:>90%.  Persistence for sporadic discharges: ‘rare’:<1/hr; ‘occasional’:1/min-1/hr; ‘frequent’:>1/min; ‘abundant’:>1/10 sec.  RPP=rhythmic and periodic patterns; GRDA=generalized rhythmic delta activity; FIRDA=frontal intermittent GRDA; LRDA=lateralized rhythmic delta activity; TIRDA=temporal intermittent rhythmic delta activity;  LPD=PLED=lateralized periodic discharges; GPD=generalized periodic discharges; BIPDs =bilateral independent periodic discharges; Mf=multifocal; SIRPDs=stimulus induced rhythmic, periodic, or ictal appearing discharges; BIRDs=brief potentially ictal rhythmic discharges >4 Hz, lasting .5-10s; PFA (paroxysmal bursts >13 Hz or =8 Hz <10s).  Modifiers: +F=with fast component; +S=with spike component; +R=with rhythmic component.  S-B=burst suppression pattern.  Max=maximal. N1-drowsy; N2-stage II sleep; N3-slow wave sleep. SSS/BETS=small sharp spikes/benign epileptiform transients of sleep. HV=hyperventilation; PS=photic stimulation]]]    Daily EEG Visual Analysis    FINDINGS:      Background:  Continuous: continuous  Symmetry: asymmetric with predominance of slower frequencies over the right hemisphere  PDR: 9 Hz activity on the left, fragments on the right, with amplitude to 30 uV, that attenuated to eye opening.  Low amplitude frontal beta noted in wakefulness.  State chcange: present  Voltage: normal, mostly 20-150uV  Anterior Posterior Gradient: present  Other background findings: none  Breach: absent    Background Slowing:  Generalized slowing: None  Focal slowing: Continuous right hemispheric polymorphic delta activity was present.    State Changes:   Drowsiness was characterized by fragmentation, attenuation, and slowing of the background activity.  Stage 2 sleep was characterized by K-complexes and sleep spindles that were better represented over the left hemisphere.  Slow-wave sleep was characterized by diffuse delta activity.    Sporadic Epileptiform Discharges:    None    Rhythmic and Periodic Patterns (RPPs):  None     Electrographic and Electroclinical seizures:  None    Other Clinical Events:  None    Activation Procedures:   Hyperventilation was not performed.    Photic stimulation was performed and did not elicit any abnormalities.      Artifacts:  Intermittent myogenic and movement artifacts were present. Artifact progressively increases starting 4/3/23 22:15, limiting interpretation. The study becomes uninterpretable at 4/4/23 02:09. The electrodes are fixed on 4/4/23 09:22.    ECG:  The heart rate on single channel ECG was predominantly between 70-90 BPM with regular rhythm.    EEG Classification / Summary:    Abnormal EEG in the awake, drowsy, and asleep states due to:  1. Continuous, right hemispheric focal polymorphic delta activity  2. Asymmetry and attenuation of sleep spindles from the right hemisphere    Clinical Impression:  This is an abnormal study indicative of a focal cerebral dysfunction in the right hemisphere, consistent with history of R MCA infarction. No epileptiform abnormalities or seizures were captured.    Ashley Olivera MD  PGY-6 Pediatric Epilepsy    Opal Mae MD  Attending Physician, [facility]    -------------------------------------------------------------------------------------------------------  [facility] EEG Reading Room Ph#: [phone]  Epilepsy Answering Service after 5PM and before 8:30AM: Ph#: [phone]

## 2023-04-04 NOTE — CONSULT NOTE ADULT - SUBJECTIVE AND OBJECTIVE BOX
CC: Patient is a 58y old  Male who presents with a chief complaint of Stroke (04 Apr 2023 08:48)    HPI:  57 y/o male with PMHx of HTN and DM transfered from [facility] to [facility] as a code stroke. [facility] reports pt LKW was 3/28 around 20:00 and was c/o right arm pain, today his family found his around 05:00 with left sided hemiparesis, slurred speech and was incontinent of urine, they called EMS as pt arrived at [facility] at 05:56, he was found to have right ICA occlusion with R MCA infarct and transferred to [facility], code stroke called upon arrival. He is s/p thrombectomy TICI 2B recanalization and AYAKA stent on 3/29.     Imaging performed:  3/29 CT head- 72 mL area of core infarct in the right MCA territory. 108 cc penumbra in   the right MCA territory.    3/30 CT head-  Compared with 3/29/2023 there is decreasing retained contrast   in the right basal ganglia and right frontal parietal cortex with   evolving lucency consistent with an acute right artery infarct. Residual   high density although decreased compared to the prior exam may represent   retained contrast as well as a small amount of hemorrhage. No increased   hemorrhage identified.    4/1 Carotid doppler-   No significant hemodynamic stenosis of the right internal   carotid artery.  Patent right ICA stent.  Over 70% left internal carotid artery stenosis.  Bilateral external carotid artery stenosis.    Measurement of carotid stenosis is based on velocity parameters that   correlate the residual internal carotid diameter with that of the more   distal vessel in accordance with a method such as the North American   Symptomatic Carotid Endarterectomy Trial (NASCET).  4/2 CT head-  Unchanged hemorrhagic RIGHT MCA infarction with effacement   of the RIGHT lateral ventricle and 6 mm subfalcine herniation to the LEFT.    4/4 CT head-  Stable acute/subacute large right MCA territory infarction   with stable hemorrhagic transformation in the right basal ganglia and   right temporal lobe. Stable mass effect and right-to-left midline shift   measuring 6.7 mm. No new hemorrhage. Moderate adjacent cytotoxic edema,   unchanged.    4/4 EEG-  This is an abnormal study indicative of a focal cortical dysfunction in the right hemisphere, consistent with history of R MCA infarction. No epileptiform abnormalities or seizures were seen.    REVIEW OF SYSTEMS  Constitutional - No fever, + fatigue  Respiratory - No cough, No wheezing  Cardiovascular - No chest pain, No palpitations  Neurological + loss of strength    VITALS  T(C): 36.8 (04-04-23 @ 08:00), Max: 36.9 (04-03-23 @ 23:56)  HR: 77 (04-04-23 @ 11:00) (72 - 116)  BP: 139/88 (04-04-23 @ 11:00) (93/73 - 170/102)  RR: 21 (04-04-23 @ 11:00) (17 - 37)  SpO2: 100% (04-04-23 @ 11:00) (94% - 100%)      PAST MEDICAL & SURGICAL HISTORY- See chart       FUNCTIONAL HISTORY  Pt lives in a basement apartment (12 steps) with his spouse and 2 son  Pt was independent with ADL's and ambulation     CURRENT FUNCTIONAL STATUS  4/4 PT    Bed Mobility  Bed Mobility Training Sit-to-Supine: maximum assist (25% patient effort);  2 person assist  Bed Mobility Training Supine-to-Sit: maximum assist (25% patient effort);  2 person assist  Bed Mobility Training Limitations: decreased ability to use legs for bridging/pushing;  impaired ability to control trunk for mobility;  decreased ability to use arms for pushing/pulling;  decreased strength;  impaired balance;  impaired postural control;  cognitive, decreased safety awareness    Gait Training  Gait Training: maximum assist (25% patient effort);  2 person assist;  weight-bearing as tolerated   bilateral hand held assist, Left knee blocked ;  bed to chair  Gait Analysis: 2-point gait   decreased asa;  shuffling;  decreased step length;  impaired postural control;  decreased strength;  impaired balance;  assist needed to weight shift and advance LLE. Left knee blocked 2*2 knee buckling in stance phase of gait, severe left sided lean;  Bed to Chair;  bilateral hand held assist     3/31 OT    Bathing Training:     · Level of [place]	dependent (less than 25% patients effort)    Upper Body Dressing Training:     · Level of [place]	dependent (less than 25% patients effort); due to lethargy    Lower Body Dressing Training:     · Level of [place]	dependent (less than 25% patients effort)    Toilet Hygiene Training:     · Level of [place]	dependent (less than 25% patients effort)    Grooming Training:     · Level of [place]	maximum assist (25% patients effort); with oral care  · Physical Assist/Nonphysical Assist	1 person assist    Eating/Self-Feeding Training:     · Level of [place]	dependent (less than 25% patients effort)      SOCIAL HISTORY - as per documentation/history    FAMILY HISTORY - See chart     RECENT LABS - Reviewed  CBC Full  -  ( 04 Apr 2023 06:03 )  WBC Count : 11.97 K/uL  RBC Count : 4.81 M/uL  Hemoglobin : 13.1 g/dL  Hematocrit : 38.1 %  Platelet Count - Automated : 312 K/uL  Mean Cell Volume : 79.2 fl  Mean Cell Hemoglobin : 27.2 pg  Mean Cell Hemoglobin Concentration : 34.4 gm/dL  Auto Neutrophil # : x  Auto Lymphocyte # : x  Auto Monocyte # : x  Auto Eosinophil # : x  Auto Basophil # : x  Auto Neutrophil % : x  Auto Lymphocyte % : x  Auto Monocyte % : x  Auto Eosinophil % : x  Auto Basophil % : x    04-04    142  |  109<H>  |  13.5  ----------------------------<  157<H>  3.2<L>   |  20.0<L>  |  0.51    Ca    8.2<L>      04 Apr 2023 06:03  Phos  3.4     04-04  Mg     2.3     04-04    TPro  6.4<L>  /  Alb  3.4  /  TBili  1.2  /  DBili  x   /  AST  25  /  ALT  23  /  AlkPhos  30<L>  04-03      ALLERGIES  No Known Allergies      MEDICATIONS   acetaminophen     Tablet .. 975 milliGRAM(s) Oral every 6 hours PRN  acetylcysteine 20%  Inhalation 4 milliLiter(s) Inhalation every 6 hours  albuterol/ipratropium for Nebulization 3 milliLiter(s) Nebulizer every 6 hours  artificial  tears Solution 1 Drop(s) Both EYES two times a day  aspirin  chewable 81 milliGRAM(s) Oral daily  atorvastatin 80 milliGRAM(s) Oral at bedtime  bisacodyl Suppository 10 milliGRAM(s) Rectal daily PRN  cangrelor Infusion 1 MICROgram(s)/kG/Min IV Continuous <Continuous>  chlorhexidine 2% Cloths 1 Application(s) Topical daily  dextrose 5%. 1000 milliLiter(s) IV Continuous <Continuous>  dextrose 5%. 1000 milliLiter(s) IV Continuous <Continuous>  dextrose 50% Injectable 25 Gram(s) IV Push once  dextrose 50% Injectable 12.5 Gram(s) IV Push once  dextrose 50% Injectable 25 Gram(s) IV Push once  dextrose Oral Gel 15 Gram(s) Oral once PRN  glucagon  Injectable 1 milliGRAM(s) IntraMuscular once  hydrALAZINE Injectable 10 milliGRAM(s) IV Push every 2 hours PRN  insulin lispro (ADMELOG) corrective regimen sliding scale   SubCutaneous every 6 hours  melatonin 5 milliGRAM(s) Oral at bedtime  pantoprazole   Suspension 40 milliGRAM(s) Oral daily  petrolatum Ophthalmic Ointment 1 Application(s) Right EYE every 12 hours  piperacillin/tazobactam IVPB.. 3.375 Gram(s) IV Intermittent every 8 hours  polyethylene glycol 3350 17 Gram(s) Oral daily  senna Syrup 10 milliLiter(s) Oral at bedtime  sodium chloride 2 Gram(s) Oral every 6 hours  traZODone 50 milliGRAM(s) Oral at bedtime      ----------------------------------------------------------------------------------------  PHYSICAL EXAM  Constitutional - NAD, Appears tired   HEENT +NG tube  Chest - Increased respirations   Cardiovascular- +tachycardia   Abdomen - Soft   Extremities - left UE edema   Neurologic Exam -                    Cognitive - Eyes closed, opens to command and falls back asleep      Communication - non-verbal     Cranial Nerves - unable to fully asses     Motor - Limited command following                     LEFT    UE- 0/5                    RIGHT UE - EF 4/5, EE 4/5,  4/5                    LEFT    LE - appear antigravity                    RIGHT LE - appears antigravity    Psychiatric - Calm   ----------------------------------------------------------------------------------------  ASSESSMENT/PLAN  58yMale with functional deficits after R MCA stroke s/p mechanical thrombectomy and right carotid stent with angioplasty  R MCA stroke s/p thrombectomy, R ICA stent- ASA, Lipitor, Zosyn, Cangrelor, Pending MRI brain  HTN- Hydralazine  Pain - Tylenol  Arousal- Recommend Amantadine 100 mg q 6 am  Sleep- Trazodone, Melatonin   Dysphagia- NPO, NG tube  DVT PPX - SCDs  Rehab - Patient is being medically optimized in the ICU. Continue with PT/OT/Speech. Recommend ACUTE inpatient rehabilitation for the functional deficits consisting of 3 hours of therapy/day & 24 hour RN/daily PMR physician for comorbid medical management. Patient will be able to tolerate 3 hours a day. Will continue to follow and current recommendations may change if functional progress changes. Recommend ongoing mobilization by staff to maintain cardiopulmonary function and prevention of secondary complications related to debility. Discussed with rehab team.  CC: Patient is a 58y old  Male who presents with a chief complaint of Stroke (04 Apr 2023 08:48)    HPI:  57 y/o male with PMHx of HTN and DM transferred from [facility] to [facility] as a code stroke. [facility] reports pt LKW was 3/28 around 20:00 and was c/o right arm pain. His family found him around 05:00 with left sided hemiparesis, slurred speech and he was incontinent of urine, they called EMS as pt arrived at [facility] at 05:56, he was found to have right ICA occlusion with R MCA infarct and transferred to [facility], code stroke called upon arrival. He is s/p thrombectomy TICI 2B recanalization and AYAKA stent on 3/29.    Imaging performed:  3/29 CT head- 72 mL area of core infarct in the right MCA territory. 108 cc penumbra in   the right MCA territory.    3/30 CT head-  Compared with 3/29/2023 there is decreasing retained contrast   in the right basal ganglia and right frontal parietal cortex with   evolving lucency consistent with an acute right artery infarct. Residual   high density although decreased compared to the prior exam may represent   retained contrast as well as a small amount of hemorrhage. No increased   hemorrhage identified.    4/1 Carotid doppler-   No significant hemodynamic stenosis of the right internal   carotid artery.  Patent right ICA stent.  Over 70% left internal carotid artery stenosis.  Bilateral external carotid artery stenosis.    Measurement of carotid stenosis is based on velocity parameters that   correlate the residual internal carotid diameter with that of the more   distal vessel in accordance with a method such as the North American   Symptomatic Carotid Endarterectomy Trial (NASCET).  4/2 CT head-  Unchanged hemorrhagic RIGHT MCA infarction with effacement   of the RIGHT lateral ventricle and 6 mm subfalcine herniation to the LEFT.    4/4 CT head-  Stable acute/subacute large right MCA territory infarction   with stable hemorrhagic transformation in the right basal ganglia and   right temporal lobe. Stable mass effect and right-to-left midline shift   measuring 6.7 mm. No new hemorrhage. Moderate adjacent cytotoxic edema,   unchanged.    4/4 EEG-  This is an abnormal study indicative of a focal cortical dysfunction in the right hemisphere, consistent with history of R MCA infarction. No epileptiform abnormalities or seizures were seen.    REVIEW OF SYSTEMS  Constitutional - No fever, + fatigue  Respiratory - No cough, No wheezing  Cardiovascular - No chest pain, No palpitations  Neurological + loss of strength    VITALS  T(C): 36.8 (04-04-23 @ 08:00), Max: 36.9 (04-03-23 @ 23:56)  HR: 77 (04-04-23 @ 11:00) (72 - 116)  BP: 139/88 (04-04-23 @ 11:00) (93/73 - 170/102)  RR: 21 (04-04-23 @ 11:00) (17 - 37)  SpO2: 100% (04-04-23 @ 11:00) (94% - 100%)      PAST MEDICAL & SURGICAL HISTORY- See chart       FUNCTIONAL HISTORY  Pt lives in a basement apartment (12 steps) with his spouse and 2 son  Pt was independent with ADL's and ambulation     CURRENT FUNCTIONAL STATUS  4/4 PT    Bed Mobility  Bed Mobility Training Sit-to-Supine: maximum assist (25% patient effort);  2 person assist  Bed Mobility Training Supine-to-Sit: maximum assist (25% patient effort);  2 person assist  Bed Mobility Training Limitations: decreased ability to use legs for bridging/pushing;  impaired ability to control trunk for mobility;  decreased ability to use arms for pushing/pulling;  decreased strength;  impaired balance;  impaired postural control;  cognitive, decreased safety awareness    Gait Training  Gait Training: maximum assist (25% patient effort);  2 person assist;  weight-bearing as tolerated   bilateral hand held assist, Left knee blocked ;  bed to chair  Gait Analysis: 2-point gait   decreased asa;  shuffling;  decreased step length;  impaired postural control;  decreased strength;  impaired balance;  assist needed to weight shift and advance LLE. Left knee blocked 2*2 knee buckling in stance phase of gait, severe left sided lean;  Bed to Chair;  bilateral hand held assist     3/31 OT    Bathing Training:     · Level of [place]	dependent (less than 25% patients effort)    Upper Body Dressing Training:     · Level of [place]	dependent (less than 25% patients effort); due to lethargy    Lower Body Dressing Training:     · Level of [place]	dependent (less than 25% patients effort)    Toilet Hygiene Training:     · Level of [place]	dependent (less than 25% patients effort)    Grooming Training:     · Level of [place]	maximum assist (25% patients effort); with oral care  · Physical Assist/Nonphysical Assist	1 person assist    Eating/Self-Feeding Training:     · Level of [place]	dependent (less than 25% patients effort)      SOCIAL HISTORY - as per documentation/history    FAMILY HISTORY - See chart     RECENT LABS - Reviewed  CBC Full  -  ( 04 Apr 2023 06:03 )  WBC Count : 11.97 K/uL  RBC Count : 4.81 M/uL  Hemoglobin : 13.1 g/dL  Hematocrit : 38.1 %  Platelet Count - Automated : 312 K/uL  Mean Cell Volume : 79.2 fl  Mean Cell Hemoglobin : 27.2 pg  Mean Cell Hemoglobin Concentration : 34.4 gm/dL  Auto Neutrophil # : x  Auto Lymphocyte # : x  Auto Monocyte # : x  Auto Eosinophil # : x  Auto Basophil # : x  Auto Neutrophil % : x  Auto Lymphocyte % : x  Auto Monocyte % : x  Auto Eosinophil % : x  Auto Basophil % : x    04-04    142  |  109<H>  |  13.5  ----------------------------<  157<H>  3.2<L>   |  20.0<L>  |  0.51    Ca    8.2<L>      04 Apr 2023 06:03  Phos  3.4     04-04  Mg     2.3     04-04    TPro  6.4<L>  /  Alb  3.4  /  TBili  1.2  /  DBili  x   /  AST  25  /  ALT  23  /  AlkPhos  30<L>  04-03      ALLERGIES  No Known Allergies      MEDICATIONS   acetaminophen     Tablet .. 975 milliGRAM(s) Oral every 6 hours PRN  acetylcysteine 20%  Inhalation 4 milliLiter(s) Inhalation every 6 hours  albuterol/ipratropium for Nebulization 3 milliLiter(s) Nebulizer every 6 hours  artificial  tears Solution 1 Drop(s) Both EYES two times a day  aspirin  chewable 81 milliGRAM(s) Oral daily  atorvastatin 80 milliGRAM(s) Oral at bedtime  bisacodyl Suppository 10 milliGRAM(s) Rectal daily PRN  cangrelor Infusion 1 MICROgram(s)/kG/Min IV Continuous <Continuous>  chlorhexidine 2% Cloths 1 Application(s) Topical daily  dextrose 5%. 1000 milliLiter(s) IV Continuous <Continuous>  dextrose 5%. 1000 milliLiter(s) IV Continuous <Continuous>  dextrose 50% Injectable 25 Gram(s) IV Push once  dextrose 50% Injectable 12.5 Gram(s) IV Push once  dextrose 50% Injectable 25 Gram(s) IV Push once  dextrose Oral Gel 15 Gram(s) Oral once PRN  glucagon  Injectable 1 milliGRAM(s) IntraMuscular once  hydrALAZINE Injectable 10 milliGRAM(s) IV Push every 2 hours PRN  insulin lispro (ADMELOG) corrective regimen sliding scale   SubCutaneous every 6 hours  melatonin 5 milliGRAM(s) Oral at bedtime  pantoprazole   Suspension 40 milliGRAM(s) Oral daily  petrolatum Ophthalmic Ointment 1 Application(s) Right EYE every 12 hours  piperacillin/tazobactam IVPB.. 3.375 Gram(s) IV Intermittent every 8 hours  polyethylene glycol 3350 17 Gram(s) Oral daily  senna Syrup 10 milliLiter(s) Oral at bedtime  sodium chloride 2 Gram(s) Oral every 6 hours  traZODone 50 milliGRAM(s) Oral at bedtime      ----------------------------------------------------------------------------------------  PHYSICAL EXAM  Constitutional - NAD, Appears tired   HEENT +NG tube  Chest - Increased respirations   Cardiovascular- +tachycardia   Abdomen - Soft   Extremities - left UE edema   Neurologic Exam -                    Cognitive - Eyes closed, opens to command and falls back asleep      Communication - non-verbal     Cranial Nerves - unable to fully asses     Motor - Limited command following                     LEFT    UE- 0/5                    RIGHT UE - EF 4/5, EE 4/5,  4/5                    LEFT    LE - appear antigravity                    RIGHT LE - appears antigravity    Psychiatric - Calm   ----------------------------------------------------------------------------------------  ASSESSMENT/PLAN  58yMale with functional deficits after R MCA stroke s/p mechanical thrombectomy and right carotid stent with angioplasty  R MCA stroke s/p thrombectomy, R ICA stent- ASA, Lipitor, Zosyn, Cangrelor, Pending MRI brain  HTN- Hydralazine  Pain - Tylenol  Arousal- Recommend Amantadine 100 mg q 6 am  Sleep- Trazodone, Melatonin   Dysphagia- NPO, NG tube  DVT PPX - SCDs  Rehab - Patient is being medically optimized in the ICU. Continue with PT/OT/Speech. Recommend ACUTE inpatient rehabilitation for the functional deficits consisting of 3 hours of therapy/day & 24 hour RN/daily PMR physician for comorbid medical management. Patient will be able to tolerate 3 hours a day. Will continue to follow and current recommendations may change if functional progress changes. Recommend ongoing mobilization by staff to maintain cardiopulmonary function and prevention of secondary complications related to debility. Discussed with rehab team.  CC: Patient is a 58y old  Male who presents with a chief complaint of Stroke (04 Apr 2023 08:48)    HPI:  59 y/o male with PMHx of HTN and DM transferred from [facility] to [facility] as a code stroke. [facility] reports pt LKW was 3/28 around 20:00 and was c/o right arm pain. His family found him around 05:00 with left sided hemiparesis, slurred speech and he was incontinent of urine, they called EMS as pt arrived at [facility] at 05:56, he was found to have right ICA occlusion with R MCA infarct and transferred to [facility], code stroke called upon arrival. He is s/p thrombectomy TICI 2B recanalization and AYAKA stent on 3/29.    Imaging performed:  3/29 CT head- 72 mL area of core infarct in the right MCA territory. 108 cc penumbra in   the right MCA territory.    3/30 CT head-  Compared with 3/29/2023 there is decreasing retained contrast   in the right basal ganglia and right frontal parietal cortex with   evolving lucency consistent with an acute right artery infarct. Residual   high density although decreased compared to the prior exam may represent   retained contrast as well as a small amount of hemorrhage. No increased   hemorrhage identified.    4/1 Carotid doppler-   No significant hemodynamic stenosis of the right internal   carotid artery.  Patent right ICA stent.  Over 70% left internal carotid artery stenosis.  Bilateral external carotid artery stenosis.    Measurement of carotid stenosis is based on velocity parameters that   correlate the residual internal carotid diameter with that of the more   distal vessel in accordance with a method such as the North American   Symptomatic Carotid Endarterectomy Trial (NASCET).  4/2 CT head-  Unchanged hemorrhagic RIGHT MCA infarction with effacement   of the RIGHT lateral ventricle and 6 mm subfalcine herniation to the LEFT.    4/4 CT head-  Stable acute/subacute large right MCA territory infarction   with stable hemorrhagic transformation in the right basal ganglia and   right temporal lobe. Stable mass effect and right-to-left midline shift   measuring 6.7 mm. No new hemorrhage. Moderate adjacent cytotoxic edema,   unchanged.    4/4 EEG-  This is an abnormal study indicative of a focal cortical dysfunction in the right hemisphere, consistent with history of R MCA infarction. No epileptiform abnormalities or seizures were seen.    REVIEW OF SYSTEMS  Constitutional - No fever, + fatigue  Respiratory - No cough, No wheezing  Cardiovascular - No chest pain, No palpitations  Neurological + loss of strength    VITALS  T(C): 36.8 (04-04-23 @ 08:00), Max: 36.9 (04-03-23 @ 23:56)  HR: 77 (04-04-23 @ 11:00) (72 - 116)  BP: 139/88 (04-04-23 @ 11:00) (93/73 - 170/102)  RR: 21 (04-04-23 @ 11:00) (17 - 37)  SpO2: 100% (04-04-23 @ 11:00) (94% - 100%)      PAST MEDICAL & SURGICAL HISTORY- See chart       FUNCTIONAL HISTORY  Pt lives in a basement apartment (12 steps) with his spouse and 2 son  Pt was independent with ADL's and ambulation     CURRENT FUNCTIONAL STATUS  4/4 PT    Bed Mobility  Bed Mobility Training Sit-to-Supine: maximum assist (25% patient effort);  2 person assist  Bed Mobility Training Supine-to-Sit: maximum assist (25% patient effort);  2 person assist  Bed Mobility Training Limitations: decreased ability to use legs for bridging/pushing;  impaired ability to control trunk for mobility;  decreased ability to use arms for pushing/pulling;  decreased strength;  impaired balance;  impaired postural control;  cognitive, decreased safety awareness    Gait Training  Gait Training: maximum assist (25% patient effort);  2 person assist;  weight-bearing as tolerated   bilateral hand held assist, Left knee blocked ;  bed to chair  Gait Analysis: 2-point gait   decreased asa;  shuffling;  decreased step length;  impaired postural control;  decreased strength;  impaired balance;  assist needed to weight shift and advance LLE. Left knee blocked 2*2 knee buckling in stance phase of gait, severe left sided lean;  Bed to Chair;  bilateral hand held assist     3/31 OT    Bathing Training:     · Level of [place]	dependent (less than 25% patients effort)    Upper Body Dressing Training:     · Level of [place]	dependent (less than 25% patients effort); due to lethargy    Lower Body Dressing Training:     · Level of [place]	dependent (less than 25% patients effort)    Toilet Hygiene Training:     · Level of [place]	dependent (less than 25% patients effort)    Grooming Training:     · Level of [place]	maximum assist (25% patients effort); with oral care  · Physical Assist/Nonphysical Assist	1 person assist    Eating/Self-Feeding Training:     · Level of [place]	dependent (less than 25% patients effort)      SOCIAL HISTORY - as per documentation/history    FAMILY HISTORY - See chart     RECENT LABS - Reviewed  CBC Full  -  ( 04 Apr 2023 06:03 )  WBC Count : 11.97 K/uL  RBC Count : 4.81 M/uL  Hemoglobin : 13.1 g/dL  Hematocrit : 38.1 %  Platelet Count - Automated : 312 K/uL  Mean Cell Volume : 79.2 fl  Mean Cell Hemoglobin : 27.2 pg  Mean Cell Hemoglobin Concentration : 34.4 gm/dL  Auto Neutrophil # : x  Auto Lymphocyte # : x  Auto Monocyte # : x  Auto Eosinophil # : x  Auto Basophil # : x  Auto Neutrophil % : x  Auto Lymphocyte % : x  Auto Monocyte % : x  Auto Eosinophil % : x  Auto Basophil % : x    04-04    142  |  109<H>  |  13.5  ----------------------------<  157<H>  3.2<L>   |  20.0<L>  |  0.51    Ca    8.2<L>      04 Apr 2023 06:03  Phos  3.4     04-04  Mg     2.3     04-04    TPro  6.4<L>  /  Alb  3.4  /  TBili  1.2  /  DBili  x   /  AST  25  /  ALT  23  /  AlkPhos  30<L>  04-03      ALLERGIES  No Known Allergies      MEDICATIONS   acetaminophen     Tablet .. 975 milliGRAM(s) Oral every 6 hours PRN  acetylcysteine 20%  Inhalation 4 milliLiter(s) Inhalation every 6 hours  albuterol/ipratropium for Nebulization 3 milliLiter(s) Nebulizer every 6 hours  artificial  tears Solution 1 Drop(s) Both EYES two times a day  aspirin  chewable 81 milliGRAM(s) Oral daily  atorvastatin 80 milliGRAM(s) Oral at bedtime  bisacodyl Suppository 10 milliGRAM(s) Rectal daily PRN  cangrelor Infusion 1 MICROgram(s)/kG/Min IV Continuous <Continuous>  chlorhexidine 2% Cloths 1 Application(s) Topical daily  dextrose 5%. 1000 milliLiter(s) IV Continuous <Continuous>  dextrose 5%. 1000 milliLiter(s) IV Continuous <Continuous>  dextrose 50% Injectable 25 Gram(s) IV Push once  dextrose 50% Injectable 12.5 Gram(s) IV Push once  dextrose 50% Injectable 25 Gram(s) IV Push once  dextrose Oral Gel 15 Gram(s) Oral once PRN  glucagon  Injectable 1 milliGRAM(s) IntraMuscular once  hydrALAZINE Injectable 10 milliGRAM(s) IV Push every 2 hours PRN  insulin lispro (ADMELOG) corrective regimen sliding scale   SubCutaneous every 6 hours  melatonin 5 milliGRAM(s) Oral at bedtime  pantoprazole   Suspension 40 milliGRAM(s) Oral daily  petrolatum Ophthalmic Ointment 1 Application(s) Right EYE every 12 hours  piperacillin/tazobactam IVPB.. 3.375 Gram(s) IV Intermittent every 8 hours  polyethylene glycol 3350 17 Gram(s) Oral daily  senna Syrup 10 milliLiter(s) Oral at bedtime  sodium chloride 2 Gram(s) Oral every 6 hours  traZODone 50 milliGRAM(s) Oral at bedtime      ----------------------------------------------------------------------------------------  PHYSICAL EXAM  Constitutional - NAD, Appears tired   HEENT +NG tube  Chest - Increased respirations   Cardiovascular- +tachycardia   Abdomen - Soft   Extremities - left UE edema   Neurologic Exam -                    Cognitive - Eyes closed, opens to command and falls back asleep      Communication - non-verbal     Cranial Nerves - unable to fully asses     Motor - Limited command following                     LEFT    UE- Trace movents                     RIGHT UE - EF 4/5, EE 4/5,  4/5                    LEFT    LE - appear antigravity                    RIGHT LE - appears antigravity    Psychiatric - Calm   ----------------------------------------------------------------------------------------  ASSESSMENT/PLAN  58yMale with functional deficits after R MCA stroke s/p mechanical thrombectomy and right carotid stent with angioplasty  R MCA stroke s/p thrombectomy, R ICA stent- ASA, Lipitor, Zosyn, Cangrelor, Pending MRI brain  HTN- Hydralazine  Pain - Tylenol  Sleep- Trazodone, Melatonin   Dysphagia- NPO, NG tube  DVT PPX - SCDs  Rehab - Patient is being medically optimized in the ICU. Continue with PT/OT/Speech. Recommend ACUTE inpatient rehabilitation for the functional deficits consisting of 3 hours of therapy/day & 24 hour RN/daily PMR physician for comorbid medical management. Patient will be able to tolerate 3 hours a day. Will continue to follow and current recommendations may change if functional progress changes. Recommend ongoing mobilization by staff to maintain cardiopulmonary function and prevention of secondary complications related to debility. Discussed with rehab team.

## 2023-04-04 NOTE — SWALLOW BEDSIDE ASSESSMENT ADULT - SLP PERTINENT HISTORY OF CURRENT PROBLEM
As per MD note: "57 y/o male with PMHx of HTN and DM transfered from [facility] to [facility] as a code stroke. [facility] reports pt BLAYNE was last night around 20:00 and was c/o right arm pain, today his family found his around 05:00 with left sided hemiparesis, slurred speech and was incontinent of urine, they called EMS as pt arrived at [facility] at 05:56, he was found to have right ICA occlusion and transferred to [facility], code stroke called upon arrival. Pt was on Cardene however per EMS neuro wanted pressure around 200 and it was d/monet, pt sedated with propofol."

## 2023-04-04 NOTE — CONSULT NOTE ADULT - ATTENDING COMMENTS
58yMale with functional deficits after R MCA stroke s/p mechanical thrombectomy and right carotid stent with angioplasty  Presenting with left hemiparesis and dysphagia  Continue PT/OT and SLP  Patient would likely benefit from acute rehabilitation once medically optimized.

## 2023-04-04 NOTE — SWALLOW BEDSIDE ASSESSMENT ADULT - SLP GENERAL OBSERVATIONS
Pt recd a&a sitting up in chair @ the bedside, suspect reduced cognition, +NGt in place, SpO2 @ 94%, wet upper airway breath sounds observed @ baseline, NAD, 0/10 pain scale pre/post.

## 2023-04-04 NOTE — PROGRESS NOTE PEDS - ASSESSMENT
ASSESSMENT:  59 y/o male with PMHx of HTN and DM transfered from [facility] on 3/29, LKW ~20:00 c/o right arm pain, found by family ~05:00 with L hemiparesis, slurred speech and was incontinent of urine, found to have AYAKA/RMCA occlusions s/p thrombectomy TICI 2B recanalization and AYAKA stent on 3/29. Post op CTH shows likely contrast staining within stroke bed with mass effect and MLS. Neurosurgery consulted for hemicrani watch.  Plan:   - Q1 hour neuro checks  - CTH 4/2 stable   - pend MRI   - EEG.   - SBP goal  2/2 risk of hemorrhage,  - Pulm toileting currently on high flow, intermittent Chest PT, mucomyst x 24 hours.     - Cangrelor drip per neuro IR for R ICA stent, will transition to DAPT  - DVT prophylaxis: SCDs only  - ASA for stroke ppx  - NS 2% @75, Na goal >140  - Further medical management as per NSICU   - pend discussion with surgeon in AM

## 2023-04-04 NOTE — PROGRESS NOTE ADULT - ASSESSMENT
ASSESSMENT: 58y Male with PMH HTN and DM, found by his family 0500 AM on 3/29/23 with right hemiparesis, slurred speech, and incontinent of urine.  Last well known 2000  on 3/28/23. Patient brought in by EMS to the [facility] at 0556, was noted not to be a candidate for Tenecteplase as patient out of time window. Patient was transferred to [facility] for thrombectomy due to right ICA occlusion with tandem right MCA occlusion. NIHSS 28 MRS pre - 0 . Etiology likely large artery atherosclerotic disease. Post mechanical thrombectomy TICI 2B and Right carotid stent with angioplasty.     NEURO: neurologically overall improving vs. admission, noted overnight with episodes intermittenly of restlessness, CT head obtained without acute change, stable cerebral edema with mass effect and brain compression, Continue close monitoring for neurologic deterioration, with stroke checks per ICU,  Na goal gradually of 140-145 for now- avoid hyponatremia and rapid fluctuations, BMP as per protocol for adequate titration, permissive HTN as per post thrombectomy recommendations 110-140mmHg in setting of recent revascularization/hemorrhagic transformation as to avoid hypo/hyperperfusion injury. Titrate statin to LDL goal less than 70, EEG prelim 4/3/23 without evidence of seizure. Carotid duplex as noted- close monitoring of left ICA stenosis for eventual consideration in elective revascularization.   - MRI Brain w/o pending  - Physical therapy/OT/Speech eval/treatment.       ANTITHROMBOTIC THERAPY: ASA 81mg daily, cangrelor gtt post stenting- transition to PO agent (Brilinta) pending clinical course/long term enteral access and stable repeat imaging. P2y12: 9.     PULMONARY:  extubated, care per ICU, aspiration precautions, incentive spirometry as tolerated     CARDIOVASCULAR: cardiac monitoring to screen for atrial fibrillation,     GASTROINTESTINAL: dysphagia screen flailed SLP eval     Diet: NPO/NGT with Glucerna as per ICU    RENAL: BUN/Cr 13.5/0.51, maintain adequate hydration,  monitor urine output  , supplement for hypokalemia per ICU team      Na Goal:  >140     Quispe: Y    HEMATOLOGY: H/H 13.1/38.1, monitor for si/sx of bleeding, Platelets 312. Patient should have all age and risk appropriate malignancy screening.      DVT ppx      ID: previously febrile., leukocytosis . Continues on zosyn for noted 7 day course , aspiration precautions     OTHER:  , A1C 7.3- continue glycemic control    DISPOSITION: Rehab or home depending on PT eval once stable and workup is complete    CORE MEASURES:        Admission NIHSS: 28     TPA: [] YES [x] NO      LDL/HDL/A1C: 165/58/7.3     Depression Screen: pending     Statin Therapy: pending     Dysphagia Screen: [] PASS [x] FAIL       Smoking [] YES [x] NO      Afib [] YES [x] NO     Stroke Education [x] YES [] NO   ASSESSMENT: 58y Male with PMH HTN and DM, found by his family 0500 AM on 3/29/23 with right hemiparesis, slurred speech, and incontinent of urine.  Last well known 2000  on 3/28/23. Patient brought in by EMS to the [facility] at 0556, was noted not to be a candidate for Tenecteplase as patient out of time window. Patient was transferred to [facility] for thrombectomy due to right ICA occlusion with tandem right MCA occlusion. NIHSS 28 MRS pre - 0 . Etiology likely large artery atherosclerotic disease. Post mechanical thrombectomy TICI 2B and Right carotid stent with angioplasty.     NEURO: neurologically overall improving vs. admission, noted overnight with episodes intermittenly of restlessness, CT head obtained without acute change, stable cerebral edema with mass effect and brain compression, Continue close monitoring for neurologic deterioration, with stroke checks per ICU,  Na goal gradually of 140-145 for now- avoid hyponatremia and rapid fluctuations, BMP as per protocol for adequate titration, permissive HTN as per post thrombectomy recommendations 110-140mmHg in setting of recent revascularization/hemorrhagic transformation as to avoid hypo/hyperperfusion injury. Titrate statin to LDL goal less than 70, EEG prelim 4/3/23 without evidence of seizure. Carotid duplex as noted- close monitoring of left ICA stenosis for eventual consideration in elective revascularization.   - MRI Brain w/o pending  - Physical therapy/OT/Speech eval/treatment.       ANTITHROMBOTIC THERAPY: ASA 81mg daily, cangrelor gtt post stenting- transition to PO agent (Brilinta) pending clinical course/long term enteral access and stable repeat imaging. P2y12:.     PULMONARY:  extubated, care per ICU, aspiration precautions, incentive spirometry as tolerated     CARDIOVASCULAR: cardiac monitoring to screen for atrial fibrillation,     GASTROINTESTINAL: dysphagia screen flailed SLP eval     Diet: NPO/NGT with Glucerna as per ICU    RENAL: BUN/Cr 13.5/0.51, maintain adequate hydration,  monitor urine output  , supplement for hypokalemia per ICU team      Na Goal:  >140     Quispe: Y    HEMATOLOGY: H/H 13.1/38.1, monitor for si/sx of bleeding, Platelets 312. Patient should have all age and risk appropriate malignancy screening.      DVT ppx      ID: previously febrile., leukocytosis . Continues on zosyn for noted 7 day course , aspiration precautions     OTHER:  , A1C 7.3- continue glycemic control    DISPOSITION: Rehab or home depending on PT eval once stable and workup is complete    CORE MEASURES:        Admission NIHSS: 28     TPA: [] YES [x] NO      LDL/HDL/A1C: 165/58/7.3     Depression Screen: pending     Statin Therapy: pending     Dysphagia Screen: [] PASS [x] FAIL       Smoking [] YES [x] NO      Afib [] YES [x] NO     Stroke Education [x] YES [] NO

## 2023-04-04 NOTE — PROGRESS NOTE ADULT - SUBJECTIVE AND OBJECTIVE BOX
HPI:  57 y/o male with PMHx of HTN and DM transfered from [facility] to [facility] as a code stroke. [facility] reports pt BLAYNE was last night around 20:00 and was c/o right arm pain, today his family found his around 05:00 with left sided hemiparesis, slurred speech and was incontinent of urine, they called EMS as pt arrived at [facility] at 05:56, he was found to have right ICA occlusion and transferred to [facility], code stroke called upon arrival. Pt was on Cardene however per EMS neuro wanted pressure around 200 and it was d/monet, pt sedated with propofol. (29 Mar 2023 09:40)    O/n events: episode of being agitated last night, insomnia.    ICU Vital Signs Last 24 Hrs  T(C): 36.8 (04 Apr 2023 12:03), Max: 36.9 (03 Apr 2023 23:56)  T(F): 98.2 (04 Apr 2023 12:03), Max: 98.4 (03 Apr 2023 23:56)  HR: 91 (04 Apr 2023 14:00) (72 - 116)  BP: 124/96 (04 Apr 2023 14:00) (93/73 - 170/102)  BP(mean): 105 (04 Apr 2023 14:00) (78 - 123)  ABP: --  ABP(mean): --  RR: 25 (04 Apr 2023 14:00) (17 - 37)  SpO2: 97% (04 Apr 2023 14:00) (94% - 100%)    O2 Parameters below as of 04 Apr 2023 12:00  Patient On (Oxygen Delivery Method): nasal cannula  O2 Flow (L/min): 2    Labs, imaging reviewed.    EXAMINATION: stable  General:  NAD, sitting in a chair.  Neuro:  with eyelid opening apraxia but able to open lids slightly, remains alert and awake during exam,  follows simple commands on both sides, pupils 3 mm and reactive but R is more sluggish, EOMI, no BTT on the L, able to open his mouth, better cough today, lifts head off the bed, RUE 4-/5, L arm 2/5 spontaneously, R leg 3/5, L leg 2/5 spontaneously   Cards:  RRR  Respiratory:  mild tachypnea, with some rhonchi   Abdomen:  soft, NT, ND  Extremities:  no LE edema

## 2023-04-04 NOTE — PROGRESS NOTE ADULT - SUBJECTIVE AND OBJECTIVE BOX
[facility] Stroke Team  Progress Note     HPI:  57 y/o male with PMHx of HTN and DM transfered from [facility] to [facility] as a code stroke. [facility] EMS reports pt LKW was  night prior to admission around 20:00 and was c/o right arm pain, day of admission his family found his around 05:00 with left sided hemiparesis, slurred speech and was incontinent of urine, they called EMS as pt arrived at [facility] at 05:56, he was found to have right ICA occlusion and transferred to [facility], code stroke called upon arrival. Transferred to  for mechanical thrombectomy.    SUBJECTIVE: No events overnight.  No new neurologic complaints.  ROS reported negative unless otherwise noted.    acetaminophen     Tablet .. 975 milliGRAM(s) Oral every 6 hours PRN  acetylcysteine 20%  Inhalation 4 milliLiter(s) Inhalation every 6 hours  albuterol/ipratropium for Nebulization 3 milliLiter(s) Nebulizer every 6 hours  artificial  tears Solution 1 Drop(s) Both EYES two times a day  aspirin  chewable 81 milliGRAM(s) Oral daily  atorvastatin 80 milliGRAM(s) Oral at bedtime  bisacodyl Suppository 10 milliGRAM(s) Rectal daily PRN  cangrelor Infusion 1 MICROgram(s)/kG/Min IV Continuous <Continuous>  chlorhexidine 2% Cloths 1 Application(s) Topical daily  dexMEDEtomidine Infusion 0.2 MICROgram(s)/kG/Hr IV Continuous <Continuous>  dextrose 5%. 1000 milliLiter(s) IV Continuous <Continuous>  dextrose 5%. 1000 milliLiter(s) IV Continuous <Continuous>  dextrose 50% Injectable 25 Gram(s) IV Push once  dextrose 50% Injectable 12.5 Gram(s) IV Push once  dextrose 50% Injectable 25 Gram(s) IV Push once  dextrose Oral Gel 15 Gram(s) Oral once PRN  glucagon  Injectable 1 milliGRAM(s) IntraMuscular once  hydrALAZINE Injectable 10 milliGRAM(s) IV Push every 2 hours PRN  insulin lispro (ADMELOG) corrective regimen sliding scale   SubCutaneous every 6 hours  melatonin 5 milliGRAM(s) Oral at bedtime  pantoprazole   Suspension 40 milliGRAM(s) Oral daily  petrolatum Ophthalmic Ointment 1 Application(s) Right EYE every 12 hours  piperacillin/tazobactam IVPB.. 3.375 Gram(s) IV Intermittent every 8 hours  polyethylene glycol 3350 17 Gram(s) Oral daily  senna Syrup 10 milliLiter(s) Oral at bedtime  sodium chloride 2 Gram(s) Oral every 6 hours  sodium chloride 2% + sodium acetate 50:50 1000 milliLiter(s) IV Continuous <Continuous>      PHYSICAL EXAM:   Vital Signs Last 24 Hrs  T(C): 36.7 (04 Apr 2023 04:19), Max: 36.9 (03 Apr 2023 23:56)  T(F): 98 (04 Apr 2023 04:19), Max: 98.4 (03 Apr 2023 23:56)  HR: 85 (04 Apr 2023 08:00) (65 - 116)  BP: 124/74 (04 Apr 2023 08:00) (93/73 - 170/102)  BP(mean): 88 (04 Apr 2023 08:00) (78 - 123)  RR: 28 (04 Apr 2023 08:00) (17 - 37)  SpO2: 99% (04 Apr 2023 08:00) (94% - 100%)    Parameters below as of 04 Apr 2023 08:00  Patient On (Oxygen Delivery Method): nasal cannula  O2 Flow (L/min): 2      General: No acute distress    NEUROLOGICAL EXAM:  Mental status: awake, alert, oriented to self, place,  able to ID pen,  follow simple commands and repeat   CN: right eye slightly exodeviated, able to count fingers in both visual fields, slight left gaze palsy,  left facial palsy  motor: moves right UE/LE 5/5  Moves left UE 1/5, LLE moves  against gravity but falls to bed in < 5 sec    Sensory:  left sensory extinction   gait deferred        LABS:                        13.1   11.97 )-----------( 312      ( 04 Apr 2023 06:03 )             38.1    04-04    142  |  109<H>  |  13.5  ----------------------------<  157<H>  3.2<L>   |  20.0<L>  |  0.51    Ca    8.2<L>      04 Apr 2023 06:03  Phos  3.4     04-04  Mg     2.3     04-04    TPro  6.4<L>  /  Alb  3.4  /  TBili  1.2  /  DBili  x   /  AST  25  /  ALT  23  /  AlkPhos  30<L>  04-03        IMAGING: Reviewed by me.    CT Head No Cont (04.04.23 @ 07:59)   IMPRESSION: Stable acute/subacute large right MCA territory infarction   with stable hemorrhagic transformation in the right basal ganglia and   right temporal lobe. Stable mass effect and right-to-left midline shift   measuring 6.7 mm. No new hemorrhage. Moderate adjacent cytotoxic edema,   unchanged.    CT Head No Cont (04.02.23 @ 08:08)   Unchanged hemorrhagic RIGHT MCA infarction with effacement   of the RIGHT lateral ventricle and 6 mm subfalcine herniation to the LEFT.    US Duplex Carotid Arteries Complete, Bilateral (04.01.23 @ 22:21)   IMPRESSION: No significant hemodynamic stenosis of the right internal   carotid artery.  Patent right ICA stent.  Over 70% left internal carotid artery stenosis.  Bilateral external carotid artery stenosis.    CT Head No Cont (03.30.23 @ 03:40)   Compared with 3/29/2023 there is decreasing retained contrast   in the right basal ganglia and right frontal parietal cortex with   evolving lucency consistent with an acute right artery infarct. Residual   high density although decreased compared to the prior exam may represent   retained contrast as well as a small amount of hemorrhage. No increased   hemorrhage identified.    CT BRAIN STROKE PROTOCOL   03/29/2023    IMPRESSION: Dense right MCA sign with early right temporal lobe   infarction. No acute intracranial hemorrhage.    CT PERFUSION W MAPS IC    03/29/2023    CBF<30% volume: 72 ml right MCA territory.  Tmax>6.0 s volume: 180 ml  Mismatch volume: 108 ml  Mismatch ratio: 2.5    TTE  3/29/2023  Left ventricular ejection fraction, by visual estimation, is >75%. Technically difficult study. Hyperdynamic global left ventricular systolic function. The left ventricular diastolic function could not be assessed in this study. There is mild concentric left ventricular hypertrophy. There is no evidence of pericardial effusion. Mild mitral annular calcification. Trace mitral valve regurgitation. {\rtf1\rhtwkh94135\ansi\ooqrhnc5550\ftnbj\uc1\deff0  {\fonttbl{\f0 \fnil Segoe UI;}{\f1 \fnil \fcharset0 Segoe UI;}{\f2 \fnil Times New Michael;}}  {\colortbl ;\lon113\rtkyn972\mlwr746 ;\red0\green0\blue0 ;\red0\green0\pckj392 ;\red0\green0\blue0 ;}  {\stylesheet{\f0\fs20 Normal;}{\cs1 Default Paragraph Font;}{\cs2\f0\fs16 Line Number;}{\cs3\f2\fs24\ul\cf3 Hyperlink;}}  {\*\revtbl{Unknown;}}  \lbigbg11488\xoatec59071\yaiyq4532\hkfcw7033\fvjvy8164\fcmho2033\khpdwum465\rnevnnb589\nogrowautofit\qyolen117\formshade\nofeaturethrottle1\dntblnsbdb\fet4\aendnotes\aftnnrlc\pgbrdrhead\pgbrdrfoot  \sectd\bavvyd23100\faqwkl97590\guttersxn0\jkoxijta7396\ykwwfyti2854\cfgslobd3588\dgcausjj1576\qsrshuf451\ekosszo523\sbkpage\pgncont\pgndec  \plain\plain\f0\fs24\pard\plain\f0\fs24\plain\f0\fs20\udnl8278\hich\f0\dbch\f0\loch\f0\fs20 [facility] Stroke Team\par  Progress Note \par  \par  HPI:\par  57 y/o male with PMHx of HTN and DM transfered from [facility] to [facility] as a code stroke. [facility] EMS reports pt LKW was  night prior to admission around 20:00 and was c/o right arm pain, day of admission his family found his around 05:00 with left sided hemiparesis,   slurred speech and was incontinent of urine, they called EMS as pt arrived at [facility] at 05:56, he was found to have right ICA occlusion and transferred to [facility], code stroke called upon arrival. Transferred to IR for mechanical thrombectomy.\par  \par  SUBJECTIVE: No events overnight.  No new neurologic complaints.  ROS reported negative unless otherwise noted.\par  \par  acetaminophen     Tablet .. 975 milliGRAM(s) Oral every 6 hours PRN\par  acetylcysteine 20%  Inhalation 4 milliLiter(s) Inhalation every 6 hours\par  albuterol/ipratropium for Nebulization 3 milliLiter(s) Nebulizer every 6 hours\par  artificial  tears Solution 1 Drop(s) Both EYES two times a day\par  \plain\f1\fs20\jxyl5167\hich\f1\dbch\f1\loch\f1\fs20\b aspirin  chewable 81 milliGRAM(s) Oral daily\par  atorvastatin 80 milliGRAM(s) Oral at bedtime\plain\f0\fs20\kisy6525\hich\f0\dbch\f0\loch\f0\fs20\par  bisacodyl Suppository 10 milliGRAM(s) Rectal daily PRN\par  \plain\f1\fs20\ivrj3732\hich\f1\dbch\f1\loch\f1\fs20\b cangrelor Infusion 1 MICROgram(s)/kG/Min IV Continuous <Continuous>\plain\f0\fs20\pjjd5801\hich\f0\dbch\f0\loch\f0\fs20\par  chlorhexidine 2% Cloths 1 Application(s) Topical daily\par  dexMEDEtomidine Infusion 0.2 MICROgram(s)/kG/Hr IV Continuous <Continuous>\par  dextrose 5%. 1000 milliLiter(s) IV Continuous <Continuous>\par  dextrose 5%. 1000 milliLiter(s) IV Continuous <Continuous>\par  dextrose 50% Injectable 25 Gram(s) IV Push once\par  dextrose 50% Injectable 12.5 Gram(s) IV Push once\par  dextrose 50% Injectable 25 Gram(s) IV Push once\par  dextrose Oral Gel 15 Gram(s) Oral once PRN\par  glucagon  Injectable 1 milliGRAM(s) IntraMuscular once\par  hydrALAZINE Injectable 10 milliGRAM(s) IV Push every 2 hours PRN\par  insulin lispro (ADMELOG) corrective regimen sliding scale   SubCutaneous every 6 hours\par  melatonin 5 milliGRAM(s) Oral at bedtime\par  pantoprazole   Suspension 40 milliGRAM(s) Oral daily\par  petrolatum Ophthalmic Ointment 1 Application(s) Right EYE every 12 hours\par  \plain\f1\fs20\lgrk5231\hich\f1\dbch\f1\loch\f1\fs20\b piperacillin/tazobactam IVPB.. 3.375 Gram(s) IV Intermittent every 8 hours\plain\f0\fs20\zdcp4823\hich\f0\dbch\f0\loch\f0\fs20\par  polyethylene glycol 3350 17 Gram(s) Oral daily\par  senna Syrup 10 milliLiter(s) Oral at bedtime\par  sodium chloride 2 Gram(s) Oral every 6 hours\par  sodium chloride 2% + sodium acetate 50:50 1000 milliLiter(s) IV Continuous <Continuous>\par  \par  \par  PHYSICAL EXAM: \par  Vital Signs Last 24 Hrs\par  T(C): 36.7 (04 Apr 2023 04:19), Max: 36.9 (03 Apr 2023 23:56)\par  T(F): 98 (04 Apr 2023 04:19), Max: 98.4 (03 Apr 2023 23:56)\par  HR: 85 (04 Apr 2023 08:00) (65 - 116)\par  BP: 124/74 (04 Apr 2023 08:00) (93/73 - 170/102)\par  BP(mean): 88 (04 Apr 2023 08:00) (78 - 123)\par  RR: 28 (04 Apr 2023 08:00) (17 - 37)\par  SpO2: 99% (04 Apr 2023 08:00) (94% - 100%)\par  \par  Parameters below as of 04 Apr 2023 08:00\par  Patient On (Oxygen Delivery Method): nasal cannula\par  O2 Flow (L/min): 2\par  \par  \par  General: No acute distress\par  \par  NEUROLOGICAL EXAM:\par  Mental status: awake, alert, oriented to self, place,  able to ID pen,  follow simple commands and repeat \par  CN: right eye slightly exodeviated, able to count fingers in both visual fields, slight left gaze palsy,  left facial palsy\par  motor: moves right UE/LE 5/5  Moves left UE 1/5, LLE moves  against gravity but falls to bed in < 5 sec  \par  Sensory:  left sensory extinction \par  gait deferred\par  \par  \par  \par  LABS:\par           \par             13.1 \par  11.97 )-----------( 312      ( 04 Apr 2023 06:03 )\par             38.1 \par   04-04\par  \par  142  |  109<H>  |  13.5\par  ----------------------------<  157<H>\par  3.2<L>   |  20.0<L>  |  0.51\par  \par  Ca    8.2<L>      04 Apr 2023 06:03\par  Phos  3.4     04-04\par  Mg     2.3     04-04\par  \par  TPro  6.4<L>  /  Alb  3.4  /  TBili  1.2  /  DBili  x   /  AST  25  /  ALT  23  /  AlkPhos  30<L>  04-03\par  \par  \par  \par  IMAGING: Reviewed by me.\par  \par  \plain\f1\fs20\pxxo5414\hich\f1\dbch\f1\loch\f1\cf2\fs20\b CT Head No Cont (04.04.23 @ 07:59) \plain\f0\fs20\vidw3499\hich\f0\dbch\f0\loch\f0\fs20\par  \ql\plain\f0\fs24\plain\f0\fs20\rppp9991\hich\f0\dbch\f0\loch\f0\fs20 IMPRESSION: Stable acute/subacute large right MCA territory infarction \par  with stable hemorrhagic transformation in the right basal ganglia and \par  right temporal lobe. Stable mass effect and right-to-left midline shift \par  measuring 6.7 mm. No new hemorrhage. Moderate adjacent cytotoxic edema, \par  \pard\plain\f0\fs24\plain\f0\fs20\fbvy3761\hich\f0\dbch\f0\loch\f0\fs20 unchanged.\par  \par  \plain\f1\fs20\rgft7467\hich\f1\dbch\f1\loch\f1\cf2\fs20\b CT Head No Cont (04.02.23 @ 08:08) \plain\f0\fs20\mjck3955\hich\f0\dbch\f0\loch\f0\fs20\par  \ql\plain\f0\fs24\plain\f0\fs20\blxs8127\hich\f0\dbch\f0\loch\f0\fs20 Unchanged hemorrhagic RIGHT MCA infarction with effacement \par  of the RIGHT lateral ventricle and 6 mm subfalcine herniation to the LEFT.\par  \par  \pard\plain\f0\fs24\plain\f1\fs20\cnog9958\hich\f1\dbch\f1\loch\f1\cf2\fs20\b US Duplex Carotid Arteries Complete, Bilateral (04.01.23 @ 22:21) \plain\f0\fs20\cnrc9932\hich\f0\dbch\f0\loch\f0\fs20\par  \ql\plain\f0\fs24\plain\f0\fs20\qewv5507\hich\f0\dbch\f0\loch\f0\fs20 IMPRESSION: No significant hemodynamic stenosis of the right internal \par  carotid artery.\par  Patent right ICA stent.\par  Over 70% left internal carotid artery stenosis.\par  \pard\plain\f0\fs24\plain\f0\fs20\jdyb0459\hich\f0\dbch\f0\loch\f0\fs20 Bilateral external carotid artery stenosis.\par  \ql\plain\f0\fs24\plain\f0\fs20\quxz9470\hich\f0\dbch\f0\loch\f0\fs20\par  \plain\f1\fs20\efvo1330\hich\f1\dbch\f1\loch\f1\cf2\fs20\b CT Head No Cont (03.30.23 @ 03:40) \plain\f0\fs20\tjik8333\hich\f0\dbch\f0\loch\f0\fs20\par  Compared with 3/29/2023 there is decreasing retained contrast \par  in the right basal ganglia and right frontal parietal cortex with \par  evolving lucency consistent with an acute right artery infarct. Residual \par  high density although decreased compared to the prior exam may represent \par  retained contrast as well as a small amount of hemorrhage. No increased \par  hemorrhage identified.\par  \par  \plain\f1\fs20\kpsy2668\hich\f1\dbch\f1\loch\f1\cf2\fs20\b CT BRAIN STROKE PROTOCOL \plain\f0\fs20\efrx6711\hich\f0\dbch\f0\loch\f0\fs20\par  \plain\f1\fs20\qtod0907\hich\f1\dbch\f1\loch\f1\cf2\fs20\b 03/29/2023  \plain\f0\fs20\bvpo8362\hich\f0\dbch\f0\loch\f0\fs20\par  IMPRESSION: Dense right MCA sign with early right temporal lobe \par  infarction. No acute intracranial hemorrhage.\par  \par  \plain\f1\fs20\igvb3775\hich\f1\dbch\f1\loch\f1\cf2\fs20\b CT PERFUSION W MAPS IC \plain\f0\fs20\cvdw1991\hich\f0\dbch\f0\loch\f0\fs20  \par  \plain\f1\fs20\ncie6708\hich\f1\dbch\f1\loch\f1\cf2\fs20\b 03/29/2023  \plain\f0\fs20\psko7342\hich\f0\dbch\f0\loch\f0\fs20\par  CBF<30% volume: 72 ml right MCA territory.\par  Tmax>6.0 s volume: 180 ml\par  Mismatch volume: 108 ml\par  Mismatch ratio: 2.5\par  \par  \plain\f1\fs20\kctz9076\hich\f1\dbch\f1\loch\f1\cf2\fs20\b TTE\par  3/29/2023\par  \plain\f0\fs20\ilpp1037\hich\f0\dbch\f0\loch\f0\fs20 Left ventricular ejection fraction, by visual estimation, is >75%. Technically difficult study. Hyperdynamic global left ventricular systolic function. The left ventricular diastolic function could   not be assessed in this study. There is mild concentric left ventricular hypertrophy. There is no evidence of pericardial effusion. Mild mitral annular calcification. Trace mitral valve regurgitation.\par  \par  EEG:\par  \plain\f1\fs16\ptda4167\hich\f1\dbch\f1\loch\f1\cf2\fs16\par  EEG Classification / Summary:\par  \par  Abnormal EEG in the awake, drowsy, and asleep states due to:\par  1. Continuous, right hemispheric focal polymorphic delta activity\par  2. Asymmetry and attenuation of sleep spindles from the right hemisphere\par  \par  Clinical Impression:\par  This is an abnormal study indicative of a focal cerebral dysfunction in the right hemisphere, consistent with history of R MCA infarction. No epileptiform abnormalities or seizures were captured.\par  \par  Ashley Olivera MD\par  PGY-6 Pediatric Epilepsy\par  \par  Opal Mae MD\par  Attending Physician, [facility]\par  \par  -------------------------------------------------------------------------------------------------------\par  [facility] EEG Reading Room Ph#: [phone]\par  Epilepsy Answering Service after 5PM and before 8:30AM: Ph#: [phone]\par  \par  \par  \par  \plain\f1\fs16\zfxm8139\hich\f1\dbch\f1\loch\f1\cf2\fs16\strike\plain\f1\fs16\owgz1232\hich\f1\dbch\f1\loch\f1\cf2\fs16\plain\f1\fs16\xqru9629\hich\f1\dbch\f1\loch\f1\cf2\fs16\par  \par  {\*\bkmkstart bkClinDocSignatures}{\*\bkmkend bkClinDocSignatures}{\*\bkmkstart bkcommentSBK}{\*\bkmkend bkcommentSBK}\plain\f1\fs16\wioc8604\hich\f1\dbch\f1\loch\f1\cf2\fs16\b Electronic Signatures:\plain\f1\fs16\xsvi1443\hich\f1\dbch\f1\loch\f1\cf2\fs16\par  \plain\f1\fs16\lsor4719\hich\f1\dbch\f1\loch\f1\cf2\fs16\b\ul Opal Mae E (MD)\plain\f1\fs16\ppkd8013\hich\f1\dbch\f1\loch\f1\cf2\fs16   \plain\f1\fs18\oeos3207\hich\f1\dbch\f1\loch\f1\cf2\fs18 (Signed 04-Apr-2023 12:27)\par  \fi-360\li720\plain\f0\fs24\plain\f1\fs18\kagr6036\hich\f1\dbch\f1\loch\f1\cf2\fs18\tab\plain\f1\fs16\jmdz8232\hich\f1\dbch\f1\loch\f1\cf2\fs16\b\i Authored: \plain\f1\fs16\vnlg5788\hich\f1\dbch\f1\loch\f1\cf2\fs16\i EEG REPORT\plain\f0\fs20\dimj2334\hich\f0\dbch\f0\loch\f0\fs20\par  }

## 2023-04-04 NOTE — PROGRESS NOTE ADULT - ASSESSMENT
Assessment/Plan:   57 yo M with acute CVA due to R ICA/MCA occlusion, with hemorrhagic transformation, vasogenic edema and MLS; no thrombolytics as was out of the window for administration.  S/p TICI 2b MT, AYAKA stenting 3/29. On Cangrelor and ASA now.  Respiratory distress due to bulbar impairment, pulm congestion; concern for aspiration PNA vs pneumonitis.   PMH of HTN and DM.  Dysphagia.    Neuro:  neurochecks  cEEG for 24 hrs in view of episode of worsening neuro exam - negative, will d/c  c/w ASA 81 mg daily; c/w cangrelor ggt at 1 mcg/kg/min while on hemicrani watch; plan to keep on for another 24 hrs  c/w Atorvastatin 80 mg daily  add Trazodone and Melatonin q hs    CV: , TTE with EF 75%  MAP >65    Pulm:  cont Duoneb + mucomyst, chest PT  maintain OSats >92, monitor EtCO2; switch to NC    GI:  cont TF - to goal; BM regimen  S/S re-eval in am, surgical team for PEG eval    Renal:  Na goal 145-155  d/c 2%, cont salt tabs  monitor UOp    Heme:  SCDs, hold Lov ppx as on DAPT/heme conversion    ID: MRSA neg  c/w Zosyn for aspiration PNA, 7 days in total    Endo: HgA1C 7.3  cont ISS  FS goal 120-180

## 2023-04-04 NOTE — PROGRESS NOTE PEDS - SUBJECTIVE AND OBJECTIVE BOX
HPI:  HPI:  57 y/o male with PMHx of HTN and DM transfered from [facility] to [facility] as a code stroke. [facility] EMS reports pt BLAYNE was last night around 20:00 and was c/o right arm pain, today his family found his around 05:00 with left sided hemiparesis, slurred speech and was incontinent of urine, they called EMS as pt arrived at [facility] at 05:56, he was found to have right ICA occlusion and transferred to [facility], code stroke called upon arrival. Pt was on Cardene however per EMS neuro wanted pressure around 200 and it was d/monet, pt sedated with propofol.         (29 Mar 2023 09:40)        INTERVAL HPI/OVERNIGHT EVENTS:    OVERNIGHT EVENTS: patient seen and examined at bedside. States he feels overall better. Respiratory status is improving. tachypnic. On eeg monitoring to rule out sublclincal seizure.     Vital Signs Last 24 Hrs  T(C): 36.9 (03 Apr 2023 23:56), Max: 37.5 (03 Apr 2023 01:00)  T(F): 98.4 (03 Apr 2023 23:56), Max: 99.5 (03 Apr 2023 01:00)  HR: 88 (03 Apr 2023 22:00) (65 - 104)  BP: 140/99 (03 Apr 2023 22:00) (110/85 - 170/102)  BP(mean): 111 (03 Apr 2023 22:00) (88 - 123)  RR: 30 (03 Apr 2023 22:00) (21 - 31)  SpO2: 98% (03 Apr 2023 22:00) (95% - 100%)    Parameters below as of 03 Apr 2023 20:39  Patient On (Oxygen Delivery Method): nasal cannula w/ humidification,2L      PHYSICAL EXAM:  General: NAD,  HEENT: atraumatic head, PERRL 4mm b/l, R eye chemosis  Cardiovascular: Regular rate and rhythm  Respiratory: tachypnic  GI: abdomen soft, nontender, nondistended  Neuro: OE to voice, +cough/gag/corneals, blinks to threat tack.   RUE 5/5, RLE 3/5, able to follow commands   LUE D/B/T 2/5, HG 1/5, attempts to lift LUE antigrav distally, LLE 3/5  Extremities: distal pulses 2+ x4  Wound/incision: R groin site c/d/i      LABS:                        11.8   7.48  )-----------( 255      ( 03 Apr 2023 05:37 )             35.5     04-03    140  |  106  |  12.3  ----------------------------<  120<H>  3.5   |  22.0  |  0.49<L>    Ca    8.1<L>      03 Apr 2023 23:30  Phos  3.9     04-03  Mg     2.3     04-03    TPro  6.4<L>  /  Alb  3.4  /  TBili  1.2  /  DBili  x   /  AST  25  /  ALT  23  /  AlkPhos  30<L>  04-03 04-02 @ 07:01  -  04-03 @ 07:00  --------------------------------------------------------  IN: 3258 mL / OUT: 4600 mL / NET: -1342 mL    04-03 @ 07:01  -  04-04 @ 00:23  --------------------------------------------------------  IN: 1912 mL / OUT: 1375 mL / NET: 537 mL        RADIOLOGY & ADDITIONAL TESTS:

## 2023-04-04 NOTE — SWALLOW BEDSIDE ASSESSMENT ADULT - SWALLOW EVAL: DIAGNOSIS
Oral phase of swallow characterized by reduced oral grading/orientation to utensil and delayed A-P transit across consistencies. Suspect pharyngeal dysphagia w/ all trials presented marked by excessive multiple swallows (4-6) & increased WOB.

## 2023-04-04 NOTE — PROGRESS NOTE ADULT - SUBJECTIVE AND OBJECTIVE BOX
Patient is a 58y old  Male who presents with a chief complaint of stroke (04 Apr 2023 14:59)    HPI:  59 y/o male with PMHx of HTN and DM transfered from [facility] to [facility] as a code stroke. [facility] EMS reports pt BLAYNE was last night around 20:00 and was c/o right arm pain, today his family found his around 05:00 with left sided hemiparesis, slurred speech and was incontinent of urine, they called EMS as pt arrived at [facility] at 05:56, he was found to have right ICA occlusion and transferred to [facility], code stroke called upon arrival. Pt was on Cardene however per EMS neuro wanted pressure around 200 and it was d/monet, pt sedated with propofol.  (29 Mar 2023 09:40)      Interval history:  Patient seen and examined by neuro IR team. Patient had episode of agitation overnight requiring medication administration. CTH this am stable. No other acute events reported.      Vital Signs Last 24 Hrs  T(C): 36.8 (04 Apr 2023 12:03), Max: 36.9 (03 Apr 2023 23:56)  T(F): 98.2 (04 Apr 2023 12:03), Max: 98.4 (03 Apr 2023 23:56)  HR: 87 (04 Apr 2023 15:26) (72 - 116)  BP: 138/127 (04 Apr 2023 15:00) (93/73 - 156/75)  BP(mean): 131 (04 Apr 2023 15:00) (78 - 131)  RR: 32 (04 Apr 2023 15:00) (17 - 37)  SpO2: 98% (04 Apr 2023 15:26) (94% - 100%)    Parameters below as of 04 Apr 2023 15:26  Patient On (Oxygen Delivery Method): nasal cannula w/ humidification      Physical Exam:  Constitutional: NAD, lying in bed  Neuro  * Mental Status: EO to voice, A&O x2 (name, place), following commands, expressive > receptive aphasia   * Cranial Nerves: L pupil 2mm brisk, R pupil 3mm sluggish, EOMI, L facial   * Motor: RUE/RLE 5/5, LUE 1/5, LLE 2/5   * Sensory: Sensation grossly intact to noxious   * Reflexes: not assessed       LABS:                        13.1   11.97 )-----------( 312      ( 04 Apr 2023 06:03 )             38.1     04-04    140  |  108  |  13.5  ----------------------------<  157<H>  3.4<L>   |  20.0<L>  |  0.50    Ca    8.2<L>      04 Apr 2023 12:20  Phos  3.4     04-04  Mg     2.3     04-04    TPro  6.4<L>  /  Alb  3.4  /  TBili  1.2  /  DBili  x   /  AST  25  /  ALT  23  /  AlkPhos  30<L>  04-03    CULTURES:  Culture Results:   No growth to date. (04-01 @ 17:25)  Culture Results:   No growth to date. (04-01 @ 17:25)      Medications:  MEDICATIONS  (STANDING):  acetylcysteine 20%  Inhalation 4 milliLiter(s) Inhalation every 6 hours  albuterol/ipratropium for Nebulization 3 milliLiter(s) Nebulizer every 6 hours  artificial  tears Solution 1 Drop(s) Both EYES two times a day  aspirin  chewable 81 milliGRAM(s) Oral daily  atorvastatin 80 milliGRAM(s) Oral at bedtime  cangrelor Infusion 1 MICROgram(s)/kG/Min (22.2 mL/Hr) IV Continuous <Continuous>  chlorhexidine 2% Cloths 1 Application(s) Topical daily  dextrose 5%. 1000 milliLiter(s) (100 mL/Hr) IV Continuous <Continuous>  dextrose 5%. 1000 milliLiter(s) (50 mL/Hr) IV Continuous <Continuous>  dextrose 50% Injectable 25 Gram(s) IV Push once  dextrose 50% Injectable 12.5 Gram(s) IV Push once  dextrose 50% Injectable 25 Gram(s) IV Push once  glucagon  Injectable 1 milliGRAM(s) IntraMuscular once  insulin lispro (ADMELOG) corrective regimen sliding scale   SubCutaneous every 6 hours  melatonin 5 milliGRAM(s) Oral at bedtime  pantoprazole   Suspension 40 milliGRAM(s) Oral daily  petrolatum Ophthalmic Ointment 1 Application(s) Right EYE every 12 hours  piperacillin/tazobactam IVPB.. 3.375 Gram(s) IV Intermittent every 8 hours  polyethylene glycol 3350 17 Gram(s) Oral daily  potassium chloride   Solution 30 milliEquivalent(s) Oral every 4 hours  senna Syrup 10 milliLiter(s) Oral at bedtime  sodium chloride 2 Gram(s) Oral every 6 hours  traZODone 50 milliGRAM(s) Oral at bedtime    MEDICATIONS  (PRN):  acetaminophen     Tablet .. 975 milliGRAM(s) Oral every 6 hours PRN Temp greater or equal to 38C (100.4F), Mild Pain (1 - 3)  bisacodyl Suppository 10 milliGRAM(s) Rectal daily PRN Constipation  dextrose Oral Gel 15 Gram(s) Oral once PRN Blood Glucose LESS THAN 70 milliGRAM(s)/deciliter  hydrALAZINE Injectable 10 milliGRAM(s) IV Push every 2 hours PRN SBP >140      RADIOLOGY & ADDITIONAL STUDIES:  < from: CT Head No Cont (04.04.23 @ 07:59) >  IMPRESSION: Stable acute/subacute large right MCA territory infarction   with stable hemorrhagic transformation in the right basal ganglia and   right temporal lobe. Stable mass effect and right-to-left midline shift   measuring 6.7 mm. No new hemorrhage. Moderate adjacent cytotoxic edema,   unchanged.    --- End of Report ---    ISAURO LENZ MD; Attending Radiologist  This document has been electronically signed. Apr 4 2023  8:38AM    < end of copied text >

## 2023-04-04 NOTE — PROGRESS NOTE ADULT - ASSESSMENT
Assessment:  58M with PMH HTN, DM who presented with L sided weakness, found to have AYAKA/RMCA occlusions s/p thrombectomy TICI 2B recanalization and AYAKA stent on 3/29. Post op CTH showed contrast vs hemorrhage.   - PSD 6/7, POD 6  - Exam stable  - CTH stable       Plan:  - Discussed with Dr. Allen  - Q4 hour neuro checks  - MAP >65  - Continue cangrelor @ 1 during hemicrani watch period, plan to transition to Brilinta vs Plavix tomorrow   - Hypertonic saline per NSICU to reduce cerebral edema, Na goal 145-155  - Appreciate neurosurgery consult for possible hemicrani  - , on atorvastatin 80  - A1C 7.3%  - TTE results as above  - Further care per NSICU team Assessment:  58M with PMH HTN, DM who presented with L sided weakness, found to have AYAKA/RMCA occlusions s/p thrombectomy TICI 2B recanalization and AYAKA stent on 3/29. Post op CTH showed contrast vs hemorrhage.   - PSD 6/7, POD 6  - Exam stable  - CTH stable       Plan:  - Discussed with Dr. Allen  - Q4 hour neuro checks  - MAP >65  - Continue cangrelor @ 1 during hemicrani watch period, plan to transition to Brilinta vs Plavix tomorrow   - Appreciate neurosurgery consult for possible hemicrani  - , on atorvastatin 80  - A1C 7.3%  - TTE performed  - Further care per NSICU team  - PA only visit  Assessment:  58M with PMH HTN, DM who presented with L sided weakness, found to have AYAKA/RMCA occlusions s/p thrombectomy TICI 2B recanalization and AYAKA stent on 3/29. Post op CTH showed contrast vs hemorrhage.   - PSD 6/7, POD 6  - Exam stable  - CTH stable       Plan:  - Discussed with Dr. Allen  - Q4 hour neuro checks  - MAP >65  - Continue cangrelor @ 1 during hemicrani watch period, plan to transition to Brilinta vs Plavix tomorrow   - Continue ASA 81   - Appreciate neurosurgery consult for possible hemicrani  - , on atorvastatin 80  - A1C 7.3%  - TTE performed  - Further care per NSICU team  - PA only visit

## 2023-04-05 ENCOUNTER — TRANSCRIPTION ENCOUNTER (OUTPATIENT)
Age: 58
End: 2023-04-05

## 2023-04-05 DIAGNOSIS — R13.10 DYSPHAGIA, UNSPECIFIED: ICD-10-CM

## 2023-04-05 LAB
ANION GAP SERPL CALC-SCNC: 14 MMOL/L — SIGNIFICANT CHANGE UP (ref 5–17)
BUN SERPL-MCNC: 13.8 MG/DL — SIGNIFICANT CHANGE UP (ref 8–20)
CALCIUM SERPL-MCNC: 8.4 MG/DL — SIGNIFICANT CHANGE UP (ref 8.4–10.5)
CHLORIDE SERPL-SCNC: 107 MMOL/L — SIGNIFICANT CHANGE UP (ref 96–108)
CO2 SERPL-SCNC: 19 MMOL/L — LOW (ref 22–29)
CREAT SERPL-MCNC: 0.56 MG/DL — SIGNIFICANT CHANGE UP (ref 0.5–1.3)
EGFR: 113 ML/MIN/1.73M2 — SIGNIFICANT CHANGE UP
GLUCOSE BLDC GLUCOMTR-MCNC: 114 MG/DL — HIGH (ref 70–99)
GLUCOSE BLDC GLUCOMTR-MCNC: 153 MG/DL — HIGH (ref 70–99)
GLUCOSE BLDC GLUCOMTR-MCNC: 170 MG/DL — HIGH (ref 70–99)
GLUCOSE SERPL-MCNC: 140 MG/DL — HIGH (ref 70–99)
HCT VFR BLD CALC: 38.1 % — LOW (ref 39–50)
HGB BLD-MCNC: 12.6 G/DL — LOW (ref 13–17)
MAGNESIUM SERPL-MCNC: 2.4 MG/DL — SIGNIFICANT CHANGE UP (ref 1.6–2.6)
MCHC RBC-ENTMCNC: 26.8 PG — LOW (ref 27–34)
MCHC RBC-ENTMCNC: 33.1 GM/DL — SIGNIFICANT CHANGE UP (ref 32–36)
MCV RBC AUTO: 80.9 FL — SIGNIFICANT CHANGE UP (ref 80–100)
PHOSPHATE SERPL-MCNC: 3.7 MG/DL — SIGNIFICANT CHANGE UP (ref 2.4–4.7)
PLATELET # BLD AUTO: 381 K/UL — SIGNIFICANT CHANGE UP (ref 150–400)
POTASSIUM SERPL-MCNC: 3.6 MMOL/L — SIGNIFICANT CHANGE UP (ref 3.5–5.3)
POTASSIUM SERPL-SCNC: 3.6 MMOL/L — SIGNIFICANT CHANGE UP (ref 3.5–5.3)
RBC # BLD: 4.71 M/UL — SIGNIFICANT CHANGE UP (ref 4.2–5.8)
RBC # FLD: 14.1 % — SIGNIFICANT CHANGE UP (ref 10.3–14.5)
SARS-COV-2 RNA SPEC QL NAA+PROBE: SIGNIFICANT CHANGE UP
SODIUM SERPL-SCNC: 140 MMOL/L — SIGNIFICANT CHANGE UP (ref 135–145)
WBC # BLD: 11.96 K/UL — HIGH (ref 3.8–10.5)
WBC # FLD AUTO: 11.96 K/UL — HIGH (ref 3.8–10.5)

## 2023-04-05 PROCEDURE — 99291 CRITICAL CARE FIRST HOUR: CPT

## 2023-04-05 PROCEDURE — 99223 1ST HOSP IP/OBS HIGH 75: CPT

## 2023-04-05 PROCEDURE — 71045 X-RAY EXAM CHEST 1 VIEW: CPT | Mod: 26

## 2023-04-05 PROCEDURE — 99232 SBSQ HOSP IP/OBS MODERATE 35: CPT

## 2023-04-05 PROCEDURE — 74018 RADEX ABDOMEN 1 VIEW: CPT | Mod: 26

## 2023-04-05 PROCEDURE — 99233 SBSQ HOSP IP/OBS HIGH 50: CPT | Mod: 24

## 2023-04-05 PROCEDURE — 71045 X-RAY EXAM CHEST 1 VIEW: CPT | Mod: 26,77

## 2023-04-05 RX ORDER — ASPIRIN/CALCIUM CARB/MAGNESIUM 324 MG
300 TABLET ORAL DAILY
Refills: 0 | Status: DISCONTINUED | OUTPATIENT
Start: 2023-04-05 | End: 2023-04-07

## 2023-04-05 RX ORDER — TRAZODONE HCL 50 MG
100 TABLET ORAL AT BEDTIME
Refills: 0 | Status: DISCONTINUED | OUTPATIENT
Start: 2023-04-05 | End: 2023-04-05

## 2023-04-05 RX ORDER — ONDANSETRON 8 MG/1
4 TABLET, FILM COATED ORAL ONCE
Refills: 0 | Status: COMPLETED | OUTPATIENT
Start: 2023-04-05 | End: 2023-04-05

## 2023-04-05 RX ORDER — POTASSIUM CHLORIDE 20 MEQ
40 PACKET (EA) ORAL ONCE
Refills: 0 | Status: COMPLETED | OUTPATIENT
Start: 2023-04-05 | End: 2023-04-05

## 2023-04-05 RX ORDER — PANTOPRAZOLE SODIUM 20 MG/1
40 TABLET, DELAYED RELEASE ORAL DAILY
Refills: 0 | Status: DISCONTINUED | OUTPATIENT
Start: 2023-04-05 | End: 2023-04-11

## 2023-04-05 RX ADMIN — ONDANSETRON 4 MILLIGRAM(S): 8 TABLET, FILM COATED ORAL at 08:15

## 2023-04-05 RX ADMIN — Medication 1 APPLICATION(S): at 17:10

## 2023-04-05 RX ADMIN — Medication 3 MILLILITER(S): at 03:56

## 2023-04-05 RX ADMIN — Medication 4 MILLILITER(S): at 03:55

## 2023-04-05 RX ADMIN — Medication 10 MILLIGRAM(S): at 05:03

## 2023-04-05 RX ADMIN — Medication 40 MILLIEQUIVALENT(S): at 05:32

## 2023-04-05 RX ADMIN — Medication 4 MILLILITER(S): at 21:28

## 2023-04-05 RX ADMIN — PANTOPRAZOLE SODIUM 40 MILLIGRAM(S): 20 TABLET, DELAYED RELEASE ORAL at 12:48

## 2023-04-05 RX ADMIN — Medication 1 DROP(S): at 05:38

## 2023-04-05 RX ADMIN — SODIUM CHLORIDE 2 GRAM(S): 9 INJECTION INTRAMUSCULAR; INTRAVENOUS; SUBCUTANEOUS at 05:33

## 2023-04-05 RX ADMIN — Medication 2: at 11:26

## 2023-04-05 RX ADMIN — Medication 1 DROP(S): at 17:09

## 2023-04-05 RX ADMIN — CHLORHEXIDINE GLUCONATE 1 APPLICATION(S): 213 SOLUTION TOPICAL at 05:33

## 2023-04-05 RX ADMIN — Medication 4 MILLILITER(S): at 13:56

## 2023-04-05 RX ADMIN — Medication 1 APPLICATION(S): at 05:32

## 2023-04-05 RX ADMIN — Medication 3 MILLILITER(S): at 07:28

## 2023-04-05 RX ADMIN — Medication 4 MILLILITER(S): at 07:28

## 2023-04-05 RX ADMIN — CANGRELOR 22.2 MICROGRAM(S)/KG/MIN: 50 INJECTION, POWDER, LYOPHILIZED, FOR SOLUTION INTRAVENOUS at 05:31

## 2023-04-05 RX ADMIN — PIPERACILLIN AND TAZOBACTAM 25 GRAM(S): 4; .5 INJECTION, POWDER, LYOPHILIZED, FOR SOLUTION INTRAVENOUS at 08:49

## 2023-04-05 RX ADMIN — Medication 10 MILLIGRAM(S): at 01:05

## 2023-04-05 RX ADMIN — CANGRELOR 22.2 MICROGRAM(S)/KG/MIN: 50 INJECTION, POWDER, LYOPHILIZED, FOR SOLUTION INTRAVENOUS at 13:35

## 2023-04-05 RX ADMIN — Medication 2: at 05:31

## 2023-04-05 RX ADMIN — Medication 3 MILLILITER(S): at 13:56

## 2023-04-05 RX ADMIN — PIPERACILLIN AND TAZOBACTAM 25 GRAM(S): 4; .5 INJECTION, POWDER, LYOPHILIZED, FOR SOLUTION INTRAVENOUS at 17:08

## 2023-04-05 RX ADMIN — Medication 300 MILLIGRAM(S): at 17:09

## 2023-04-05 RX ADMIN — Medication 3 MILLILITER(S): at 21:29

## 2023-04-05 NOTE — CONSULT NOTE ADULT - SUBJECTIVE AND OBJECTIVE BOX
HISTORY OF PRESENT ILLNESS: 58y Male with PMH HTN and DM transferred from [facility] for thrombectomy due to right ICA occlusion with tandem right MCA occlusion. GI consulted after failed swallow evaluation for PEG tube placement. At the time of my evaluation patient is lethargic, open eyes to repeated verbal stimuli, falls asleep during conversation. Tube feed in progress via NGT. He denies abdominal pain, nausea, vomiting. Abdomen softly distended. Cangrelor infusion in progress. According to Neuro ICU nurse, patient had increased aspiration of tube feeds overnight.         Review of Systems:  Limited due to medical condition.     PAST MEDICAL/SURGICAL HISTORY:    SOCIAL HISTORY:  - TOBACCO: Denies  - ALCOHOL: Denies  - ILLICIT DRUG USE: Denies    FAMILY HISTORY:  No known history of gastrointestinal or liver disease;      HOME MEDICATIONS:    INPATIENT MEDICATIONS:  MEDICATIONS  (STANDING):  acetylcysteine 20%  Inhalation 4 milliLiter(s) Inhalation every 6 hours  albuterol/ipratropium for Nebulization 3 milliLiter(s) Nebulizer every 6 hours  artificial  tears Solution 1 Drop(s) Both EYES two times a day  aspirin  chewable 81 milliGRAM(s) Oral daily  atorvastatin 80 milliGRAM(s) Oral at bedtime  cangrelor Infusion 1 MICROgram(s)/kG/Min (22.2 mL/Hr) IV Continuous <Continuous>  chlorhexidine 2% Cloths 1 Application(s) Topical daily  dextrose 5%. 1000 milliLiter(s) (100 mL/Hr) IV Continuous <Continuous>  dextrose 5%. 1000 milliLiter(s) (50 mL/Hr) IV Continuous <Continuous>  dextrose 50% Injectable 25 Gram(s) IV Push once  dextrose 50% Injectable 12.5 Gram(s) IV Push once  dextrose 50% Injectable 25 Gram(s) IV Push once  glucagon  Injectable 1 milliGRAM(s) IntraMuscular once  insulin lispro (ADMELOG) corrective regimen sliding scale   SubCutaneous every 6 hours  melatonin 5 milliGRAM(s) Oral at bedtime  pantoprazole   Suspension 40 milliGRAM(s) Oral daily  petrolatum Ophthalmic Ointment 1 Application(s) Right EYE every 12 hours  piperacillin/tazobactam IVPB.. 3.375 Gram(s) IV Intermittent every 8 hours  polyethylene glycol 3350 17 Gram(s) Oral daily  senna Syrup 10 milliLiter(s) Oral at bedtime  sodium chloride 2 Gram(s) Oral every 6 hours  traZODone 50 milliGRAM(s) Oral at bedtime    MEDICATIONS  (PRN):  acetaminophen     Tablet .. 975 milliGRAM(s) Oral every 6 hours PRN Temp greater or equal to 38C (100.4F), Mild Pain (1 - 3)  bisacodyl Suppository 10 milliGRAM(s) Rectal daily PRN Constipation  dextrose Oral Gel 15 Gram(s) Oral once PRN Blood Glucose LESS THAN 70 milliGRAM(s)/deciliter  hydrALAZINE Injectable 10 milliGRAM(s) IV Push every 2 hours PRN SBP >140  labetalol Injectable 10 milliGRAM(s) IV Push every 2 hours PRN Systolic blood pressure > 140    ALLERGIES:  No Known Allergies    T(C): 36.9 (04-05-23 @ 08:02), Max: 37 (04-05-23 @ 04:25)  HR: 93 (04-05-23 @ 10:00) (77 - 109)  BP: 114/70 (04-05-23 @ 10:00) (108/82 - 152/81)  RR: 26 (04-05-23 @ 10:00) (20 - 36)  SpO2: 98% (04-05-23 @ 10:00) (94% - 100%)    04-04-23 @ 07:01  -  04-05-23 @ 07:00  --------------------------------------------------------  IN: 2527.4 mL / OUT: 1400 mL / NET: 1127.4 mL    04-05-23 @ 07:01  -  04-05-23 @ 10:45  --------------------------------------------------------  IN: 116.6 mL / OUT: 0 mL / NET: 116.6 mL        PHYSICAL EXAM:    Constitutional: Lethargic  Neuro: Lethargic, open eyes to repeated verbal stimuli  HEENT: anicteric sclerae  Neck: supple, no JVD  CV: regular rate, regular rhythm  Pulm/chest: rhonchi bilaterally, no accessory muscle use noted  Abd: softly distended. nontender,  +bowel sounds. No rigidity, rebound tenderness, or guarding  Ext: no Cyanosis, clubbing or edema  Skin: warm, no jaundice   Psych: cooperative      LABS:             12.6   11.96 )-----------( 381      ( 04-05 @ 03:23 )             38.1                13.1   11.97 )-----------( 312      ( 04-04 @ 06:03 )             38.1                11.8   7.48  )-----------( 255      ( 04-03 @ 05:37 )             35.5         04-05    140  |  107  |  13.8  ----------------------------<  140<H>  3.6   |  19.0<L>  |  0.56    Ca    8.4      05 Apr 2023 03:23  Phos  3.7     04-05  Mg     2.4     04-05        Ferritin, Serum: 221 ng/mL (04-01-23 @ 11:15)       HISTORY OF PRESENT ILLNESS: 58y Male with PMH HTN and DM transferred from [facility] for thrombectomy due to right ICA occlusion with tandem right MCA occlusion. Patient had thrombectomy. Conversion to hemorrhagic CVA.  INtubated, now extubated. ON 2 liters O2.  GI consulted after failed swallow evaluation for PEG tube placement. At the time of my evaluation patient is lethargic, open eyes to repeated verbal stimuli, falls asleep during conversation. Tube feed in progress via NGT. He denies abdominal pain, nausea, vomiting. Abdomen softly distended. Cangrelor infusion in progress.      Review of Systems:  Limited due to medical condition.     PAST MEDICAL/SURGICAL HISTORY:    SOCIAL HISTORY:  - TOBACCO: Denies  - ALCOHOL: Denies  - ILLICIT DRUG USE: Denies    FAMILY HISTORY:  No known history of gastrointestinal or liver disease;      HOME MEDICATIONS:    INPATIENT MEDICATIONS:  MEDICATIONS  (STANDING):  acetylcysteine 20%  Inhalation 4 milliLiter(s) Inhalation every 6 hours  albuterol/ipratropium for Nebulization 3 milliLiter(s) Nebulizer every 6 hours  artificial  tears Solution 1 Drop(s) Both EYES two times a day  aspirin  chewable 81 milliGRAM(s) Oral daily  atorvastatin 80 milliGRAM(s) Oral at bedtime  cangrelor Infusion 1 MICROgram(s)/kG/Min (22.2 mL/Hr) IV Continuous <Continuous>  chlorhexidine 2% Cloths 1 Application(s) Topical daily  dextrose 5%. 1000 milliLiter(s) (100 mL/Hr) IV Continuous <Continuous>  dextrose 5%. 1000 milliLiter(s) (50 mL/Hr) IV Continuous <Continuous>  dextrose 50% Injectable 25 Gram(s) IV Push once  dextrose 50% Injectable 12.5 Gram(s) IV Push once  dextrose 50% Injectable 25 Gram(s) IV Push once  glucagon  Injectable 1 milliGRAM(s) IntraMuscular once  insulin lispro (ADMELOG) corrective regimen sliding scale   SubCutaneous every 6 hours  melatonin 5 milliGRAM(s) Oral at bedtime  pantoprazole   Suspension 40 milliGRAM(s) Oral daily  petrolatum Ophthalmic Ointment 1 Application(s) Right EYE every 12 hours  piperacillin/tazobactam IVPB.. 3.375 Gram(s) IV Intermittent every 8 hours  polyethylene glycol 3350 17 Gram(s) Oral daily  senna Syrup 10 milliLiter(s) Oral at bedtime  sodium chloride 2 Gram(s) Oral every 6 hours  traZODone 50 milliGRAM(s) Oral at bedtime    MEDICATIONS  (PRN):  acetaminophen     Tablet .. 975 milliGRAM(s) Oral every 6 hours PRN Temp greater or equal to 38C (100.4F), Mild Pain (1 - 3)  bisacodyl Suppository 10 milliGRAM(s) Rectal daily PRN Constipation  dextrose Oral Gel 15 Gram(s) Oral once PRN Blood Glucose LESS THAN 70 milliGRAM(s)/deciliter  hydrALAZINE Injectable 10 milliGRAM(s) IV Push every 2 hours PRN SBP >140  labetalol Injectable 10 milliGRAM(s) IV Push every 2 hours PRN Systolic blood pressure > 140    ALLERGIES:  No Known Allergies    T(C): 36.9 (04-05-23 @ 08:02), Max: 37 (04-05-23 @ 04:25)  HR: 93 (04-05-23 @ 10:00) (77 - 109)  BP: 114/70 (04-05-23 @ 10:00) (108/82 - 152/81)  RR: 26 (04-05-23 @ 10:00) (20 - 36)  SpO2: 98% (04-05-23 @ 10:00) (94% - 100%)    04-04-23 @ 07:01  -  04-05-23 @ 07:00  --------------------------------------------------------  IN: 2527.4 mL / OUT: 1400 mL / NET: 1127.4 mL    04-05-23 @ 07:01  -  04-05-23 @ 10:45  --------------------------------------------------------  IN: 116.6 mL / OUT: 0 mL / NET: 116.6 mL        PHYSICAL EXAM:    Constitutional: Lethargic  Neuro: Lethargic, open eyes to repeated verbal stimuli  HEENT: anicteric sclerae  Neck: supple, no JVD  CV: regular rate, regular rhythm  Pulm/chest: rhonchi bilaterally, no accessory muscle use noted  Abd: softly distended. nontender,  +bowel sounds. No rigidity, rebound tenderness, or guarding  Ext: no Cyanosis, clubbing or edema  Skin: warm, no jaundice   Psych: cooperative      LABS:             12.6   11.96 )-----------( 381      ( 04-05 @ 03:23 )             38.1                13.1   11.97 )-----------( 312      ( 04-04 @ 06:03 )             38.1                11.8   7.48  )-----------( 255      ( 04-03 @ 05:37 )             35.5         04-05    140  |  107  |  13.8  ----------------------------<  140<H>  3.6   |  19.0<L>  |  0.56    Ca    8.4      05 Apr 2023 03:23  Phos  3.7     04-05  Mg     2.4     04-05        Ferritin, Serum: 221 ng/mL (04-01-23 @ 11:15)

## 2023-04-05 NOTE — DIETITIAN NUTRITION RISK NOTIFICATION - TREATMENT: THE FOLLOWING DIET HAS BEEN RECOMMENDED
Diet, NPO with Tube Feed:   Tube Feeding Modality: Nasogastric  Glucerna 1.5 Gene (GLUCERNA1.5)  Total Volume for 24 Hours (mL): 1200  Continuous  Starting Tube Feed Rate {mL per Hour}: 10  Increase Tube Feed Rate by (mL): 10     Every 4 hours  Until Goal Tube Feed Rate (mL per Hour): 50  Tube Feed Duration (in Hours): 24  Tube Feed Start Time: 12:45 (04-04-23 @ 17:31) [Active]

## 2023-04-05 NOTE — PROGRESS NOTE ADULT - SUBJECTIVE AND OBJECTIVE BOX
Patient is a 58y old  Male who presents with a chief complaint of Stroke (05 Apr 2023 07:58)    HPI:  57 y/o male with PMHx of HTN and DM transfered from [facility] to [facility] as a code stroke. [facility] EMS reports pt BLAYNE was last night around 20:00 and was c/o right arm pain, today his family found his around 05:00 with left sided hemiparesis, slurred speech and was incontinent of urine, they called EMS as pt arrived at [facility] at 05:56, he was found to have right ICA occlusion and transferred to [facility], code stroke called upon arrival. Pt was on Cardene however per EMS neuro wanted pressure around 200 and it was d/monet, pt sedated with propofol. (29 Mar 2023 09:40)      Interval history:  Patient seen and examined by neuro IR team. Patient reports that he feels well, but patient had episode of vomiting this am. General surgery consulted for possible G tube.       Vital Signs Last 24 Hrs  T(C): 36.9 (05 Apr 2023 08:02), Max: 37 (05 Apr 2023 04:25)  T(F): 98.4 (05 Apr 2023 08:02), Max: 98.6 (05 Apr 2023 04:25)  HR: 93 (05 Apr 2023 10:00) (77 - 109)  BP: 114/70 (05 Apr 2023 10:00) (108/82 - 152/81)  BP(mean): 81 (05 Apr 2023 10:00) (81 - 109)  RR: 26 (05 Apr 2023 10:00) (20 - 36)  SpO2: 98% (05 Apr 2023 10:00) (94% - 100%)    Parameters below as of 05 Apr 2023 08:00  Patient On (Oxygen Delivery Method): nasal cannula w/ humidification  O2 Flow (L/min): 2      Physical Exam:  Constitutional: NAD, lying in bed  Neuro  * Mental Status: EO to voice, following commands, A&O x3 (name, place, month), no aphasia, + dysarthria   * Cranial Nerves: L pupil 3mm brisk, R pupil 4mm sluggish, dysconjugate gaze, L facial droop  * Motor: RUE 5/5, LUE 2-3/5, RLE 5/5, LLE 3/5, + drift LUE/LLE   * Sensory: Sensation grossly intact to noxious stimuli   * Reflexes: not assessed       LABS:                        12.6   11.96 )-----------( 381      ( 05 Apr 2023 03:23 )             38.1     04-05    140  |  107  |  13.8  ----------------------------<  140<H>  3.6   |  19.0<L>  |  0.56    Ca    8.4      05 Apr 2023 03:23  Phos  3.7     04-05  Mg     2.4     04-05    CULTURES:  Culture Results:   No growth to date. (04-01 @ 17:25)  Culture Results:   No growth to date. (04-01 @ 17:25)      Medications:  MEDICATIONS  (STANDING):  acetylcysteine 20%  Inhalation 4 milliLiter(s) Inhalation every 6 hours  albuterol/ipratropium for Nebulization 3 milliLiter(s) Nebulizer every 6 hours  artificial  tears Solution 1 Drop(s) Both EYES two times a day  aspirin  chewable 81 milliGRAM(s) Oral daily  atorvastatin 80 milliGRAM(s) Oral at bedtime  cangrelor Infusion 1 MICROgram(s)/kG/Min (22.2 mL/Hr) IV Continuous <Continuous>  chlorhexidine 2% Cloths 1 Application(s) Topical daily  dextrose 5%. 1000 milliLiter(s) (100 mL/Hr) IV Continuous <Continuous>  dextrose 5%. 1000 milliLiter(s) (50 mL/Hr) IV Continuous <Continuous>  dextrose 50% Injectable 25 Gram(s) IV Push once  dextrose 50% Injectable 12.5 Gram(s) IV Push once  dextrose 50% Injectable 25 Gram(s) IV Push once  glucagon  Injectable 1 milliGRAM(s) IntraMuscular once  insulin lispro (ADMELOG) corrective regimen sliding scale   SubCutaneous every 6 hours  melatonin 5 milliGRAM(s) Oral at bedtime  pantoprazole   Suspension 40 milliGRAM(s) Oral daily  petrolatum Ophthalmic Ointment 1 Application(s) Right EYE every 12 hours  piperacillin/tazobactam IVPB.. 3.375 Gram(s) IV Intermittent every 8 hours  polyethylene glycol 3350 17 Gram(s) Oral daily  senna Syrup 10 milliLiter(s) Oral at bedtime  sodium chloride 2 Gram(s) Oral every 6 hours  traZODone 50 milliGRAM(s) Oral at bedtime    MEDICATIONS  (PRN):  acetaminophen     Tablet .. 975 milliGRAM(s) Oral every 6 hours PRN Temp greater or equal to 38C (100.4F), Mild Pain (1 - 3)  bisacodyl Suppository 10 milliGRAM(s) Rectal daily PRN Constipation  dextrose Oral Gel 15 Gram(s) Oral once PRN Blood Glucose LESS THAN 70 milliGRAM(s)/deciliter  hydrALAZINE Injectable 10 milliGRAM(s) IV Push every 2 hours PRN SBP >140  labetalol Injectable 10 milliGRAM(s) IV Push every 2 hours PRN Systolic blood pressure > 140      RADIOLOGY & ADDITIONAL STUDIES:  No new neuro IR imaging to review

## 2023-04-05 NOTE — CONSULT NOTE ADULT - ASSESSMENT
59 yo M s/p stroke with R ICA stent on DAPT with failed s/s.  NSx requesting open G-tube placement, as GI refused.    -Tentatively schedule for friday, will need to hold cangelor prior surgery  -Patient noted to have ileus this morning, recommend to hold TF, NGT to suction, optimize electrolytes and mobilize the patient  -Pre-op for friday  -Please document clearance for his surgery 57 yo M s/p stroke with R ICA stent on DAPT with failed s/s.  NSx requesting open G-tube placement, as GI unable to perform PEG.     -Patient noted to have ileus this morning, recommend to hold TF, NGT to suction, optimize electrolytes and mobilize the patient  -Will hold of on surgical intervention until ileus resolve, as the surgery is nor emergent and this would carry higher complication rates  -Please call surgery team when ileus has resolved and patient is tolerating enteral diet prior placing permanent enteral feeding tube.

## 2023-04-05 NOTE — PROGRESS NOTE ADULT - ASSESSMENT
INCOMPLETE  ASSESSMENT: 58y Male with PMH HTN and DM, found by his family 0500 AM on 3/29/23 with right hemiparesis, slurred speech, and incontinent of urine.  Last well known 2000  on 3/28/23. Patient brought in by EMS to the [facility] at 0556, was noted not to be a candidate for Tenecteplase as patient out of time window. 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 NIHSS 28 MRS pre - 0 . Etiology likely large artery atherosclerotic disease. Post mechanical thrombectomy TICI 2B and Right carotid stent with angioplasty.     NEURO: neurologically overall improving vs. admission, noted overnight with episodes intermittenly of restlessness, CT head obtained without acute change, stable cerebral edema with mass effect and brain compression, Continue close monitoring for neurologic deterioration, with stroke checks per ICU,  Na goal gradually of 140-145 for now- avoid hyponatremia and rapid fluctuations, BMP as per protocol for adequate titration, permissive HTN as per post thrombectomy recommendations 110-140mmHg in setting of recent revascularization/hemorrhagic transformation as to avoid hypo/hyperperfusion injury. Titrate statin to LDL goal less than 70, EEG prelim 4/3/23 without evidence of seizure. Carotid duplex as noted- close monitoring of left ICA stenosis for eventual consideration in elective revascularization.   - MRI Brain w/o pending  - Physical therapy/OT/Speech eval/treatment.       ANTITHROMBOTIC THERAPY: ASA 81mg daily, cangrelor gtt post stenting- transition to PO agent (Brilinta) pending clinical course/long term enteral access and stable repeat imaging. P2y12:.     PULMONARY:  extubated, care per ICU, aspiration precautions, incentive spirometry as tolerated     CARDIOVASCULAR: cardiac monitoring to screen for atrial fibrillation,     GASTROINTESTINAL: dysphagia screen flailed SLP eval     Diet: NPO/NGT with Glucerna as per ICU    RENAL: BUN/Cr 13.8/0.56, maintain adequate hydration,  monitor urine output  , supplement for hypokalemia per ICU team      Na Goal:  >140     Quispe: Y    HEMATOLOGY: H/H 12.6/38.1, monitor for si/sx of bleeding, Platelets 312. Patient should have all age and risk appropriate malignancy screening.      DVT ppx      ID: previously febrile., leukocytosis . Continues on zosyn for noted 7 day course , aspiration precautions     OTHER:  , A1C 7.3- continue glycemic control    DISPOSITION: Rehab or home depending on PT eval once stable and workup is complete    CORE MEASURES:        Admission NIHSS: 28     TPA: [] YES [x] NO      LDL/HDL/A1C: 165/58/7.3     Depression Screen: pending     Statin Therapy: pending     Dysphagia Screen: [] PASS [x] FAIL       Smoking [] YES [x] NO      Afib [] YES [x] NO     Stroke Education [x] YES [] NO       ASSESSMENT: 58y Male with PMH HTN and DM, found by his family 0500 AM on 3/29/23 with right hemiparesis, slurred speech, and incontinent of urine.  Last well known 2000  on 3/28/23. Patient brought in by EMS to the [facility] at 0556, was noted not to be a candidate for Tenecteplase as patient out of time window. 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 NIHSS 28 MRS pre - 0 . Etiology likely large artery atherosclerotic disease. Post mechanical thrombectomy TICI 2B and Right carotid stent with angioplasty.     NEURO: neurologically overall improving, CT head 4/4/23  without acute change, stable cerebral edema with mass effect and brain compression. Continue close monitoring for neurologic deterioration, with stroke checks per ICU,  Na goal gradually of 140-145 for now- avoid hyponatremia and rapid fluctuations, BMP as per protocol for adequate titration, permissive HTN as per post thrombectomy recommendations 110-140mmHg in setting of recent revascularization/hemorrhagic transformation as to avoid hypo/hyperperfusion injury. Titrate statin to LDL goal less than 70, EEG without evidence of seizure. Carotid duplex as noted- close monitoring of left ICA stenosis for eventual consideration in elective revascularization.     - MRI Brain w/o pending  - Physical therapy/OT/Speech eval/treatment.       ANTITHROMBOTIC THERAPY: ASA 81mg daily, cangrelor gtt post stenting- transition to PO agent (Brilinta) pending clinical course/long term enteral access and  P2y12:.     PULMONARY:  care per ICU, aspiration precautions, incentive spirometry as tolerated     CARDIOVASCULAR: cardiac monitoring to screen for atrial fibrillation,     GASTROINTESTINAL: dysphagia screen flailed, PEG placement scheduled 4/5/23 , SLP eval once appropriate. Nutritionist consult appreciated     Diet: NPO/ patient now with small bowel obstruction on abdominal xray. NGT, GI consult    RENAL: BUN/Cr 13.8/0.56, maintain adequate hydration,  monitor urine output, hypokalemia resolved, trend daily      Na Goal:  >140     Quispe: Y    HEMATOLOGY: H/H 12.6/38.1, monitor for si/sx of bleeding, Platelets 381. Patient should have all age and risk appropriate malignancy screening.      DVT ppx      ID: previously febrile., leukocytosis . Continues on zosyn for noted 7 day course (complete on 3/6/23), aspiration precautions     OTHER:   A1C 7.3- continue glycemic control    DISPOSITION: Rehab or home depending on PT eval once stable and workup is complete    CORE MEASURES:        Admission NIHSS: 28     TPA: [] YES [x] NO      LDL/HDL/A1C: 165/58/7.3     Depression Screen: pending     Statin Therapy: pending     Dysphagia Screen: [] PASS [x] FAIL       Smoking [] YES [x] NO      Afib [] YES [x] NO     Stroke Education [x] YES [] NO

## 2023-04-05 NOTE — DIETITIAN NUTRITION RISK NOTIFICATION - ADDITIONAL COMMENTS/DIETITIAN RECOMMENDATIONS
Consider re-starting enteral feeds of Pivot (semi-elemental) formula @ 20ml/hr; increase by 10ml every 6 hours as tolerated to goal rate 50ml/hr (x 24 hours) TOTAL VOLUME = 1200ml/day; 1800kcal, 113gm protein

## 2023-04-05 NOTE — PROGRESS NOTE ADULT - SUBJECTIVE AND OBJECTIVE BOX
Preliminary note, official recommendations pending attending review/signature                                [facility] Stroke Team  Progress Note     INCOMPLETE    HPI:  57 y/o male with PMHx of HTN and DM transfered from [facility] to [facility] as a code stroke. [facility] EMS reports pt LKW was  night prior to admission around 20:00 and was c/o right arm pain, day of admission his family found his around 05:00 with left sided hemiparesis, slurred speech and was incontinent of urine, they called EMS as pt arrived at [facility] at 05:56, he was found to have right ICA occlusion and transferred to [facility], code stroke called upon arrival. Transferred to  for mechanical thrombectomy.    SUBJECTIVE: No events overnight.  No new neurologic complaints.  ROS reported negative unless otherwise noted.    acetaminophen     Tablet .. 975 milliGRAM(s) Oral every 6 hours PRN  acetylcysteine 20%  Inhalation 4 milliLiter(s) Inhalation every 6 hours  albuterol/ipratropium for Nebulization 3 milliLiter(s) Nebulizer every 6 hours  artificial  tears Solution 1 Drop(s) Both EYES two times a day  aspirin  chewable 81 milliGRAM(s) Oral daily  atorvastatin 80 milliGRAM(s) Oral at bedtime  bisacodyl Suppository 10 milliGRAM(s) Rectal daily PRN  cangrelor Infusion 1 MICROgram(s)/kG/Min IV Continuous <Continuous>  chlorhexidine 2% Cloths 1 Application(s) Topical daily  dextrose 5%. 1000 milliLiter(s) IV Continuous <Continuous>  dextrose 5%. 1000 milliLiter(s) IV Continuous <Continuous>  dextrose 50% Injectable 25 Gram(s) IV Push once  dextrose 50% Injectable 12.5 Gram(s) IV Push once  dextrose 50% Injectable 25 Gram(s) IV Push once  dextrose Oral Gel 15 Gram(s) Oral once PRN  glucagon  Injectable 1 milliGRAM(s) IntraMuscular once  hydrALAZINE Injectable 10 milliGRAM(s) IV Push every 2 hours PRN  insulin lispro (ADMELOG) corrective regimen sliding scale   SubCutaneous every 6 hours  labetalol Injectable 10 milliGRAM(s) IV Push every 2 hours PRN  melatonin 5 milliGRAM(s) Oral at bedtime  pantoprazole   Suspension 40 milliGRAM(s) Oral daily  petrolatum Ophthalmic Ointment 1 Application(s) Right EYE every 12 hours  piperacillin/tazobactam IVPB.. 3.375 Gram(s) IV Intermittent every 8 hours  polyethylene glycol 3350 17 Gram(s) Oral daily  senna Syrup 10 milliLiter(s) Oral at bedtime  sodium chloride 2 Gram(s) Oral every 6 hours  traZODone 50 milliGRAM(s) Oral at bedtime      PHYSICAL EXAM:   Vital Signs Last 24 Hrs  T(C): 37 (05 Apr 2023 04:25), Max: 37 (05 Apr 2023 04:25)  T(F): 98.6 (05 Apr 2023 04:25), Max: 98.6 (05 Apr 2023 04:25)  HR: 99 (05 Apr 2023 07:29) (72 - 109)  BP: 128/90 (05 Apr 2023 07:00) (108/82 - 152/81)  BP(mean): 102 (05 Apr 2023 07:00) (86 - 109)  RR: 30 (05 Apr 2023 07:00) (20 - 36)  SpO2: 98% (05 Apr 2023 07:29) (94% - 100%)    Parameters below as of 05 Apr 2023 07:29  Patient On (Oxygen Delivery Method): nasal cannula      INCOMPLETE  General: No acute distress    NEUROLOGICAL EXAM:  Mental status: awake, alert, oriented to self, place,  able to ID pen,  follow simple commands and repeat   CN: right eye slightly exodeviated, able to count fingers in both visual fields, slight left gaze palsy,  left facial palsy  motor: moves right UE/LE 5/5  Moves left UE 1/5, LLE moves  against gravity but falls to bed in < 5 sec    Sensory:  left sensory extinction   gait deferred        LABS:                        12.6   11.96 )-----------( 381      ( 05 Apr 2023 03:23 )             38.1    04-05    140  |  107  |  13.8  ----------------------------<  140<H>  3.6   |  19.0<L>  |  0.56    Ca    8.4      05 Apr 2023 03:23  Phos  3.7     04-05  Mg     2.4     04-05          IMAGING: Reviewed by me.     CT Head No Cont (04.04.23 @ 07:59)   IMPRESSION: Stable acute/subacute large right MCA territory infarction   with stable hemorrhagic transformation in the right basal ganglia and   right temporal lobe. Stable mass effect and right-to-left midline shift   measuring 6.7 mm. No new hemorrhage. Moderate adjacent cytotoxic edema,   unchanged.    CT Head No Cont (04.02.23 @ 08:08)   Unchanged hemorrhagic RIGHT MCA infarction with effacement   of the RIGHT lateral ventricle and 6 mm subfalcine herniation to the LEFT.    US Duplex Carotid Arteries Complete, Bilateral (04.01.23 @ 22:21)   IMPRESSION: No significant hemodynamic stenosis of the right internal   carotid artery.  Patent right ICA stent.  Over 70% left internal carotid artery stenosis.  Bilateral external carotid artery stenosis.    CT Head No Cont (03.30.23 @ 03:40)   Compared with 3/29/2023 there is decreasing retained contrast   in the right basal ganglia and right frontal parietal cortex with   evolving lucency consistent with an acute right artery infarct. Residual   high density although decreased compared to the prior exam may represent   retained contrast as well as a small amount of hemorrhage. No increased   hemorrhage identified.    CT BRAIN STROKE PROTOCOL   03/29/2023    IMPRESSION: Dense right MCA sign with early right temporal lobe   infarction. No acute intracranial hemorrhage.    CT PERFUSION W MAPS IC    03/29/2023    CBF<30% volume: 72 ml right MCA territory.  Tmax>6.0 s volume: 180 ml  Mismatch volume: 108 ml  Mismatch ratio: 2.5    TTE  3/29/2023  Left ventricular ejection fraction, by visual estimation, is >75%. Technically difficult study. Hyperdynamic global left ventricular systolic function. The left ventricular diastolic function could not be assessed in this study. There is mild concentric left ventricular hypertrophy. There is no evidence of pericardial effusion. Mild mitral annular calcification. Trace mitral valve regurgitation.    EEG:  EEG Classification / Summary:  Abnormal EEG in the awake, drowsy, and asleep states due to:  1. Continuous, right hemispheric focal polymorphic delta activity  2. Asymmetry and attenuation of sleep spindles from the right hemisphere  Clinical Impression:  This is an abnormal study indicative of a focal cerebral dysfunction in the right hemisphere, consistent with history of R MCA infarction. No epileptiform abnormalities or seizures were captured.           Preliminary note, official recommendations pending attending review/signature                                [facility] Stroke Team  Progress Note       HPI:  57 y/o male with PMHx of HTN and DM transfered from [facility] to [facility] as a code stroke. [facility] EMS reports pt LKW was  night prior to admission around 20:00 and was c/o right arm pain, day of admission his family found his around 05:00 with left sided hemiparesis, slurred speech and was incontinent of urine, they called EMS as pt arrived at [facility] at 05:56, he was found to have right ICA occlusion and transferred to [facility], code stroke called upon arrival. Transferred to  for mechanical thrombectomy.    SUBJECTIVE: No events overnight.  No new neurologic complaints.  ROS reported negative unless otherwise noted.    acetaminophen     Tablet .. 975 milliGRAM(s) Oral every 6 hours PRN  acetylcysteine 20%  Inhalation 4 milliLiter(s) Inhalation every 6 hours  albuterol/ipratropium for Nebulization 3 milliLiter(s) Nebulizer every 6 hours  artificial  tears Solution 1 Drop(s) Both EYES two times a day  aspirin  chewable 81 milliGRAM(s) Oral daily  atorvastatin 80 milliGRAM(s) Oral at bedtime  bisacodyl Suppository 10 milliGRAM(s) Rectal daily PRN  cangrelor Infusion 1 MICROgram(s)/kG/Min IV Continuous <Continuous>  chlorhexidine 2% Cloths 1 Application(s) Topical daily  dextrose 5%. 1000 milliLiter(s) IV Continuous <Continuous>  dextrose 5%. 1000 milliLiter(s) IV Continuous <Continuous>  dextrose 50% Injectable 25 Gram(s) IV Push once  dextrose 50% Injectable 12.5 Gram(s) IV Push once  dextrose 50% Injectable 25 Gram(s) IV Push once  dextrose Oral Gel 15 Gram(s) Oral once PRN  glucagon  Injectable 1 milliGRAM(s) IntraMuscular once  hydrALAZINE Injectable 10 milliGRAM(s) IV Push every 2 hours PRN  insulin lispro (ADMELOG) corrective regimen sliding scale   SubCutaneous every 6 hours  labetalol Injectable 10 milliGRAM(s) IV Push every 2 hours PRN  melatonin 5 milliGRAM(s) Oral at bedtime  pantoprazole   Suspension 40 milliGRAM(s) Oral daily  petrolatum Ophthalmic Ointment 1 Application(s) Right EYE every 12 hours  piperacillin/tazobactam IVPB.. 3.375 Gram(s) IV Intermittent every 8 hours  polyethylene glycol 3350 17 Gram(s) Oral daily  senna Syrup 10 milliLiter(s) Oral at bedtime  sodium chloride 2 Gram(s) Oral every 6 hours  traZODone 50 milliGRAM(s) Oral at bedtime      PHYSICAL EXAM:   Vital Signs Last 24 Hrs  T(C): 37 (05 Apr 2023 04:25), Max: 37 (05 Apr 2023 04:25)  T(F): 98.6 (05 Apr 2023 04:25), Max: 98.6 (05 Apr 2023 04:25)  HR: 99 (05 Apr 2023 07:29) (72 - 109)  BP: 128/90 (05 Apr 2023 07:00) (108/82 - 152/81)  BP(mean): 102 (05 Apr 2023 07:00) (86 - 109)  RR: 30 (05 Apr 2023 07:00) (20 - 36)  SpO2: 98% (05 Apr 2023 07:29) (94% - 100%)    Parameters below as of 05 Apr 2023 07:29  Patient On (Oxygen Delivery Method): nasal cannula      General: No acute distress, sitting on a chair, participating in exam.    NEUROLOGICAL EXAM:    Mental status: awake, alert, oriented to self, place, dates, and situation.  Able to ID pen and  glove. Follows simple commands and repeats phrases.     CN: ARTEMIO 3--->2, brisk,  able to count fingers in both visual fields, slight left gaze palsy,  left facial palsy    Motor: moves right UE/LE 5/5  Moves left UE 2/5, LLE moves  against gravity but falls to bed in < 5 sec      Sensory:  left sensory extinction     Gait: deferred        LABS:                        12.6   11.96 )-----------( 381      ( 05 Apr 2023 03:23 )             38.1    04-05    140  |  107  |  13.8  ----------------------------<  140<H>  3.6   |  19.0<L>  |  0.56    Ca    8.4      05 Apr 2023 03:23  Phos  3.7     04-05  Mg     2.4     04-05          IMAGING: Reviewed by me.     CT Head No Cont (04.04.23 @ 07:59)   IMPRESSION: Stable acute/subacute large right MCA territory infarction   with stable hemorrhagic transformation in the right basal ganglia and   right temporal lobe. Stable mass effect and right-to-left midline shift   measuring 6.7 mm. No new hemorrhage. Moderate adjacent cytotoxic edema,   unchanged.    CT Head No Cont (04.02.23 @ 08:08)   Unchanged hemorrhagic RIGHT MCA infarction with effacement   of the RIGHT lateral ventricle and 6 mm subfalcine herniation to the LEFT.    US Duplex Carotid Arteries Complete, Bilateral (04.01.23 @ 22:21)   IMPRESSION: No significant hemodynamic stenosis of the right internal   carotid artery.  Patent right ICA stent.  Over 70% left internal carotid artery stenosis.  Bilateral external carotid artery stenosis.    CT Head No Cont (03.30.23 @ 03:40)   Compared with 3/29/2023 there is decreasing retained contrast   in the right basal ganglia and right frontal parietal cortex with   evolving lucency consistent with an acute right artery infarct. Residual   high density although decreased compared to the prior exam may represent   retained contrast as well as a small amount of hemorrhage. No increased   hemorrhage identified.    CT BRAIN STROKE PROTOCOL   03/29/2023    IMPRESSION: Dense right MCA sign with early right temporal lobe   infarction. No acute intracranial hemorrhage.    CT PERFUSION W MAPS IC    03/29/2023    CBF<30% volume: 72 ml right MCA territory.  Tmax>6.0 s volume: 180 ml  Mismatch volume: 108 ml  Mismatch ratio: 2.5    TTE  3/29/2023  Left ventricular ejection fraction, by visual estimation, is >75%. Technically difficult study. Hyperdynamic global left ventricular systolic function. The left ventricular diastolic function could not be assessed in this study. There is mild concentric left ventricular hypertrophy. There is no evidence of pericardial effusion. Mild mitral annular calcification. Trace mitral valve regurgitation.    EEG:  EEG Classification / Summary:  Abnormal EEG in the awake, drowsy, and asleep states due to:  1. Continuous, right hemispheric focal polymorphic delta activity  2. Asymmetry and attenuation of sleep spindles from the right hemisphere  Clinical Impression:  This is an abnormal study indicative of a focal cerebral dysfunction in the right hemisphere, consistent with history of R MCA infarction. No epileptiform abnormalities or seizures were captured.                                          [facility] Stroke Team  Progress Note       HPI:  57 y/o male with PMHx of HTN and DM transfered from [facility] to [facility] as a code stroke. [facility] EMS reports pt LKW was  night prior to admission around 20:00 and was c/o right arm pain, day of admission his family found his around 05:00 with left sided hemiparesis, slurred speech and was incontinent of urine, they called EMS as pt arrived at [facility] at 05:56, he was found to have right ICA occlusion and transferred to [facility], code stroke called upon arrival. Transferred to  for mechanical thrombectomy.    SUBJECTIVE: No events overnight.  No new neurologic complaints.  ROS reported negative unless otherwise noted.    acetaminophen     Tablet .. 975 milliGRAM(s) Oral every 6 hours PRN  acetylcysteine 20%  Inhalation 4 milliLiter(s) Inhalation every 6 hours  albuterol/ipratropium for Nebulization 3 milliLiter(s) Nebulizer every 6 hours  artificial  tears Solution 1 Drop(s) Both EYES two times a day  aspirin  chewable 81 milliGRAM(s) Oral daily  atorvastatin 80 milliGRAM(s) Oral at bedtime  bisacodyl Suppository 10 milliGRAM(s) Rectal daily PRN  cangrelor Infusion 1 MICROgram(s)/kG/Min IV Continuous <Continuous>  chlorhexidine 2% Cloths 1 Application(s) Topical daily  dextrose 5%. 1000 milliLiter(s) IV Continuous <Continuous>  dextrose 5%. 1000 milliLiter(s) IV Continuous <Continuous>  dextrose 50% Injectable 25 Gram(s) IV Push once  dextrose 50% Injectable 12.5 Gram(s) IV Push once  dextrose 50% Injectable 25 Gram(s) IV Push once  dextrose Oral Gel 15 Gram(s) Oral once PRN  glucagon  Injectable 1 milliGRAM(s) IntraMuscular once  hydrALAZINE Injectable 10 milliGRAM(s) IV Push every 2 hours PRN  insulin lispro (ADMELOG) corrective regimen sliding scale   SubCutaneous every 6 hours  labetalol Injectable 10 milliGRAM(s) IV Push every 2 hours PRN  melatonin 5 milliGRAM(s) Oral at bedtime  pantoprazole   Suspension 40 milliGRAM(s) Oral daily  petrolatum Ophthalmic Ointment 1 Application(s) Right EYE every 12 hours  piperacillin/tazobactam IVPB.. 3.375 Gram(s) IV Intermittent every 8 hours  polyethylene glycol 3350 17 Gram(s) Oral daily  senna Syrup 10 milliLiter(s) Oral at bedtime  sodium chloride 2 Gram(s) Oral every 6 hours  traZODone 50 milliGRAM(s) Oral at bedtime      PHYSICAL EXAM:   Vital Signs Last 24 Hrs  T(C): 37 (05 Apr 2023 04:25), Max: 37 (05 Apr 2023 04:25)  T(F): 98.6 (05 Apr 2023 04:25), Max: 98.6 (05 Apr 2023 04:25)  HR: 99 (05 Apr 2023 07:29) (72 - 109)  BP: 128/90 (05 Apr 2023 07:00) (108/82 - 152/81)  BP(mean): 102 (05 Apr 2023 07:00) (86 - 109)  RR: 30 (05 Apr 2023 07:00) (20 - 36)  SpO2: 98% (05 Apr 2023 07:29) (94% - 100%)    Parameters below as of 05 Apr 2023 07:29  Patient On (Oxygen Delivery Method): nasal cannula      General: No acute distress, sitting on a chair, participating in exam.    NEUROLOGICAL EXAM:    Mental status: awake, alert, oriented to self, place, dates, and situation.  Able to ID pen and  glove. Follows simple commands and repeats phrases.     CN: ARTEMIO 3--->2, brisk,  able to count fingers in both visual fields, slight left gaze palsy,  left facial palsy    Motor: moves right UE/LE 5/5  Moves left UE 2/5, LLE moves  against gravity but falls to bed in < 5 sec      Sensory:  left sensory extinction     Gait: deferred        LABS:                        12.6   11.96 )-----------( 381      ( 05 Apr 2023 03:23 )             38.1    04-05    140  |  107  |  13.8  ----------------------------<  140<H>  3.6   |  19.0<L>  |  0.56    Ca    8.4      05 Apr 2023 03:23  Phos  3.7     04-05  Mg     2.4     04-05          IMAGING: Reviewed by me.     CT Head No Cont (04.04.23 @ 07:59)   IMPRESSION: Stable acute/subacute large right MCA territory infarction   with stable hemorrhagic transformation in the right basal ganglia and   right temporal lobe. Stable mass effect and right-to-left midline shift   measuring 6.7 mm. No new hemorrhage. Moderate adjacent cytotoxic edema,   unchanged.    CT Head No Cont (04.02.23 @ 08:08)   Unchanged hemorrhagic RIGHT MCA infarction with effacement   of the RIGHT lateral ventricle and 6 mm subfalcine herniation to the LEFT.    US Duplex Carotid Arteries Complete, Bilateral (04.01.23 @ 22:21)   IMPRESSION: No significant hemodynamic stenosis of the right internal   carotid artery.  Patent right ICA stent.  Over 70% left internal carotid artery stenosis.  Bilateral external carotid artery stenosis.    CT Head No Cont (03.30.23 @ 03:40)   Compared with 3/29/2023 there is decreasing retained contrast   in the right basal ganglia and right frontal parietal cortex with   evolving lucency consistent with an acute right artery infarct. Residual   high density although decreased compared to the prior exam may represent   retained contrast as well as a small amount of hemorrhage. No increased   hemorrhage identified.    CT BRAIN STROKE PROTOCOL   03/29/2023    IMPRESSION: Dense right MCA sign with early right temporal lobe   infarction. No acute intracranial hemorrhage.    CT PERFUSION W MAPS IC    03/29/2023    CBF<30% volume: 72 ml right MCA territory.  Tmax>6.0 s volume: 180 ml  Mismatch volume: 108 ml  Mismatch ratio: 2.5    TTE  3/29/2023  Left ventricular ejection fraction, by visual estimation, is >75%. Technically difficult study. Hyperdynamic global left ventricular systolic function. The left ventricular diastolic function could not be assessed in this study. There is mild concentric left ventricular hypertrophy. There is no evidence of pericardial effusion. Mild mitral annular calcification. Trace mitral valve regurgitation.    EEG:  EEG Classification / Summary:  Abnormal EEG in the awake, drowsy, and asleep states due to:  1. Continuous, right hemispheric focal polymorphic delta activity  2. Asymmetry and attenuation of sleep spindles from the right hemisphere  Clinical Impression:  This is an abnormal study indicative of a focal cerebral dysfunction in the right hemisphere, consistent with history of R MCA infarction. No epileptiform abnormalities or seizures were captured.

## 2023-04-05 NOTE — CONSULT NOTE ADULT - PROBLEM SELECTOR RECOMMENDATION 9
I discussed the Neurosurgery team   Will do the PEG endoscopically  tomorrow.   will need to hold the Cangrelor 4 hours before the PEG and few hours after  the PEG. WOuld prefer not to start Brilinta the day of PEG if possible.   - Hold PEG  feeds after midnight

## 2023-04-05 NOTE — CHART NOTE - NSCHARTNOTEFT_GEN_A_CORE
Source: Patient [ ]  Family [ ]   other [x ] EMR and RN     Current Diet: Diet, NPO with Tube Feed:   Tube Feeding Modality: Nasogastric  Glucerna 1.5 Gene (GLUCERNA1.5)  Total Volume for 24 Hours (mL): 1200  Continuous  Starting Tube Feed Rate {mL per Hour}: 10  Increase Tube Feed Rate by (mL): 10     Every 4 hours  Until Goal Tube Feed Rate (mL per Hour): 50  Tube Feed Duration (in Hours): 24  Tube Feed Start Time: 12:45 (04-04-23 @ 17:31)      Enteral /Parenteral Nutrition: (Currently on hold secondary to vomiting)     Current Weight:   4/4: 82.7 kg   4/3: 75.6 kg   3/31: 82.8 kg   3/29: 75 kg   (Trace edema to B/L hands)     % Weight Change: Unsure of accuracy of weights; will continue to monitor weights for trends.     Pertinent Medications: MEDICATIONS  (STANDING):  acetylcysteine 20%  Inhalation 4 milliLiter(s) Inhalation every 6 hours  albuterol/ipratropium for Nebulization 3 milliLiter(s) Nebulizer every 6 hours  artificial  tears Solution 1 Drop(s) Both EYES two times a day  aspirin  chewable 81 milliGRAM(s) Oral daily  atorvastatin 80 milliGRAM(s) Oral at bedtime  cangrelor Infusion 1 MICROgram(s)/kG/Min (22.2 mL/Hr) IV Continuous <Continuous>  chlorhexidine 2% Cloths 1 Application(s) Topical daily  dextrose 5%. 1000 milliLiter(s) (100 mL/Hr) IV Continuous <Continuous>  dextrose 5%. 1000 milliLiter(s) (50 mL/Hr) IV Continuous <Continuous>  dextrose 50% Injectable 25 Gram(s) IV Push once  dextrose 50% Injectable 12.5 Gram(s) IV Push once  dextrose 50% Injectable 25 Gram(s) IV Push once  glucagon  Injectable 1 milliGRAM(s) IntraMuscular once  insulin lispro (ADMELOG) corrective regimen sliding scale   SubCutaneous every 6 hours  melatonin 5 milliGRAM(s) Oral at bedtime  pantoprazole   Suspension 40 milliGRAM(s) Oral daily  petrolatum Ophthalmic Ointment 1 Application(s) Right EYE every 12 hours  piperacillin/tazobactam IVPB.. 3.375 Gram(s) IV Intermittent every 8 hours  polyethylene glycol 3350 17 Gram(s) Oral daily  senna Syrup 10 milliLiter(s) Oral at bedtime  sodium chloride 2 Gram(s) Oral every 6 hours  traZODone 50 milliGRAM(s) Oral at bedtime    MEDICATIONS  (PRN):  acetaminophen     Tablet .. 975 milliGRAM(s) Oral every 6 hours PRN Temp greater or equal to 38C (100.4F), Mild Pain (1 - 3)  bisacodyl Suppository 10 milliGRAM(s) Rectal daily PRN Constipation  dextrose Oral Gel 15 Gram(s) Oral once PRN Blood Glucose LESS THAN 70 milliGRAM(s)/deciliter  hydrALAZINE Injectable 10 milliGRAM(s) IV Push every 2 hours PRN SBP >140  labetalol Injectable 10 milliGRAM(s) IV Push every 2 hours PRN Systolic blood pressure > 140    Pertinent Labs: CBC Full  -  ( 05 Apr 2023 03:23 )  WBC Count : 11.96 K/uL  RBC Count : 4.71 M/uL  Hemoglobin : 12.6 g/dL  Hematocrit : 38.1 %  Platelet Count - Automated : 381 K/uL  Mean Cell Volume : 80.9 fl  Mean Cell Hemoglobin : 26.8 pg  Mean Cell Hemoglobin Concentration : 33.1 gm/dL  Auto Neutrophil # : x  Auto Lymphocyte # : x  Auto Monocyte # : x  Auto Eosinophil # : x  Auto Basophil # : x  Auto Neutrophil % : x  Auto Lymphocyte % : x  Auto Monocyte % : x  Auto Eosinophil % : x  Auto Basophil % : x        Skin: R groin thrombectomy site.     Nutrition focused physical exam-briefly conducted - found signs of malnutrition [ ]absent [x ]present    Subcutaneous fat loss: Mild [x ] Orbital fat pads region, [ ]Buccal fat region, [ ]Triceps region,  [ ]Ribs region    Muscle wasting: Mild  [x ]Temples region, [ ]Clavicle region, [x ]Shoulder region, [ ]Scapula region, [ ]Interosseous region,  [ ]thigh region, [ ]Calf region    Estimated Needs:   [x ] no change since previous assessment  [ ] recalculated:     Hospital Course:   Pt is a 58 year old Male with PMH HTN and DM, found by his family 0500 AM on 3/29/23 with right hemiparesis, slurred speech, and incontinent of urine.  Last well known 2000  on 3/28/23. Patient brought in by EMS to the [facility] at 0556, was noted not to be a candidate for Tenecteplase as patient out of time window. Patient was transferred to [facility] for thrombectomy due to right ICA occlusion with tandem right MCA occlusion. Etiology likely large artery atherosclerotic disease. Post mechanical thrombectomy TICI 2B and Right carotid stent with angioplasty.     Current Nutrition Diagnosis:  Pt remains at high nutrition risk secondary to moderate (acute) protein calorie malnutrition related to inability to meet sufficient protein energy needs in setting of recent stroke, dysphagia and intolerance to enteral feeds as evidenced by meeting < 75% estimated energy requirements > 7 days, physical signs of mild muscle/fat loss and edema. Pt with reduced cognition noted; pt had swallow evaluation on 4/4; per SLP pt not safe for PO at this time; enteral feeds initiated. Per RN pt with multiple loose BM's over night and vomiting this morning. Enteral feeds placed on hold at this time. Recommendations below:     Recommendations:   1. RX: MVI and Vit. C daily (500mg) daily   2. Check weight daily to monitor trends   3. Repeat swallow evaluation as appropriate   4. Consider re-starting enteral feeds of Pivot (semi-elemental) formula @ 20ml/hr; increase by 10ml every 6 hours as tolerated to goal rate 50ml/hr (x 24 hours) TOTAL VOLUME = 1200ml/day; 1800kcal, 113gm protein   5. Monitor tolerance closely     Monitoring and Evaluation:   [ ] PO intake [x ] Tolerance to diet prescription [X] Weights  [X] Follow up per protocol [X] Labs:

## 2023-04-05 NOTE — PROVIDER CONTACT NOTE (OTHER) - ACTION/TREATMENT ORDERED:
CHEST PT; Chest X-ray, and Duoneb.
Orders obtained for OFIRMEV and hydralazine PRN for SBP >140
xrays ordered.

## 2023-04-05 NOTE — CONSULT NOTE ADULT - NS ATTEND AMEND GEN_ALL_CORE FT
Pt seen and examined with NP  Pt unable to give hx   I spoke to neurosurgery team  PEG tomorrow - please see above

## 2023-04-05 NOTE — PROGRESS NOTE ADULT - ASSESSMENT
Assessment:  58M with PMH HTN, DM who presented with L sided weakness, found to have AYAKA/RMCA occlusions s/p thrombectomy TICI 2B recanalization and AYAKA stent on 3/29. Post op CTH showed contrast vs hemorrhage.   - PSD 7/8, POD 7  - Exam stable  - Gen surg consulted for G tube       Plan:  - Discussed with Dr. Allen  - Q4 hour neuro checks  - MAP >65  - Continue cangrelor @ 1, pending G tube placement date may transition to Brilinta while waiting placement, will discuss G tube timing with gen surg  - When G tube placed, cangrelor can be stopped just prior to OR (half life 10 minutes) and please restart within the next 12 hours  - Continue ASA 81, ideally to continue through G tube placement due to risk of in-stent stenosis   - , on atorvastatin 80  - A1C 7.3%  - TTE performed  - Further care per NSICU team  - PA only visit

## 2023-04-05 NOTE — PROGRESS NOTE ADULT - ASSESSMENT
Assessment/Plan:   59 yo M with acute CVA due to R ICA/MCA occlusion, with hemorrhagic transformation, vasogenic edema and MLS; no thrombolytics as was out of the window for administration.  S/p TICI 2b MT, AYAKA stenting 3/29. On Cangrelor and ASA now.  Respiratory distress due to bulbar impairment, pulm congestion; concern for aspiration PNA vs pneumonitis.   PMH of HTN and DM.  Dysphagia.  Ileus    Neuro:  neurochecks  c/w ASA 81 mg daily - switch to rectal as NPO; c/w cangrelor ggt at 1 mcg/kg/min while on hemicrani watch; plan to held temporarily for PEG, restart after    CV: , TTE with EF 75%  MAP >65    Pulm:  cont Duoneb + mucomyst, chest PT; OOB  maintain OSats >92, monitor EtCO2; switch to NC    GI:  NPO; BM regimen, disimpaction done today - no stool  NGT to intermittent suction  S/S re-eval daily, involvement of GI team for PEG eval appreciated, planned for 4/6    Renal:  Na goal 145-155  d/c cont salt tabs  monitor UOp    Heme:  SCDs, hold Lov ppx as on DAPT/heme conversion    ID: MRSA neg  c/w Zosyn for aspiration PNA, 7 days in total    Endo: HgA1C 7.3  cont ISS  FS goal 120-180

## 2023-04-05 NOTE — PROVIDER CONTACT NOTE (OTHER) - REASON
SBP greater then 140
tachypnea
tachypnea noted; patient on 3L NC oxygen. patient lethargic; complaint of shortness of breath.

## 2023-04-05 NOTE — PROGRESS NOTE ADULT - SUBJECTIVE AND OBJECTIVE BOX
HPI:  57 y/o male with PMHx of HTN and DM transfered from [facility] to [facility] as a code stroke. [facility] reports pt BLAYNE was last night around 20:00 and was c/o right arm pain, today his family found his around 05:00 with left sided hemiparesis, slurred speech and was incontinent of urine, they called EMS as pt arrived at [facility] at 05:56, he was found to have right ICA occlusion and transferred to [facility], code stroke called upon arrival. Pt was on Cardene however per EMS neuro wanted pressure around 200 and it was d/monet, pt sedated with propofol. (29 Mar 2023 09:40)    O/n events: episode of vomiting this am.    ICU Vital Signs Last 24 Hrs  T(C): 36.8 (04 Apr 2023 12:03), Max: 36.9 (03 Apr 2023 23:56)  T(F): 98.2 (04 Apr 2023 12:03), Max: 98.4 (03 Apr 2023 23:56)  HR: 91 (04 Apr 2023 14:00) (72 - 116)  BP: 124/96 (04 Apr 2023 14:00) (93/73 - 170/102)  BP(mean): 105 (04 Apr 2023 14:00) (78 - 123)  RR: 25 (04 Apr 2023 14:00) (17 - 37)  SpO2: 97% (04 Apr 2023 14:00) (94% - 100%)    O2 Parameters below as of 04 Apr 2023 12:00  Patient On (Oxygen Delivery Method): nasal cannula  O2 Flow (L/min): 2    Labs, imaging reviewed.    EXAMINATION: stable  General:  NAD, sitting in a chair.  Neuro:  with eyelid opening apraxia but able to open lids slightly, remains alert and awake during exam,  follows simple commands on both sides, pupils 3 mm and reactive but R is more sluggish, EOMI, no BTT on the L, able to open his mouth, better cough today, lifts head off the bed, RUE 4-/5, L arm 2/5 spontaneously, R leg 3/5, L leg 2/5 spontaneously   Cards:  RRR  Respiratory:  mild tachypnea, with some rhonchi   Abdomen:  soft, NT, mild distension. Decreased BS.  Extremities:  no LE edema

## 2023-04-05 NOTE — CONSULT NOTE ADULT - ASSESSMENT
58y Male with PMH HTN and DM transferred from [facility] for thrombectomy due to right ICA occlusion with tandem right MCA occlusion. GI consulted after failed swallow evaluation for PEG tube placement.     Failed Swallow evaluation requiring PEG tube placement:  Patient is currently on Cangrelor infusion, which needed to be continued for another 24 hours per Neurosurgery documentation. Currently receiving tube feeds via NGT.    Will schedule for PEG tube placement once clinically able to hold Cangrelor for 6 hours prior to the procedure. Please HOLD off long-acting antiplatelet and/ or anticoagulation prior to the procedure  Patient is now lethargic, rhonchi bilaterally, on room air.   Continue tube feeds via NGT. Tube feeds needed to be held after midnight prior to the procedure   Maintain current COVID PCR, T&S, INR <1.5, Hgb >7.0, Plt >50 prior to procedure.   58y Male with PMH HTN and DM transferred from [facility] for thrombectomy due to right ICA occlusion with tandem right MCA occlusion. GI consulted after failed swallow evaluation for PEG tube placement.     Failed Swallow evaluation requiring PEG tube placement:  Patient is currently on Cangrelor infusion, which needed to be continued for another 24 hours per Neurosurgery documentation. Currently receiving tube feeds via NGT.    Will schedule for PEG tube placement once clinically able to hold Cangrelor for 4-6 hours prior to the procedure.   Patient is now lethargic, rhonchi bilaterally, on 2 liters   Continue tube feeds via NGT. Tube feeds needed to be held after midnight prior to the procedure   Maintain current COVID PCR, T&S, INR <1.5, Hgb >7.0, Plt >50 prior to procedure.

## 2023-04-05 NOTE — PROVIDER CONTACT NOTE (OTHER) - SITUATION
tachypnea noted; patient on 3L NC oxygen. patient lethargic complaint of shortness of breath.
Notified pt c/o mild HA and /76
at times pt respiratory rate in 30s not sustaining.

## 2023-04-05 NOTE — CONSULT NOTE ADULT - SUBJECTIVE AND OBJECTIVE BOX
SURGERY CONSULT  HPI noted as below.  Patient admitted to the NSx service after being transferred for stroke with occlusion of R ICA requiring stent placement, now with L sided deficit and on dual antiplatelet with aspirin and cangelor IV. Patient has failed speech and swallow eval multiple times with dysphagia.   NSx consulted GI for PEG placement, however, not comfortable performing PEG tube placement while on aspirin. Per Nsx Cangelor can be held prior surgery with a 1/2 life of 10 minutes and request to perform surgery while on aspirin to avoid re-stenosis of the recent stent.     ==============================================================================================================  HPI: 58y Male  HPI:  57 y/o male with PMHx of HTN and DM transfered from [facility] to [facility] as a code stroke. [facility] EMS reports pt BLAYNE was last night around 20:00 and was c/o right arm pain, today his family found his around 05:00 with left sided hemiparesis, slurred speech and was incontinent of urine, they called EMS as pt arrived at [facility] at 05:56, he was found to have right ICA occlusion and transferred to [facility], code stroke called upon arrival. Pt was on Cardene however per EMS neuro wanted pressure around 200 and it was d/monet, pt sedated with propofol.         (29 Mar 2023 09:40)      PAST MEDICAL & SURGICAL HISTORY:    Home Meds: Home Medications:    Allergies: Allergies    No Known Allergies    Intolerances      Soc:   Advanced Directives: Presumed Full Code     CURRENT MEDICATIONS:   --------------------------------------------------------------------------------------  Neurologic Medications  acetaminophen     Tablet .. 975 milliGRAM(s) Oral every 6 hours PRN Temp greater or equal to 38C (100.4F), Mild Pain (1 - 3)  melatonin 5 milliGRAM(s) Oral at bedtime  traZODone 50 milliGRAM(s) Oral at bedtime    Respiratory Medications  acetylcysteine 20%  Inhalation 4 milliLiter(s) Inhalation every 6 hours  albuterol/ipratropium for Nebulization 3 milliLiter(s) Nebulizer every 6 hours    Cardiovascular Medications  hydrALAZINE Injectable 10 milliGRAM(s) IV Push every 2 hours PRN SBP >140  labetalol Injectable 10 milliGRAM(s) IV Push every 2 hours PRN Systolic blood pressure > 140    Gastrointestinal Medications  bisacodyl Suppository 10 milliGRAM(s) Rectal daily PRN Constipation  dextrose 5%. 1000 milliLiter(s) IV Continuous <Continuous>  dextrose 5%. 1000 milliLiter(s) IV Continuous <Continuous>  pantoprazole   Suspension 40 milliGRAM(s) Oral daily  polyethylene glycol 3350 17 Gram(s) Oral daily  senna Syrup 10 milliLiter(s) Oral at bedtime  sodium chloride 2 Gram(s) Oral every 6 hours    Genitourinary Medications    Hematologic/Oncologic Medications  aspirin  chewable 81 milliGRAM(s) Oral daily  cangrelor Infusion 1 MICROgram(s)/kG/Min IV Continuous <Continuous>    Antimicrobial/Immunologic Medications  piperacillin/tazobactam IVPB.. 3.375 Gram(s) IV Intermittent every 8 hours    Endocrine/Metabolic Medications  atorvastatin 80 milliGRAM(s) Oral at bedtime  dextrose 50% Injectable 25 Gram(s) IV Push once  dextrose 50% Injectable 12.5 Gram(s) IV Push once  dextrose 50% Injectable 25 Gram(s) IV Push once  dextrose Oral Gel 15 Gram(s) Oral once PRN Blood Glucose LESS THAN 70 milliGRAM(s)/deciliter  glucagon  Injectable 1 milliGRAM(s) IntraMuscular once  insulin lispro (ADMELOG) corrective regimen sliding scale   SubCutaneous every 6 hours    Topical/Other Medications  artificial  tears Solution 1 Drop(s) Both EYES two times a day  chlorhexidine 2% Cloths 1 Application(s) Topical daily  petrolatum Ophthalmic Ointment 1 Application(s) Right EYE every 12 hours    --------------------------------------------------------------------------------------    VITAL SIGNS, INS/OUTS (last 24 hours):  --------------------------------------------------------------------------------------  ICU Vital Signs Last 24 Hrs  T(C): 36.9 (05 Apr 2023 08:02), Max: 37 (05 Apr 2023 04:25)  T(F): 98.4 (05 Apr 2023 08:02), Max: 98.6 (05 Apr 2023 04:25)  HR: 93 (05 Apr 2023 10:00) (77 - 109)  BP: 114/70 (05 Apr 2023 10:00) (108/82 - 152/81)  BP(mean): 81 (05 Apr 2023 10:00) (81 - 109)  ABP: --  ABP(mean): --  RR: 26 (05 Apr 2023 10:00) (20 - 36)  SpO2: 98% (05 Apr 2023 10:00) (94% - 100%)    O2 Parameters below as of 05 Apr 2023 08:00  Patient On (Oxygen Delivery Method): nasal cannula w/ humidification  O2 Flow (L/min): 2        I&O's Summary    04 Apr 2023 07:01  -  05 Apr 2023 07:00  --------------------------------------------------------  IN: 2527.4 mL / OUT: 1400 mL / NET: 1127.4 mL    05 Apr 2023 07:01  -  05 Apr 2023 10:35  --------------------------------------------------------  IN: 116.6 mL / OUT: 0 mL / NET: 116.6 mL      --------------------------------------------------------------------------------------    PHYSICAL EXAM:  GENERAL: NAD, well-groomed, well-developed  HEAD:  Atraumatic, Normocephalic  EYES: EOMI, PERRLA, conjunctiva and sclera clear  NECK: Supple, No JVD  CHEST/LUNG: non-labored breathing on room air.   HEART: Regular rate and rhythm; S1/S2  ABDOMEN: Soft, Nontender, slightly distended. NGT in place, no abdominal scars  VASCULAR: Normal pulses, Normal capillary refill  EXTREMITIES:  2+ Peripheral Pulses, No cyanosis, No edema  SKIN: Warm, Intact     LABS  --------------------------------------------------------------------------------------  Labs:  CAPILLARY BLOOD GLUCOSE      POCT Blood Glucose.: 170 mg/dL (05 Apr 2023 05:30)  POCT Blood Glucose.: 209 mg/dL (04 Apr 2023 23:16)  POCT Blood Glucose.: 168 mg/dL (04 Apr 2023 17:38)  POCT Blood Glucose.: 124 mg/dL (04 Apr 2023 11:28)                          12.6   11.96 )-----------( 381      ( 05 Apr 2023 03:23 )             38.1         04-05    140  |  107  |  13.8  ----------------------------<  140<H>  3.6   |  19.0<L>  |  0.56      Calcium, Total Serum: 8.4 mg/dL (04-05-23 @ 03:23)      LFTs:       ABG - ( 03 Apr 2023 08:35 )  pH: 7.530 /  pCO2: 28    /  pO2: 122   / HCO3: 23    / Base Excess: 0.7   /  SaO2: x               ABG - ( 01 Apr 2023 15:59 )  pH: 7.540 /  pCO2: 30    /  pO2: 68    / HCO3: 26    / Base Excess: 3.2   /  SaO2: 97.9            ABG - ( 29 Mar 2023 12:34 )  pH: 7.440 /  pCO2: 39    /  pO2: 91    / HCO3: 26    / Base Excess: 2.3   /  SaO2: 99.4              Coags:                  --------------------------------------------------------------------------------------

## 2023-04-06 DIAGNOSIS — I65.22 OCCLUSION AND STENOSIS OF LEFT CAROTID ARTERY: ICD-10-CM

## 2023-04-06 DIAGNOSIS — I63.9 CEREBRAL INFARCTION, UNSPECIFIED: ICD-10-CM

## 2023-04-06 PROBLEM — Z00.00 ENCOUNTER FOR PREVENTIVE HEALTH EXAMINATION: Status: ACTIVE | Noted: 2023-04-06

## 2023-04-06 LAB
ANION GAP SERPL CALC-SCNC: 10 MMOL/L — SIGNIFICANT CHANGE UP (ref 5–17)
APTT BLD: 28 SEC — SIGNIFICANT CHANGE UP (ref 27.5–35.5)
BLD GP AB SCN SERPL QL: SIGNIFICANT CHANGE UP
BUN SERPL-MCNC: 19.8 MG/DL — SIGNIFICANT CHANGE UP (ref 8–20)
CALCIUM SERPL-MCNC: 8.1 MG/DL — LOW (ref 8.4–10.5)
CHLORIDE SERPL-SCNC: 108 MMOL/L — SIGNIFICANT CHANGE UP (ref 96–108)
CO2 SERPL-SCNC: 23 MMOL/L — SIGNIFICANT CHANGE UP (ref 22–29)
CREAT SERPL-MCNC: 0.62 MG/DL — SIGNIFICANT CHANGE UP (ref 0.5–1.3)
EGFR: 109 ML/MIN/1.73M2 — SIGNIFICANT CHANGE UP
GLUCOSE BLDC GLUCOMTR-MCNC: 102 MG/DL — HIGH (ref 70–99)
GLUCOSE BLDC GLUCOMTR-MCNC: 105 MG/DL — HIGH (ref 70–99)
GLUCOSE BLDC GLUCOMTR-MCNC: 112 MG/DL — HIGH (ref 70–99)
GLUCOSE BLDC GLUCOMTR-MCNC: 150 MG/DL — HIGH (ref 70–99)
GLUCOSE SERPL-MCNC: 117 MG/DL — HIGH (ref 70–99)
HCT VFR BLD CALC: 32.3 % — LOW (ref 39–50)
HGB BLD-MCNC: 10.5 G/DL — LOW (ref 13–17)
INR BLD: 1.32 RATIO — HIGH (ref 0.88–1.16)
MAGNESIUM SERPL-MCNC: 2.3 MG/DL — SIGNIFICANT CHANGE UP (ref 1.6–2.6)
MCHC RBC-ENTMCNC: 27.1 PG — SIGNIFICANT CHANGE UP (ref 27–34)
MCHC RBC-ENTMCNC: 32.5 GM/DL — SIGNIFICANT CHANGE UP (ref 32–36)
MCV RBC AUTO: 83.5 FL — SIGNIFICANT CHANGE UP (ref 80–100)
PHOSPHATE SERPL-MCNC: 4.5 MG/DL — SIGNIFICANT CHANGE UP (ref 2.4–4.7)
PLATELET # BLD AUTO: 311 K/UL — SIGNIFICANT CHANGE UP (ref 150–400)
POTASSIUM SERPL-MCNC: 3.6 MMOL/L — SIGNIFICANT CHANGE UP (ref 3.5–5.3)
POTASSIUM SERPL-SCNC: 3.6 MMOL/L — SIGNIFICANT CHANGE UP (ref 3.5–5.3)
PROTHROM AB SERPL-ACNC: 15.3 SEC — HIGH (ref 10.5–13.4)
RBC # BLD: 3.87 M/UL — LOW (ref 4.2–5.8)
RBC # FLD: 14.4 % — SIGNIFICANT CHANGE UP (ref 10.3–14.5)
SODIUM SERPL-SCNC: 141 MMOL/L — SIGNIFICANT CHANGE UP (ref 135–145)
WBC # BLD: 8.13 K/UL — SIGNIFICANT CHANGE UP (ref 3.8–10.5)
WBC # FLD AUTO: 8.13 K/UL — SIGNIFICANT CHANGE UP (ref 3.8–10.5)

## 2023-04-06 PROCEDURE — 93010 ELECTROCARDIOGRAM REPORT: CPT

## 2023-04-06 PROCEDURE — 99291 CRITICAL CARE FIRST HOUR: CPT

## 2023-04-06 PROCEDURE — 43246 EGD PLACE GASTROSTOMY TUBE: CPT

## 2023-04-06 PROCEDURE — 99223 1ST HOSP IP/OBS HIGH 75: CPT

## 2023-04-06 PROCEDURE — 99232 SBSQ HOSP IP/OBS MODERATE 35: CPT

## 2023-04-06 DEVICE — FEEDING TUBE PEG KIT ENDOVIVE STANDARD 20FR PUSH WITH LIDOCAINE AMPULE: Type: IMPLANTABLE DEVICE | Status: FUNCTIONAL

## 2023-04-06 RX ORDER — DOXAZOSIN MESYLATE 4 MG
4 TABLET ORAL AT BEDTIME
Refills: 0 | Status: DISCONTINUED | OUTPATIENT
Start: 2023-04-06 | End: 2023-04-11

## 2023-04-06 RX ORDER — SODIUM CHLORIDE 9 MG/ML
1000 INJECTION INTRAMUSCULAR; INTRAVENOUS; SUBCUTANEOUS
Refills: 0 | Status: DISCONTINUED | OUTPATIENT
Start: 2023-04-06 | End: 2023-04-11

## 2023-04-06 RX ORDER — ENOXAPARIN SODIUM 100 MG/ML
40 INJECTION SUBCUTANEOUS EVERY 24 HOURS
Refills: 0 | Status: DISCONTINUED | OUTPATIENT
Start: 2023-04-06 | End: 2023-04-11

## 2023-04-06 RX ORDER — SENNA PLUS 8.6 MG/1
10 TABLET ORAL AT BEDTIME
Refills: 0 | Status: DISCONTINUED | OUTPATIENT
Start: 2023-04-06 | End: 2023-04-11

## 2023-04-06 RX ORDER — DOXAZOSIN MESYLATE 4 MG
4 TABLET ORAL ONCE
Refills: 0 | Status: COMPLETED | OUTPATIENT
Start: 2023-04-06 | End: 2023-04-06

## 2023-04-06 RX ORDER — ACETAMINOPHEN 500 MG
1000 TABLET ORAL ONCE
Refills: 0 | Status: COMPLETED | OUTPATIENT
Start: 2023-04-06 | End: 2023-04-06

## 2023-04-06 RX ORDER — DOXAZOSIN MESYLATE 4 MG
4 TABLET ORAL AT BEDTIME
Refills: 0 | Status: DISCONTINUED | OUTPATIENT
Start: 2023-04-06 | End: 2023-04-06

## 2023-04-06 RX ADMIN — Medication 1 APPLICATION(S): at 05:34

## 2023-04-06 RX ADMIN — PIPERACILLIN AND TAZOBACTAM 25 GRAM(S): 4; .5 INJECTION, POWDER, LYOPHILIZED, FOR SOLUTION INTRAVENOUS at 00:21

## 2023-04-06 RX ADMIN — Medication 4 MILLIGRAM(S): at 21:50

## 2023-04-06 RX ADMIN — CANGRELOR 22.2 MICROGRAM(S)/KG/MIN: 50 INJECTION, POWDER, LYOPHILIZED, FOR SOLUTION INTRAVENOUS at 21:45

## 2023-04-06 RX ADMIN — CANGRELOR 22.2 MICROGRAM(S)/KG/MIN: 50 INJECTION, POWDER, LYOPHILIZED, FOR SOLUTION INTRAVENOUS at 01:14

## 2023-04-06 RX ADMIN — Medication 3 MILLILITER(S): at 14:39

## 2023-04-06 RX ADMIN — Medication 3 MILLILITER(S): at 21:44

## 2023-04-06 RX ADMIN — Medication 10 MILLIGRAM(S): at 10:03

## 2023-04-06 RX ADMIN — Medication 10 MILLIGRAM(S): at 08:09

## 2023-04-06 RX ADMIN — Medication 1000 MILLIGRAM(S): at 22:00

## 2023-04-06 RX ADMIN — CHLORHEXIDINE GLUCONATE 1 APPLICATION(S): 213 SOLUTION TOPICAL at 05:34

## 2023-04-06 RX ADMIN — SODIUM CHLORIDE 75 MILLILITER(S): 9 INJECTION INTRAMUSCULAR; INTRAVENOUS; SUBCUTANEOUS at 10:26

## 2023-04-06 RX ADMIN — ENOXAPARIN SODIUM 40 MILLIGRAM(S): 100 INJECTION SUBCUTANEOUS at 21:39

## 2023-04-06 RX ADMIN — Medication 300 MILLIGRAM(S): at 11:56

## 2023-04-06 RX ADMIN — PIPERACILLIN AND TAZOBACTAM 25 GRAM(S): 4; .5 INJECTION, POWDER, LYOPHILIZED, FOR SOLUTION INTRAVENOUS at 08:10

## 2023-04-06 RX ADMIN — Medication 400 MILLIGRAM(S): at 21:40

## 2023-04-06 RX ADMIN — Medication 3 MILLILITER(S): at 09:03

## 2023-04-06 RX ADMIN — PANTOPRAZOLE SODIUM 40 MILLIGRAM(S): 20 TABLET, DELAYED RELEASE ORAL at 11:02

## 2023-04-06 RX ADMIN — SENNA PLUS 10 MILLILITER(S): 8.6 TABLET ORAL at 21:50

## 2023-04-06 RX ADMIN — Medication 4 MILLILITER(S): at 05:08

## 2023-04-06 RX ADMIN — Medication 4 MILLILITER(S): at 09:03

## 2023-04-06 RX ADMIN — Medication 1 DROP(S): at 17:56

## 2023-04-06 RX ADMIN — Medication 10 MILLIGRAM(S): at 08:49

## 2023-04-06 RX ADMIN — Medication 1 DROP(S): at 05:34

## 2023-04-06 RX ADMIN — Medication 4 MILLIGRAM(S): at 11:02

## 2023-04-06 RX ADMIN — Medication 3 MILLILITER(S): at 05:08

## 2023-04-06 NOTE — PROGRESS NOTE ADULT - ASSESSMENT
Assessment:  58M with PMH HTN, DM who presented with L sided weakness, found to have AYAKA/RMCA occlusions s/p thrombectomy TICI 2B recanalization and AYAKA stent on 3/29. Post op CTH showed contrast vs hemorrhage.   - PSD 8/9, POD 8  - Exam stable  - Plan for G tube with GI today       Plan:  - Discussed with Dr. Allen  - Q4 hour neuro checks  - MAP >65  - Cangrelor on hold for G tube today with GI, plan to restart after OR pending clearance from GI, plan within 12 hours   - Plan to transition to Brilinta once cleared by GI   - Continue ASA 81  - , on atorvastatin 80  - A1C 7.3%  - TTE performed  - Further care per NSICU team  - PA only visit

## 2023-04-06 NOTE — PROGRESS NOTE ADULT - SUBJECTIVE AND OBJECTIVE BOX
Patient is a 58y old  Male who presents with a chief complaint of stroke (05 Apr 2023 15:18)    HPI:  59 y/o male with PMHx of HTN and DM transfered from [facility] to [facility] as a code stroke. [facility] EMS reports pt BLAYNE was last night around 20:00 and was c/o right arm pain, today his family found his around 05:00 with left sided hemiparesis, slurred speech and was incontinent of urine, they called EMS as pt arrived at [facility] at 05:56, he was found to have right ICA occlusion and transferred to [facility], code stroke called upon arrival. Pt was on Cardene however per EMS neuro wanted pressure around 200 and it was d/monet, pt sedated with propofol. (29 Mar 2023 09:40)      Interval history:  Patient seen and examined by neuro IR team. Patient gave "thumbs up" when asked how he is doing, denies pain. Plan for G tube with GI today, cangrelor on hold in preparation for procedure.       Vital Signs Last 24 Hrs  T(C): 36.6 (06 Apr 2023 11:32), Max: 37.2 (05 Apr 2023 16:35)  T(F): 97.9 (06 Apr 2023 11:32), Max: 98.9 (05 Apr 2023 16:35)  HR: 88 (06 Apr 2023 14:00) (72 - 92)  BP: 127/68 (06 Apr 2023 14:00) (102/58 - 157/88)  BP(mean): 83 (06 Apr 2023 14:00) (70 - 108)  RR: 19 (06 Apr 2023 14:00) (18 - 28)  SpO2: 93% (06 Apr 2023 14:00) (93% - 100%)    Parameters below as of 06 Apr 2023 12:00  Patient On (Oxygen Delivery Method): nasal cannula  O2 Flow (L/min): 2      Physical Exam:  Constitutional: NAD, lying in bed  Neuro  * Mental Status: EO spontaneously, A&O x3, following commands, + aphasia and dysarthria   * Cranial Nerves: L pupil 3mm reactive, R pupil 4mm sluggish, L facial droop  * Motor: RUE 5/5, RLE 5/5, LUE 2/5, LLE 4/5  * Sensory: Sensation grossly intact to noxious stimuli   * Reflexes: not assessed       LABS:                        10.5   8.13  )-----------( 311      ( 06 Apr 2023 04:10 )             32.3     04-06    141  |  108  |  19.8  ----------------------------<  117<H>  3.6   |  23.0  |  0.62    Ca    8.1<L>      06 Apr 2023 04:10  Phos  4.5     04-06  Mg     2.3     04-06    PT/INR - ( 06 Apr 2023 04:10 )   PT: 15.3 sec;   INR: 1.32 ratio    PTT - ( 06 Apr 2023 04:10 )  PTT:28.0 sec    CULTURES:  Culture Results:   No growth to date. (04-01 @ 17:25)  Culture Results:   No growth to date. (04-01 @ 17:25)      Medications:  MEDICATIONS  (STANDING):  albuterol/ipratropium for Nebulization 3 milliLiter(s) Nebulizer every 6 hours  artificial  tears Solution 1 Drop(s) Both EYES two times a day  aspirin Suppository 300 milliGRAM(s) Rectal daily  cangrelor Infusion 1 MICROgram(s)/kG/Min (22.2 mL/Hr) IV Continuous <Continuous>  chlorhexidine 2% Cloths 1 Application(s) Topical daily  dextrose 5%. 1000 milliLiter(s) (100 mL/Hr) IV Continuous <Continuous>  dextrose 5%. 1000 milliLiter(s) (50 mL/Hr) IV Continuous <Continuous>  dextrose 50% Injectable 25 Gram(s) IV Push once  dextrose 50% Injectable 12.5 Gram(s) IV Push once  dextrose 50% Injectable 25 Gram(s) IV Push once  enoxaparin Injectable 40 milliGRAM(s) SubCutaneous every 24 hours  glucagon  Injectable 1 milliGRAM(s) IntraMuscular once  insulin lispro (ADMELOG) corrective regimen sliding scale   SubCutaneous every 6 hours  pantoprazole  Injectable 40 milliGRAM(s) IV Push daily  petrolatum Ophthalmic Ointment 1 Application(s) Right EYE every 12 hours  senna Syrup 10 milliLiter(s) Oral at bedtime  sodium chloride 0.9%. 1000 milliLiter(s) (75 mL/Hr) IV Continuous <Continuous>    MEDICATIONS  (PRN):  acetaminophen     Tablet .. 975 milliGRAM(s) Oral every 6 hours PRN Temp greater or equal to 38C (100.4F), Mild Pain (1 - 3)  bisacodyl Suppository 10 milliGRAM(s) Rectal daily PRN Constipation  dextrose Oral Gel 15 Gram(s) Oral once PRN Blood Glucose LESS THAN 70 milliGRAM(s)/deciliter  hydrALAZINE Injectable 10 milliGRAM(s) IV Push every 2 hours PRN SBP >140  labetalol Injectable 10 milliGRAM(s) IV Push every 2 hours PRN Systolic blood pressure > 140      RADIOLOGY & ADDITIONAL STUDIES:  No new neuro IR imaging     < from: CT Head No Cont (04.04.23 @ 07:59) >  IMPRESSION: Stable acute/subacute large right MCA territory infarction   with stable hemorrhagic transformation in the right basal ganglia and   right temporal lobe. Stable mass effect and right-to-left midline shift   measuring 6.7 mm. No new hemorrhage. Moderate adjacent cytotoxic edema,   unchanged.    --- End of Report ---    ISAURO LENZ MD; Attending Radiologist  This document has been electronically signed. Apr 4 2023  8:38AM    < end of copied text >

## 2023-04-06 NOTE — CONSULT NOTE ADULT - CONSULT REQUESTED DATE/TIME
04-Apr-2023 10:53
05-Apr-2023 10:44
05-Apr-2023 10:34
29-Mar-2023 09:30
29-Mar-2023 18:12
06-Apr-2023 16:57

## 2023-04-06 NOTE — PROGRESS NOTE ADULT - SUBJECTIVE AND OBJECTIVE BOX
Chief complaint:   Patient is a 58y old  Male who presents with a chief complaint of stroke (05 Apr 2023 15:18)    HPI:  59 y/o male with PMHx of HTN and DM transfered from [facility] to [facility] as a code stroke. [facility] reports pt BLAYNE was last night around 20:00 and was c/o right arm pain, today his family found his around 05:00 with left sided hemiparesis, slurred speech and was incontinent of urine, they called EMS as pt arrived at [facility] at 05:56, he was found to have right ICA occlusion and transferred to [facility], code stroke called upon arrival. Pt was on Cardene however per EMS neuro wanted pressure around 200 and it was d/monet, pt sedated with propofol.     (29 Mar 2023 09:40)        24hr EVENTS: PEG today      ROS: [ ]  Unable to assess due to mental status   All other systems negative    -----------------------------------------------------------------------------------------------------------------------------------------------------------------------------------  ICU Vital Signs Last 24 Hrs  T(C): 36.7 (06 Apr 2023 04:24), Max: 37.2 (05 Apr 2023 16:35)  T(F): 98 (06 Apr 2023 04:24), Max: 98.9 (05 Apr 2023 16:35)  HR: 84 (06 Apr 2023 08:46) (72 - 105)  BP: 153/80 (06 Apr 2023 08:46) (107/74 - 153/80)  BP(mean): 100 (06 Apr 2023 08:46) (75 - 108)  ABP: --  ABP(mean): --  RR: 28 (06 Apr 2023 08:46) (20 - 28)  SpO2: 95% (06 Apr 2023 08:46) (95% - 100%)    O2 Parameters below as of 06 Apr 2023 08:00  Patient On (Oxygen Delivery Method): nasal cannula  O2 Flow (L/min): 2          I&O's Summary    05 Apr 2023 07:01  -  06 Apr 2023 07:00  --------------------------------------------------------  IN: 682.8 mL / OUT: 950 mL / NET: -267.2 mL    06 Apr 2023 07:01  -  06 Apr 2023 09:05  --------------------------------------------------------  IN: 44.4 mL / OUT: 0 mL / NET: 44.4 mL        MEDICATIONS  (STANDING):  acetylcysteine 20%  Inhalation 4 milliLiter(s) Inhalation every 6 hours  albuterol/ipratropium for Nebulization 3 milliLiter(s) Nebulizer every 6 hours  artificial  tears Solution 1 Drop(s) Both EYES two times a day  aspirin Suppository 300 milliGRAM(s) Rectal daily  cangrelor Infusion 1 MICROgram(s)/kG/Min (22.2 mL/Hr) IV Continuous <Continuous>  chlorhexidine 2% Cloths 1 Application(s) Topical daily  dextrose 5%. 1000 milliLiter(s) (50 mL/Hr) IV Continuous <Continuous>  dextrose 5%. 1000 milliLiter(s) (100 mL/Hr) IV Continuous <Continuous>  dextrose 50% Injectable 25 Gram(s) IV Push once  dextrose 50% Injectable 12.5 Gram(s) IV Push once  dextrose 50% Injectable 25 Gram(s) IV Push once  glucagon  Injectable 1 milliGRAM(s) IntraMuscular once  insulin lispro (ADMELOG) corrective regimen sliding scale   SubCutaneous every 6 hours  pantoprazole  Injectable 40 milliGRAM(s) IV Push daily  petrolatum Ophthalmic Ointment 1 Application(s) Right EYE every 12 hours  senna Syrup 10 milliLiter(s) Oral at bedtime      IMAGING:   Recent imaging studies were reviewed.    LAB RESULTS:                          10.5   8.13  )-----------( 311      ( 06 Apr 2023 04:10 )             32.3       PT/INR - ( 06 Apr 2023 04:10 )   PT: 15.3 sec;   INR: 1.32 ratio         PTT - ( 06 Apr 2023 04:10 )  PTT:28.0 sec    04-06    141  |  108  |  19.8  ----------------------------<  117<H>  3.6   |  23.0  |  0.62    Ca    8.1<L>      06 Apr 2023 04:10  Phos  4.5     04-06  Mg     2.3     04-06      -----------------------------------------------------------------------------------------------------------------------------------------------------------------------------------    PHYSICAL EXAM:  General: Calm, intubated  HEENT: MMM  Neuro:  -Mental status- No acute distress  -CN- PERRL 3mm, EOMI, tongue midline, face symmetric    CV: RRR  Pulm: clear to auscultation  Abd: Soft, nontender, nondistended  Ext: no noted edema in lower ext  Skin: warm, dry       Chief complaint:   Patient is a 58y old  Male who presents with a chief complaint of stroke (05 Apr 2023 15:18)    HPI:  57 y/o male with PMHx of HTN and DM transfered from [facility] to [facility] as a code stroke. [facility] reports pt BLAYNE was last night around 20:00 and was c/o right arm pain, today his family found his around 05:00 with left sided hemiparesis, slurred speech and was incontinent of urine, they called EMS as pt arrived at [facility] at 05:56, he was found to have right ICA occlusion and transferred to [facility], code stroke called upon arrival. Pt was on Cardene however per EMS neuro wanted pressure around 200 and it was d/monet, pt sedated with propofol.     (29 Mar 2023 09:40)    24hr EVENTS: PEG today    ROS: [x ]  Unable to assess due to mental status   All other systems negative    -----------------------------------------------------------------------------------------------------------------------------------------------------------------------------------  ICU Vital Signs Last 24 Hrs  T(C): 36.7 (06 Apr 2023 04:24), Max: 37.2 (05 Apr 2023 16:35)  T(F): 98 (06 Apr 2023 04:24), Max: 98.9 (05 Apr 2023 16:35)  HR: 84 (06 Apr 2023 08:46) (72 - 105)  BP: 153/80 (06 Apr 2023 08:46) (107/74 - 153/80)  BP(mean): 100 (06 Apr 2023 08:46) (75 - 108)  ABP: --  ABP(mean): --  RR: 28 (06 Apr 2023 08:46) (20 - 28)  SpO2: 95% (06 Apr 2023 08:46) (95% - 100%)    O2 Parameters below as of 06 Apr 2023 08:00  Patient On (Oxygen Delivery Method): nasal cannula  O2 Flow (L/min): 2      I&O's Summary    05 Apr 2023 07:01  -  06 Apr 2023 07:00  --------------------------------------------------------  IN: 682.8 mL / OUT: 950 mL / NET: -267.2 mL    06 Apr 2023 07:01  -  06 Apr 2023 09:05  --------------------------------------------------------  IN: 44.4 mL / OUT: 0 mL / NET: 44.4 mL        MEDICATIONS  (STANDING):  acetylcysteine 20%  Inhalation 4 milliLiter(s) Inhalation every 6 hours  albuterol/ipratropium for Nebulization 3 milliLiter(s) Nebulizer every 6 hours  artificial  tears Solution 1 Drop(s) Both EYES two times a day  aspirin Suppository 300 milliGRAM(s) Rectal daily  cangrelor Infusion 1 MICROgram(s)/kG/Min (22.2 mL/Hr) IV Continuous <Continuous>  chlorhexidine 2% Cloths 1 Application(s) Topical daily  dextrose 5%. 1000 milliLiter(s) (50 mL/Hr) IV Continuous <Continuous>  dextrose 5%. 1000 milliLiter(s) (100 mL/Hr) IV Continuous <Continuous>  dextrose 50% Injectable 25 Gram(s) IV Push once  dextrose 50% Injectable 12.5 Gram(s) IV Push once  dextrose 50% Injectable 25 Gram(s) IV Push once  glucagon  Injectable 1 milliGRAM(s) IntraMuscular once  insulin lispro (ADMELOG) corrective regimen sliding scale   SubCutaneous every 6 hours  pantoprazole  Injectable 40 milliGRAM(s) IV Push daily  petrolatum Ophthalmic Ointment 1 Application(s) Right EYE every 12 hours  senna Syrup 10 milliLiter(s) Oral at bedtime      IMAGING:   Recent imaging studies were reviewed.    LAB RESULTS:                          10.5   8.13  )-----------( 311      ( 06 Apr 2023 04:10 )             32.3       PT/INR - ( 06 Apr 2023 04:10 )   PT: 15.3 sec;   INR: 1.32 ratio         PTT - ( 06 Apr 2023 04:10 )  PTT:28.0 sec    04-06    141  |  108  |  19.8  ----------------------------<  117<H>  3.6   |  23.0  |  0.62    Ca    8.1<L>      06 Apr 2023 04:10  Phos  4.5     04-06  Mg     2.3     04-06      -----------------------------------------------------------------------------------------------------------------------------------------------------------------------------------    PHYSICAL EXAM:  General: Calm, letharbic  HEENT: MMM  Neuro:  -Mental status- No acute distress, AOx4, follow commands on R  no BTT on the L   -CN- PERRL 3mm, EOMI, tongue midline, face symmetric   RUE 4/5, L arm 2/5 spontaneously, R leg 4/5, L leg 2/5 spontaneously   CV: RRR  Pulm: coarse breath sounds   Abd: Soft, nontender, nondistended  Ext: no noted edema in lower ext  Skin: warm, dry       Chief complaint:   Patient is a 58y old  Male who presents with a chief complaint of stroke (05 Apr 2023 15:18)    HPI:  57 y/o male with PMHx of HTN and DM transfered from [facility] to [facility] as a code stroke. [facility] reports pt BLAYNE was last night around 20:00 and was c/o right arm pain, today his family found his around 05:00 with left sided hemiparesis, slurred speech and was incontinent of urine, they called EMS as pt arrived at [facility] at 05:56, he was found to have right ICA occlusion and transferred to [facility], code stroke called upon arrival. Pt was on Cardene however per EMS neuro wanted pressure around 200 and it was d/monet, pt sedated with propofol.     (29 Mar 2023 09:40)    24hr EVENTS: PEG today    ROS: [x ]  Unable to assess due to mental status   All other systems negative    -----------------------------------------------------------------------------------------------------------------------------------------------------------------------------------  ICU Vital Signs Last 24 Hrs  T(C): 36.7 (06 Apr 2023 04:24), Max: 37.2 (05 Apr 2023 16:35)  T(F): 98 (06 Apr 2023 04:24), Max: 98.9 (05 Apr 2023 16:35)  HR: 84 (06 Apr 2023 08:46) (72 - 105)  BP: 153/80 (06 Apr 2023 08:46) (107/74 - 153/80)  BP(mean): 100 (06 Apr 2023 08:46) (75 - 108)  ABP: --  ABP(mean): --  RR: 28 (06 Apr 2023 08:46) (20 - 28)  SpO2: 95% (06 Apr 2023 08:46) (95% - 100%)    O2 Parameters below as of 06 Apr 2023 08:00  Patient On (Oxygen Delivery Method): nasal cannula  O2 Flow (L/min): 2      I&O's Summary    05 Apr 2023 07:01  -  06 Apr 2023 07:00  --------------------------------------------------------  IN: 682.8 mL / OUT: 950 mL / NET: -267.2 mL    06 Apr 2023 07:01  -  06 Apr 2023 09:05  --------------------------------------------------------  IN: 44.4 mL / OUT: 0 mL / NET: 44.4 mL        MEDICATIONS  (STANDING):  acetylcysteine 20%  Inhalation 4 milliLiter(s) Inhalation every 6 hours  albuterol/ipratropium for Nebulization 3 milliLiter(s) Nebulizer every 6 hours  artificial  tears Solution 1 Drop(s) Both EYES two times a day  aspirin Suppository 300 milliGRAM(s) Rectal daily  cangrelor Infusion 1 MICROgram(s)/kG/Min (22.2 mL/Hr) IV Continuous <Continuous>  chlorhexidine 2% Cloths 1 Application(s) Topical daily  dextrose 5%. 1000 milliLiter(s) (50 mL/Hr) IV Continuous <Continuous>  dextrose 5%. 1000 milliLiter(s) (100 mL/Hr) IV Continuous <Continuous>  dextrose 50% Injectable 25 Gram(s) IV Push once  dextrose 50% Injectable 12.5 Gram(s) IV Push once  dextrose 50% Injectable 25 Gram(s) IV Push once  glucagon  Injectable 1 milliGRAM(s) IntraMuscular once  insulin lispro (ADMELOG) corrective regimen sliding scale   SubCutaneous every 6 hours  pantoprazole  Injectable 40 milliGRAM(s) IV Push daily  petrolatum Ophthalmic Ointment 1 Application(s) Right EYE every 12 hours  senna Syrup 10 milliLiter(s) Oral at bedtime      IMAGING:   Recent imaging studies were reviewed.    LAB RESULTS:                          10.5   8.13  )-----------( 311      ( 06 Apr 2023 04:10 )             32.3       PT/INR - ( 06 Apr 2023 04:10 )   PT: 15.3 sec;   INR: 1.32 ratio         PTT - ( 06 Apr 2023 04:10 )  PTT:28.0 sec    04-06    141  |  108  |  19.8  ----------------------------<  117<H>  3.6   |  23.0  |  0.62    Ca    8.1<L>      06 Apr 2023 04:10  Phos  4.5     04-06  Mg     2.3     04-06      -----------------------------------------------------------------------------------------------------------------------------------------------------------------------------------    PHYSICAL EXAM:  General: Calm, letharbic  HEENT: MMM  Neuro:  -Mental status- No acute distress, AOx4, follow commands on R  no BTT on the L   L neglect   -CN- PERRL 3mm, EOMI, tongue midline, face symmetric   RUE 4/5, L arm 2/5 spontaneously, R leg 4/5, L leg 2/5 spontaneously   CV: RRR  Pulm: coarse breath sounds   Abd: Soft, nontender, nondistended  Ext: no noted edema in lower ext  Skin: warm, dry

## 2023-04-06 NOTE — CONSULT NOTE ADULT - ASSESSMENT
57 y/o m, PMHx of HTN and DM transferred from [facility] to [facility] as a code stroke.   [facility] reports pt BLAYNE was last night around 20:00 and was c/o right arm pain, today his family found him around 05:00 with left sided hemiparesis, slurred speech and was incontinent of urine, they called EMS as pt arrived at [facility] at 05:56,  he was found to have right ICA occlusion and transferred to [facility], code stroke called upon arrival.   Patient now s/p thrombectomy due to right ICA occlusion with tandem right MCA occlusion.    A cardiac consult was called due for Stroke etiology  Patient is nonverbal, brother as side and states no cardiac history, has never seen a cardiologist, has no family history of cardiac diseae, and is a non smoker.     Patient nods heads to questioning appropriately  Denies headache, dizziness, chest pain, palpitations, sob, pnd, orthopnea hemoptysis n/v/d/c/ abd pain, fever, chills, syncope, presyncope or any other pain.            Summary:   1. Left ventricular ejection fraction, by visual estimation, is >75%.   2. Technically difficult study.   3. Hyperdynamic global left ventricular systolic function.   4. The left ventricular diastolic function could not be assessed in this   study.   5. There is mild concentric left ventricular hypertrophy.   6. There is no evidence of pericardial effusion.   7. Mild mitral annular calcification.   8. Trace mitral valve regurgitation.   59 y/o m, PMHx of HTN and DM transferred from [facility] to [facility] as a code stroke.   [facility] reports pt BLAYNE was last night around 20:00 and was c/o right arm pain, today his family found him around 05:00 with left sided hemiparesis, slurred speech and was incontinent of urine, they called EMS as pt arrived at [facility] at 05:56,  he was found to have right ICA occlusion and transferred to [facility], code stroke called upon arrival.   Patient now s/p thrombectomy due to right ICA occlusion with tandem right MCA occlusion.    A cardiac consult was called due for Stroke etiology  Patient is nonverbal, brother as side and states no cardiac history, has never seen a cardiologist, has no family history of cardiac diseae, and is a non smoker.     Patient nods heads to questioning appropriately  Denies headache, dizziness, chest pain, palpitations, sob, pnd, orthopnea hemoptysis n/v/d/c/ abd pain, fever, chills, syncope, presyncope or any other pain.          .

## 2023-04-06 NOTE — PROGRESS NOTE ADULT - ASSESSMENT
Assessment/Plan:   59 yo M with acute CVA due to R ICA/MCA occlusion, with hemorrhagic transformation, vasogenic edema and MLS; no thrombolytics as was out of the window for administration.  S/p TICI 2b MT, AYAKA stenting 3/29. On Cangrelor and ASA now.  Respiratory distress due to bulbar impairment, pulm congestion; concern for aspiration PNA vs pneumonitis.   PMH of HTN and DM.  Dysphagia.  Ileus    Neuro:  neurochecks  c/w ASA 81 mg daily - switch to rectal as NPO; c/w cangrelor ggt at 1 mcg/kg/min while on hemicrani watch; hold temporarily for PEG today, restart after    CV: , TTE with EF 75%  MAP >65    Pulm:  cont Duoneb + mucomyst, chest PT; OOB  maintain OSats >92, monitor EtCO2; switch to NC    GI:  NPO; BM regimen, disimpaction 4/5-no stool  NGT to intermittent suction  S/S re-eval daily, involvement of GI team for PEG eval appreciated, planned for 4/6    Renal:  Na goal 145-155  monitor UOp    Heme:  SCDs, hold Lov ppx as on DAPT/heme conversion    ID: MRSA neg  c/w Zosyn for aspiration PNA, 7 days in total    Endo: HgA1C 7.3  cont ISS  FS goal 120-180   Assessment/Plan:   57 yo M with acute CVA due to R ICA/MCA occlusion, with hemorrhagic transformation, vasogenic edema and MLS; no thrombolytics as was out of the window for administration.  S/p TICI 2b MT, AYAKA stenting 3/29. On Cangrelor and ASA now.  Respiratory distress due to bulbar impairment, pulm congestion; concern for aspiration PNA vs pneumonitis.   PMH of HTN and DM.  Dysphagia.  Ileus?    Neuro: PSD 8  neurochecks q4h  c/w ASA 81 mg daily - switch to rectal as NPO; c/w cangrelor ggt at 1 mcg/kg/min while on hemicrani watch; hold temporarily for PEG with GI today, restart after  MRI tomorrow    CV: , TTE with EF 75%  MAP >65  start cardura    Pulm:  cont Duoneb, chest PT; OOB  maintain OSats >92, monitor EtCO2; 2L NC    GI:  NPO; BM regimen   LBM 4/5  NGT clamped  S/S re-eval daily, involvement of GI team for PEG appreciated, planned for 4/6    Renal:  Na goal 140-150  monitor UOp  start cardura  start NS while NPO    Heme:  SCDs, Lov ppx      ID: MRSA neg  s/p Zosyn for aspiration PNA, 7 days in total    Endo: HgA1C 7.3  cont ISS  FS goal 120-180

## 2023-04-06 NOTE — CONSULT NOTE ADULT - NS ATTEND AMEND GEN_ALL_CORE FT
1) Acute cerebrovascular accident  Presents with CVA and now s/p thrombectomy due to right ICA occlusion with tandem right MCA occlusion.  Pt is planned to have  G tube tonight  2) Repeat TTE with bubble study vs MARCELINO: Brother ( would like to hold of on any testing currently (phone number [phone], who States his other brother who is a pulmonologist can help explain it to him.  His Name is Roel Muñoz Phone number [phone]  Patient is unable to swallow and is currently not consented.  Continue telemetry monitoring  3) Left carotid artery stenosis:  No significant hemodynamic stenosis of the right internal. carotid artery.  Patent right ICA stent.  Over 70% left internal carotid artery stenosis.. Bilateral external carotid artery stenosis. Vascular consult recommended. 1) Acute cerebrovascular accident  Presents with CVA and now s/p thrombectomy due to right ICA occlusion with tandem right MCA occlusion.  Pt is planned to have  G tube tonight  2) Repeat TTE with bubble study vs MARCELINO: Brother ( would like to hold of on any testing currently (phone number [phone], who States his other brother who is a pulmonologist can help explain it to him.  His Name is Roel Muñoz Phone number [phone]  Patient is unable to swallow and is currently not consented.  Continue telemetry monitoring.  will d/w neurology.   3) Left carotid artery stenosis:  No significant hemodynamic stenosis of the right internal. carotid artery.  Patent right ICA stent.  Over 70% left internal carotid artery stenosis.. Bilateral external carotid artery stenosis. Vascular consult recommended.

## 2023-04-06 NOTE — CHART NOTE - NSCHARTNOTEFT_GEN_A_CORE
EGD with PEG tube placement. Normal EGD. Successful PEG tube placement. Can use G-tube for medications in four hours as well as restating Cangrelor in four hours. Hold TF tonight. Will restart in AM if pt. is OK overnight. Family informed post procedure. Case and management also discussed with pt's nurse post procedure.

## 2023-04-06 NOTE — CONSULT NOTE ADULT - PROBLEM SELECTOR RECOMMENDATION 9
Presents with CVA and now s/p thrombectomy due to right ICA occlusion with tandem right MCA occlusion.  Patient + for hemiparesis,, non verbal, and + facial droop, and unable to swallow  Getting a G tube tonight  Neruology and nuerosurgery following  TTE Ef > 75. %  Repeat TTE with bubble study vs MARCELINO - brother would like to hold of on any testing currently  [place]ihnue telemetry montirong  12 ekg with artifact - will repeat Presents with CVA and now s/p thrombectomy due to right ICA occlusion with tandem right MCA occlusion.  Patient + for hemiparesis,, non verbal, and + facial droop, and unable to swallow  Getting a G tube tonight  Neurology and neurosurgery following  TTE Ef > 75. %  Repeat TTE with bubble study vs MARCELINO - brother ( would like to hold of on any testing currently (phone number [phone], who States his other brother who is a pulmonagist can help explain it to him.  His Name is Roel Muñoz Phone number [phone]  Patient is unable to swallow and is currently not consented.    Continue telemetry monitoring  12 ekg with artifact - will repeat Presents with CVA and now s/p thrombectomy due to right ICA occlusion with tandem right MCA occlusion.  Patient + for hemiparesis,, non verbal, and + facial droop, and unable to swallow  Getting a G tube tonight  Neurology and neurosurgery following  TTE Ef > 75. %  Repeat TTE with bubble study vs MRACELINO - brother ( would like to hold of on any testing currently (phone number [phone], who States his other brother who is a pulmonologist can help explain it to him.  His Name is Roel Muñoz Phone number [phone]  Patient is unable to swallow and is currently not consented.    Continue telemetry monitoring  12 ekg with artifact - will repeat

## 2023-04-06 NOTE — CONSULT NOTE ADULT - PROBLEM SELECTOR RECOMMENDATION 2
Carotid duplex-  No significant hemodynamic stenosis of the right internal   carotid artery.  Patent right ICA stent.  Over 70% left internal carotid artery stenosis.  Bilateral external carotid artery stenosis.  Vascular consult

## 2023-04-06 NOTE — CONSULT NOTE ADULT - SUBJECTIVE AND OBJECTIVE BOX
[facility]                                              CARDIOLOGY AT [address], [place] [address]                                             Telephone: [phone]. Fax:[phone]                                                       CARDIOLOGY CONSULTATION NOTE                                                                                             History obtained by: Patient and medical record  Community Cardiologist:  None   obtained: Yes [  ] No [ x ]  Reason for Consultation:  CVA etilogy  Available out pt records reviewed: Yes [  ] No [ x ]    Chief complaint:    Patient is a 58y old  Male who presents with a chief complaint of stroke (2023 15:18)    HPI:  59 y/o m, PMHx of HTN and DM transferred from [facility] to [facility] as a code stroke.   [facility] EMS reports pt JAGRUTIW was last night around 20:00 and was c/o right arm pain, today his family found him around 05:00 with left sided hemiparesis, slurred speech and was incontinent of urine, they called EMS as pt arrived at [facility] at 05:56,  he was found to have right ICA occlusion and transferred to [facility], code stroke called upon arrival.   Patient now s/p thrombectomy due to right ICA occlusion with tandem right MCA occlusion.    A cardiac consult was called due for Stroke etiology  Patient is nonverbal, brother as side and states no cardiac history, has never seen a cardiologist, has no family history of cardiac diseae, and is a non smoker.     Patient nods heads to questioning appropriately  Denies headache, dizziness, chest pain, palpitations, sob, pnd, orthopnea hemoptysis n/v/d/c/ abd pain, fever, chills, syncope, presyncope or any other pain.       CARDIAC TESTING     ECHO:  ACC: [id number] EXAM:  ECHO TTE WO CON COMP W DOPP                        PROCEDURE DATE:  2023    INTERPRETATION:  TRANSTHORACIC ECHOCARDIOGRAM REPORT  Patient Name:   CONG AGUAYO Patient Location: Inpatient  Medical Rec #:  [id number]        Accession #:      [id number]  Account #:                      Height:           66.9 in 170.0 cm  YOB: 1965       Weight:           163.1 lb 74.00 kg  Patient Age:    58 years        BSA:              1.85 m²  Patient Gender: M               BP:               103/73 mmHg    Date of Exam:        3/29/2023 6:22:56 PM  Sonographer:         Ousmane Duran  Referring Physician: Caridad Klein    Procedure:     2D Echo/Doppler/Color Doppler Complete.  Indications:   I67.89 - Other cerebrovascular disease  Diagnosis:     I31.39 - Other pericardial effusion (noninflammatory)  Study Details: Technically difficult study. Study quality was adversely   affected due to body habitus, lung interference and limited   parasternal views.  2D AND M-MODE MEASUREMENTS (normal ranges within parentheses):  Left                 Normal   Aorta/Left            Normal  Ventricle:                    Atrium:  IVSd (2D):    1.11  (0.7-1.1) Aortic Root    3.40  (2.4-3.7)                 cm             (2D):           cm  LVPWd (2D):   1.16  (0.7-1.1) Left Atrium    2.70  (1.9-4.0)                 cm             (2D):           cm  LVIDd (2D):   4.32  (3.4-5.7) LA Volume      22.4                 cm             Index         ml/m²  LVIDs (2D):   2.29                 cm  LV FS (2D):   47.0   (>25%)                  %  Relative Wall 0.54   (<0.42)  Thickness  LV DIASTOLIC FUNCTION:  MV Peak E: 0.62 m/s e', MV Capri: 0.07 m/s  MV Peak A: 0.63 m/s E/e' Ratio: 8.42  E/A Ratio: 0.99     Decel Time: 204 msec    SPECTRAL DOPPLER ANALYSIS (where applicable):  Mitral Valve:  MV P1/2 Time: 59.16 msec  MV Area, PHT: 3.72 cm²    Aortic Valve: AoV Max Mendel: 1.10 m/s AoV Peak P.8 mmHg AoV Mean PG:   3.0 mmHg    LVOT Vmax: 0.87 m/s LVOT VTI: 0.222 m LVOT Diameter: 2.20 cm    AoV Area, Vmax: 3.00 cm² AoV Area, VTI: 3.31 cm² AoV Area, Vmn: 2.58 cm²  Ao VTI: 0.255    PHYSICIAN INTERPRETATION:  Left Ventricle: The left ventricular internal cavity size is normal.  Global LV systolic function was hyperdynamic. Left ventricular ejection   fraction, by visual estimation, is >75%. The left ventricular diastolic   function could not be assessed in this study.  Right Ventricle: Normal right ventricular size and function.  Left Atrium: The left atrium is normal in size.  Right Atrium: The right atrium is normal in size.  Pericardium: There is no evidence of pericardial effusion.  Mitral Valve: There is mild mitral annular calcification. Trace mitral   valve regurgitation is seen.  Tricuspid Valve: The tricuspid valve is not well seen. Trivial tricuspid   regurgitation is visualized. Adequate TR velocity was not obtained to   accurately assess RVSP.  Aortic Valve: The aortic valve was not well visualized. No evidence of   aortic valve regurgitation is seen.  Pulmonic Valve: The pulmonic valve was not well visualized.  Aorta: The aortic root is normal in size and structure.  Pulmonary Artery: The pulmonary artery is not well seen.  Venous: Patient on Mechanical ventilation unable to assess Right Atrial   pressure.    Summary:   1. Left ventricular ejection fraction, by visual estimation, is >75%.   2. Technically difficult study.   3. Hyperdynamic global left ventricular systolic function.   4. The left ventricular diastolic function could not be assessed in this   study.   5. There is mild concentric left ventricular hypertrophy.   6. There is no evidence of pericardial effusion.   7. Mild mitral annular calcification.   8. Trace mitral valve regurgitation.    MD Tangela Electronically signed on 3/30/2023 at 8:22:02 AM  STRESS:    CATH:     ELECTROPHYSIOLOGY:     PAST MEDICAL HISTORY  HTN  DM    PAST SURGICAL HISTORY  None    SOCIAL HISTORY:  Denies smoking/alcohol/drugs    FAMILY HISTORY:  Family History of Cardiovascular Disease:  Yes [  ] No [  x]  Coronary Artery Disease in first degree relative: Yes [  ] No [x  ]  Sudden Cardiac Death in First degree relative: Yes [  ] No [x  ]    HOME MEDICATIONS:      CURRENT CARDIAC MEDICATIONS:  hydrALAZINE Injectable 10 milliGRAM(s) IV Push every 2 hours PRN SBP >140  labetalol Injectable 10 milliGRAM(s) IV Push every 2 hours PRN Systolic blood pressure > 140    CURRENT OTHER MEDICATIONS:  albuterol/ipratropium for Nebulization 3 milliLiter(s) Nebulizer every 6 hours  acetaminophen     Tablet .. 975 milliGRAM(s) Oral every 6 hours PRN Temp greater or equal to 38C (100.4F), Mild Pain (1 - 3)  aspirin Suppository 300 milliGRAM(s) Rectal daily  bisacodyl Suppository 10 milliGRAM(s) Rectal daily PRN Constipation  pantoprazole  Injectable 40 milliGRAM(s) IV Push daily  senna Syrup 10 milliLiter(s) Oral at bedtime  artificial  tears Solution 1 Drop(s) Both EYES two times a day  cangrelor Infusion 1 MICROgram(s)/kG/Min (22.2 mL/Hr) IV Continuous <Continuous>  chlorhexidine 2% Cloths 1 Application(s) Topical daily  enoxaparin Injectable 40 milliGRAM(s) SubCutaneous every 24 hours  glucagon  Injectable 1 milliGRAM(s) IntraMuscular once, Stop order after: 1 Doses  insulin lispro (ADMELOG) corrective regimen sliding scale   SubCutaneous every 6 hours  petrolatum Ophthalmic Ointment 1 Application(s) Right EYE every 12 hours  sodium chloride 0.9%. 1000 milliLiter(s) (75 mL/Hr) IV Continuous <Continuous>    ALLERGIES:   No Known Allergies    REVIEW OF SYMPTOMS:   CONSTITUTIONAL: No fever, no chills, no weight loss, no weight gain, no fatigue   ENMT:  No vertigo; No sinus or throat pain  NECK: No pain or stiffness  CARDIOVASCULAR: No chest pain, no dyspnea, no syncope/presyncope, no palpitations, no dizziness, no Orthopnea, no Paroxsymal nocturnal dyspnea  RESPIRATORY: no Shortness of breath, no cough, no wheezing  : No dysuria, no hematuria   GI: No dark color stool, no nausea, no diarrhea, no constipation, no abdominal pain   NEURO: No headache, no slurred speech   MUSCULOSKELETAL: No joint pain or swelling; No muscle, back, or extremity pain  PSYCH: No agitation, no anxiety.    ALL OTHER REVIEW OF SYSTEMS ARE NEGATIVE.    VITAL SIGNS:  T(C): 36.8 (23 @ 16:53), Max: 36.8 (23 @ 16:53)  T(F): 98.3 (23 @ 16:53), Max: 98.3 (23 @ 16:53)  HR: 82 (23 @ 16:00) (72 - 92)  BP: 123/76 (23 @ 16:00) (102/58 - 157/88)  RR: 23 (23 @ 16:00) (18 - 28)  SpO2: 96% (23 @ 16:00) (93% - 100%)    INTAKE AND OUTPUT:      @ 07:01  -   @ 07:00  --------------------------------------------------------  IN: 682.8 mL / OUT: 950 mL / NET: -267.2 mL   @ 07:01  -   @ 16:57  --------------------------------------------------------  IN: 699.4 mL / OUT: 450 mL / NET: 249.4 mL    PHYSICAL EXAM:  Constitutional: Comfortable . No acute distress.   HEENT: N/G tube in place and normocephalic , neck is supple . no JVD. No carotid bruit.  CNS: A&O - difficulty to assess, patients gives thumbs up to verbal commands,   Nonverbal, lifts left arm and leg slightly.  Right diminished but somewhat stronger than left. + facial droop    Respiratory: CTAB, unlabored   Cardiovascular: RRR normal s1 s2. No murmur. No rubs or gallop.  Gastrointestinal: Soft, non-tender. +Bowel sounds.   Extremities: + Peripheral Pulses, No clubbing, cyanosis, or edema  Psychiatric: Calm . no agitation.   Skin: Warm and dry, no ulcers on extremities     LABS:                       10.5   8.13  )-----------( 311      ( 2023 04:10 )             32.3     04-06    141  |  108  |  19.8  ----------------------------<  117<H>  3.6   |  23.0  |  0.62    Ca    8.1<L>      2023 04:10  Phos  4.5     04-06  Mg     2.3     04-06    PT/INR - ( 2023 04:10 )   PT: 15.3 sec;   INR: 1.32 ratio       PTT - ( 2023 04:10 )  PTT:28.0 sec    INTERPRETATION OF TELEMETRY:  Sr 60's, no acute alarms noted      ECG:   St, poor quality - artifact.    Prior ECG: Yes [  ] No [ x ]

## 2023-04-07 LAB
ANION GAP SERPL CALC-SCNC: 11 MMOL/L — SIGNIFICANT CHANGE UP (ref 5–17)
BUN SERPL-MCNC: 18.4 MG/DL — SIGNIFICANT CHANGE UP (ref 8–20)
CALCIUM SERPL-MCNC: 8.2 MG/DL — LOW (ref 8.4–10.5)
CHLORIDE SERPL-SCNC: 105 MMOL/L — SIGNIFICANT CHANGE UP (ref 96–108)
CO2 SERPL-SCNC: 23 MMOL/L — SIGNIFICANT CHANGE UP (ref 22–29)
CREAT SERPL-MCNC: 0.46 MG/DL — LOW (ref 0.5–1.3)
CULTURE RESULTS: SIGNIFICANT CHANGE UP
CULTURE RESULTS: SIGNIFICANT CHANGE UP
EGFR: 120 ML/MIN/1.73M2 — SIGNIFICANT CHANGE UP
GLUCOSE BLDC GLUCOMTR-MCNC: 104 MG/DL — HIGH (ref 70–99)
GLUCOSE BLDC GLUCOMTR-MCNC: 114 MG/DL — HIGH (ref 70–99)
GLUCOSE BLDC GLUCOMTR-MCNC: 149 MG/DL — HIGH (ref 70–99)
GLUCOSE BLDC GLUCOMTR-MCNC: 98 MG/DL — SIGNIFICANT CHANGE UP (ref 70–99)
GLUCOSE SERPL-MCNC: 105 MG/DL — HIGH (ref 70–99)
HCT VFR BLD CALC: 31.8 % — LOW (ref 39–50)
HCYS SERPL-MCNC: 6.6 UMOL/L — SIGNIFICANT CHANGE UP
HGB BLD-MCNC: 10.4 G/DL — LOW (ref 13–17)
MAGNESIUM SERPL-MCNC: 2.2 MG/DL — SIGNIFICANT CHANGE UP (ref 1.6–2.6)
MCHC RBC-ENTMCNC: 26.9 PG — LOW (ref 27–34)
MCHC RBC-ENTMCNC: 32.7 GM/DL — SIGNIFICANT CHANGE UP (ref 32–36)
MCV RBC AUTO: 82.2 FL — SIGNIFICANT CHANGE UP (ref 80–100)
PA ADP PRP-ACNC: 107 PRU — LOW (ref 180–376)
PA ADP PRP-ACNC: 89 PRU — LOW (ref 180–376)
PHOSPHATE SERPL-MCNC: 4 MG/DL — SIGNIFICANT CHANGE UP (ref 2.4–4.7)
PLATELET # BLD AUTO: 293 K/UL — SIGNIFICANT CHANGE UP (ref 150–400)
POTASSIUM SERPL-MCNC: 3.6 MMOL/L — SIGNIFICANT CHANGE UP (ref 3.5–5.3)
POTASSIUM SERPL-SCNC: 3.6 MMOL/L — SIGNIFICANT CHANGE UP (ref 3.5–5.3)
RBC # BLD: 3.87 M/UL — LOW (ref 4.2–5.8)
RBC # FLD: 13.6 % — SIGNIFICANT CHANGE UP (ref 10.3–14.5)
SODIUM SERPL-SCNC: 139 MMOL/L — SIGNIFICANT CHANGE UP (ref 135–145)
SPECIMEN SOURCE: SIGNIFICANT CHANGE UP
SPECIMEN SOURCE: SIGNIFICANT CHANGE UP
WBC # BLD: 7.77 K/UL — SIGNIFICANT CHANGE UP (ref 3.8–10.5)
WBC # FLD AUTO: 7.77 K/UL — SIGNIFICANT CHANGE UP (ref 3.8–10.5)

## 2023-04-07 PROCEDURE — 99233 SBSQ HOSP IP/OBS HIGH 50: CPT

## 2023-04-07 PROCEDURE — 99291 CRITICAL CARE FIRST HOUR: CPT

## 2023-04-07 PROCEDURE — 70551 MRI BRAIN STEM W/O DYE: CPT | Mod: 26

## 2023-04-07 PROCEDURE — 74018 RADEX ABDOMEN 1 VIEW: CPT | Mod: 26

## 2023-04-07 PROCEDURE — 99232 SBSQ HOSP IP/OBS MODERATE 35: CPT

## 2023-04-07 RX ORDER — POTASSIUM CHLORIDE 20 MEQ
40 PACKET (EA) ORAL ONCE
Refills: 0 | Status: COMPLETED | OUTPATIENT
Start: 2023-04-07 | End: 2023-04-07

## 2023-04-07 RX ORDER — LACTULOSE 10 G/15ML
10 SOLUTION ORAL ONCE
Refills: 0 | Status: DISCONTINUED | OUTPATIENT
Start: 2023-04-07 | End: 2023-04-07

## 2023-04-07 RX ORDER — ATORVASTATIN CALCIUM 80 MG/1
80 TABLET, FILM COATED ORAL DAILY
Refills: 0 | Status: DISCONTINUED | OUTPATIENT
Start: 2023-04-07 | End: 2023-04-11

## 2023-04-07 RX ORDER — LISINOPRIL 2.5 MG/1
10 TABLET ORAL DAILY
Refills: 0 | Status: DISCONTINUED | OUTPATIENT
Start: 2023-04-07 | End: 2023-04-11

## 2023-04-07 RX ORDER — POLYETHYLENE GLYCOL 3350 17 G/17G
17 POWDER, FOR SOLUTION ORAL DAILY
Refills: 0 | Status: DISCONTINUED | OUTPATIENT
Start: 2023-04-07 | End: 2023-04-11

## 2023-04-07 RX ORDER — LISINOPRIL 2.5 MG/1
10 TABLET ORAL DAILY
Refills: 0 | Status: DISCONTINUED | OUTPATIENT
Start: 2023-04-07 | End: 2023-04-07

## 2023-04-07 RX ORDER — ACETAMINOPHEN 500 MG
650 TABLET ORAL EVERY 6 HOURS
Refills: 0 | Status: DISCONTINUED | OUTPATIENT
Start: 2023-04-07 | End: 2023-04-08

## 2023-04-07 RX ORDER — TICAGRELOR 90 MG/1
180 TABLET ORAL ONCE
Refills: 0 | Status: COMPLETED | OUTPATIENT
Start: 2023-04-07 | End: 2023-04-07

## 2023-04-07 RX ORDER — OXYCODONE HYDROCHLORIDE 5 MG/1
10 TABLET ORAL EVERY 6 HOURS
Refills: 0 | Status: DISCONTINUED | OUTPATIENT
Start: 2023-04-07 | End: 2023-04-08

## 2023-04-07 RX ORDER — KETOROLAC TROMETHAMINE 30 MG/ML
15 SYRINGE (ML) INJECTION ONCE
Refills: 0 | Status: DISCONTINUED | OUTPATIENT
Start: 2023-04-07 | End: 2023-04-07

## 2023-04-07 RX ORDER — OXYCODONE HYDROCHLORIDE 5 MG/1
5 TABLET ORAL EVERY 6 HOURS
Refills: 0 | Status: DISCONTINUED | OUTPATIENT
Start: 2023-04-07 | End: 2023-04-08

## 2023-04-07 RX ORDER — TICAGRELOR 90 MG/1
90 TABLET ORAL EVERY 12 HOURS
Refills: 0 | Status: DISCONTINUED | OUTPATIENT
Start: 2023-04-08 | End: 2023-04-11

## 2023-04-07 RX ORDER — ASPIRIN/CALCIUM CARB/MAGNESIUM 324 MG
81 TABLET ORAL DAILY
Refills: 0 | Status: DISCONTINUED | OUTPATIENT
Start: 2023-04-07 | End: 2023-04-11

## 2023-04-07 RX ORDER — ACETAMINOPHEN 500 MG
650 TABLET ORAL EVERY 6 HOURS
Refills: 0 | Status: DISCONTINUED | OUTPATIENT
Start: 2023-04-07 | End: 2023-04-07

## 2023-04-07 RX ADMIN — Medication 10 MILLIGRAM(S): at 08:00

## 2023-04-07 RX ADMIN — Medication 650 MILLIGRAM(S): at 23:50

## 2023-04-07 RX ADMIN — ATORVASTATIN CALCIUM 80 MILLIGRAM(S): 80 TABLET, FILM COATED ORAL at 11:30

## 2023-04-07 RX ADMIN — Medication 1 APPLICATION(S): at 17:10

## 2023-04-07 RX ADMIN — OXYCODONE HYDROCHLORIDE 5 MILLIGRAM(S): 5 TABLET ORAL at 01:13

## 2023-04-07 RX ADMIN — Medication 1 APPLICATION(S): at 05:13

## 2023-04-07 RX ADMIN — Medication 650 MILLIGRAM(S): at 11:29

## 2023-04-07 RX ADMIN — Medication 10 MILLIGRAM(S): at 12:12

## 2023-04-07 RX ADMIN — ENOXAPARIN SODIUM 40 MILLIGRAM(S): 100 INJECTION SUBCUTANEOUS at 17:08

## 2023-04-07 RX ADMIN — Medication 4 MILLIGRAM(S): at 21:40

## 2023-04-07 RX ADMIN — Medication 650 MILLIGRAM(S): at 18:11

## 2023-04-07 RX ADMIN — SODIUM CHLORIDE 75 MILLILITER(S): 9 INJECTION INTRAMUSCULAR; INTRAVENOUS; SUBCUTANEOUS at 04:04

## 2023-04-07 RX ADMIN — Medication 40 MILLIEQUIVALENT(S): at 06:06

## 2023-04-07 RX ADMIN — Medication 650 MILLIGRAM(S): at 05:11

## 2023-04-07 RX ADMIN — POLYETHYLENE GLYCOL 3350 17 GRAM(S): 17 POWDER, FOR SOLUTION ORAL at 17:09

## 2023-04-07 RX ADMIN — Medication 1 DROP(S): at 17:09

## 2023-04-07 RX ADMIN — Medication 10 MILLIGRAM(S): at 00:05

## 2023-04-07 RX ADMIN — Medication 15 MILLIGRAM(S): at 18:11

## 2023-04-07 RX ADMIN — Medication 81 MILLIGRAM(S): at 11:30

## 2023-04-07 RX ADMIN — Medication 3 MILLILITER(S): at 05:05

## 2023-04-07 RX ADMIN — Medication 10 MILLIGRAM(S): at 16:09

## 2023-04-07 RX ADMIN — Medication 10 MILLIGRAM(S): at 10:14

## 2023-04-07 RX ADMIN — Medication 650 MILLIGRAM(S): at 17:09

## 2023-04-07 RX ADMIN — CHLORHEXIDINE GLUCONATE 1 APPLICATION(S): 213 SOLUTION TOPICAL at 05:12

## 2023-04-07 RX ADMIN — Medication 650 MILLIGRAM(S): at 08:10

## 2023-04-07 RX ADMIN — Medication 1 DROP(S): at 05:13

## 2023-04-07 RX ADMIN — Medication 15 MILLIGRAM(S): at 17:08

## 2023-04-07 RX ADMIN — LISINOPRIL 10 MILLIGRAM(S): 2.5 TABLET ORAL at 14:45

## 2023-04-07 RX ADMIN — SENNA PLUS 10 MILLILITER(S): 8.6 TABLET ORAL at 21:40

## 2023-04-07 RX ADMIN — TICAGRELOR 180 MILLIGRAM(S): 90 TABLET ORAL at 14:44

## 2023-04-07 RX ADMIN — Medication 3 MILLILITER(S): at 08:54

## 2023-04-07 RX ADMIN — Medication 650 MILLIGRAM(S): at 12:25

## 2023-04-07 RX ADMIN — SODIUM CHLORIDE 75 MILLILITER(S): 9 INJECTION INTRAMUSCULAR; INTRAVENOUS; SUBCUTANEOUS at 17:50

## 2023-04-07 RX ADMIN — OXYCODONE HYDROCHLORIDE 5 MILLIGRAM(S): 5 TABLET ORAL at 00:30

## 2023-04-07 RX ADMIN — Medication 10 MILLIGRAM(S): at 04:05

## 2023-04-07 RX ADMIN — Medication 10 MILLIGRAM(S): at 11:31

## 2023-04-07 RX ADMIN — CANGRELOR 22.2 MICROGRAM(S)/KG/MIN: 50 INJECTION, POWDER, LYOPHILIZED, FOR SOLUTION INTRAVENOUS at 10:14

## 2023-04-07 RX ADMIN — PANTOPRAZOLE SODIUM 40 MILLIGRAM(S): 20 TABLET, DELAYED RELEASE ORAL at 11:30

## 2023-04-07 NOTE — PHYSICAL THERAPY INITIAL EVALUATION ADULT - PRECAUTIONS/LIMITATIONS, REHAB EVAL
CPAP via ETT/fall precautions/oxygen therapy device and L/min
fall precautions/oxygen therapy device and L/min

## 2023-04-07 NOTE — OCCUPATIONAL THERAPY INITIAL EVALUATION ADULT - RANGE OF MOTION EXAMINATION, UPPER EXTREMITY
left UE hemiparetic- minimal active elbow flexion/extension in gravity eliminated/Right UE Active ROM was WFL (within functional limits)
Left UE active ROM 1/4 elbow flexion, gross grasp, <1/4 shoulder abduction, no other active movement; Right UE 1/4 shoulder flexion, 1/2 elbow flexion, full wrist and digits

## 2023-04-07 NOTE — OCCUPATIONAL THERAPY INITIAL EVALUATION ADULT - LIVES WITH, PROFILE
wife and 2 sons in a basement apartment with 12 steps down with railing and no steps inside/children/spouse
As per chart: Pt lives in a basement apartment with wife and two children- 12 MEI.

## 2023-04-07 NOTE — OCCUPATIONAL THERAPY INITIAL EVALUATION ADULT - REFERRAL TO ANOTHER SERVICE NEEDED, OT EVAL
Linn Morrocleopatra needs to be seen for an appointment before further refills are given.   TOO SOON
Please assist pt with refill 10/29/19
physical therapy
physical therapy/social work

## 2023-04-07 NOTE — PHYSICAL THERAPY INITIAL EVALUATION ADULT - PERTINENT HX OF CURRENT PROBLEM, REHAB EVAL
57 yo male h/o HTN and DM, found by family with Left sided weakness and confusion. BIBA to [facility], intubated for persistent vomiting. CTH reportedly wnl, CTA reportedly with Right carotid occlusion. Transferred to [facility] for neurosurgical evalaution. CTH with some loss of grey-white in the Right temporal lobe. Pt is s/p angio demonstrating a Right proximal ICA occlusion at the bifurcation with a Right M1 occlusion. Now s/p Mechanical Thrombectomy with TICI 2B recanalization and IA tPA given, followed by stenting of Right ICA. Repeat CTH with some hemorrhagic transformation in the R temporal lobe and some hyperdensity (contrast) in the R caudate and basal ganglia, as well as patchy strokes throughout the Right MCA territory. Pt remains in neurosurgical ICU for hemicraniectomy watch. Pt is now s/p open Jtube placement with GI
57 yo male h/o HTN and DM, found by family with Left sided weakness and confusion. BIBA to [facility], intubated for persistent vomiting. CTH reportedly wnl, CTA reportedly with Right carotid occlusion. Transferred to [facility] for neurosurgical evalaution. CTH with some loss of grey-white in the Right temporal lobe. Pt is s/p angio demonstrating a Right proximal ICA occlusion at the bifurcation with a Right M1 occlusion. Now s/p Mechanical Thrombectomy with TICI 2B recanalization and IA tPA given, followed by stenting of Right ICA. Repeat CTH with some hemorrhagic transformation in the R temporal lobe and some hyperdensity (contrast) in the R caudate and basal ganglia, as well as patchy strokes throughout the Right MCA territory. Pt remains in neurosurgical ICU for hemicraniectomy watch.

## 2023-04-07 NOTE — PHYSICAL THERAPY INITIAL EVALUATION ADULT - ACTIVE RANGE OF MOTION EXAMINATION, REHAB EVAL
Left UE Active ROM was WFL (within functional limits)
Right UE Active ROM was WFL (within functional limits)/Left LE Active ROM was WFL (within functional limits)/Right LE Active ROM was WFL (within functional limits)

## 2023-04-07 NOTE — PHYSICAL THERAPY INITIAL EVALUATION ADULT - SENSORY TESTS
Pt inconsistent with following commands, ongoing assessment needed
Pt unable to perform finger to thumb with LUE, intact RUE; right sided gaze preference noted  however, pt can turn head to left when prompted. Acuity on left peripheral field intact

## 2023-04-07 NOTE — OCCUPATIONAL THERAPY INITIAL EVALUATION ADULT - GENERAL OBSERVATIONS, REHAB EVAL
+ telemetry, + texas catheter, + IV
+IV, +Hi-flow O2NC, +catheter bradford, +cardiac monitor, +NG tube

## 2023-04-07 NOTE — PHYSICAL THERAPY INITIAL EVALUATION ADULT - PLANNED THERAPY INTERVENTIONS, PT EVAL
balance training/bed mobility training/gait training/neuromuscular re-education/postural re-education/strengthening/transfer training
balance training/bed mobility training/gait training/neuromuscular re-education/postural re-education/strengthening/transfer training

## 2023-04-07 NOTE — OCCUPATIONAL THERAPY INITIAL EVALUATION ADULT - NS ASR FOLLOW COMMAND OT EVAL
with mod/max verbal cues and increased time/able to follow single-step instructions
75% of the time/able to follow single-step instructions

## 2023-04-07 NOTE — PHYSICAL THERAPY INITIAL EVALUATION ADULT - IMPAIRMENTS FOUND, PT EVAL
arousal, attention, and cognition/gait, locomotion, and balance/muscle strength/posture/ventilation and respiration/gas exchange
arousal, attention, and cognition/decreased midline orientation/gait, locomotion, and balance/muscle strength

## 2023-04-07 NOTE — PROGRESS NOTE ADULT - ASSESSMENT
57 y/o m, PMHx of HTN and DM transferred from [facility] to [facility] as a code stroke.   [facility] EMS reports pt BLAYNE was last night around 20:00 and was c/o right arm pain, today his family found him around 05:00 with left sided hemiparesis, slurred speech and was incontinent of urine, they called EMS as pt arrived at [facility] at 05:56,  he was found to have right ICA occlusion and transferred to [facility], code stroke called upon arrival.   Patient now s/p thrombectomy due to right ICA occlusion with tandem right MCA occlusion.    A cardiac consult was called due for Stroke etiology  Patient is nonverbal, brother as side and states no cardiac history, has never seen a cardiologist, has no family history of cardiac diseae, and is a non smoker.     Patient nods heads to questioning appropriately  Denies headache, dizziness, chest pain, palpitations, sob, pnd, orthopnea hemoptysis n/v/d/c/ abd pain, fever, chills, syncope, presyncope or any other pain.          .

## 2023-04-07 NOTE — PROGRESS NOTE ADULT - PROBLEM SELECTOR PLAN 1
- Presented initially w/ CVA  - now s/p thrombectomy due to right ICA occlusion with tandem right MCA occlusion.  - hemiparesis, non verbal, and + facial droop, and unable to swallow  - Neurology and neurosurgery following  - TTE EF > 75%  - need to r/o embolic vs ischemic stroke   - plan for Repeat TTE with bubble study vs MARCELINO   - monitor telemetry for afib for now  - hx of carotid stenosis, patent right side ICA stent, >70% left MADYSON, bilateral external carotid stenosis  - needs vascular surgery consult   - currently on ASA suppository and cangrelor infusion  - BP management as per neuro ICU, currently using labetalol IV Q2h PRN for >140  - family needs to discuss if they want invasive procedures, were apprehensive about MARCELINO yesterday   - goals of care discussion

## 2023-04-07 NOTE — PHYSICAL THERAPY INITIAL EVALUATION ADULT - NEUROVASCULAR ASSESSMENT RUE
no numbness
Pt giving thumbs up for all attempts to test sensation even when therapist does not provide tactile stimulus. Inconsistent results

## 2023-04-07 NOTE — PROGRESS NOTE ADULT - SUBJECTIVE AND OBJECTIVE BOX
[facility]                                                         CARDIOLOGY AT [address], [place] [address]                                                         Telephone: [phone]. Fax:[phone]                                                                             PROGRESS NOTE    Reason for follow up: cva  Update: f/u w/ neuro       Review of symptoms:   Cardiac:  No chest pain. No dyspnea. No palpitations.  Respiratory: no cough. No dyspnea  Gastrointestinal: No diarrhea. No abdominal pain. No bleeding.   Neuro: No focal neuro complaints.    Vitals:  T(C): 36.6 (04-07-23 @ 08:01), Max: 36.8 (04-06-23 @ 16:53)  HR: 76 (04-07-23 @ 09:16) (69 - 104)  BP: 134/74 (04-07-23 @ 09:00) (102/58 - 172/79)  RR: 20 (04-07-23 @ 09:00) (15 - 26)  SpO2: 96% (04-07-23 @ 09:16) (91% - 100%)  Wt(kg): --  I&O's Summary    06 Apr 2023 07:01  -  07 Apr 2023 07:00  --------------------------------------------------------  IN: 2231.4 mL / OUT: 1550 mL / NET: 681.4 mL    07 Apr 2023 07:01  -  07 Apr 2023 09:49  --------------------------------------------------------  IN: 194.4 mL / OUT: 0 mL / NET: 194.4 mL    PHYSICAL EXAM:  Appearance: Comfortable. No acute distress  HEENT:  Atraumatic. Normocephalic.  Normal oral mucosa  Neurologic: A & O x 3, no gross focal deficits.  Cardiovascular: RRR S1 S2, No murmur, no rubs/gallops. No JVD  Respiratory: Lungs clear to auscultation, unlabored   Gastrointestinal:  Soft, Non-tender, + BS  Lower Extremities: 2+ Peripheral Pulses, No clubbing, cyanosis, or edema  Psychiatry: Patient is calm. No agitation.   Skin: warm and dry.    CURRENT CARDIAC MEDICATIONS:  doxazosin 4 milliGRAM(s) Oral at bedtime  hydrALAZINE Injectable 10 milliGRAM(s) IV Push every 2 hours PRN  labetalol Injectable 10 milliGRAM(s) IV Push every 2 hours PRN      CURRENT OTHER MEDICATIONS:  albuterol/ipratropium for Nebulization 3 milliLiter(s) Nebulizer every 6 hours  acetaminophen   Oral Liquid .. 650 milliGRAM(s) Oral every 6 hours PRN Temp greater or equal to 38C (100.4F), Mild Pain (1 - 3)  aspirin Suppository 300 milliGRAM(s) Rectal daily  oxyCODONE    IR 5 milliGRAM(s) Oral every 6 hours PRN Moderate Pain (4 - 6)  oxyCODONE    IR 10 milliGRAM(s) Oral every 6 hours PRN Severe Pain (7 - 10)  bisacodyl 5 milliGRAM(s) Oral every 12 hours PRN Constipation  pantoprazole  Injectable 40 milliGRAM(s) IV Push daily  senna Syrup 10 milliLiter(s) Oral at bedtime  dextrose 50% Injectable 25 Gram(s) IV Push once, Stop order after: 1 Doses  dextrose 50% Injectable 12.5 Gram(s) IV Push once, Stop order after: 1 Doses  dextrose 50% Injectable 25 Gram(s) IV Push once, Stop order after: 1 Doses  insulin lispro (ADMELOG) corrective regimen sliding scale   SubCutaneous every 6 hours  artificial  tears Solution 1 Drop(s) Both EYES two times a day  cangrelor Infusion 1 MICROgram(s)/kG/Min (22.2 mL/Hr) IV Continuous <Continuous>  chlorhexidine 2% Cloths 1 Application(s) Topical daily  enoxaparin Injectable 40 milliGRAM(s) SubCutaneous every 24 hours  petrolatum Ophthalmic Ointment 1 Application(s) Right EYE every 12 hours  sodium chloride 0.9%. 1000 milliLiter(s) (75 mL/Hr) IV Continuous <Continuous>      LABS:	 	                            10.4   7.77  )-----------( 293      ( 07 Apr 2023 03:50 )             31.8     04-07    139  |  105  |  18.4  ----------------------------<  105<H>  3.6   |  23.0  |  0.46<L>    Ca    8.2<L>      07 Apr 2023 03:50  Phos  4.0     04-07  Mg     2.2     04-07      PT/INR/PTT ( 06 Apr 2023 04:10 )                       :                       :      15.3         :       28.0                  .        .                   .              .           .       1.32        .                                       Lipid Profile: Date: 03-29 @ 12:31  Total cholesterol 270; Direct LDL: --; HDL: 58; Triglycerides:83

## 2023-04-07 NOTE — OCCUPATIONAL THERAPY INITIAL EVALUATION ADULT - PERTINENT HX OF CURRENT PROBLEM, REHAB EVAL
Pt with right ICA and MCA occlusions; transfer from [facility]
As per MD note: 57 y/o male with PMHx of HTN and DM transfered from [facility] to [facility] as a code stroke. [facility] EMS reports pt BLAYNE was last night around 20:00 and was c/o right arm pain, today his family found his around 05:00 with left sided hemiparesis, slurred speech and was incontinent of urine, they called EMS as pt arrived at [facility] at 05:56, he was found to have right ICA occlusion and transferred to [facility], code stroke called upon arrival. Pt was on Cardene however per EMS neuro wanted pressure around 200 and it was d/monet, pt sedated with propofol.   (29 Mar 2023 09:40)

## 2023-04-07 NOTE — PROGRESS NOTE ADULT - PROBLEM SELECTOR PLAN 1
S/p Normal EGD with PEG placement yesterday. PEG site appears clean and intact, minimal sanguinous drainage noted. Hemoglobin stable at 10.4gm.    - Can use PEG tube for feeds, please consult nutrition for tube feed recommendations  - Avoid clogging G tube, solution medications preferred, flush with at least 30cc sterile water before and after each use  - Keep the G tube site clean and dry  - Maintain head of bed elevation and aspiration precautions  - No further GI recommendations at this time. GI to sign-off, please reconsult as needed.

## 2023-04-07 NOTE — PHYSICAL THERAPY INITIAL EVALUATION ADULT - GENERAL OBSERVATIONS, REHAB EVAL
forehead
Pt received supine in bed + telemetry//BP + IV + texas cath. Pt c/o 0/10 Pre/post PT pain, pt agreeable to PT
Pt received supine in bed + telemetry//BP + IV + Venous Compression Boots + ETT @ 25cm pre/post PT + OGT + Right wrist restraint, + Brenda with BP + bradford, Pt able to nod yes/no, c/o 0/10 pain, pt agreeable to PT

## 2023-04-07 NOTE — PROGRESS NOTE ADULT - CRITICAL CARE ATTENDING COMMENT
Pt is critically ill and at high risk of rapid deterioration/death due to abovementioned conditions.   Still requires critical care interventions - ongoing frequent evaluations, interventions and management adjustment by the Attending and ICU team, - and included review of relevant history, clinical examination, review of data and images, discussion of treatment with the multidisciplinary team and any consultants involved in this patient’s care.
I spent 40 minutes of critical care time examining patient, reviewing vitals, labs, medications, imaging and discussing with the team goals of care to prevent life-threatening in this patient who is at high risk for deterioration or death due to:  stroke
Pt is critically ill and at high risk of rapid deterioration/death due to abovementioned conditions.   Still requires critical care interventions - ongoing frequent evaluations, interventions and management adjustment by the Attending and ICU team, - and included review of relevant history, clinical examination, review of data and images, discussion of treatment with the multidisciplinary team and any consultants involved in this patient’s care.
Pt is critically ill and at high risk of rapid deterioration/death due to abovementioned conditions.   Still requires critical care interventions - ongoing frequent evaluations, interventions and management adjustment by the Attending and ICU team, - and included review of relevant history, clinical examination, review of data and images, discussion of treatment with the multidisciplinary team and any consultants involved in this patient’s care.
I spent 40 minutes of critical care time examining patient, reviewing vitals, labs, medications, imaging and discussing with the team goals of care to prevent life-threatening in this patient who is at high risk for deterioration or death due to:  stroke

## 2023-04-07 NOTE — PROGRESS NOTE ADULT - SUBJECTIVE AND OBJECTIVE BOX
Chief Complaint:  Patient is a 58y old  Male who presents with a chief complaint of stroke (05 Apr 2023 15:18)      HPI/ 24 hr events: Patient seen and examined at bedside, no overnight events. S/p Normal EGD with PEG placement yesterday. PEG site appears clean and intact, minimal sanguinous drainage noted. Vitals are stable, no leukocytosis, Hemoglobin stable at 10.4gm. Denies nausea, vomiting, abdominal pain.      REVIEW OF SYSTEMS:   Limited due to medical condition.         MEDICATIONS:   MEDICATIONS  (STANDING):  albuterol/ipratropium for Nebulization 3 milliLiter(s) Nebulizer every 6 hours  artificial  tears Solution 1 Drop(s) Both EYES two times a day  aspirin Suppository 300 milliGRAM(s) Rectal daily  cangrelor Infusion 1 MICROgram(s)/kG/Min (22.2 mL/Hr) IV Continuous <Continuous>  chlorhexidine 2% Cloths 1 Application(s) Topical daily  dextrose 50% Injectable 25 Gram(s) IV Push once  dextrose 50% Injectable 12.5 Gram(s) IV Push once  dextrose 50% Injectable 25 Gram(s) IV Push once  doxazosin 4 milliGRAM(s) Oral at bedtime  enoxaparin Injectable 40 milliGRAM(s) SubCutaneous every 24 hours  insulin lispro (ADMELOG) corrective regimen sliding scale   SubCutaneous every 6 hours  pantoprazole  Injectable 40 milliGRAM(s) IV Push daily  petrolatum Ophthalmic Ointment 1 Application(s) Right EYE every 12 hours  senna Syrup 10 milliLiter(s) Oral at bedtime  sodium chloride 0.9%. 1000 milliLiter(s) (75 mL/Hr) IV Continuous <Continuous>    MEDICATIONS  (PRN):  acetaminophen   Oral Liquid .. 650 milliGRAM(s) Oral every 6 hours PRN Temp greater or equal to 38C (100.4F), Mild Pain (1 - 3)  bisacodyl 5 milliGRAM(s) Oral every 12 hours PRN Constipation  hydrALAZINE Injectable 10 milliGRAM(s) IV Push every 2 hours PRN SBP >140  labetalol Injectable 10 milliGRAM(s) IV Push every 2 hours PRN Systolic blood pressure > 140  oxyCODONE    IR 5 milliGRAM(s) Oral every 6 hours PRN Moderate Pain (4 - 6)  oxyCODONE    IR 10 milliGRAM(s) Oral every 6 hours PRN Severe Pain (7 - 10)      ALLERGIES:   Allergies    No Known Allergies    Intolerances        VITAL SIGNS:   Vital Signs Last 24 Hrs  T(C): 36.7 (07 Apr 2023 04:17), Max: 36.8 (06 Apr 2023 16:53)  T(F): 98.1 (07 Apr 2023 04:17), Max: 98.3 (06 Apr 2023 16:53)  HR: 79 (07 Apr 2023 07:00) (69 - 104)  BP: 127/73 (07 Apr 2023 07:00) (102/58 - 157/88)  BP(mean): 88 (07 Apr 2023 07:00) (70 - 107)  RR: 19 (07 Apr 2023 07:00) (15 - 28)  SpO2: 95% (07 Apr 2023 07:00) (91% - 100%)    Parameters below as of 07 Apr 2023 06:00  Patient On (Oxygen Delivery Method): nasal cannula  O2 Flow (L/min): 2    I&O's Summary    06 Apr 2023 07:01  -  07 Apr 2023 07:00  --------------------------------------------------------  IN: 2231.4 mL / OUT: 1550 mL / NET: 681.4 mL        PHYSICAL EXAM:   GENERAL:  No acute distress  HEENT:  NC/AT, conjunctiva clear, sclera anicteric  CHEST:  No increased effort  HEART:  Regular rhythm  ABDOMEN: PEG site appears clean and intact, minimal sanguinous drainage noted, Soft, non-tender, non-distended, normoactive bowel sounds  EXTREMITIES: No edema  SKIN:  Warm, dry  NEURO:  Calm, cooperative        LABS:  CBC Full  -  ( 07 Apr 2023 03:50 )  WBC Count : 7.77 K/uL  RBC Count : 3.87 M/uL  Hemoglobin : 10.4 g/dL  Hematocrit : 31.8 %  Platelet Count - Automated : 293 K/uL  Mean Cell Volume : 82.2 fl  Mean Cell Hemoglobin : 26.9 pg  Mean Cell Hemoglobin Concentration : 32.7 gm/dL  Auto Neutrophil # : x  Auto Lymphocyte # : x  Auto Monocyte # : x  Auto Eosinophil # : x  Auto Basophil # : x  Auto Neutrophil % : x  Auto Lymphocyte % : x  Auto Monocyte % : x  Auto Eosinophil % : x  Auto Basophil % : x    04-07    139  |  105  |  18.4  ----------------------------<  105<H>  3.6   |  23.0  |  0.46<L>    Ca    8.2<L>      07 Apr 2023 03:50  Phos  4.0     04-07  Mg     2.2     04-07        PT/INR - ( 06 Apr 2023 04:10 )   PT: 15.3 sec;   INR: 1.32 ratio         PTT - ( 06 Apr 2023 04:10 )  PTT:28.0 sec              RADIOLOGY & ADDITIONAL STUDIES:        < from: EGD w/ PEG Placement (04.06.23 @ 00:00) >    PEG Placement Report    Date: 4/6/2023    Patient Name: CONG AGUAYO      Findings:      Esophagus Mucosa Normal mucosa was noted in the whole esophagus.      Stomach Mucosa Normal mucosa was noted in the whole stomach.      Additional stomach findings - EGD was o/w normal to D2. Gastrostomy tube    successfully inserted and position of gastrostomy tube bumper verified    endoscopically..      Duodenum Mucosa Normal mucosa was noted in the whole duodenum. No duodenitis or    ulcerations seen.        Impressions:      Normal mucosa in the whole examined duodenum.      - EGD was o/w normal to D2. Gastrostomy tube successfully inserted and    position of gastrostomy tube bumper verified endoscopically. .    Normal mucosa in the whole esophagus.    Normal mucosa in the whole stomach.      Plan:      NPO May use G-tube for medications in four hours. Can restart Cangrelor in four    hours. Hold TF tonight. Will restart TF in AM after inspecting gastrostomy    site.      Additional Notes:      Normal EGD to D2. Successful PEG tube placement. See above management plan    recommendations.            Attending Participation:        I was present and participatedduring the entire procedure, including non-key    portions.        Daljit Smith MD        Version 1, Electronically signed on 4/6/2023 5:45:23 PM by Daljit Smith MD    < end of copied text >

## 2023-04-07 NOTE — PROGRESS NOTE ADULT - SUBJECTIVE AND OBJECTIVE BOX
Chief complaint:   Patient is a 58y old  Male who presents with a chief complaint of stroke (05 Apr 2023 15:18)    HPI:  59 y/o male with PMHx of HTN and DM transfered from [facility] to [facility] as a code stroke. [facility] reports pt BLAYNE was last night around 20:00 and was c/o right arm pain, today his family found his around 05:00 with left sided hemiparesis, slurred speech and was incontinent of urine, they called EMS as pt arrived at [facility] at 05:56, he was found to have right ICA occlusion and transferred to [facility], code stroke called upon arrival. Pt was on Cardene however per EMS neuro wanted pressure around 200 and it was d/monet, pt sedated with propofol.         (29 Mar 2023 09:40)        24hr EVENTS:      ROS: [ ]  Unable to assess due to mental status   All other systems negative    -----------------------------------------------------------------------------------------------------------------------------------------------------------------------------------  ICU Vital Signs Last 24 Hrs  T(C): 36.6 (07 Apr 2023 08:01), Max: 36.8 (06 Apr 2023 16:53)  T(F): 97.9 (07 Apr 2023 08:01), Max: 98.3 (06 Apr 2023 16:53)  HR: 80 (07 Apr 2023 08:39) (69 - 104)  BP: 122/84 (07 Apr 2023 08:39) (102/58 - 172/79)  BP(mean): 95 (07 Apr 2023 08:39) (70 - 107)  ABP: --  ABP(mean): --  RR: 22 (07 Apr 2023 08:39) (15 - 26)  SpO2: 99% (07 Apr 2023 08:39) (91% - 100%)    O2 Parameters below as of 07 Apr 2023 06:00  Patient On (Oxygen Delivery Method): nasal cannula  O2 Flow (L/min): 2          I&O's Summary    06 Apr 2023 07:01  -  07 Apr 2023 07:00  --------------------------------------------------------  IN: 2231.4 mL / OUT: 1550 mL / NET: 681.4 mL    07 Apr 2023 07:01  -  07 Apr 2023 09:05  --------------------------------------------------------  IN: 194.4 mL / OUT: 0 mL / NET: 194.4 mL        MEDICATIONS  (STANDING):  albuterol/ipratropium for Nebulization 3 milliLiter(s) Nebulizer every 6 hours  artificial  tears Solution 1 Drop(s) Both EYES two times a day  aspirin Suppository 300 milliGRAM(s) Rectal daily  cangrelor Infusion 1 MICROgram(s)/kG/Min (22.2 mL/Hr) IV Continuous <Continuous>  chlorhexidine 2% Cloths 1 Application(s) Topical daily  dextrose 50% Injectable 25 Gram(s) IV Push once  dextrose 50% Injectable 12.5 Gram(s) IV Push once  dextrose 50% Injectable 25 Gram(s) IV Push once  doxazosin 4 milliGRAM(s) Oral at bedtime  enoxaparin Injectable 40 milliGRAM(s) SubCutaneous every 24 hours  insulin lispro (ADMELOG) corrective regimen sliding scale   SubCutaneous every 6 hours  pantoprazole  Injectable 40 milliGRAM(s) IV Push daily  petrolatum Ophthalmic Ointment 1 Application(s) Right EYE every 12 hours  senna Syrup 10 milliLiter(s) Oral at bedtime  sodium chloride 0.9%. 1000 milliLiter(s) (75 mL/Hr) IV Continuous <Continuous>      RESPIRATORY:        IMAGING:   Recent imaging studies were reviewed.    LAB RESULTS:                          10.4   7.77  )-----------( 293      ( 07 Apr 2023 03:50 )             31.8       PT/INR - ( 06 Apr 2023 04:10 )   PT: 15.3 sec;   INR: 1.32 ratio         PTT - ( 06 Apr 2023 04:10 )  PTT:28.0 sec    04-07    139  |  105  |  18.4  ----------------------------<  105<H>  3.6   |  23.0  |  0.46<L>    Ca    8.2<L>      07 Apr 2023 03:50  Phos  4.0     04-07  Mg     2.2     04-07      -----------------------------------------------------------------------------------------------------------------------------------------------------------------------------------    PHYSICAL EXAM:  General: Calm, letharbic  HEENT: MMM  Neuro:  -Mental status- No acute distress, AOx4, follow commands on R  no BTT on the L   L neglect   dysarthria  -CN- PERRL 3mm, EOMI, tongue midline, face symmetric   RUE 4/5, L arm 2/5 spontaneously, R leg 4/5, L leg 2/5 spontaneously   CV: RRR  Pulm: coarse breath sounds   Abd: Soft, nontender, nondistended  Ext: no noted edema in lower ext  Skin: warm, dry             Chief complaint:   Patient is a 58y old  Male who presents with a chief complaint of stroke (05 Apr 2023 15:18)    HPI:  59 y/o male with PMHx of HTN and DM transfered from [facility] to [facility] as a code stroke. [facility] reports pt BLAYNE was last night around 20:00 and was c/o right arm pain, today his family found his around 05:00 with left sided hemiparesis, slurred speech and was incontinent of urine, they called EMS as pt arrived at [facility] at 05:56, he was found to have right ICA occlusion and transferred to [facility], code stroke called upon arrival. Pt was on Cardene however per EMS neuro wanted pressure around 200 and it was d/monet, pt sedated with propofol.   (29 Mar 2023 09:40)    24hr EVENTS:  PEG yesterday    ROS: no complaints  All other systems negative    -----------------------------------------------------------------------------------------------------------------------------------------------------------------------------------  ICU Vital Signs Last 24 Hrs  T(C): 36.6 (07 Apr 2023 08:01), Max: 36.8 (06 Apr 2023 16:53)  T(F): 97.9 (07 Apr 2023 08:01), Max: 98.3 (06 Apr 2023 16:53)  HR: 80 (07 Apr 2023 08:39) (69 - 104)  BP: 122/84 (07 Apr 2023 08:39) (102/58 - 172/79)  BP(mean): 95 (07 Apr 2023 08:39) (70 - 107)  ABP: --  ABP(mean): --  RR: 22 (07 Apr 2023 08:39) (15 - 26)  SpO2: 99% (07 Apr 2023 08:39) (91% - 100%)    O2 Parameters below as of 07 Apr 2023 06:00  Patient On (Oxygen Delivery Method): nasal cannula  O2 Flow (L/min): 2      I&O's Summary    06 Apr 2023 07:01  -  07 Apr 2023 07:00  --------------------------------------------------------  IN: 2231.4 mL / OUT: 1550 mL / NET: 681.4 mL    07 Apr 2023 07:01  -  07 Apr 2023 09:05  --------------------------------------------------------  IN: 194.4 mL / OUT: 0 mL / NET: 194.4 mL      MEDICATIONS  (STANDING):  albuterol/ipratropium for Nebulization 3 milliLiter(s) Nebulizer every 6 hours  artificial  tears Solution 1 Drop(s) Both EYES two times a day  aspirin Suppository 300 milliGRAM(s) Rectal daily  cangrelor Infusion 1 MICROgram(s)/kG/Min (22.2 mL/Hr) IV Continuous <Continuous>  chlorhexidine 2% Cloths 1 Application(s) Topical daily  dextrose 50% Injectable 25 Gram(s) IV Push once  dextrose 50% Injectable 12.5 Gram(s) IV Push once  dextrose 50% Injectable 25 Gram(s) IV Push once  doxazosin 4 milliGRAM(s) Oral at bedtime  enoxaparin Injectable 40 milliGRAM(s) SubCutaneous every 24 hours  insulin lispro (ADMELOG) corrective regimen sliding scale   SubCutaneous every 6 hours  pantoprazole  Injectable 40 milliGRAM(s) IV Push daily  petrolatum Ophthalmic Ointment 1 Application(s) Right EYE every 12 hours  senna Syrup 10 milliLiter(s) Oral at bedtime  sodium chloride 0.9%. 1000 milliLiter(s) (75 mL/Hr) IV Continuous <Continuous>      IMAGING:   Recent imaging studies were reviewed.    LAB RESULTS:                          10.4   7.77  )-----------( 293      ( 07 Apr 2023 03:50 )             31.8       PT/INR - ( 06 Apr 2023 04:10 )   PT: 15.3 sec;   INR: 1.32 ratio         PTT - ( 06 Apr 2023 04:10 )  PTT:28.0 sec    04-07    139  |  105  |  18.4  ----------------------------<  105<H>  3.6   |  23.0  |  0.46<L>    Ca    8.2<L>      07 Apr 2023 03:50  Phos  4.0     04-07  Mg     2.2     04-07      -----------------------------------------------------------------------------------------------------------------------------------------------------------------------------------    PHYSICAL EXAM:  General: Calm, lethargic  HEENT: MMM  Neuro:  -Mental status- No acute distress, AOx4, follow commands on R  no BTT on the L   L neglect   dysarthria  -CN- PERRL 3mm, EOMI, tongue midline, face symmetric   RUE 4/5, L arm 2/5 spontaneously, R leg 4/5, L leg 2/5 spontaneously   CV: RRR  Pulm: coarse breath sounds   Abd: Soft, nontender, nondistended  Ext: no noted edema in lower ext  Skin: warm, dry

## 2023-04-07 NOTE — PROGRESS NOTE ADULT - ASSESSMENT
Assessment:  58M with PMH HTN, DM who presented with L sided weakness, found to have AYAKA/RMCA occlusions s/p thrombectomy TICI 2B recanalization and AYAKA stent on 3/29. Post op CTH showed contrast vs hemorrhage.   - PSD 9/10, POD 9  - Exam stable / improving  - PEG tube performed yesterday      Plan:  - Discussed with Dr. Allen  - Q4 hour neuro checks  - MAP >65  - Loaded with Brilinta today 180mg, cangrelor stopped, Brilinta 60 BID started after load    - Continue ASA 81  - , on atorvastatin 80  - A1C 7.3%  - TTE performed  - Further care per NSICU team  - PA only visit

## 2023-04-07 NOTE — PHYSICAL THERAPY INITIAL EVALUATION ADULT - DIAGNOSIS, PT EVAL
left sided weakness, decreased balance, impaired midline orientation and decreased cognitive processing s/p neuro event
left sided weakness, decreased balance, impaired midline orientation and decreased cognitive processing s/p neuro event

## 2023-04-07 NOTE — PHYSICAL THERAPY INITIAL EVALUATION ADULT - MANUAL MUSCLE TESTING RESULTS, REHAB EVAL
LUE grossly 0/5 to 1/5 throughout. LLE grossly 2/5 throughout. right sided extremities WFL
RLE WFL, LLE Pt able to bear weight however, difficulty advancing LE in standing, Pt able to perform hip flexion/knee flexion against gravity in bed.

## 2023-04-07 NOTE — OCCUPATIONAL THERAPY INITIAL EVALUATION ADULT - PLANNED THERAPY INTERVENTIONS, OT EVAL
ADL retraining/balance training/bed mobility training/transfer training
ADL retraining/balance training/bed mobility training/cognitive, visual perceptual/motor coordination training/neuromuscular re-education/transfer training

## 2023-04-07 NOTE — PROGRESS NOTE ADULT - SUBJECTIVE AND OBJECTIVE BOX
Patient is a 58y old  Male who presents with a chief complaint of stroke (05 Apr 2023 15:18)    HPI:  57 y/o male with PMHx of HTN and DM transfered from [facility] to [facility] as a code stroke. [facility] reports pt BLAYNE was last night around 20:00 and was c/o right arm pain, today his family found his around 05:00 with left sided hemiparesis, slurred speech and was incontinent of urine, they called EMS as pt arrived at [facility] at 05:56, he was found to have right ICA occlusion and transferred to [facility], code stroke called upon arrival. Pt was on Cardene however per EMS neuro wanted pressure around 200 and it was d/monet, pt sedated with propofol. (29 Mar 2023 09:40)      Interval history:  Patient seen and examined by neuro IR team. Patient reports he is doing well, denies pain or other complaints. Underwent PEG placement yesterday, tolerated well. Cangrelor drip restarted after PEG, no symptoms of intracranial insufficiency.       Vital Signs Last 24 Hrs  T(C): 36.4 (07 Apr 2023 12:11), Max: 36.8 (06 Apr 2023 16:53)  T(F): 97.6 (07 Apr 2023 12:11), Max: 98.3 (06 Apr 2023 16:53)  HR: 72 (07 Apr 2023 14:00) (67 - 104)  BP: 128/60 (07 Apr 2023 14:00) (108/78 - 172/79)  BP(mean): 82 (07 Apr 2023 14:00) (75 - 107)  RR: 13 (07 Apr 2023 14:00) (13 - 26)  SpO2: 97% (07 Apr 2023 14:00) (91% - 100%)    Parameters below as of 07 Apr 2023 13:00  Patient On (Oxygen Delivery Method): room air      Physical Exam:  Constitutional: NAD, lying in bed  Neuro  * Mental Status: EO spontaneously, A&O x3, following commands, ? mild aphasia, mild dysarthria   * Cranial Nerves: L pupil 3mm reactive, R pupil 4mm sluggish, L facial droop  * Motor: RUE 5/5, RLE 5/5, LUE 2/5 besides HG 3/5, LLE 4/5  * Sensory: Sensation grossly intact to noxious stimuli   * Reflexes: not assessed       LABS:                        10.4   7.77  )-----------( 293      ( 07 Apr 2023 03:50 )             31.8     04-07    139  |  105  |  18.4  ----------------------------<  105<H>  3.6   |  23.0  |  0.46<L>    Ca    8.2<L>      07 Apr 2023 03:50  Phos  4.0     04-07  Mg     2.2     04-07      PT/INR - ( 06 Apr 2023 04:10 )   PT: 15.3 sec;   INR: 1.32 ratio    PTT - ( 06 Apr 2023 04:10 )  PTT:28.0 sec    CULTURES:  Culture Results:   No Growth Final (04-01 @ 17:25)  Culture Results:   No Growth Final (04-01 @ 17:25)      Medications:  MEDICATIONS  (STANDING):  acetaminophen   Oral Liquid .. 650 milliGRAM(s) Oral every 6 hours  artificial  tears Solution 1 Drop(s) Both EYES two times a day  aspirin  chewable 81 milliGRAM(s) Enteral Tube daily  atorvastatin 80 milliGRAM(s) Oral daily  cangrelor Infusion 1 MICROgram(s)/kG/Min (22.2 mL/Hr) IV Continuous <Continuous>  chlorhexidine 2% Cloths 1 Application(s) Topical daily  dextrose 50% Injectable 25 Gram(s) IV Push once  dextrose 50% Injectable 12.5 Gram(s) IV Push once  dextrose 50% Injectable 25 Gram(s) IV Push once  doxazosin 4 milliGRAM(s) Oral at bedtime  enoxaparin Injectable 40 milliGRAM(s) SubCutaneous every 24 hours  insulin lispro (ADMELOG) corrective regimen sliding scale   SubCutaneous every 6 hours  lisinopril 10 milliGRAM(s) Oral daily  pantoprazole  Injectable 40 milliGRAM(s) IV Push daily  petrolatum Ophthalmic Ointment 1 Application(s) Right EYE every 12 hours  polyethylene glycol 3350 17 Gram(s) Oral daily  senna Syrup 10 milliLiter(s) Oral at bedtime  sodium chloride 0.9%. 1000 milliLiter(s) (75 mL/Hr) IV Continuous <Continuous>    MEDICATIONS  (PRN):  bisacodyl 5 milliGRAM(s) Oral every 12 hours PRN Constipation  hydrALAZINE Injectable 10 milliGRAM(s) IV Push every 2 hours PRN SBP >140  labetalol Injectable 10 milliGRAM(s) IV Push every 2 hours PRN Systolic blood pressure > 140  oxyCODONE    IR 5 milliGRAM(s) Oral every 6 hours PRN Moderate Pain (4 - 6)  oxyCODONE    IR 10 milliGRAM(s) Oral every 6 hours PRN Severe Pain (7 - 10)      RADIOLOGY & ADDITIONAL STUDIES:  < from: MR Head No Cont (04.07.23 @ 08:48) >  IMPRESSION:  Large subacute right MCA infarct with hemorrhagic transformation in the   right basal ganglia and right temporal lobe. Mildly improving 5 mm   right-to-left midline shift.  FLAIR hyperintense signal along the right frontalparietal temporal sulci   suggesting small subarachnoid hemorrhage.    --- End of Report ---    KELLY ASKEW MD; Resident Radiologist  This document has been electronically signed.  BART MARTINEZ MD; Attending Radiologist  This document has beenelectronically signed. Apr 7 2023 11:01AM    < end of copied text >

## 2023-04-07 NOTE — PROGRESS NOTE ADULT - ASSESSMENT
Assessment/Plan:   59 yo M with acute CVA due to R ICA/MCA occlusion, with hemorrhagic transformation, vasogenic edema and MLS; no thrombolytics as was out of the window for administration.  S/p TICI 2b MT, AYAKA stenting 3/29. On Cangrelor and ASA now.  Respiratory distress due to bulbar impairment, pulm congestion; concern for aspiration PNA vs pneumonitis.   PMH of HTN and DM.  Dysphagia.  Ileus?    Neuro: PSD 9  neurochecks q4h  c/w ASA 81 mg daily - switch to rectal as NPO; c/w cangrelor ggt at 1 mcg/kg/min while on hemicrani watch; hold temporarily for PEG with GI today, restart after  MRI tomorrow    CV: , TTE with EF 75%  MAP >65  cardura    Pulm:  cont Duoneb, chest PT; OOB  maintain OSats >92, monitor EtCO2; 2L NC    GI:  start TF via PEG; BM regimen   LBM 4/5  S/S re-eval daily    Renal:  Na goal 140-150  monitor UOp  cardura  start NS while NPO    Heme:  SCDs, Lov ppx      ID: MRSA neg  s/p Zosyn for aspiration PNA, 7 days in total    Endo: HgA1C 7.3  cont ISS  FS goal 120-180   Assessment/Plan:   57 yo M with acute CVA due to R ICA/MCA occlusion, with hemorrhagic transformation, vasogenic edema and MLS; no thrombolytics as was out of the window for administration.  S/p TICI 2b MT, AYAKA stenting 3/29. On Cangrelor and ASA now.  Respiratory distress due to bulbar impairment, pulm congestion; concern for aspiration PNA vs pneumonitis.   PMH of HTN and DM.  Dysphagia.  Ileus?    Neuro: PSD 9  neurochecks q4h  c/w ASA 81 mg daily   MRI completed    CV: , TTE with EF 75%  MAP >65  cardura   transition cangrelor to brillinta  lipitor 80  ASA    Pulm:  chest PT; OOB  maintain OSats >92, monitor EtCO2; 2L NC    GI:  start TF via PEG; BM regimen   LBM 4/5  S/S re-eval     Renal:  Na goal 135-145  monitor UOP  cardura  NS while NPO    Heme:  SCDs, Lov ppx      ID: MRSA neg  s/p Zosyn for aspiration PNA     Endo: HgA1C 7.3  cont ISS  FS goal 120-180

## 2023-04-07 NOTE — PHYSICAL THERAPY INITIAL EVALUATION ADULT - CRITERIA FOR SKILLED THERAPEUTIC INTERVENTIONS
Acute rehab/impairments found/anticipated discharge recommendation
inpatient rehabilitation/impairments found/anticipated discharge recommendation

## 2023-04-07 NOTE — PHYSICAL THERAPY INITIAL EVALUATION ADULT - IMPAIRMENTS CONTRIBUTING IMPAIRED BED MOBILITY, REHAB EVAL
decreased midline orientation, pt pushing with RUE to left side./impaired balance/cognition/impaired postural control/decreased strength
impaired balance/impaired postural control/decreased strength

## 2023-04-07 NOTE — PHYSICAL THERAPY INITIAL EVALUATION ADULT - ASSISTIVE DEVICE FOR TRANSFER: BED/CHAIR, REHAB EVAL
bilateral and held assist, left knee block in stance phase and assist assist advancing in swing phase required

## 2023-04-07 NOTE — PROGRESS NOTE ADULT - ASSESSMENT
58y Male with PMH HTN and DM transferred from [facility] for thrombectomy due to right ICA occlusion with tandem right MCA occlusion. GI consulted after failed swallow evaluation for PEG tube placement.

## 2023-04-07 NOTE — OCCUPATIONAL THERAPY INITIAL EVALUATION ADULT - LEVEL OF INDEPENDENCE: SIT/STAND, REHAB EVAL
maximum assist (25% patients effort)
unable to stand fully upright/dependent (less than 25% patients effort)

## 2023-04-07 NOTE — PHYSICAL THERAPY INITIAL EVALUATION ADULT - ADDITIONAL COMMENTS
Pt lives in a basement apartment with his spouse and 2 sons. Pt has another son nearby that is supportive. 12 Steps down to enter with a handrail, no steps inside. Pt was independent PTA without an assist device. Pt owns no DME.
Hx from previous PT evaluation: Pt lives in a basement apartment with his spouse and 2 sons. Pt has another son nearby that is supportive. 12 Steps down to enter with a handrail, no steps inside. Pt was independent PTA without an assist device. Pt owns no DME.

## 2023-04-08 LAB
ANION GAP SERPL CALC-SCNC: 10 MMOL/L — SIGNIFICANT CHANGE UP (ref 5–17)
BUN SERPL-MCNC: 10.6 MG/DL — SIGNIFICANT CHANGE UP (ref 8–20)
CALCIUM SERPL-MCNC: 7.8 MG/DL — LOW (ref 8.4–10.5)
CHLORIDE SERPL-SCNC: 106 MMOL/L — SIGNIFICANT CHANGE UP (ref 96–108)
CO2 SERPL-SCNC: 21 MMOL/L — LOW (ref 22–29)
CREAT SERPL-MCNC: 0.4 MG/DL — LOW (ref 0.5–1.3)
EGFR: 125 ML/MIN/1.73M2 — SIGNIFICANT CHANGE UP
GLUCOSE BLDC GLUCOMTR-MCNC: 101 MG/DL — HIGH (ref 70–99)
GLUCOSE BLDC GLUCOMTR-MCNC: 103 MG/DL — HIGH (ref 70–99)
GLUCOSE SERPL-MCNC: 108 MG/DL — HIGH (ref 70–99)
HCT VFR BLD CALC: 31.3 % — LOW (ref 39–50)
HGB BLD-MCNC: 10.4 G/DL — LOW (ref 13–17)
MAGNESIUM SERPL-MCNC: 2.3 MG/DL — SIGNIFICANT CHANGE UP (ref 1.6–2.6)
MCHC RBC-ENTMCNC: 26.7 PG — LOW (ref 27–34)
MCHC RBC-ENTMCNC: 33.2 GM/DL — SIGNIFICANT CHANGE UP (ref 32–36)
MCV RBC AUTO: 80.5 FL — SIGNIFICANT CHANGE UP (ref 80–100)
PA ADP PRP-ACNC: 65 PRU — LOW (ref 180–376)
PHOSPHATE SERPL-MCNC: 3.5 MG/DL — SIGNIFICANT CHANGE UP (ref 2.4–4.7)
PLATELET # BLD AUTO: 295 K/UL — SIGNIFICANT CHANGE UP (ref 150–400)
POTASSIUM SERPL-MCNC: 3.5 MMOL/L — SIGNIFICANT CHANGE UP (ref 3.5–5.3)
POTASSIUM SERPL-SCNC: 3.5 MMOL/L — SIGNIFICANT CHANGE UP (ref 3.5–5.3)
RBC # BLD: 3.89 M/UL — LOW (ref 4.2–5.8)
RBC # FLD: 13.7 % — SIGNIFICANT CHANGE UP (ref 10.3–14.5)
SODIUM SERPL-SCNC: 137 MMOL/L — SIGNIFICANT CHANGE UP (ref 135–145)
WBC # BLD: 7.32 K/UL — SIGNIFICANT CHANGE UP (ref 3.8–10.5)
WBC # FLD AUTO: 7.32 K/UL — SIGNIFICANT CHANGE UP (ref 3.8–10.5)

## 2023-04-08 PROCEDURE — 99233 SBSQ HOSP IP/OBS HIGH 50: CPT

## 2023-04-08 PROCEDURE — 74177 CT ABD & PELVIS W/CONTRAST: CPT | Mod: 26

## 2023-04-08 PROCEDURE — 71260 CT THORAX DX C+: CPT | Mod: 26

## 2023-04-08 RX ORDER — ACETAMINOPHEN 500 MG
650 TABLET ORAL EVERY 6 HOURS
Refills: 0 | Status: DISCONTINUED | OUTPATIENT
Start: 2023-04-08 | End: 2023-04-11

## 2023-04-08 RX ORDER — POTASSIUM CHLORIDE 20 MEQ
40 PACKET (EA) ORAL ONCE
Refills: 0 | Status: COMPLETED | OUTPATIENT
Start: 2023-04-08 | End: 2023-04-08

## 2023-04-08 RX ORDER — HYDRALAZINE HCL 50 MG
10 TABLET ORAL EVERY 4 HOURS
Refills: 0 | Status: DISCONTINUED | OUTPATIENT
Start: 2023-04-08 | End: 2023-04-11

## 2023-04-08 RX ADMIN — Medication 1 APPLICATION(S): at 17:30

## 2023-04-08 RX ADMIN — Medication 650 MILLIGRAM(S): at 06:06

## 2023-04-08 RX ADMIN — Medication 1 DROP(S): at 17:30

## 2023-04-08 RX ADMIN — SODIUM CHLORIDE 75 MILLILITER(S): 9 INJECTION INTRAMUSCULAR; INTRAVENOUS; SUBCUTANEOUS at 20:02

## 2023-04-08 RX ADMIN — Medication 1 APPLICATION(S): at 06:02

## 2023-04-08 RX ADMIN — Medication 650 MILLIGRAM(S): at 06:28

## 2023-04-08 RX ADMIN — Medication 650 MILLIGRAM(S): at 12:48

## 2023-04-08 RX ADMIN — SODIUM CHLORIDE 75 MILLILITER(S): 9 INJECTION INTRAMUSCULAR; INTRAVENOUS; SUBCUTANEOUS at 06:10

## 2023-04-08 RX ADMIN — LISINOPRIL 10 MILLIGRAM(S): 2.5 TABLET ORAL at 06:03

## 2023-04-08 RX ADMIN — Medication 1 DROP(S): at 06:02

## 2023-04-08 RX ADMIN — Medication 4 MILLIGRAM(S): at 21:11

## 2023-04-08 RX ADMIN — PANTOPRAZOLE SODIUM 40 MILLIGRAM(S): 20 TABLET, DELAYED RELEASE ORAL at 11:24

## 2023-04-08 RX ADMIN — Medication 81 MILLIGRAM(S): at 11:24

## 2023-04-08 RX ADMIN — ENOXAPARIN SODIUM 40 MILLIGRAM(S): 100 INJECTION SUBCUTANEOUS at 17:29

## 2023-04-08 RX ADMIN — TICAGRELOR 90 MILLIGRAM(S): 90 TABLET ORAL at 17:32

## 2023-04-08 RX ADMIN — TICAGRELOR 90 MILLIGRAM(S): 90 TABLET ORAL at 06:06

## 2023-04-08 RX ADMIN — CHLORHEXIDINE GLUCONATE 1 APPLICATION(S): 213 SOLUTION TOPICAL at 06:01

## 2023-04-08 RX ADMIN — ATORVASTATIN CALCIUM 80 MILLIGRAM(S): 80 TABLET, FILM COATED ORAL at 11:24

## 2023-04-08 RX ADMIN — Medication 650 MILLIGRAM(S): at 00:28

## 2023-04-08 RX ADMIN — Medication 40 MILLIEQUIVALENT(S): at 08:27

## 2023-04-08 RX ADMIN — Medication 650 MILLIGRAM(S): at 11:48

## 2023-04-08 RX ADMIN — Medication 10 MILLIGRAM(S): at 11:15

## 2023-04-08 RX ADMIN — Medication 650 MILLIGRAM(S): at 21:14

## 2023-04-08 NOTE — CHART NOTE - NSCHARTNOTEFT_GEN_A_CORE
Sakina Dowell is a 58 year old male with HTN, DM who presented from [facility] to [facility] as a code stroke for left sided weakness and slurred speech on 3/29, LKW the night prior so was outside of the window for TNK. He was intubated on prior to transfer for airway protection. He was found to have a right ICA occlusion and was taken to angio on arrival as a code biplane. He underwent a TICI 2b thrombectomy of the AYAKA and RMCA and AYAKA stent placement. A CT head post procedure demonstrated new MLS with some hemorrhagic conversion, neurosurgery was consulted however no intervention was required. His mental status improved following the thrombectomy and he was able to be extubated on 3/31 without complications. His course was complicated by aspiration pneumonia s/p antibiotics (zosyn 3/30-4/6), dysphagia requiring PEG placement on 4/6. He was maintained on a cangrelor drip from 3/29 to 4/7 for stent patency and now remains on Brilinta. He remains undergoing a stroke work-up which has been notable for elevated Hgb A1c and LDL, cardiology was consulted and recommend outpatient follow-up..     PROCEDURE:  3/29 TICI 2B thrombectomy of AYAKA/MCA stroke, PPD 10   4/6: S/P PEG POD 2    Vital Signs Last 24 Hrs  T(C): 37.6 (04-08-23 @ 12:00), Max: 37.6 (04-08-23 @ 12:00)  T(F): 99.7 (04-08-23 @ 12:00), Max: 99.7 (04-08-23 @ 12:00)  HR: 92 (04-08-23 @ 12:00) (71 - 98)  BP: 135/70 (04-08-23 @ 12:00) (103/79 - 159/81)  BP(mean): 92 (04-08-23 @ 12:00) (82 - 102)  RR: 21 (04-08-23 @ 12:00) (13 - 23)  SpO2: 99% (04-08-23 @ 12:00) (95% - 100%)    PHYSICAL EXAM:  Constitutional: No Acute Distress, sitting in chair with wife at bedside    Neurological: Awake, alert, oriented to person, place and time, speech dysarthric. Lt sided neglect, no BTT on Left. Following commands. Pupils B/l 3 mm R. face symmetric, tongue midline. Moving right side with 4/5 strength, LUE 2/5, LLE 3/5  Incision: PEG site c/d/i  Abd: soft, tender near PEG site, mild distention, +BS x4    LABS:             10.4   7.32  )-----------( 295      ( 08 Apr 2023 03:27 )             31.3   137  |  106  |  10.6  ----------------------------<  108<H>  3.5   |  21.0<L>  |  0.40<L>    Ca    7.8<L>      08 Apr 2023 03:27  Phos  3.5     04-08  Mg     2.3     04-08    MEDICATIONS:  Antibiotics:    Neuro:  acetaminophen   Oral Liquid .. 650 milliGRAM(s) Oral every 6 hours PRN Moderate Pain (4 - 6)  oxyCODONE    IR 5 milliGRAM(s) Oral every 6 hours PRN Moderate Pain (4 - 6)  oxyCODONE    IR 10 milliGRAM(s) Oral every 6 hours PRN Severe Pain (7 - 10)    Cardiac:  doxazosin 4 milliGRAM(s) Oral at bedtime  hydrALAZINE Injectable 10 milliGRAM(s) IV Push every 2 hours PRN SBP >140  labetalol Injectable 10 milliGRAM(s) IV Push every 2 hours PRN Systolic blood pressure > 140  lisinopril 10 milliGRAM(s) Oral daily    Pulm:    GI/:  bisacodyl 5 milliGRAM(s) Oral every 12 hours PRN Constipation  pantoprazole  Injectable 40 milliGRAM(s) IV Push daily  polyethylene glycol 3350 17 Gram(s) Oral daily  senna Syrup 10 milliLiter(s) Oral at bedtime    Other:   artificial  tears Solution 1 Drop(s) Both EYES two times a day  aspirin  chewable 81 milliGRAM(s) Enteral Tube daily  atorvastatin 80 milliGRAM(s) Oral daily  chlorhexidine 2% Cloths 1 Application(s) Topical daily  dextrose 50% Injectable 25 Gram(s) IV Push once  dextrose 50% Injectable 12.5 Gram(s) IV Push once  dextrose 50% Injectable 25 Gram(s) IV Push once  enoxaparin Injectable 40 milliGRAM(s) SubCutaneous every 24 hours  insulin lispro (ADMELOG) corrective regimen sliding scale   SubCutaneous every 6 hours  petrolatum Ophthalmic Ointment 1 Application(s) Right EYE every 12 hours  sodium chloride 0.9%. 1000 milliLiter(s) IV Continuous <Continuous>  ticagrelor 90 milliGRAM(s) Enteral Tube every 12 hours    -------------------------------------------------------------------------------------------------------------  PLAN:  Neuro:   - Q4 neuro checks  - Continue ASA and Brilinta 90 BID for stent patency   - appreciate neuro IR recs  - Neuro for stroke work-up    Respiratory:   - Room Air    CV:  - SBP   - Continue Lisinopril 10  - Continue atorvastatin 80  - PRN: hydralazine    Renal:   - NS @ 75  - Voiding  - Replete electrolytes as needed    GI:    - PEG in place, Trickle feeds   - CT Abd/Pel pending   - Bowel Regimen: Colace, senna  - LBM: 4/8 liquid   - GI PPX:    Endocrine:   - ISS    Heme/Onc:               - DVT ppx: venodynes on (not on due to ___), SQL (holding SQL due to ____)    ID:   - Afebrile vs tMax _____      PT/OT:   Dispo: __________, pt signed out to _____________  Case discussed with ________________ Sakina Dowell is a 58 year old male with HTN, DM who presented from [facility] to [facility] as a code stroke for left sided weakness and slurred speech on 3/29, LKW the night prior so was outside of the window for TNK. He was intubated on prior to transfer for airway protection. He was found to have a right ICA occlusion and was taken to angio on arrival as a code biplane. He underwent a TICI 2b thrombectomy of the AYAKA and RMCA and AYAKA stent placement. A CT head post procedure demonstrated new MLS with some hemorrhagic conversion, neurosurgery was consulted however no intervention was required. His mental status improved following the thrombectomy and he was able to be extubated on 3/31 without complications. His course was complicated by aspiration pneumonia s/p antibiotics (zosyn 3/30-4/6), dysphagia requiring PEG placement on 4/6. He was maintained on a cangrelor drip from 3/29 to 4/7 for stent patency and now remains on Brilinta. He remains undergoing a stroke work-up which has been notable for elevated Hgb A1c and LDL, cardiology was consulted and recommend outpatient follow-up.    PROCEDURE:  3/29 TICI 2B thrombectomy of AYAKA/MCA stroke, PPD 10   4/6: S/P PEG POD 2    Vital Signs Last 24 Hrs  T(C): 37.6 (04-08-23 @ 12:00), Max: 37.6 (04-08-23 @ 12:00)  T(F): 99.7 (04-08-23 @ 12:00), Max: 99.7 (04-08-23 @ 12:00)  HR: 92 (04-08-23 @ 12:00) (71 - 98)  BP: 135/70 (04-08-23 @ 12:00) (103/79 - 159/81)  BP(mean): 92 (04-08-23 @ 12:00) (82 - 102)  RR: 21 (04-08-23 @ 12:00) (13 - 23)  SpO2: 99% (04-08-23 @ 12:00) (95% - 100%)    PHYSICAL EXAM:  Constitutional: No Acute Distress, sitting in chair with wife at bedside    Neurological: Awake, alert, oriented to person, place and time, speech dysarthric. Lt sided neglect, no BTT on Left. Following commands. Pupils B/l 3 mm R. face symmetric, tongue midline. Moving right side with 4/5 strength, LUE 2/5, LLE 3/5  Incision: PEG site c/d/i  Abd: soft, tender near PEG site, mild distention, +BS x4    LABS:             10.4   7.32  )-----------( 295      ( 08 Apr 2023 03:27 )             31.3   137  |  106  |  10.6  ----------------------------<  108<H>  3.5   |  21.0<L>  |  0.40<L>    Ca    7.8<L>      08 Apr 2023 03:27  Phos  3.5     04-08  Mg     2.3     04-08    MEDICATIONS:  Antibiotics:    Neuro:  acetaminophen   Oral Liquid .. 650 milliGRAM(s) Oral every 6 hours PRN Moderate Pain (4 - 6)  oxyCODONE    IR 5 milliGRAM(s) Oral every 6 hours PRN Moderate Pain (4 - 6)  oxyCODONE    IR 10 milliGRAM(s) Oral every 6 hours PRN Severe Pain (7 - 10)    Cardiac:  doxazosin 4 milliGRAM(s) Oral at bedtime  hydrALAZINE Injectable 10 milliGRAM(s) IV Push every 2 hours PRN SBP >140  labetalol Injectable 10 milliGRAM(s) IV Push every 2 hours PRN Systolic blood pressure > 140  lisinopril 10 milliGRAM(s) Oral daily    Pulm:    GI/:  bisacodyl 5 milliGRAM(s) Oral every 12 hours PRN Constipation  pantoprazole  Injectable 40 milliGRAM(s) IV Push daily  polyethylene glycol 3350 17 Gram(s) Oral daily  senna Syrup 10 milliLiter(s) Oral at bedtime    Other:   artificial  tears Solution 1 Drop(s) Both EYES two times a day  aspirin  chewable 81 milliGRAM(s) Enteral Tube daily  atorvastatin 80 milliGRAM(s) Oral daily  chlorhexidine 2% Cloths 1 Application(s) Topical daily  dextrose 50% Injectable 25 Gram(s) IV Push once  dextrose 50% Injectable 12.5 Gram(s) IV Push once  dextrose 50% Injectable 25 Gram(s) IV Push once  enoxaparin Injectable 40 milliGRAM(s) SubCutaneous every 24 hours  insulin lispro (ADMELOG) corrective regimen sliding scale   SubCutaneous every 6 hours  petrolatum Ophthalmic Ointment 1 Application(s) Right EYE every 12 hours  sodium chloride 0.9%. 1000 milliLiter(s) IV Continuous <Continuous>  ticagrelor 90 milliGRAM(s) Enteral Tube every 12 hours    -------------------------------------------------------------------------------------------------------------  PLAN:  Neuro:   - Q4 neuro checks  - Continue ASA and Brilinta 90 BID for stent patency   - appreciate neuro IR recs  - Neuro for stroke work-up    Respiratory:   - Room Air    CV:  - SBP   - Continue Lisinopril 10  - Continue atorvastatin 80  - PRN: hydralazine    Renal:   - NS @ 75  - Voiding  - Replete electrolytes as needed    GI:    - PEG in place, Trickle feeds.   - c/f poss bowel obstruction. CT Abd/Pel pending   - passed SLP today, ok for pureed diet pending CT  - Bowel Regimen:   - LBM: 4/8 liquid   - GI PPX:    Endocrine:   - ISS    Heme/Onc:               - DVT ppx: venodynes on, SQL    ID:   - Afebrile    PT/OT: Acute, PMR consult placed 4/8  Dispo: Med tele with neurology following  Case discussed with Dr. Moore Sakina Dowell is a 58 year old male with HTN, DM who presented from [facility] to [facility] as a code stroke for left sided weakness and slurred speech on 3/29, LKW the night prior so was outside of the window for TNK. He was intubated on prior to transfer for airway protection. He was found to have a right ICA occlusion and was taken to angio on arrival as a code biplane. He underwent a TICI 2b thrombectomy of the AYAKA and RMCA and AYAKA stent placement. A CT head post procedure demonstrated new MLS with some hemorrhagic conversion, neurosurgery was consulted however no intervention was required. His mental status improved following the thrombectomy and he was able to be extubated on 3/31 without complications. His course was complicated by aspiration pneumonia s/p antibiotics (zosyn 3/30-4/6), dysphagia requiring PEG placement on 4/6. He was maintained on a cangrelor drip from 3/29 to 4/7 for stent patency and now remains on Brilinta. He remains undergoing a stroke work-up which has been notable for elevated Hgb A1c and LDL, cardiology was consulted and recommend outpatient follow-up.    PROCEDURE:  3/29 TICI 2B thrombectomy of AYAKA/MCA stroke, PPD 10   4/6: S/P PEG POD 2    Vital Signs Last 24 Hrs  T(C): 37.6 (04-08-23 @ 12:00), Max: 37.6 (04-08-23 @ 12:00)  T(F): 99.7 (04-08-23 @ 12:00), Max: 99.7 (04-08-23 @ 12:00)  HR: 92 (04-08-23 @ 12:00) (71 - 98)  BP: 135/70 (04-08-23 @ 12:00) (103/79 - 159/81)  BP(mean): 92 (04-08-23 @ 12:00) (82 - 102)  RR: 21 (04-08-23 @ 12:00) (13 - 23)  SpO2: 99% (04-08-23 @ 12:00) (95% - 100%)    PHYSICAL EXAM:  Constitutional: No Acute Distress, sitting in chair with wife at bedside    Neurological: Awake, alert, oriented to person, place and time, speech dysarthric. Lt sided neglect, no BTT on Left. Following commands. Pupils B/l 3 mm R. face symmetric, tongue midline. Moving right side with 4/5 strength, LUE 2/5, LLE 3/5  Incision: PEG site c/d/i  Abd: soft, tender near PEG site, mild distention, +BS x4    LABS:             10.4   7.32  )-----------( 295      ( 08 Apr 2023 03:27 )             31.3   137  |  106  |  10.6  ----------------------------<  108<H>  3.5   |  21.0<L>  |  0.40<L>    Ca    7.8<L>      08 Apr 2023 03:27  Phos  3.5     04-08  Mg     2.3     04-08    MEDICATIONS:  Antibiotics:    Neuro:  acetaminophen   Oral Liquid .. 650 milliGRAM(s) Oral every 6 hours PRN Moderate Pain (4 - 6)  oxyCODONE    IR 5 milliGRAM(s) Oral every 6 hours PRN Moderate Pain (4 - 6)  oxyCODONE    IR 10 milliGRAM(s) Oral every 6 hours PRN Severe Pain (7 - 10)    Cardiac:  doxazosin 4 milliGRAM(s) Oral at bedtime  hydrALAZINE Injectable 10 milliGRAM(s) IV Push every 2 hours PRN SBP >140  labetalol Injectable 10 milliGRAM(s) IV Push every 2 hours PRN Systolic blood pressure > 140  lisinopril 10 milliGRAM(s) Oral daily    Pulm:    GI/:  bisacodyl 5 milliGRAM(s) Oral every 12 hours PRN Constipation  pantoprazole  Injectable 40 milliGRAM(s) IV Push daily  polyethylene glycol 3350 17 Gram(s) Oral daily  senna Syrup 10 milliLiter(s) Oral at bedtime    Other:   artificial  tears Solution 1 Drop(s) Both EYES two times a day  aspirin  chewable 81 milliGRAM(s) Enteral Tube daily  atorvastatin 80 milliGRAM(s) Oral daily  chlorhexidine 2% Cloths 1 Application(s) Topical daily  dextrose 50% Injectable 25 Gram(s) IV Push once  dextrose 50% Injectable 12.5 Gram(s) IV Push once  dextrose 50% Injectable 25 Gram(s) IV Push once  enoxaparin Injectable 40 milliGRAM(s) SubCutaneous every 24 hours  insulin lispro (ADMELOG) corrective regimen sliding scale   SubCutaneous every 6 hours  petrolatum Ophthalmic Ointment 1 Application(s) Right EYE every 12 hours  sodium chloride 0.9%. 1000 milliLiter(s) IV Continuous <Continuous>  ticagrelor 90 milliGRAM(s) Enteral Tube every 12 hours    -------------------------------------------------------------------------------------------------------------  PLAN:  Neuro:   - Q4 neuro checks  - Continue ASA and Brilinta 90 BID for stent patency   - appreciate neuro IR recs  - Neuro for stroke work-up    Respiratory:   - Room Air    CV:  - SBP   - Continue Lisinopril 10  - Continue atorvastatin 80  - PRN: hydralazine  - Cards consulted    Renal:   - NS @ 75  - Voiding  - Replete electrolytes as needed    GI:    - PEG in place, Trickle feeds.   - c/f poss bowel obstruction. CT Abd/Pel pending   - passed SLP today, ok for pureed diet pending CT  - Bowel Regimen:   - LBM: 4/8 liquid   - GI PPX:    Endocrine:   - ISS    Heme/Onc:               - DVT ppx: venodynes on, SQL    ID:   - Afebrile    PT/OT: Acute, PMR consult placed 4/8  Dispo: Med tele with neurology following  Case discussed with Dr. Moore Sakina Dowell is a 58 year old male with HTN, DM who presented from [facility] to [facility] as a code stroke for left sided weakness and slurred speech on 3/29, LKW the night prior so was outside of the window for TNK. He was intubated on prior to transfer for airway protection. He was found to have a right ICA occlusion and was taken to angio on arrival as a code biplane. He underwent a TICI 2b thrombectomy of the AYAKA and RMCA and AYAKA stent placement. A CT head post procedure demonstrated new MLS with some hemorrhagic conversion, neurosurgery was consulted however no intervention was required. His mental status improved following the thrombectomy and he was able to be extubated on 3/31 without complications. His course was complicated by aspiration pneumonia s/p antibiotics (zosyn 3/30-4/6), dysphagia requiring PEG placement on 4/6. He was maintained on a cangrelor drip from 3/29 to 4/7 for stent patency and now remains on Brilinta. He remains undergoing a stroke work-up which has been notable for elevated Hgb A1c and LDL, cardiology was consulted and recommend outpatient follow-up.    PROCEDURE:  3/29 TICI 2B thrombectomy of AYAKA/MCA stroke, PPD 10   4/6: S/P PEG POD 2    Vital Signs Last 24 Hrs  T(C): 37.6 (04-08-23 @ 12:00), Max: 37.6 (04-08-23 @ 12:00)  T(F): 99.7 (04-08-23 @ 12:00), Max: 99.7 (04-08-23 @ 12:00)  HR: 92 (04-08-23 @ 12:00) (71 - 98)  BP: 135/70 (04-08-23 @ 12:00) (103/79 - 159/81)  BP(mean): 92 (04-08-23 @ 12:00) (82 - 102)  RR: 21 (04-08-23 @ 12:00) (13 - 23)  SpO2: 99% (04-08-23 @ 12:00) (95% - 100%)    PHYSICAL EXAM:  Constitutional: No Acute Distress, sitting in chair with wife at bedside    Neurological: Awake, alert, oriented to person, place and time, speech dysarthric. Lt sided neglect, no BTT on Left. Following commands. Pupils B/l 3 mm R. face symmetric, tongue midline. Moving right side with 4/5 strength, LUE 2/5, LLE 3/5  Incision: PEG site c/d/i  Abd: soft, tender near PEG site, mild distention, +BS x4    LABS:             10.4   7.32  )-----------( 295      ( 08 Apr 2023 03:27 )             31.3   137  |  106  |  10.6  ----------------------------<  108<H>  3.5   |  21.0<L>  |  0.40<L>    Ca    7.8<L>      08 Apr 2023 03:27  Phos  3.5     04-08  Mg     2.3     04-08    MEDICATIONS:  Antibiotics:    Neuro:  acetaminophen   Oral Liquid .. 650 milliGRAM(s) Oral every 6 hours PRN Moderate Pain (4 - 6)  oxyCODONE    IR 5 milliGRAM(s) Oral every 6 hours PRN Moderate Pain (4 - 6)  oxyCODONE    IR 10 milliGRAM(s) Oral every 6 hours PRN Severe Pain (7 - 10)    Cardiac:  doxazosin 4 milliGRAM(s) Oral at bedtime  hydrALAZINE Injectable 10 milliGRAM(s) IV Push every 2 hours PRN SBP >140  labetalol Injectable 10 milliGRAM(s) IV Push every 2 hours PRN Systolic blood pressure > 140  lisinopril 10 milliGRAM(s) Oral daily    Pulm:    GI/:  bisacodyl 5 milliGRAM(s) Oral every 12 hours PRN Constipation  pantoprazole  Injectable 40 milliGRAM(s) IV Push daily  polyethylene glycol 3350 17 Gram(s) Oral daily  senna Syrup 10 milliLiter(s) Oral at bedtime    Other:   artificial  tears Solution 1 Drop(s) Both EYES two times a day  aspirin  chewable 81 milliGRAM(s) Enteral Tube daily  atorvastatin 80 milliGRAM(s) Oral daily  chlorhexidine 2% Cloths 1 Application(s) Topical daily  dextrose 50% Injectable 25 Gram(s) IV Push once  dextrose 50% Injectable 12.5 Gram(s) IV Push once  dextrose 50% Injectable 25 Gram(s) IV Push once  enoxaparin Injectable 40 milliGRAM(s) SubCutaneous every 24 hours  insulin lispro (ADMELOG) corrective regimen sliding scale   SubCutaneous every 6 hours  petrolatum Ophthalmic Ointment 1 Application(s) Right EYE every 12 hours  sodium chloride 0.9%. 1000 milliLiter(s) IV Continuous <Continuous>  ticagrelor 90 milliGRAM(s) Enteral Tube every 12 hours    -------------------------------------------------------------------------------------------------------------  PLAN:  Neuro:   - Q4 neuro checks  - Continue ASA and Brilinta 90 BID for stent patency   - appreciate neuro IR recs  - Neuro for stroke work-up    Respiratory:   - Room Air    CV:  - SBP   - Continue Lisinopril 10  - Continue atorvastatin 80  - PRN: hydralazine  - Cards consulted    Renal:   - NS @ 75  - Voiding  - Replete electrolytes as needed    GI:    - PEG in place, Trickle feeds.   - c/f poss bowel obstruction. CT Abd/Pel pending   - passed SLP today, ok for pureed diet pending CT  - Bowel Regimen:   - LBM: 4/8 liquid   - GI PPX:    Endocrine:   - ISS    Heme/Onc:               - DVT ppx: venodynes on, SQL  - hypercoaguable work-up pending (sent 4/6)     ID:   - Afebrile    PT/OT: Acute, PMR consult placed 4/8  Dispo: Medicine tele with neurology following  Case discussed with Dr. Moore

## 2023-04-08 NOTE — PROGRESS NOTE ADULT - SUBJECTIVE AND OBJECTIVE BOX
HPI:  57 y/o male with PMHx of HTN and DM transferred from [facility] to [facility] as a code stroke. [facility] reports pt BLAYNE was last night around 20:00 and was c/o right arm pain, today his family found his around 05:00 with left sided hemiparesis, slurred speech and was incontinent of urine, they called EMS as pt arrived at [facility] at 05:56, he was found to have right ICA occlusion and transferred to [facility], code stroke called upon arrival. Pt was on Cardene however per EMS neuro wanted pressure around 200 and it was d/monet, pt sedated with propofol. (29 Mar 2023 09:40)    O/n events: episode of vomiting this am.    ICU Vital Signs Last 24 Hrs  T(C): 36.8 (04 Apr 2023 12:03), Max: 36.9 (03 Apr 2023 23:56)  T(F): 98.2 (04 Apr 2023 12:03), Max: 98.4 (03 Apr 2023 23:56)  HR: 91 (04 Apr 2023 14:00) (72 - 116)  BP: 124/96 (04 Apr 2023 14:00) (93/73 - 170/102)  BP(mean): 105 (04 Apr 2023 14:00) (78 - 123)  RR: 25 (04 Apr 2023 14:00) (17 - 37)  SpO2: 97% (04 Apr 2023 14:00) (94% - 100%)    O2 Parameters below as of 04 Apr 2023 12:00  Patient On (Oxygen Delivery Method): nasal cannula  O2 Flow (L/min): 2    Labs, imaging reviewed.    EXAMINATION: stable  General:  NAD, sitting in a chair.  Neuro:  EO to verbal, alert and awake during exam,  follows simple commands on both sides, pupils 3 mm and reactive but R is more sluggish, EOMI, able to open his mouth, good cough today, lifts head off the bed, RUE 4/5, L arm 2/5 spontaneously, R leg 4/5, L leg 2/5 spontaneously   Cards:  RRR  Respiratory:  CTAB, no crackles or wheezes.  Abdomen:  soft, NT, ND. Decreased BS. PEG site intact.  Extremities:  no LE edema

## 2023-04-08 NOTE — PROGRESS NOTE ADULT - ASSESSMENT
58 year old male with HTN, DM who presented from [facility] to [facility] as a code stroke for left sided weakness and slurred speech on 3/29, LKW the night prior so was outside of the window for TNK.   He was intubated on prior to transfer for airway protection.   He was found to have a right ICA occlusion and was taken to angio on arrival as a code biplane. He underwent a TICI 2b thrombectomy of the AYAKA and RMCA and AYAKA stent placement.   CT head post procedure demonstrated new MLS with some hemorrhagic conversion, neurosurgery was consulted however no intervention was required. His mental status improved following the thrombectomy and he was able to be extubated on 3/31 without complications.   Hospital course was complicated by aspiration pneumonia s/p antibiotics (zosyn 3/30-4/6),   Dysphagia requiring PEG placement on 4/6. He was maintained on a cangrelor drip from 3/29 to 4/7 for stent patency and now remains on Brilinta.   He remains undergoing a stroke work-up which has been notable for elevated Hgb A1c and LDL,   cardiology was consulted and recommend outpatient follow-up.    Htn/CVA- s/p TICI 2b thrombectomy of the AYAKA and RMCA and AYAKA stent placement.   - Continue Lisinopril 10  - Continue atorvastatin 80  - PRN: hydralazine  - Q4 neuro checks  - Continue ASA and Brilinta 90 BID   - Neuro for stroke work-up    Dysphagia  - PEG in place, Trickle feeds.   - c/f poss bowel obstruction. CT Abd/Pel - inflammation around gastrostomy tube- and RLL mucus plug  - passed SLP today, ok for pureed diet   - Bowel Regimen:   - LBM: 4/8 liquid   - GI PPX:    Bowel regimen/constipation  bisacodyl 5 milliGRAM(s) Oral every 12 hours PRN Constipation  pantoprazole  Injectable 40 milliGRAM(s) IV Push daily  polyethylene glycol 3350 17 Gram(s) Oral daily  senna Syrup 10 milliLiter(s) Oral at bedtime    DM   FS Ac and HS with - ISS                - DVT ppx: veno dynes on, SQL    - hypercoagulable work-up pending (sent 4/6)     ID:   - Afebrile    PT/OT: Acute, PMR consult placed

## 2023-04-08 NOTE — SPEECH LANGUAGE PATHOLOGY EVALUATION - SLP PERTINENT HISTORY OF CURRENT PROBLEM
59 yo M with acute CVA due to R ICA/MCA occlusion, with hemorrhagic transformation, vasogenic edema and MLS; no thrombolytics as was out of the window for administration. Respiratory distress due to bulbar impairment, pulm congestion; concern for aspiration PNA vs pneumonitis.

## 2023-04-08 NOTE — PROVIDER CONTACT NOTE (CHANGE IN STATUS NOTIFICATION) - ACTION/TREATMENT ORDERED:
Neuro team aware. As per team, no interventions at this time.
No new orders given.
Neuro team aware as this has been patient's intermittent baseline. No interventions at this time.

## 2023-04-08 NOTE — PROGRESS NOTE ADULT - ASSESSMENT
Assessment/Plan:   59 yo M with acute CVA due to R ICA/MCA occlusion, with hemorrhagic transformation, vasogenic edema and MLS; no thrombolytics as was out of the window for administration.  S/p TICI 2b MT, AYAKA stenting 3/29.   Respiratory distress due to bulbar impairment, pulm congestion; concern for aspiration PNA vs pneumonitis; resolved.   PMH of HTN and DM.  Dysphagia. PEG in place.  Ileus    Neuro:  neurochecks  c/w ASA 81 mg daily, cont Brilinta; p2Y12 therapeutic  CT CAP w/contr as part of w/up for etiology of stroke  check CRP, JOHNIE; pending hypercoag w/up    CV: , TTE with EF 75%  MAP >65, -160 goal    Pulm:  cont Duoneb + mucomyst, chest PT; OOB  maintain OSats >92.  incentive spirometry    GI:  TF @10 for now, will increase if Nl CT; BM regimen  S/S re-eval daily    Renal:  Na goal 145-155  d/c cont salt tabs  monitor UOp    Heme:  SCDs, hold Lov ppx as on DAPT/heme conversion    ID: MRSA neg  c/w Zosyn for aspiration PNA, 7 days in total    Endo: HgA1C 7.3  cont ISS  FS goal 120-180    Stable to be downgraded to Telemetry; Medicine involvement would be appreciated.

## 2023-04-08 NOTE — SPEECH LANGUAGE PATHOLOGY EVALUATION - SLP DIAGNOSIS
Evaluation initiated; limited assessment as pt with increasing lethargy as eval progressed. Expressive/receptive language appears functional for basic information. Suspect deficits for language with increased length and semantic/syntactic complexity. Mild-moderate dysarthria of speech impacting articulatory subsystem. Overall intelligibility is fair-good. Suspect at least moderate cognitive deficits .Assessment to be ongoing.

## 2023-04-08 NOTE — SPEECH LANGUAGE PATHOLOGY EVALUATION - COMMENTS
MRI: "Large subacute right MCA infarct with hemorrhagic transformation in the   right basal ganglia and right temporal lobe. Mildly improving 5 mm   right-to-left midline shift.  FLAIR hyperintense signal along the right frontal parietal temporal sulci   suggesting small subarachnoid hemorrhage." to assess Left inattention

## 2023-04-08 NOTE — PROVIDER CONTACT NOTE (CHANGE IN STATUS NOTIFICATION) - SITUATION
PA notified that while pt in MRI /79 and that hydralazine 10 mg IVP given as ordered.
NIH score changed from last assessment. Patient has paralysis to lower face & only some effort against gravity to left leg.
Patient received from IR still paralyzed & sedated. Neuro team at bedside, aware of NIH score at this time.

## 2023-04-08 NOTE — PROGRESS NOTE ADULT - SUBJECTIVE AND OBJECTIVE BOX
CONG AGUAYO Patient is a 58y old  Male who presents with a chief complaint of stroke (08 Apr 2023 14:23)     HPI:  59 y/o male with PMHx of HTN and DM transfered from [facility] to [facility] as a code stroke. [facility] reports pt BLAYNE was last night around 20:00 and was c/o right arm pain, today his family found his around 05:00 with left sided hemiparesis, slurred speech and was incontinent of urine, they called EMS as pt arrived at [facility] at 05:56, he was found to have right ICA occlusion and transferred to [facility], code stroke called upon arrival. Pt was on Cardene however per EMS neuro wanted pressure around 200 and it was d/monet, pt sedated with propofol.     (29 Mar 2023 09:40)    The patient was seen and evaluated   The patient is in no acute distress.  Denied any fever chest pain, palpitations, shortness of breath, abdominal pain, fever, dysuria, cough, edema       I&O's Summary    07 Apr 2023 07:01  -  08 Apr 2023 07:00  --------------------------------------------------------  IN: 2385.4 mL / OUT: 1675 mL / NET: 710.4 mL    08 Apr 2023 07:01  -  08 Apr 2023 18:20  --------------------------------------------------------  IN: 1110 mL / OUT: 1600 mL / NET: -490 mL      Allergies    No Known Allergies    Intolerances      HEALTH ISSUES - PROBLEM Dx:  Suspected deep vein thrombosis (DVT)    Dysphagia    Acute cerebrovascular accident (CVA)    Left carotid artery stenosis          PAST MEDICAL & SURGICAL HISTORY:          Vital Signs Last 24 Hrs  T(C): 37.2 (08 Apr 2023 16:00), Max: 37.6 (08 Apr 2023 12:00)  T(F): 99 (08 Apr 2023 16:00), Max: 99.7 (08 Apr 2023 12:00)  HR: 81 (08 Apr 2023 18:00) (73 - 98)  BP: 136/74 (08 Apr 2023 18:00) (103/79 - 159/81)  BP(mean): 93 (08 Apr 2023 18:00) (84 - 111)  RR: 22 (08 Apr 2023 18:00) (18 - 25)  SpO2: 100% (08 Apr 2023 18:00) (95% - 100%)    Parameters below as of 08 Apr 2023 16:00  Patient On (Oxygen Delivery Method): room air    T(C): 37.2 (04-08-23 @ 16:00), Max: 37.6 (04-08-23 @ 12:00)  HR: 81 (04-08-23 @ 18:00) (73 - 98)  BP: 136/74 (04-08-23 @ 18:00) (103/79 - 159/81)  RR: 22 (04-08-23 @ 18:00) (18 - 25)  SpO2: 100% (04-08-23 @ 18:00) (95% - 100%)  Wt(kg): --    PHYSICAL EXAM:    GENERAL: NAD- A&O x3, following commands, ? mild aphasia, mild dysarthria   * Cranial Nerves: L pupil 3mm reactive, R pupil 4mm sluggish, L facial droop  HEAD:  Atraumatic, Normocephalic  EYES: EOMI, PERRL, conjunctiva and sclera clear  ENMT:  Moist mucous membranes,  No lesions  NECK: Supple, No JVD, Normal thyroid  NERVOUS SYSTEM:  Alert & Oriented X3,  Moves upper and lower extremities; CNS-II-XII  CHEST/LUNG: Clear to auscultation bilaterally; No rales, rhonchi, wheezing,   HEART: Regular rate and rhythm; No murmurs,   ABDOMEN: Soft, Nontender, Nondistended; Bowel sounds present  EXTREMITIES:  Peripheral Pulses, No  cyanosis, or edema  SKIN: No rashes or lesions  psychiatry- mood and affect appropriate, Insight and judgement intact     acetaminophen   Oral Liquid .. 650 milliGRAM(s) Oral every 6 hours PRN  artificial  tears Solution 1 Drop(s) Both EYES two times a day  aspirin  chewable 81 milliGRAM(s) Enteral Tube daily  atorvastatin 80 milliGRAM(s) Oral daily  chlorhexidine 2% Cloths 1 Application(s) Topical daily  dextrose 50% Injectable 25 Gram(s) IV Push once  dextrose 50% Injectable 12.5 Gram(s) IV Push once  dextrose 50% Injectable 25 Gram(s) IV Push once  doxazosin 4 milliGRAM(s) Oral at bedtime  enoxaparin Injectable 40 milliGRAM(s) SubCutaneous every 24 hours  hydrALAZINE Injectable 10 milliGRAM(s) IV Push every 4 hours PRN  insulin lispro (ADMELOG) corrective regimen sliding scale   SubCutaneous every 6 hours  lisinopril 10 milliGRAM(s) Oral daily  pantoprazole  Injectable 40 milliGRAM(s) IV Push daily  petrolatum Ophthalmic Ointment 1 Application(s) Right EYE every 12 hours  polyethylene glycol 3350 17 Gram(s) Oral daily  senna Syrup 10 milliLiter(s) Oral at bedtime  sodium chloride 0.9%. 1000 milliLiter(s) IV Continuous <Continuous>  ticagrelor 90 milliGRAM(s) Enteral Tube every 12 hours      LABS:                          10.4   7.32  )-----------( 295      ( 08 Apr 2023 03:27 )             31.3     04-08    137  |  106  |  10.6  ----------------------------<  108<H>  3.5   |  21.0<L>  |  0.40<L>    Ca    7.8<L>      08 Apr 2023 03:27  Phos  3.5     04-08  Mg     2.3     04-08                CAPILLARY BLOOD GLUCOSE      POCT Blood Glucose.: 103 mg/dL (08 Apr 2023 17:28)  POCT Blood Glucose.: 101 mg/dL (08 Apr 2023 11:35)  POCT Blood Glucose.: 98 mg/dL (07 Apr 2023 23:55)      RADIOLOGY & ADDITIONAL TESTS:      Consultant notes reviewed    Case discussed with consultant/provider/ family /patient

## 2023-04-08 NOTE — PROGRESS NOTE ADULT - THIS PATIENT HAS THE FOLLOWING CONDITION(S)/DIAGNOSES ON THIS ADMISSION:
None
stroke/Encephalopathy/Cerebral Edema
None
stroke
stroke
stroke/Encephalopathy/Cerebral Edema
Encephalopathy
stroke/Encephalopathy/Cerebral Edema
None
stroke/Encephalopathy/Cerebral Edema

## 2023-04-08 NOTE — SPEECH LANGUAGE PATHOLOGY EVALUATION - SLP GENERAL OBSERVATIONS
Pt received OOB in chair, initially A&A with increasing lethargy as eval progressed, 0x3, +dysarthric, +left inattention,  on room air, wife present,  pain 0/10 pre/post eval

## 2023-04-09 LAB
ANION GAP SERPL CALC-SCNC: 12 MMOL/L — SIGNIFICANT CHANGE UP (ref 5–17)
BUN SERPL-MCNC: 7.5 MG/DL — LOW (ref 8–20)
CALCIUM SERPL-MCNC: 8.3 MG/DL — LOW (ref 8.4–10.5)
CHLORIDE SERPL-SCNC: 105 MMOL/L — SIGNIFICANT CHANGE UP (ref 96–108)
CO2 SERPL-SCNC: 21 MMOL/L — LOW (ref 22–29)
CREAT SERPL-MCNC: 0.36 MG/DL — LOW (ref 0.5–1.3)
CRP SERPL-MCNC: 80 MG/L — HIGH
EGFR: 129 ML/MIN/1.73M2 — SIGNIFICANT CHANGE UP
GLUCOSE BLDC GLUCOMTR-MCNC: 109 MG/DL — HIGH (ref 70–99)
GLUCOSE BLDC GLUCOMTR-MCNC: 109 MG/DL — HIGH (ref 70–99)
GLUCOSE BLDC GLUCOMTR-MCNC: 114 MG/DL — HIGH (ref 70–99)
GLUCOSE BLDC GLUCOMTR-MCNC: 120 MG/DL — HIGH (ref 70–99)
GLUCOSE BLDC GLUCOMTR-MCNC: 126 MG/DL — HIGH (ref 70–99)
GLUCOSE SERPL-MCNC: 129 MG/DL — HIGH (ref 70–99)
HCT VFR BLD CALC: 34 % — LOW (ref 39–50)
HGB BLD-MCNC: 11.5 G/DL — LOW (ref 13–17)
MAGNESIUM SERPL-MCNC: 2 MG/DL — SIGNIFICANT CHANGE UP (ref 1.8–2.6)
MCHC RBC-ENTMCNC: 27.1 PG — SIGNIFICANT CHANGE UP (ref 27–34)
MCHC RBC-ENTMCNC: 33.8 GM/DL — SIGNIFICANT CHANGE UP (ref 32–36)
MCV RBC AUTO: 80 FL — SIGNIFICANT CHANGE UP (ref 80–100)
PHOSPHATE SERPL-MCNC: 3.1 MG/DL — SIGNIFICANT CHANGE UP (ref 2.4–4.7)
PLATELET # BLD AUTO: 333 K/UL — SIGNIFICANT CHANGE UP (ref 150–400)
POTASSIUM SERPL-MCNC: 3 MMOL/L — LOW (ref 3.5–5.3)
POTASSIUM SERPL-SCNC: 3 MMOL/L — LOW (ref 3.5–5.3)
RBC # BLD: 4.25 M/UL — SIGNIFICANT CHANGE UP (ref 4.2–5.8)
RBC # FLD: 13.5 % — SIGNIFICANT CHANGE UP (ref 10.3–14.5)
SODIUM SERPL-SCNC: 138 MMOL/L — SIGNIFICANT CHANGE UP (ref 135–145)
WBC # BLD: 8.67 K/UL — SIGNIFICANT CHANGE UP (ref 3.8–10.5)
WBC # FLD AUTO: 8.67 K/UL — SIGNIFICANT CHANGE UP (ref 3.8–10.5)

## 2023-04-09 PROCEDURE — 99233 SBSQ HOSP IP/OBS HIGH 50: CPT

## 2023-04-09 PROCEDURE — 99232 SBSQ HOSP IP/OBS MODERATE 35: CPT | Mod: 24

## 2023-04-09 RX ORDER — POTASSIUM CHLORIDE 20 MEQ
40 PACKET (EA) ORAL ONCE
Refills: 0 | Status: COMPLETED | OUTPATIENT
Start: 2023-04-09 | End: 2023-04-09

## 2023-04-09 RX ADMIN — TICAGRELOR 90 MILLIGRAM(S): 90 TABLET ORAL at 06:02

## 2023-04-09 RX ADMIN — Medication 1 DROP(S): at 17:30

## 2023-04-09 RX ADMIN — Medication 1 DROP(S): at 06:01

## 2023-04-09 RX ADMIN — Medication 650 MILLIGRAM(S): at 03:33

## 2023-04-09 RX ADMIN — Medication 81 MILLIGRAM(S): at 12:19

## 2023-04-09 RX ADMIN — POLYETHYLENE GLYCOL 3350 17 GRAM(S): 17 POWDER, FOR SOLUTION ORAL at 17:29

## 2023-04-09 RX ADMIN — LISINOPRIL 10 MILLIGRAM(S): 2.5 TABLET ORAL at 06:02

## 2023-04-09 RX ADMIN — SODIUM CHLORIDE 75 MILLILITER(S): 9 INJECTION INTRAMUSCULAR; INTRAVENOUS; SUBCUTANEOUS at 22:05

## 2023-04-09 RX ADMIN — SENNA PLUS 10 MILLILITER(S): 8.6 TABLET ORAL at 22:59

## 2023-04-09 RX ADMIN — ENOXAPARIN SODIUM 40 MILLIGRAM(S): 100 INJECTION SUBCUTANEOUS at 17:29

## 2023-04-09 RX ADMIN — Medication 4 MILLIGRAM(S): at 21:58

## 2023-04-09 RX ADMIN — PANTOPRAZOLE SODIUM 40 MILLIGRAM(S): 20 TABLET, DELAYED RELEASE ORAL at 12:19

## 2023-04-09 RX ADMIN — Medication 650 MILLIGRAM(S): at 04:30

## 2023-04-09 RX ADMIN — Medication 1 APPLICATION(S): at 06:01

## 2023-04-09 RX ADMIN — CHLORHEXIDINE GLUCONATE 1 APPLICATION(S): 213 SOLUTION TOPICAL at 06:10

## 2023-04-09 RX ADMIN — TICAGRELOR 90 MILLIGRAM(S): 90 TABLET ORAL at 17:29

## 2023-04-09 RX ADMIN — Medication 40 MILLIEQUIVALENT(S): at 17:30

## 2023-04-09 RX ADMIN — Medication 1 APPLICATION(S): at 17:53

## 2023-04-09 RX ADMIN — ATORVASTATIN CALCIUM 80 MILLIGRAM(S): 80 TABLET, FILM COATED ORAL at 12:19

## 2023-04-09 RX ADMIN — Medication 10 MILLIGRAM(S): at 04:46

## 2023-04-09 NOTE — PROGRESS NOTE ADULT - ASSESSMENT
58 year old male with HTN, DM who presented from [facility] to [facility] as a code stroke for left sided weakness and slurred speech on 3/29, LKW the night prior so was outside of the window for TNK.   He was intubated on prior to transfer for airway protection.   He was found to have a right ICA occlusion and was taken to angio on arrival as a code biplane. He underwent a TICI 2b thrombectomy of the AYAKA and RMCA and AYAKA stent placement.   CT head post procedure demonstrated new MLS with some hemorrhagic conversion, neurosurgery was consulted however no intervention was required. His mental status improved following the thrombectomy and he was able to be extubated on 3/31 without complications.   Hospital course was complicated by aspiration pneumonia s/p antibiotics (zosyn 3/30-4/6),   Dysphagia requiring PEG placement on 4/6. He was maintained on a cangrelor drip from 3/29 to 4/7 for stent patency and now remains on Brilinta.   He remains undergoing a stroke work-up which has been notable for elevated Hgb A1c and LDL,   cardiology was consulted and recommend outpatient follow-up.    Htn/CVA- s/p TICI 2b thrombectomy of the AYAKA and RMCA and AYAKA stent placement.   - Continue Lisinopril 10  - Continue atorvastatin 80  - PRN: hydralazine  - Q4 neuro checks  - Continue ASA and Brilinta 90 BID   - Neuro for stroke work-up    Dysphagia- asking to eat- states he ate cracker and juice - wants to eat   - PEG in place, Trickle feeds.   - c/f poss bowel obstruction. CT Abd/Pel - inflammation around gastrostomy tube- abdomen soft and RLL mucus plug  - Bowel Regimen:   - GI PPX:    Bowel regimen/constipation  bisacodyl 5 milliGRAM(s) Oral every 12 hours PRN Constipation  pantoprazole  Injectable 40 milliGRAM(s) IV Push daily  polyethylene glycol 3350 17 Gram(s) Oral daily  senna Syrup 10 milliLiter(s) Oral at bedtime    DM   FS Ac and HS with - ISS  DVT ppx: veno dynes on, SQL    hypercoagulable work-up pending (sent 4/6)     PT/OT: Acute, PMR consult placed

## 2023-04-09 NOTE — PROGRESS NOTE ADULT - SUBJECTIVE AND OBJECTIVE BOX
CONG AGUAYO Patient is a 58y old  Male who presents with a chief complaint of stroke (08 Apr 2023 14:23)     HPI:  59 y/o male with PMHx of HTN and DM transfered from [facility] to [facility] as a code stroke. [facility] reports pt BLAYNE was last night around 20:00 and was c/o right arm pain, today his family found his around 05:00 with left sided hemiparesis, slurred speech and was incontinent of urine, they called EMS as pt arrived at [facility] at 05:56, he was found to have right ICA occlusion and transferred to [facility], code stroke called upon arrival. Pt was on Cardene however per EMS neuro wanted pressure around 200 and it was d/monet, pt sedated with propofol.         (29 Mar 2023 09:40)    The patient was seen and evaluated   The patient is in no acute distress.  Denied any fever chest pain, palpitations, shortness of breath, abdominal pain, fever, dysuria, cough, edema       I&O's Summary    08 Apr 2023 07:01  -  09 Apr 2023 07:00  --------------------------------------------------------  IN: 2045 mL / OUT: 4700 mL / NET: -2655 mL    09 Apr 2023 07:01  -  09 Apr 2023 14:11  --------------------------------------------------------  IN: 0 mL / OUT: 450 mL / NET: -450 mL      Allergies    No Known Allergies    Intolerances      HEALTH ISSUES - PROBLEM Dx:  Suspected deep vein thrombosis (DVT)    Dysphagia    Acute cerebrovascular accident (CVA)    Left carotid artery stenosis          PAST MEDICAL & SURGICAL HISTORY:          Vital Signs Last 24 Hrs  T(C): 36.9 (09 Apr 2023 07:20), Max: 37.2 (08 Apr 2023 16:00)  T(F): 98.4 (09 Apr 2023 07:20), Max: 99 (08 Apr 2023 16:00)  HR: 99 (09 Apr 2023 07:20) (77 - 99)  BP: 123/82 (09 Apr 2023 07:20) (110/90 - 173/76)  BP(mean): 91 (08 Apr 2023 21:00) (84 - 111)  RR: 18 (09 Apr 2023 07:20) (18 - 22)  SpO2: 94% (09 Apr 2023 07:20) (94% - 100%)    Parameters below as of 09 Apr 2023 08:05  Patient On (Oxygen Delivery Method): room air    T(C): 36.9 (04-09-23 @ 07:20), Max: 37.2 (04-08-23 @ 16:00)  HR: 99 (04-09-23 @ 07:20) (77 - 99)  BP: 123/82 (04-09-23 @ 07:20) (110/90 - 173/76)  RR: 18 (04-09-23 @ 07:20) (18 - 22)  SpO2: 94% (04-09-23 @ 07:20) (94% - 100%)  Wt(kg): --    PHYSICAL EXAM:    GENERAL: NAD clear speech  HEAD:  Atraumatic,   EYES: EOMI, conjunctiva and sclera clear  ENMT:  Moist mucous membranes,   NECK: Supple, Normal thyroid  NERVOUS SYSTEM:  Alert & Oriented X3,  Moves upper and lower right left arm weakness-   able to lift left leg above the bed, CNS-II-XII  CHEST/LUNG: Clear to auscultation bilaterally; No rales, rhonchi, wheezing,   HEART: Regular rate and rhythm; No murmurs,   ABDOMEN: Soft, Nontender, Nondistended; Bowel sounds present  EXTREMITIES:  Peripheral Pulses, No  cyanosis, or edema  psychiatry- mood and affect appropriate, Insight and judgement intact     acetaminophen   Oral Liquid .. 650 milliGRAM(s) Oral every 6 hours PRN  artificial  tears Solution 1 Drop(s) Both EYES two times a day  aspirin  chewable 81 milliGRAM(s) Enteral Tube daily  atorvastatin 80 milliGRAM(s) Oral daily  chlorhexidine 2% Cloths 1 Application(s) Topical daily  dextrose 50% Injectable 25 Gram(s) IV Push once  dextrose 50% Injectable 12.5 Gram(s) IV Push once  dextrose 50% Injectable 25 Gram(s) IV Push once  doxazosin 4 milliGRAM(s) Oral at bedtime  enoxaparin Injectable 40 milliGRAM(s) SubCutaneous every 24 hours  hydrALAZINE Injectable 10 milliGRAM(s) IV Push every 4 hours PRN  insulin lispro (ADMELOG) corrective regimen sliding scale   SubCutaneous every 6 hours  lisinopril 10 milliGRAM(s) Oral daily  pantoprazole  Injectable 40 milliGRAM(s) IV Push daily  petrolatum Ophthalmic Ointment 1 Application(s) Right EYE every 12 hours  polyethylene glycol 3350 17 Gram(s) Oral daily  potassium chloride    Tablet ER 40 milliEquivalent(s) Oral once  senna Syrup 10 milliLiter(s) Oral at bedtime  sodium chloride 0.9%. 1000 milliLiter(s) IV Continuous <Continuous>  ticagrelor 90 milliGRAM(s) Enteral Tube every 12 hours      LABS:                          11.5   8.67  )-----------( 333      ( 09 Apr 2023 05:25 )             34.0     04-09    138  |  105  |  7.5<L>  ----------------------------<  129<H>  3.0<L>   |  21.0<L>  |  0.36<L>    Ca    8.3<L>      09 Apr 2023 05:25  Phos  3.1     04-09  Mg     2.0     04-09      POCT Blood Glucose.: 109 mg/dL (09 Apr 2023 12:16)  POCT Blood Glucose.: 126 mg/dL (09 Apr 2023 06:08)  POCT Blood Glucose.: 109 mg/dL (09 Apr 2023 00:21)  POCT Blood Glucose.: 103 mg/dL (08 Apr 2023 17:28)      RADIOLOGY & ADDITIONAL TESTS:      Consultant notes reviewed    Case discussed with consultant/provider/ family /patient

## 2023-04-09 NOTE — PROGRESS NOTE ADULT - SUBJECTIVE AND OBJECTIVE BOX
[facility] Stroke Team  Progress Note       HPI:  57 y/o male with PMHx of HTN and DM transfered from [facility] to [facility] as a code stroke. [facility] reports pt LKW was  night prior to admission around 20:00 and was c/o right arm pain, day of admission his family found his around 05:00 with left sided hemiparesis, slurred speech and was incontinent of urine, they called EMS as pt arrived at [facility] at 05:56, he was found to have right ICA occlusion and transferred to [facility], code stroke called upon arrival. Transferred to  for mechanical thrombectomy.            Vital Signs Last 24 Hrs  T(C): 36.8 (09 Apr 2023 16:10), Max: 36.9 (09 Apr 2023 07:20)  T(F): 98.3 (09 Apr 2023 16:10), Max: 98.4 (09 Apr 2023 07:20)  HR: 97 (09 Apr 2023 16:10) (77 - 99)  BP: 134/81 (09 Apr 2023 16:10) (123/82 - 173/76)  BP(mean): 91 (08 Apr 2023 21:00) (88 - 91)  RR: 18 (09 Apr 2023 16:10) (18 - 22)  SpO2: 96% (09 Apr 2023 16:10) (94% - 99%)    Parameters below as of 09 Apr 2023 16:10  Patient On (Oxygen Delivery Method): room air        MEDICATIONS    acetaminophen   Oral Liquid .. 650 milliGRAM(s) Oral every 6 hours PRN  artificial  tears Solution 1 Drop(s) Both EYES two times a day  aspirin  chewable 81 milliGRAM(s) Enteral Tube daily  atorvastatin 80 milliGRAM(s) Oral daily  chlorhexidine 2% Cloths 1 Application(s) Topical daily  dextrose 50% Injectable 25 Gram(s) IV Push once  dextrose 50% Injectable 12.5 Gram(s) IV Push once  dextrose 50% Injectable 25 Gram(s) IV Push once  doxazosin 4 milliGRAM(s) Oral at bedtime  enoxaparin Injectable 40 milliGRAM(s) SubCutaneous every 24 hours  hydrALAZINE Injectable 10 milliGRAM(s) IV Push every 4 hours PRN  insulin lispro (ADMELOG) corrective regimen sliding scale   SubCutaneous every 6 hours  lisinopril 10 milliGRAM(s) Oral daily  pantoprazole  Injectable 40 milliGRAM(s) IV Push daily  petrolatum Ophthalmic Ointment 1 Application(s) Right EYE every 12 hours  polyethylene glycol 3350 17 Gram(s) Oral daily  senna Syrup 10 milliLiter(s) Oral at bedtime  sodium chloride 0.9%. 1000 milliLiter(s) IV Continuous <Continuous>  ticagrelor 90 milliGRAM(s) Enteral Tube every 12 hours         LABS:  CBC Full  -  ( 09 Apr 2023 05:25 )  WBC Count : 8.67 K/uL  RBC Count : 4.25 M/uL  Hemoglobin : 11.5 g/dL  Hematocrit : 34.0 %  Platelet Count - Automated : 333 K/uL  Mean Cell Volume : 80.0 fl  Mean Cell Hemoglobin : 27.1 pg  Mean Cell Hemoglobin Concentration : 33.8 gm/dL  Auto Neutrophil # : x  Auto Lymphocyte # : x  Auto Monocyte # : x  Auto Eosinophil # : x  Auto Basophil # : x  Auto Neutrophil % : x  Auto Lymphocyte % : x  Auto Monocyte % : x  Auto Eosinophil % : x  Auto Basophil % : x      04-09    138  |  105  |  7.5<L>  ----------------------------<  129<H>  3.0<L>   |  21.0<L>  |  0.36<L>    Ca    8.3<L>      09 Apr 2023 05:25  Phos  3.1     04-09  Mg     2.0     04-09        Hemoglobin A1C:     NEUROLOGICAL EXAM:    Mental status: awake, alert, oriented to self, place, dates, and situation.  Able to ID pen and  glove. Follows simple commands and repeats phrases.     CN: ARTEMIO 3--->2, brisk,  able to count fingers in both visual fields, slight left gaze palsy,  left facial palsy    Motor: moves right UE/LE 5/5  Moves left UE 2/5, LLE moves  against gravity.      Sensory:  left sensory extinction     Gait: deferred        IMAGING: Reviewed by me.     CT Head No Cont (04.04.23 @ 07:59)   IMPRESSION: Stable acute/subacute large right MCA territory infarction   with stable hemorrhagic transformation in the right basal ganglia and   right temporal lobe. Stable mass effect and right-to-left midline shift   measuring 6.7 mm. No new hemorrhage. Moderate adjacent cytotoxic edema,   unchanged.    CT Head No Cont (04.02.23 @ 08:08)   Unchanged hemorrhagic RIGHT MCA infarction with effacement   of the RIGHT lateral ventricle and 6 mm subfalcine herniation to the LEFT.    US Duplex Carotid Arteries Complete, Bilateral (04.01.23 @ 22:21)   IMPRESSION: No significant hemodynamic stenosis of the right internal   carotid artery.  Patent right ICA stent.  Over 70% left internal carotid artery stenosis.  Bilateral external carotid artery stenosis.    CT Head No Cont (03.30.23 @ 03:40)   Compared with 3/29/2023 there is decreasing retained contrast   in the right basal ganglia and right frontal parietal cortex with   evolving lucency consistent with an acute right artery infarct. Residual   high density although decreased compared to the prior exam may represent   retained contrast as well as a small amount of hemorrhage. No increased   hemorrhage identified.    CT BRAIN STROKE PROTOCOL   03/29/2023    IMPRESSION: Dense right MCA sign with early right temporal lobe   infarction. No acute intracranial hemorrhage.        CT PERFUSION W MAPS IC    03/29/2023    CBF<30% volume: 72 ml right MCA territory.  Tmax>6.0 s volume: 180 ml  Mismatch volume: 108 ml  Mismatch ratio: 2.5    TTE  3/29/2023  Left ventricular ejection fraction, by visual estimation, is >75%. Technically difficult study. Hyperdynamic global left ventricular systolic function. The left ventricular diastolic function could not be assessed in this study. There is mild concentric left ventricular hypertrophy. There is no evidence of pericardial effusion. Mild mitral annular calcification. Trace mitral valve regurgitation.    EEG:  EEG Classification / Summary:  Abnormal EEG in the awake, drowsy, and asleep states due to:  1. Continuous, right hemispheric focal polymorphic delta activity  2. Asymmetry and attenuation of sleep spindles from the right hemisphere  Clinical Impression:  This is an abnormal study indicative of a focal cerebral dysfunction in the right hemisphere, consistent with history of R MCA infarction. No epileptiform abnormalities or seizures were captured.     MR Head No Cont (04.07.23 @ 08:48) >    IMPRESSION:  Large subacute right MCA infarct with hemorrhagic transformation in the   right basal ganglia and right temporal lobe. Mildly improving 5 mm   right-to-left midline shift.  FLAIR hyperintense signal along the right frontalparietal temporal sulci   suggesting small subarachnoid hemorrhage.        KELLY ASKEW MD; Resident Radiologist  This document has been electronically signed.  BART MARTINEZ MD; Attending Radiologist  This document has beenelectronically signed. Apr 7 2023 11:01AM      CT Abdomen and Pelvis w/ IV Cont (04.08.23 @ 17:09) >    IMPRESSION: Inflammation surrounding the gastrostomy tube concerning for   infection.    Right greater than left lower lobe airspace opacities in the lungs of   uncertain etiology, possibly infectious. A 1 month follow-up chest CT   recommended.    Right lower lobe mucous plugging.    Indeterminate 11 mm right adrenal nodule.    HEATHER FERRERA MD; Attending Radiologist

## 2023-04-09 NOTE — PROGRESS NOTE ADULT - ASSESSMENT
ASSESSMENT: 58y Male with PMH HTN and DM, found by his family 0500 AM on 3/29/23 with right hemiparesis, slurred speech, and incontinent of urine.  Last well known 2000  on 3/28/23. Patient brought in by EMS to the [facility] at 0556, was noted not to be a candidate for Tenecteplase as patient out of time window. Patient was transferred to [facility] for thrombectomy due to right ICA occlusion with tandem right MCA occlusion. NIHSS 28 MRS pre - 0 . Etiology likely large artery atherosclerotic disease. Post mechanical thrombectomy TICI 2B and Right carotid stent with angioplasty.     NEURO: neurologically overall improving,   CT head 4/4/23  with hemorrhagic transformation and cerebral edema with mass effect and brain compression.  MRI Brain 4-07-23 -images and reports were reviewed shows Improved mdline shift.   Continue close monitoring for neurologic deterioration, with stroke checks per ICU,  Na goal gradually of 140-145 for now- avoid hyponatremia and rapid fluctuations, BMP as per protocol for adequate titration, permissive HTN as per post thrombectomy recommendations 110-140mmHg in setting of recent revascularization/hemorrhagic transformation as to avoid hypo/hyperperfusion injury. Titrate statin to LDL goal less than 70, EEG without evidence of seizure. Carotid duplex as noted- close monitoring of left ICA stenosis for eventual consideration in elective revascularization.     - Physical therapy/OT/Speech eval/treatment.       ANTITHROMBOTIC THERAPY: ASA 81mg daily, and Brilinta 90 BID.    PULMONARY:  care per ICU, aspiration precautions, incentive spirometry as tolerated     CARDIOVASCULAR: cardiac monitoring to screen for atrial fibrillation,     GASTROINTESTINAL:      On pureed diet.    RENAL: BUN/Cr 13.8/0.56, maintain adequate hydration,  monitor urine output, hypokalemia resolved, trend daily      Na Goal:  >140     Quispe: Y    HEMATOLOGY: H/H 12.6/38.1, monitor for si/sx of bleeding, Platelets 381. Patient should have all age and risk appropriate malignancy screening.      DVT ppx      ID: previously febrile., leukocytosis . Continues on zosyn for noted 7 day course (complete on 3/6/23), aspiration precautions     OTHER:   A1C 7.3- continue glycemic control    DISPOSITION: Rehab depending on PT eval once stable and workup is complete    CORE MEASURES:        Admission NIHSS: 28     TPA: [] YES [x] NO      LDL/HDL/A1C: 165/58/7.3     Depression Screen: pending     Statin Therapy: pending     Dysphagia Screen: [] PASS [x] FAIL       Smoking [] YES [x] NO      Afib [] YES [x] NO     Stroke Education [x] YES [] NO

## 2023-04-10 LAB
AT III ACT/NOR PPP CHRO: 103 % — SIGNIFICANT CHANGE UP (ref 85–135)
B2 GLYCOPROT1 AB SER QL: NEGATIVE — SIGNIFICANT CHANGE UP
CARDIOLIPIN AB SER-ACNC: NEGATIVE — SIGNIFICANT CHANGE UP
DRVVT RATIO: 1.03 RATIO — SIGNIFICANT CHANGE UP (ref 0–1.21)
DRVVT SCREEN TO CONFIRM RATIO: SIGNIFICANT CHANGE UP
GLUCOSE BLDC GLUCOMTR-MCNC: 109 MG/DL — HIGH (ref 70–99)
GLUCOSE BLDC GLUCOMTR-MCNC: 110 MG/DL — HIGH (ref 70–99)
GLUCOSE BLDC GLUCOMTR-MCNC: 163 MG/DL — HIGH (ref 70–99)
PROT C ACT/NOR PPP: 112 % — SIGNIFICANT CHANGE UP (ref 74–150)
PROT S FREE PPP-ACNC: 73 % — SIGNIFICANT CHANGE UP (ref 63–140)

## 2023-04-10 PROCEDURE — 99232 SBSQ HOSP IP/OBS MODERATE 35: CPT | Mod: 24

## 2023-04-10 PROCEDURE — 74230 X-RAY XM SWLNG FUNCJ C+: CPT | Mod: 26

## 2023-04-10 PROCEDURE — 99233 SBSQ HOSP IP/OBS HIGH 50: CPT | Mod: GC

## 2023-04-10 PROCEDURE — 99233 SBSQ HOSP IP/OBS HIGH 50: CPT

## 2023-04-10 RX ORDER — LANOLIN ALCOHOL/MO/W.PET/CERES
5 CREAM (GRAM) TOPICAL AT BEDTIME
Refills: 0 | Status: DISCONTINUED | OUTPATIENT
Start: 2023-04-10 | End: 2023-04-11

## 2023-04-10 RX ADMIN — Medication 1 APPLICATION(S): at 18:10

## 2023-04-10 RX ADMIN — Medication 2: at 15:07

## 2023-04-10 RX ADMIN — Medication 1 APPLICATION(S): at 05:50

## 2023-04-10 RX ADMIN — LISINOPRIL 10 MILLIGRAM(S): 2.5 TABLET ORAL at 05:50

## 2023-04-10 RX ADMIN — Medication 1 DROP(S): at 18:10

## 2023-04-10 RX ADMIN — SENNA PLUS 10 MILLILITER(S): 8.6 TABLET ORAL at 22:53

## 2023-04-10 RX ADMIN — Medication 5 MILLIGRAM(S): at 22:53

## 2023-04-10 RX ADMIN — TICAGRELOR 90 MILLIGRAM(S): 90 TABLET ORAL at 18:09

## 2023-04-10 RX ADMIN — POLYETHYLENE GLYCOL 3350 17 GRAM(S): 17 POWDER, FOR SOLUTION ORAL at 18:09

## 2023-04-10 RX ADMIN — SODIUM CHLORIDE 75 MILLILITER(S): 9 INJECTION INTRAMUSCULAR; INTRAVENOUS; SUBCUTANEOUS at 15:13

## 2023-04-10 RX ADMIN — TICAGRELOR 90 MILLIGRAM(S): 90 TABLET ORAL at 05:50

## 2023-04-10 RX ADMIN — Medication 1 DROP(S): at 05:50

## 2023-04-10 RX ADMIN — CHLORHEXIDINE GLUCONATE 1 APPLICATION(S): 213 SOLUTION TOPICAL at 06:17

## 2023-04-10 RX ADMIN — Medication 81 MILLIGRAM(S): at 15:06

## 2023-04-10 RX ADMIN — Medication 4 MILLIGRAM(S): at 22:54

## 2023-04-10 RX ADMIN — PANTOPRAZOLE SODIUM 40 MILLIGRAM(S): 20 TABLET, DELAYED RELEASE ORAL at 15:06

## 2023-04-10 RX ADMIN — ENOXAPARIN SODIUM 40 MILLIGRAM(S): 100 INJECTION SUBCUTANEOUS at 18:09

## 2023-04-10 RX ADMIN — ATORVASTATIN CALCIUM 80 MILLIGRAM(S): 80 TABLET, FILM COATED ORAL at 15:06

## 2023-04-10 NOTE — PROGRESS NOTE ADULT - ASSESSMENT
58 year old male with HTN, DM who presented from [facility] to [facility] as a code stroke for left sided weakness and slurred speech on 3/29, LKW the night prior so was outside of the window for TNK.   He was intubated on prior to transfer for airway protection.   He was found to have a right ICA occlusion and was taken to angio on arrival as a code biplane. He underwent a TICI 2b thrombectomy of the AYAKA and RMCA and AYAKA stent placement.   CT head post procedure demonstrated new MLS with some hemorrhagic conversion, neurosurgery was consulted however no intervention was required. His mental status improved following the thrombectomy and he was able to be extubated on 3/31 without complications.   MRI Brain 4-07-23 -images and reports were reviewed shows Improved midline shift, noted small frontal parietal SAH  Hospital course was complicated by aspiration pneumonia s/p antibiotics zosyn iv  Dysphagia requiring PEG placement on 4/6. He was maintained on a cangrelor drip from 3/29 to 4/7 for stent patency and now remains on Brilinta.   He remains undergoing a stroke work-up which has been notable for elevated Hgb A1c and LDL,   cardiology was consulted and recommend outpatient follow-up.    Htn/CVA- s/p TICI 2b thrombectomy of the AYAKA and RMCA and AYAKA stent placement.   - Continue Lisinopril 10  - Continue atorvastatin 80  - PRN: hydralazine- post thrombectomy keep BP   permissive HTN as per post thrombectomy recommendations 110-140mmHg in setting of recent revascularization/hemorrhagic transformation as to avoid hypo/hyperperfusion injury. Titrate statin to LDL goal less than 70, EEG without evidence of seizure  - Q4 neuro checks  - Continue ASA and Brilinta 90 BID   - Neuro for stroke work-up    Dysphagia- asking to eat- states he ate cracker and juice - wants to eat   - PEG in place, Trickle feeds. - will DC PEG if continues to improve    aspiration precautions, incentive spirometry as tolerated, CT chest noted lower lobe airway opacities   monitor for infection, follow up 4 week CT chest for further evaluation   - c/f poss bowel obstruction. CT Abd/Pel - inflammation around gastrostomy tube- clinically soft abdomen soft and RLL mucus plug  - Bowel Regimen:   - GI PPX:    Bowel regimen/constipation  bisacodyl 5 milliGRAM(s) Oral every 12 hours PRN Constipation  pantoprazole  Injectable 40 milliGRAM(s) IV Push daily  polyethylene glycol 3350 17 Gram(s) Oral daily  senna Syrup 10 milliLiter(s) Oral at bedtime    DM   FS Ac and HS with - ISS  DVT ppx: veno dynes on, SQL    hypercoagulable work-up pending (sent 4/6)     PT/OT: Acute rehab per, PMR consult

## 2023-04-10 NOTE — SWALLOW VFSS/MBS ASSESSMENT ADULT - DIAGNOSTIC IMPRESSIONS
Mild to moderate oral dysphagia, impacted by cognition, across administered trials with reduced labial seal, decreased lingual strength & coordination. Mild to moderate pharyngeal dysphagia for puree, & assessed solids with reduced tongue base retraction & hyolaryngeal excursion. Valleculae stasis reduced with cued successive swallow. Pharyngeal stage of swallow grossly WFL for mildly thick & thin fluids. Intake of thin fluids was effective in clearing valleculae stasis from prior presented trials (solids)

## 2023-04-10 NOTE — CHART NOTE - NSCHARTNOTEFT_GEN_A_CORE
Source: Patient [ ]  Family [ ]   other [ x] staff     58yMale with functional deficits after R MCA stroke s/p mechanical thrombectomy and right carotid stent with angioplasty    Current Diet: Diet, Pureed (04-09-23 @ 14:18)      Patient reports [ ] nausea  [ ] vomiting [ ] diarrhea [ ] constipation  [ ]chewing problems [ ] swallowing issues  [ ] other:     PO intake:  < 50% [x ]   50-75%  [ ]   %  [ ]  other :    Source for PO intake [ x] Patient [ ] family [ ] chart [ ] staff [ ] other        Current Weight:   4/6 77.3 kg  4/4 82.7 kg  4/3 75.6 kg  3/31 85.3 kg    % Weight Change     Pertinent Medications: MEDICATIONS  (STANDING):  artificial  tears Solution 1 Drop(s) Both EYES two times a day  aspirin  chewable 81 milliGRAM(s) Enteral Tube daily  atorvastatin 80 milliGRAM(s) Oral daily  chlorhexidine 2% Cloths 1 Application(s) Topical daily  dextrose 50% Injectable 25 Gram(s) IV Push once  dextrose 50% Injectable 12.5 Gram(s) IV Push once  dextrose 50% Injectable 25 Gram(s) IV Push once  doxazosin 4 milliGRAM(s) Oral at bedtime  enoxaparin Injectable 40 milliGRAM(s) SubCutaneous every 24 hours  insulin lispro (ADMELOG) corrective regimen sliding scale   SubCutaneous every 6 hours  lisinopril 10 milliGRAM(s) Oral daily  melatonin 5 milliGRAM(s) Oral at bedtime  pantoprazole  Injectable 40 milliGRAM(s) IV Push daily  petrolatum Ophthalmic Ointment 1 Application(s) Right EYE every 12 hours  polyethylene glycol 3350 17 Gram(s) Oral daily  senna Syrup 10 milliLiter(s) Oral at bedtime  sodium chloride 0.9%. 1000 milliLiter(s) (75 mL/Hr) IV Continuous <Continuous>  ticagrelor 90 milliGRAM(s) Enteral Tube every 12 hours    MEDICATIONS  (PRN):  acetaminophen   Oral Liquid .. 650 milliGRAM(s) Oral every 6 hours PRN Moderate Pain (4 - 6)  hydrALAZINE Injectable 10 milliGRAM(s) IV Push every 4 hours PRN SBP >160    Pertinent Labs: CBC Full  -  ( 09 Apr 2023 05:25 )  WBC Count : 8.67 K/uL  RBC Count : 4.25 M/uL  Hemoglobin : 11.5 g/dL  Hematocrit : 34.0 %  Platelet Count - Automated : 333 K/uL  Mean Cell Volume : 80.0 fl  Mean Cell Hemoglobin : 27.1 pg  Mean Cell Hemoglobin Concentration : 33.8 gm/dL  Auto Neutrophil # : x  Auto Lymphocyte # : x  Auto Monocyte # : x  Auto Eosinophil # : x  Auto Basophil # : x  Auto Neutrophil % : x  Auto Lymphocyte % : x  Auto Monocyte % : x  Auto Eosinophil % : x  Auto Basophil % : x  04-09 Na138 mmol/L Glu 129 mg/dL<H> K+ 3.0 mmol/L<L> Cr  0.36 mg/dL<L> BUN 7.5 mg/dL<L> Phos 3.1 mg/dL Alb n/a   PAB n/a               Skin: R groin thrombectomy site.     Nutrition focused physical exam-briefly conducted - found signs of malnutrition [ ]absent [x ]present    Subcutaneous fat loss: Mild [x ] Orbital fat pads region, [ ]Buccal fat region, [ ]Triceps region,  [ ]Ribs region    Muscle wasting: Mild  [x ]Temples region, [ ]Clavicle region, [x ]Shoulder region, [ ]Scapula region, [ ]Interosseous region,  [ ]thigh region, [ ]Calf region    Estimated Needs:   [x ] no change since previous assessment  [ ] recalculated:         Current Nutrition Diagnosis:  Pt remains at high nutrition risk secondary to moderate (acute) protein calorie malnutrition related to inability to meet sufficient protein energy needs in setting of recent stroke, dysphagia and intolerance to enteral feeds as evidenced by meeting < 75% estimated energy requirements > 7 days, physical signs of mild muscle/fat loss and edema. Pt with reduced cognition noted; pt had swallow evaluation today - easy to chew with thin.     Recommendations:   1. RX: MVI and Vit. C daily (500mg) daily   2. Diet per SLP  3. Ensure enlive TID  4. Daily weight      \  Monitoring and Evaluation:   [ ] PO intake [ ] Tolerance to diet prescription [X] Weights  [X] Follow up per protocol [X] Labs:

## 2023-04-10 NOTE — SWALLOW VFSS/MBS ASSESSMENT ADULT - SLP GENERAL OBSERVATIONS
Pt received & seen seated upright via stretcher, awake/alert, reduced cognition , left inattention, accompanied by RN Sheri: on monitor, 02 sats: 96% on room air, 0/10 pain pre/post Pt received & seen seated upright via stretcher, awake/alert, reduced cognition, left inattention, accompanied by RN Sheri: on monitor, 02 sats: 96% on room air, 0/10 pain pre/post

## 2023-04-10 NOTE — PROGRESS NOTE ADULT - SUBJECTIVE AND OBJECTIVE BOX
SUBJECTIVE/OBJECTIVE- Patient seen and examined at bedside. Mental status improved, following commands. States he has a minor headache and did not sleep well last night.    REVIEW OF SYSTEMS  Constitutional - No fever, + fatigue  Neurological +headaches, No memory loss, +loss of strength    FUNCTIONAL PROGRESS  4/9 ST  Speech Language Pathology Recommendations: 1. initiate soft/bite sized diet w/ MD clearance. 2. NO LIQUIDS; alternative means of hydration 3. 1:1 assist/supervision w/ PO 4. STRICT aspiration precautions 5. upright for PO, slow rate, small bites/sips, alternate solids/liquids 6. d/c PO diet w/ overt s/s of penetration/aspiration 7. oral care8. SLP to follow     4/7 PT  Bed Mobility: Rolling/Turning:     · Level of [place]	maximum assist (25% patients effort)  · Physical Assist/Nonphysical Assist	2 person assist    Bed Mobility: Scooting/Bridging:     · Level of [place]	maximum assist (25% patients effort)  · Physical Assist/Nonphysical Assist	2 person assist    Bed Mobility: Supine to Sit:     · Level of [place]	maximum assist (25% patients effort)  · Physical Assist/Nonphysical Assist	2 person assist    Bed Mobility Analysis:     · Bed Mobility Limitations	decreased ability to use legs for bridging/pushing; impaired ability to control trunk for mobility  · Impairments Contributing to Impaired Bed Mobility	impaired balance; impaired postural control; decreased strength    Transfer: Bed to Chair:     Transfer Skill: Bed to Chair   · Level of [place]	maximum assist (25% patients effort)  · Physical Assist/Nonphysical Assist	2 person assist  · Weight-Bearing Restrictions	weight-bearing as tolerated  · Assistive Device	bilateral and held assist, left knee block in stance phase and assist assist advancing in swing phase required    Bed/Chair Transfer Safety Analysis:     · Impairments Contributing to Impaired Transfers	impaired balance; impaired postural control; decreased strength  · Transfer Safety Concerns Noted	decreased sequencing ability; decreased weight-shifting ability    Transfer: Sit to Stand:     · Level of [place]	maximum assist (25% patients effort)  · Physical Assist/Nonphysical Assist	2 person assist  · Weight-Bearing Restrictions	weight-bearing as tolerated  · Assistive Device	bilateral hand held assist    Transfer: Stand to Sit:     · Level of [place]	maximum assist (25% patients effort)  · Physical Assist/Nonphysical Assist	2 person assist  · Weight-Bearing Restrictions	weight-bearing as tolerated  · Assistive Device	bilateral hand held assist    Sit/Stand Transfer Safety Analysis:     · Transfer Safety Concerns Noted	decreased weight-shifting ability  · Impairments Contributing to Impaired Transfers	impaired balance; decreased strength; impaired postural control    Gait Skills:     · Level of [place]	bed to chair only    4/7 OT    Bathing Training:     · Level of [place]	maximum assist (25% patients effort)    Upper Body Dressing Training:     · Level of [place]	moderate assist (50% patients effort)    Lower Body Dressing Training:     · Level of [place]	maximum assist (25% patients effort)    Toilet Hygiene Training:     · Level of [place]	maximum assist (25% patients effort)    Grooming Training:     · Level of [place]	moderate assist (50% patients effort)      VITALS  T(C): 36.9 (04-10-23 @ 08:36), Max: 36.9 (04-10-23 @ 08:36)  HR: 92 (04-10-23 @ 08:36) (77 - 97)  BP: 152/83 (04-10-23 @ 08:36) (134/81 - 152/83)  RR: 17 (04-10-23 @ 08:36) (17 - 18)  SpO2: 97% (04-10-23 @ 08:36) (96% - 97%)      MEDICATIONS   acetaminophen   Oral Liquid .. 650 milliGRAM(s) every 6 hours PRN  artificial  tears Solution 1 Drop(s) two times a day  aspirin  chewable 81 milliGRAM(s) daily  atorvastatin 80 milliGRAM(s) daily  chlorhexidine 2% Cloths 1 Application(s) daily  dextrose 50% Injectable 25 Gram(s) once  dextrose 50% Injectable 12.5 Gram(s) once  dextrose 50% Injectable 25 Gram(s) once  doxazosin 4 milliGRAM(s) at bedtime  enoxaparin Injectable 40 milliGRAM(s) every 24 hours  hydrALAZINE Injectable 10 milliGRAM(s) every 4 hours PRN  insulin lispro (ADMELOG) corrective regimen sliding scale   every 6 hours  lisinopril 10 milliGRAM(s) daily  pantoprazole  Injectable 40 milliGRAM(s) daily  petrolatum Ophthalmic Ointment 1 Application(s) every 12 hours  polyethylene glycol 3350 17 Gram(s) daily  senna Syrup 10 milliLiter(s) at bedtime  sodium chloride 0.9%. 1000 milliLiter(s) <Continuous>  ticagrelor 90 milliGRAM(s) every 12 hours      RECENT LABS/IMAGING                          11.5   8.67  )-----------( 333      ( 09 Apr 2023 05:25 )             34.0     04-09    138  |  105  |  7.5<L>  ----------------------------<  129<H>  3.0<L>   |  21.0<L>  |  0.36<L>    Ca    8.3<L>      09 Apr 2023 05:25  Phos  3.1     04-09  Mg     2.0     04-09    3/29 CT head- 72 mL area of core infarct in the right MCA territory. 108 cc penumbra in   the right MCA territory.    3/30 CT head-  Compared with 3/29/2023 there is decreasing retained contrast   in the right basal ganglia and right frontal parietal cortex with   evolving lucency consistent with an acute right artery infarct. Residual   high density although decreased compared to the prior exam may represent   retained contrast as well as a small amount of hemorrhage. No increased   hemorrhage identified.    4/1 Carotid doppler-   No significant hemodynamic stenosis of the right internal   carotid artery.  Patent right ICA stent.  Over 70% left internal carotid artery stenosis.  Bilateral external carotid artery stenosis.    Measurement of carotid stenosis is based on velocity parameters that   correlate the residual internal carotid diameter with that of the more   distal vessel in accordance with a method such as the [place]   Symptomatic Carotid Endarterectomy Trial (NASCET).  4/2 CT head-  Unchanged hemorrhagic RIGHT MCA infarction with effacement   of the RIGHT lateral ventricle and 6 mm subfalcine herniation to the LEFT.    4/4 CT head-  Stable acute/subacute large right MCA territory infarction   with stable hemorrhagic transformation in the right basal ganglia and   right temporal lobe. Stable mass effect and right-to-left midline shift   measuring 6.7 mm. No new hemorrhage. Moderate adjacent cytotoxic edema,   unchanged.    4/4 EEG-  This is an abnormal study indicative of a focal cortical dysfunction in the right hemisphere, consistent with history of R MCA infarction. No epileptiform abnormalities or seizures were seen.    4/7 MRI head-  Large subacute right MCA infarct with hemorrhagic transformation in the   right basal ganglia and right temporal lobe. Mildly improving 5 mm   right-to-left midline shift.  FLAIR hyperintense signal along the right frontal parietal temporal sulci   suggesting small subarachnoid hemorrhage.    4/8 CT chest/abdomen-  Inflammation surrounding the gastrostomy tube concerning for   infection.  Right greater than left lower lobe airspace opacities in the lungs of   uncertain etiology, possibly infectious. A 1 month follow-up chest CT   recommended.  Right lower lobe mucous plugging.  Indeterminate 11 mm right adrenal nodule.    ----------------------------------------------------------------------------------------  PHYSICAL EXAM  Constitutional - NAD, Fatigued  Respiratory- Normal effort, no wheezing  Cardiovascular- S1S2  Abdomen - Soft   Extremities - left UE edema improving  Neurologic Exam -                    Cognitive -AAOx person, place, year, month (not date), situation     Communication - Fluent, no aphasia- able to follow 2 step commands, repeat, name objects and function     Cranial Nerves - Left neglect, left visual field deficit     Motor -                     LEFT    UE - ShAB 1/5, EF 1/5, EE 2/5, WE 2/5,  2/5                    RIGHT UE - ShAB 4/5, EF 5/5, EE 5/5, WE 5/5,  5/5                    LEFT    LE - HF 2/5, KE 3/5, DF 2/5, PF 3/5                    RIGHT LE - HF 4/5, KE 4/5, DF 5/5, PF 5/5    Psychiatric - Calm   ----------------------------------------------------------------------------------------  ASSESSMENT/PLAN  58yMale with functional deficits after R MCA stroke s/p mechanical thrombectomy and right carotid stent with angioplasty  R MCA stroke s/p thrombectomy and R ICA stent - ASA, Lipitor, Cangrelor  HTN- Hydralazine IV, Lisinopril  Pain - Tylenol  Sleep- Melatonin 5 mg hs (4/10)  Urinary retention- +Quispe, Cardura   Dysphagia- NPO, NG tube  DVT PPX - SCDs, Lovenox  Rehab- Continue with PT/OT/ST. Recommend ACUTE inpatient rehabilitation for the functional deficits consisting of 3 hours of therapy/day & 24 hour RN/daily PMR physician for comorbid medical management. Patient will be able to tolerate 3 hours a day. Will continue to follow and current recommendations may change if functional progress changes. Recommend ongoing mobilization by staff to maintain cardiopulmonary function and prevention of secondary complications related to debility. Discussed with rehab team.          SUBJECTIVE/OBJECTIVE- Patient seen and examined at bedside. Mental status improved, following commands. States he has a minor headache and did not sleep well last night.    REVIEW OF SYSTEMS  Constitutional - No fever, + fatigue  Neurological +headaches, No memory loss, +loss of strength    FUNCTIONAL PROGRESS  4/9 ST  Speech Language Pathology Recommendations: 1. initiate soft/bite sized diet w/ MD clearance. 2. NO LIQUIDS; alternative means of hydration 3. 1:1 assist/supervision w/ PO 4. STRICT aspiration precautions 5. upright for PO, slow rate, small bites/sips, alternate solids/liquids 6. d/c PO diet w/ overt s/s of penetration/aspiration 7. oral care8. SLP to follow     4/7 PT  Bed Mobility: Rolling/Turning:     · Level of [place]	maximum assist (25% patients effort)  · Physical Assist/Nonphysical Assist	2 person assist    Bed Mobility: Scooting/Bridging:     · Level of [place]	maximum assist (25% patients effort)  · Physical Assist/Nonphysical Assist	2 person assist    Bed Mobility: Supine to Sit:     · Level of [place]	maximum assist (25% patients effort)  · Physical Assist/Nonphysical Assist	2 person assist    Bed Mobility Analysis:     · Bed Mobility Limitations	decreased ability to use legs for bridging/pushing; impaired ability to control trunk for mobility  · Impairments Contributing to Impaired Bed Mobility	impaired balance; impaired postural control; decreased strength    Transfer: Bed to Chair:     Transfer Skill: Bed to Chair   · Level of [place]	maximum assist (25% patients effort)  · Physical Assist/Nonphysical Assist	2 person assist  · Weight-Bearing Restrictions	weight-bearing as tolerated  · Assistive Device	bilateral and held assist, left knee block in stance phase and assist assist advancing in swing phase required    Bed/Chair Transfer Safety Analysis:     · Impairments Contributing to Impaired Transfers	impaired balance; impaired postural control; decreased strength  · Transfer Safety Concerns Noted	decreased sequencing ability; decreased weight-shifting ability    Transfer: Sit to Stand:     · Level of [place]	maximum assist (25% patients effort)  · Physical Assist/Nonphysical Assist	2 person assist  · Weight-Bearing Restrictions	weight-bearing as tolerated  · Assistive Device	bilateral hand held assist    Transfer: Stand to Sit:     · Level of [place]	maximum assist (25% patients effort)  · Physical Assist/Nonphysical Assist	2 person assist  · Weight-Bearing Restrictions	weight-bearing as tolerated  · Assistive Device	bilateral hand held assist    Sit/Stand Transfer Safety Analysis:     · Transfer Safety Concerns Noted	decreased weight-shifting ability  · Impairments Contributing to Impaired Transfers	impaired balance; decreased strength; impaired postural control    Gait Skills:     · Level of [place]	bed to chair only    4/7 OT    Bathing Training:     · Level of [place]	maximum assist (25% patients effort)    Upper Body Dressing Training:     · Level of [place]	moderate assist (50% patients effort)    Lower Body Dressing Training:     · Level of [place]	maximum assist (25% patients effort)    Toilet Hygiene Training:     · Level of [place]	maximum assist (25% patients effort)    Grooming Training:     · Level of [place]	moderate assist (50% patients effort)      VITALS  T(C): 36.9 (04-10-23 @ 08:36), Max: 36.9 (04-10-23 @ 08:36)  HR: 92 (04-10-23 @ 08:36) (77 - 97)  BP: 152/83 (04-10-23 @ 08:36) (134/81 - 152/83)  RR: 17 (04-10-23 @ 08:36) (17 - 18)  SpO2: 97% (04-10-23 @ 08:36) (96% - 97%)      MEDICATIONS   acetaminophen   Oral Liquid .. 650 milliGRAM(s) every 6 hours PRN  artificial  tears Solution 1 Drop(s) two times a day  aspirin  chewable 81 milliGRAM(s) daily  atorvastatin 80 milliGRAM(s) daily  chlorhexidine 2% Cloths 1 Application(s) daily  dextrose 50% Injectable 25 Gram(s) once  dextrose 50% Injectable 12.5 Gram(s) once  dextrose 50% Injectable 25 Gram(s) once  doxazosin 4 milliGRAM(s) at bedtime  enoxaparin Injectable 40 milliGRAM(s) every 24 hours  hydrALAZINE Injectable 10 milliGRAM(s) every 4 hours PRN  insulin lispro (ADMELOG) corrective regimen sliding scale   every 6 hours  lisinopril 10 milliGRAM(s) daily  pantoprazole  Injectable 40 milliGRAM(s) daily  petrolatum Ophthalmic Ointment 1 Application(s) every 12 hours  polyethylene glycol 3350 17 Gram(s) daily  senna Syrup 10 milliLiter(s) at bedtime  sodium chloride 0.9%. 1000 milliLiter(s) <Continuous>  ticagrelor 90 milliGRAM(s) every 12 hours      RECENT LABS/IMAGING                          11.5   8.67  )-----------( 333      ( 09 Apr 2023 05:25 )             34.0     04-09    138  |  105  |  7.5<L>  ----------------------------<  129<H>  3.0<L>   |  21.0<L>  |  0.36<L>    Ca    8.3<L>      09 Apr 2023 05:25  Phos  3.1     04-09  Mg     2.0     04-09    3/29 CT head- 72 mL area of core infarct in the right MCA territory. 108 cc penumbra in   the right MCA territory.    3/30 CT head-  Compared with 3/29/2023 there is decreasing retained contrast   in the right basal ganglia and right frontal parietal cortex with   evolving lucency consistent with an acute right artery infarct. Residual   high density although decreased compared to the prior exam may represent   retained contrast as well as a small amount of hemorrhage. No increased   hemorrhage identified.    4/1 Carotid doppler-   No significant hemodynamic stenosis of the right internal   carotid artery.  Patent right ICA stent.  Over 70% left internal carotid artery stenosis.  Bilateral external carotid artery stenosis.    Measurement of carotid stenosis is based on velocity parameters that   correlate the residual internal carotid diameter with that of the more   distal vessel in accordance with a method such as the [place]   Symptomatic Carotid Endarterectomy Trial (NASCET).  4/2 CT head-  Unchanged hemorrhagic RIGHT MCA infarction with effacement   of the RIGHT lateral ventricle and 6 mm subfalcine herniation to the LEFT.    4/4 CT head-  Stable acute/subacute large right MCA territory infarction   with stable hemorrhagic transformation in the right basal ganglia and   right temporal lobe. Stable mass effect and right-to-left midline shift   measuring 6.7 mm. No new hemorrhage. Moderate adjacent cytotoxic edema,   unchanged.    4/4 EEG-  This is an abnormal study indicative of a focal cortical dysfunction in the right hemisphere, consistent with history of R MCA infarction. No epileptiform abnormalities or seizures were seen.    4/7 MRI head-  Large subacute right MCA infarct with hemorrhagic transformation in the   right basal ganglia and right temporal lobe. Mildly improving 5 mm   right-to-left midline shift.  FLAIR hyperintense signal along the right frontal parietal temporal sulci   suggesting small subarachnoid hemorrhage.    4/8 CT chest/abdomen-  Inflammation surrounding the gastrostomy tube concerning for   infection.  Right greater than left lower lobe airspace opacities in the lungs of   uncertain etiology, possibly infectious. A 1 month follow-up chest CT   recommended.  Right lower lobe mucous plugging.  Indeterminate 11 mm right adrenal nodule.    ----------------------------------------------------------------------------------------  PHYSICAL EXAM  Constitutional - NAD, Fatigued  Respiratory- Normal effort, no wheezing  Cardiovascular- S1S2  Abdomen - Soft   Extremities - left UE edema improving  Neurologic Exam -                    Cognitive -AAOx person, place, year, month (not date), situation     Communication - Fluent, no aphasia- able to follow 2 step commands, repeat, name objects and function     Cranial Nerves - Left neglect, left visual field deficit     Motor -                     LEFT    UE - ShAB 1/5, EF 1/5, EE 2/5, WE 2/5,  2/5                    RIGHT UE - ShAB 4/5, EF 5/5, EE 5/5, WE 5/5,  5/5                    LEFT    LE - HF 2/5, KE 3/5, DF 2/5, PF 3/5                    RIGHT LE - HF 4/5, KE 4/5, DF 5/5, PF 5/5    Psychiatric - Calm   ----------------------------------------------------------------------------------------  ASSESSMENT/PLAN  58yMale with functional deficits after R MCA stroke s/p mechanical thrombectomy and right carotid stent with angioplasty  R MCA stroke s/p thrombectomy and R ICA stent - ASA, Lipitor, Cangrelor  HTN- Hydralazine IV, Lisinopril  Pain - Tylenol  Sleep- Melatonin 5 mg hs (4/10)  Urinary retention- +Quispe, Cardura   Dysphagia- NPO, NG tube  Left hemiparesis- resting hand splint, PRAFO  DVT PPX - SCDs, Lovenox  Rehab- Continue with PT/OT/ST. Recommend ACUTE inpatient rehabilitation for the functional deficits consisting of 3 hours of therapy/day & 24 hour RN/daily PMR physician for comorbid medical management. Patient will be able to tolerate 3 hours a day. Will continue to follow and current recommendations may change if functional progress changes. Recommend ongoing mobilization by staff to maintain cardiopulmonary function and prevention of secondary complications related to debility. Discussed with rehab team.          SUBJECTIVE/OBJECTIVE- Patient seen and examined at bedside. Mental status improved, following commands. States he has a minor headache and did not sleep well last night.    REVIEW OF SYSTEMS  Constitutional - No fever, + fatigue  Neurological +headaches, No memory loss, +loss of strength    FUNCTIONAL PROGRESS  4/9 ST  Speech Language Pathology Recommendations: 1. initiate soft/bite sized diet w/ MD clearance. 2. NO LIQUIDS; alternative means of hydration 3. 1:1 assist/supervision w/ PO 4. STRICT aspiration precautions 5. upright for PO, slow rate, small bites/sips, alternate solids/liquids 6. d/c PO diet w/ overt s/s of penetration/aspiration 7. oral care8. SLP to follow     4/7 PT  Bed Mobility: Rolling/Turning:     · Level of [place]	maximum assist (25% patients effort)  · Physical Assist/Nonphysical Assist	2 person assist    Bed Mobility: Scooting/Bridging:     · Level of [place]	maximum assist (25% patients effort)  · Physical Assist/Nonphysical Assist	2 person assist    Bed Mobility: Supine to Sit:     · Level of [place]	maximum assist (25% patients effort)  · Physical Assist/Nonphysical Assist	2 person assist    Bed Mobility Analysis:     · Bed Mobility Limitations	decreased ability to use legs for bridging/pushing; impaired ability to control trunk for mobility  · Impairments Contributing to Impaired Bed Mobility	impaired balance; impaired postural control; decreased strength    Transfer: Bed to Chair:     Transfer Skill: Bed to Chair   · Level of [place]	maximum assist (25% patients effort)  · Physical Assist/Nonphysical Assist	2 person assist  · Weight-Bearing Restrictions	weight-bearing as tolerated  · Assistive Device	bilateral and held assist, left knee block in stance phase and assist assist advancing in swing phase required    Bed/Chair Transfer Safety Analysis:     · Impairments Contributing to Impaired Transfers	impaired balance; impaired postural control; decreased strength  · Transfer Safety Concerns Noted	decreased sequencing ability; decreased weight-shifting ability    Transfer: Sit to Stand:     · Level of [place]	maximum assist (25% patients effort)  · Physical Assist/Nonphysical Assist	2 person assist  · Weight-Bearing Restrictions	weight-bearing as tolerated  · Assistive Device	bilateral hand held assist    Transfer: Stand to Sit:     · Level of [place]	maximum assist (25% patients effort)  · Physical Assist/Nonphysical Assist	2 person assist  · Weight-Bearing Restrictions	weight-bearing as tolerated  · Assistive Device	bilateral hand held assist    Sit/Stand Transfer Safety Analysis:     · Transfer Safety Concerns Noted	decreased weight-shifting ability  · Impairments Contributing to Impaired Transfers	impaired balance; decreased strength; impaired postural control    Gait Skills:     · Level of [place]	bed to chair only    4/7 OT    Bathing Training:     · Level of [place]	maximum assist (25% patients effort)    Upper Body Dressing Training:     · Level of [place]	moderate assist (50% patients effort)    Lower Body Dressing Training:     · Level of [place]	maximum assist (25% patients effort)    Toilet Hygiene Training:     · Level of [place]	maximum assist (25% patients effort)    Grooming Training:     · Level of [place]	moderate assist (50% patients effort)      VITALS  T(C): 36.9 (04-10-23 @ 08:36), Max: 36.9 (04-10-23 @ 08:36)  HR: 92 (04-10-23 @ 08:36) (77 - 97)  BP: 152/83 (04-10-23 @ 08:36) (134/81 - 152/83)  RR: 17 (04-10-23 @ 08:36) (17 - 18)  SpO2: 97% (04-10-23 @ 08:36) (96% - 97%)      MEDICATIONS   acetaminophen   Oral Liquid .. 650 milliGRAM(s) every 6 hours PRN  artificial  tears Solution 1 Drop(s) two times a day  aspirin  chewable 81 milliGRAM(s) daily  atorvastatin 80 milliGRAM(s) daily  chlorhexidine 2% Cloths 1 Application(s) daily  dextrose 50% Injectable 25 Gram(s) once  dextrose 50% Injectable 12.5 Gram(s) once  dextrose 50% Injectable 25 Gram(s) once  doxazosin 4 milliGRAM(s) at bedtime  enoxaparin Injectable 40 milliGRAM(s) every 24 hours  hydrALAZINE Injectable 10 milliGRAM(s) every 4 hours PRN  insulin lispro (ADMELOG) corrective regimen sliding scale   every 6 hours  lisinopril 10 milliGRAM(s) daily  pantoprazole  Injectable 40 milliGRAM(s) daily  petrolatum Ophthalmic Ointment 1 Application(s) every 12 hours  polyethylene glycol 3350 17 Gram(s) daily  senna Syrup 10 milliLiter(s) at bedtime  sodium chloride 0.9%. 1000 milliLiter(s) <Continuous>  ticagrelor 90 milliGRAM(s) every 12 hours      RECENT LABS/IMAGING                          11.5   8.67  )-----------( 333      ( 09 Apr 2023 05:25 )             34.0     04-09    138  |  105  |  7.5<L>  ----------------------------<  129<H>  3.0<L>   |  21.0<L>  |  0.36<L>    Ca    8.3<L>      09 Apr 2023 05:25  Phos  3.1     04-09  Mg     2.0     04-09    3/29 CT head- 72 mL area of core infarct in the right MCA territory. 108 cc penumbra in   the right MCA territory.    3/30 CT head-  Compared with 3/29/2023 there is decreasing retained contrast   in the right basal ganglia and right frontal parietal cortex with   evolving lucency consistent with an acute right artery infarct. Residual   high density although decreased compared to the prior exam may represent   retained contrast as well as a small amount of hemorrhage. No increased   hemorrhage identified.    4/1 Carotid doppler-   No significant hemodynamic stenosis of the right internal   carotid artery.  Patent right ICA stent.  Over 70% left internal carotid artery stenosis.  Bilateral external carotid artery stenosis.    Measurement of carotid stenosis is based on velocity parameters that   correlate the residual internal carotid diameter with that of the more   distal vessel in accordance with a method such as the [place]   Symptomatic Carotid Endarterectomy Trial (NASCET).  4/2 CT head-  Unchanged hemorrhagic RIGHT MCA infarction with effacement   of the RIGHT lateral ventricle and 6 mm subfalcine herniation to the LEFT.    4/4 CT head-  Stable acute/subacute large right MCA territory infarction   with stable hemorrhagic transformation in the right basal ganglia and   right temporal lobe. Stable mass effect and right-to-left midline shift   measuring 6.7 mm. No new hemorrhage. Moderate adjacent cytotoxic edema,   unchanged.    4/4 EEG-  This is an abnormal study indicative of a focal cortical dysfunction in the right hemisphere, consistent with history of R MCA infarction. No epileptiform abnormalities or seizures were seen.    4/7 MRI head-  Large subacute right MCA infarct with hemorrhagic transformation in the   right basal ganglia and right temporal lobe. Mildly improving 5 mm   right-to-left midline shift.  FLAIR hyperintense signal along the right frontal parietal temporal sulci   suggesting small subarachnoid hemorrhage.    4/8 CT chest/abdomen-  Inflammation surrounding the gastrostomy tube concerning for   infection.  Right greater than left lower lobe airspace opacities in the lungs of   uncertain etiology, possibly infectious. A 1 month follow-up chest CT   recommended.  Right lower lobe mucous plugging.  Indeterminate 11 mm right adrenal nodule.    ----------------------------------------------------------------------------------------  PHYSICAL EXAM  Constitutional - NAD, Fatigued  Respiratory- Normal effort, no wheezing  Cardiovascular- S1S2  Abdomen +PEG  Extremities - left UE edema improving  Neurologic Exam -                    Cognitive -AAOx person, place, year, month (not date), situation     Communication - Fluent, no aphasia- able to follow 2 step commands, repeat, name objects and function     Cranial Nerves - Left neglect, left visual field deficit     Motor -                     LEFT    UE - ShAB 1/5, EF 1/5, EE 2/5, WE 2/5,  2/5                    RIGHT UE - ShAB 4/5, EF 5/5, EE 5/5, WE 5/5,  5/5                    LEFT    LE - HF 2/5, KE 3/5, DF 2/5, PF 3/5                    RIGHT LE - HF 4/5, KE 4/5, DF 5/5, PF 5/5    Psychiatric - Calm   ----------------------------------------------------------------------------------------  ASSESSMENT/PLAN  58yMale with functional deficits after R MCA stroke s/p mechanical thrombectomy and right carotid stent with angioplasty  R MCA stroke s/p thrombectomy and R ICA stent - ASA, Lipitor, Cangrelor  HTN- Hydralazine IV, Lisinopril  Pain - Tylenol  Sleep- Melatonin 5 mg hs (4/10)  Urinary retention- +Quispe, Cardura   Dysphagia-  +PEG, soft/bite sized  Left hemiparesis- resting hand splint, PRAFO  DVT PPX - SCDs, Lovenox  Rehab- Continue with PT/OT/ST. Recommend ACUTE inpatient rehabilitation for the functional deficits consisting of 3 hours of therapy/day & 24 hour RN/daily PMR physician for comorbid medical management. Patient will be able to tolerate 3 hours a day. Will continue to follow and current recommendations may change if functional progress changes. Recommend ongoing mobilization by staff to maintain cardiopulmonary function and prevention of secondary complications related to debility. Discussed with rehab team.

## 2023-04-10 NOTE — SWALLOW VFSS/MBS ASSESSMENT ADULT - SLP PERTINENT HISTORY OF CURRENT PROBLEM
Pt seen bedside this admission multiple times at bedside. LAST SEEN 4/9 WITH RX FOR SOFT/BITE-SIZED SOLIDS, NO FLUIDS VIA PO & MBS

## 2023-04-10 NOTE — SWALLOW VFSS/MBS ASSESSMENT ADULT - ORAL PHASE
Uncontrolled bolus / spillover in yesenia-pharynx Delayed oral transit time/Uncontrolled bolus / spillover in yesenia-pharynx

## 2023-04-10 NOTE — PROGRESS NOTE ADULT - ASSESSMENT
INCOMPLETE   ASSESSMENT: 58y Male with PMH HTN and DM, found by his family 0500 AM on 3/29/23 with right hemiparesis, slurred speech, and incontinent of urine.  Last well known 2000  on 3/28/23. Patient brought in by EMS to the [facility] at 0556, was noted not to be a candidate for Tenecteplase as patient out of time window. Patient was transferred to [facility] for thrombectomy due to right ICA occlusion with tandem right MCA occlusion. NIHSS 28 MRS pre - 0 . Etiology likely large artery atherosclerotic disease. Post mechanical thrombectomy TICI 2B and Right carotid stent with angioplasty. Small right frontalparietal SAH.     NEURO: neurologically overall improving, CT head 4/4/23  with hemorrhagic transformation and cerebral edema with mass effect and brain compression. MRI Brain 4-07-23 -images and reports were reviewed shows Improved midline shift.   Continue close monitoring for neurologic deterioration, with stroke neuro checks ,  Na goal gradually of 140-145 for now- avoid hyponatremia and rapid fluctuations,  permissive HTN as per post thrombectomy recommendations 110-140mmHg in setting of recent revascularization/hemorrhagic transformation as to avoid hypo/hyperperfusion injury. Titrate statin to LDL goal less than 70, EEG without evidence of seizure. Carotid duplex as noted- close monitoring of left ICA stenosis for eventual consideration in elective revascularization.   - Physical therapy/OT/Speech eval/treatment.     ANTITHROMBOTIC THERAPY: ASA 81mg daily, and Brilinta 90 BID.    PULMONARY:  aspiration precautions, incentive spirometry as tolerated , CT chest noted lower lobe airway opacities- monitor for infection, follow up 4 week CT chest for further evaluation     CARDIOVASCULAR: cardiac monitoring to screen for atrial fibrillation,     GASTROINTESTINAL: GI follow up for noted gastrostomy tube inflammation    On pureed diet.    RENAL: BUN/Cr 7.5/0.36 maintain adequate hydration,  monitor urine output, hypokalemia- replete as needed per primary team, trend daily      Na Goal: 140-145, minimum 135     Quispe: Y    HEMATOLOGY: H/H with anemia, monitor for si/sx of bleeding, Platelets 333. Patient should have all age and risk appropriate malignancy screening.      DVT ppx      ID: previously febrile., leukocytosis resolved, post 7 day course of zosyn.   aspiration precautions     OTHER:   A1C 7.3- continue glycemic control, follow up for CT CAP findings with PCP including but not limited to adrenal nodule.     DISPOSITION: Rehab depending on PT eval once stable and workup is complete    CORE MEASURES:        Admission NIHSS: 28     TPA: [] YES [x] NO      LDL/HDL/A1C: 165/58/7.3     Depression Screen: pending     Statin Therapy: pending     Dysphagia Screen: [] PASS [x] FAIL       Smoking [] YES [x] NO      Afib [] YES [x] NO     Stroke Education [x] YES [] NO         ASSESSMENT: 58y Male with PMH HTN and DM, found by his family 0500 AM on 3/29/23 with right hemiparesis, slurred speech, and incontinent of urine.  Last well known 2000  on 3/28/23. Patient brought in by EMS to the [facility] at 0556, was noted not to be a candidate for Tenecteplase as patient out of time window. Patient was transferred to [facility] for thrombectomy due to right ICA occlusion with tandem right MCA occlusion. NIHSS 28 MRS pre - 0 . Etiology likely large artery atherosclerotic disease. Post mechanical thrombectomy TICI 2B and Right carotid stent with angioplasty. Small right frontal parietal SAH.     NEURO: neurologically with decreased movement in LUE but more awake and interactive, noted 4/6-   MRI Brain 4-07-23 -images and reports were reviewed shows Improved midline shift, noted small frontal parietal SAH, Continue close monitoring for neurologic deterioration given cerebral edema with mass effect and brain compression,  stroke neuro checks ,  Na goal gradually of 140-145 for now- avoid hyponatremia and rapid fluctuations,  permissive HTN as per post thrombectomy recommendations 110-140mmHg in setting of recent revascularization/hemorrhagic transformation as to avoid hypo/hyperperfusion injury. Titrate statin to LDL goal less than 70, EEG without evidence of seizure. Carotid duplex as noted- close monitoring of left ICA stenosis for eventual consideration in elective revascularization.   - Physical therapy/OT/Speech eval/treatment.     ANTITHROMBOTIC THERAPY: ASA 81mg daily, and Brilinta 90 BID.    PULMONARY:  aspiration precautions, incentive spirometry as tolerated , CT chest noted lower lobe airway opacities- monitor for infection, follow up 4 week CT chest for further evaluation     CARDIOVASCULAR: cardiac monitoring to screen for atrial fibrillation,     GASTROINTESTINAL: GI follow up for noted gastrostomy tube inflammation    On pureed diet.    RENAL: BUN/Cr 7.5/0.36 maintain adequate hydration,  monitor urine output, hypokalemia- replete as needed per primary team, trend daily      Na Goal: 140-145, minimum 135     Quispe: Y    HEMATOLOGY: H/H with anemia, monitor for si/sx of bleeding, Platelets 333. Patient should have all age and risk appropriate malignancy screening.      DVT ppx      ID: previously febrile., leukocytosis resolved, post 7 day course of zosyn.   aspiration precautions     OTHER:   A1C 7.3- continue glycemic control, follow up for CT CAP findings with PCP including but not limited to adrenal nodule.     DISPOSITION: Rehab depending on PT eval once stable and workup is complete    CORE MEASURES:        Admission NIHSS: 28     TPA: [] YES [x] NO      LDL/HDL/A1C: 165/58/7.3     Depression Screen: pending     Statin Therapy: pending     Dysphagia Screen: [] PASS [x] FAIL       Smoking [] YES [x] NO      Afib [] YES [x] NO     Stroke Education [x] YES [] NO

## 2023-04-10 NOTE — PROGRESS NOTE ADULT - SUBJECTIVE AND OBJECTIVE BOX
Preliminary note, offical recommendations pending attending review/signature   [facility] Stroke Team  Progress Note     HPI:  59 y/o male with PMHx of HTN and DM transfered from [facility] to [facility] as a code stroke. [facility] EMS reports pt LKW was  night prior to admission around 20:00 and was c/o right arm pain, day of admission his family found his around 05:00 with left sided hemiparesis, slurred speech and was incontinent of urine, they called EMS as pt arrived at [facility] at 05:56, he was found to have right ICA occlusion and transferred to [facility], code stroke called upon arrival. Transferred to  for mechanical thrombectomy.    SUBJECTIVE: No events overnight.  No new neurologic complaints.  ROS reported negative unless otherwise noted.    acetaminophen   Oral Liquid .. 650 milliGRAM(s) Oral every 6 hours PRN  artificial  tears Solution 1 Drop(s) Both EYES two times a day  aspirin  chewable 81 milliGRAM(s) Enteral Tube daily  atorvastatin 80 milliGRAM(s) Oral daily  chlorhexidine 2% Cloths 1 Application(s) Topical daily  dextrose 50% Injectable 25 Gram(s) IV Push once  dextrose 50% Injectable 12.5 Gram(s) IV Push once  dextrose 50% Injectable 25 Gram(s) IV Push once  doxazosin 4 milliGRAM(s) Oral at bedtime  enoxaparin Injectable 40 milliGRAM(s) SubCutaneous every 24 hours  hydrALAZINE Injectable 10 milliGRAM(s) IV Push every 4 hours PRN  insulin lispro (ADMELOG) corrective regimen sliding scale   SubCutaneous every 6 hours  lisinopril 10 milliGRAM(s) Oral daily  pantoprazole  Injectable 40 milliGRAM(s) IV Push daily  petrolatum Ophthalmic Ointment 1 Application(s) Right EYE every 12 hours  polyethylene glycol 3350 17 Gram(s) Oral daily  senna Syrup 10 milliLiter(s) Oral at bedtime  sodium chloride 0.9%. 1000 milliLiter(s) IV Continuous <Continuous>  ticagrelor 90 milliGRAM(s) Enteral Tube every 12 hours      PHYSICAL EXAM:   Vital Signs Last 24 Hrs  T(C): 36.8 (10 Apr 2023 04:00), Max: 36.8 (09 Apr 2023 11:20)  T(F): 98.3 (10 Apr 2023 04:00), Max: 98.3 (09 Apr 2023 11:20)  HR: 78 (10 Apr 2023 04:00) (77 - 98)  BP: 151/83 (10 Apr 2023 04:00) (126/80 - 151/83)  BP(mean): --  RR: 18 (10 Apr 2023 04:00) (17 - 18)  SpO2: 96% (10 Apr 2023 04:00) (95% - 96%)    Parameters below as of 10 Apr 2023 04:00  Patient On (Oxygen Delivery Method): room air    EXAM PENDING     General:     NEUROLOGICAL EXAM:    Mental status: awake, alert, oriented to self, place, dates, and situation.  Able to ID pen and  glove. Follows simple commands and repeats phrases.     CN: ARTEMIO 3--->2, brisk,  able to count fingers in both visual fields, slight left gaze palsy,  left facial palsy    Motor: moves right UE/LE 5/5  Moves left UE 2/5, LLE moves  against gravity but falls to bed in < 5 sec      Sensory:  left sensory extinction     Gait: deferred    LABS:                        11.5   8.67  )-----------( 333      ( 09 Apr 2023 05:25 )             34.0    04-09    138  |  105  |  7.5<L>  ----------------------------<  129<H>  3.0<L>   |  21.0<L>  |  0.36<L>    Ca    8.3<L>      09 Apr 2023 05:25  Phos  3.1     04-09  Mg     2.0     04-09          IMAGING: Reviewed by me.     MR Head No Cont (04.07.23 @ 08:48)   IMPRESSION:  Large subacute right MCA infarct with hemorrhagic transformation in the   right basal ganglia and right temporal lobe. Mildly improving 5 mm   right-to-left midline shift.  FLAIR hyperintense signal along the right frontalparietal temporal sulci   suggesting small subarachnoid hemorrhage.    CT Head No Cont (04.04.23 @ 07:59)   IMPRESSION: Stable acute/subacute large right MCA territory infarction   with stable hemorrhagic transformation in the right basal ganglia and   right temporal lobe. Stable mass effect and right-to-left midline shift   measuring 6.7 mm. No new hemorrhage. Moderate adjacent cytotoxic edema,   unchanged.    CT Head No Cont (04.02.23 @ 08:08)   Unchanged hemorrhagic RIGHT MCA infarction with effacement   of the RIGHT lateral ventricle and 6 mm subfalcine herniation to the LEFT.    US Duplex Carotid Arteries Complete, Bilateral (04.01.23 @ 22:21)   IMPRESSION: No significant hemodynamic stenosis of the right internal   carotid artery.  Patent right ICA stent.  Over 70% left internal carotid artery stenosis.  Bilateral external carotid artery stenosis.    CT Head No Cont (03.30.23 @ 03:40)   Compared with 3/29/2023 there is decreasing retained contrast   in the right basal ganglia and right frontal parietal cortex with   evolving lucency consistent with an acute right artery infarct. Residual   high density although decreased compared to the prior exam may represent   retained contrast as well as a small amount of hemorrhage. No increased   hemorrhage identified.    CT BRAIN STROKE PROTOCOL   03/29/2023    IMPRESSION: Dense right MCA sign with early right temporal lobe   infarction. No acute intracranial hemorrhage.    CT PERFUSION W MAPS IC    03/29/2023    CBF<30% volume: 72 ml right MCA territory.  Tmax>6.0 s volume: 180 ml  Mismatch volume: 108 ml  Mismatch ratio: 2.5    TTE  3/29/2023  Left ventricular ejection fraction, by visual estimation, is >75%. Technically difficult study. Hyperdynamic global left ventricular systolic function. The left ventricular diastolic function could not be assessed in this study. There is mild concentric left ventricular hypertrophy. There is no evidence of pericardial effusion. Mild mitral annular calcification. Trace mitral valve regurgitation.    EEG:  EEG Classification / Summary:  Abnormal EEG in the awake, drowsy, and asleep states due to:  1. Continuous, right hemispheric focal polymorphic delta activity  2. Asymmetry and attenuation of sleep spindles from the right hemisphere  Clinical Impression:  This is an abnormal study indicative of a focal cerebral dysfunction in the right hemisphere, consistent with history of R MCA infarction. No epileptiform abnormalities or seizures were captured.    CT Chest Abdomen and Pelvis w/ IV Cont (04.08.23 @ 17:09)   IMPRESSION: Inflammation surrounding the gastrostomy tube concerning for   infection.  Right greater than left lower lobe airspace opacities in the lungs of   uncertain etiology, possibly infectious. A 1 month follow-up chest CT   recommended.  Right lower lobe mucous plugging.  Indeterminate 11 mm right adrenal nodule.               Preliminary note, offical recommendations pending attending review/signature   [facility] Stroke Team  Progress Note     HPI:  59 y/o male with PMHx of HTN and DM transfered from [facility] to [facility] as a code stroke. [facility] EMS reports pt LKW was  night prior to admission around 20:00 and was c/o right arm pain, day of admission his family found his around 05:00 with left sided hemiparesis, slurred speech and was incontinent of urine, they called EMS as pt arrived at [facility] at 05:56, he was found to have right ICA occlusion and transferred to [facility], code stroke called upon arrival. Transferred to IR for mechanical thrombectomy.    SUBJECTIVE: No events overnight.  No new neurologic complaints.  States doing really good. ROS reported negative unless otherwise noted.    acetaminophen   Oral Liquid .. 650 milliGRAM(s) Oral every 6 hours PRN  artificial  tears Solution 1 Drop(s) Both EYES two times a day  aspirin  chewable 81 milliGRAM(s) Enteral Tube daily  atorvastatin 80 milliGRAM(s) Oral daily  chlorhexidine 2% Cloths 1 Application(s) Topical daily  dextrose 50% Injectable 25 Gram(s) IV Push once  dextrose 50% Injectable 12.5 Gram(s) IV Push once  dextrose 50% Injectable 25 Gram(s) IV Push once  doxazosin 4 milliGRAM(s) Oral at bedtime  enoxaparin Injectable 40 milliGRAM(s) SubCutaneous every 24 hours  hydrALAZINE Injectable 10 milliGRAM(s) IV Push every 4 hours PRN  insulin lispro (ADMELOG) corrective regimen sliding scale   SubCutaneous every 6 hours  lisinopril 10 milliGRAM(s) Oral daily  pantoprazole  Injectable 40 milliGRAM(s) IV Push daily  petrolatum Ophthalmic Ointment 1 Application(s) Right EYE every 12 hours  polyethylene glycol 3350 17 Gram(s) Oral daily  senna Syrup 10 milliLiter(s) Oral at bedtime  sodium chloride 0.9%. 1000 milliLiter(s) IV Continuous <Continuous>  ticagrelor 90 milliGRAM(s) Enteral Tube every 12 hours      PHYSICAL EXAM:   Vital Signs Last 24 Hrs  T(C): 36.8 (10 Apr 2023 04:00), Max: 36.8 (09 Apr 2023 11:20)  T(F): 98.3 (10 Apr 2023 04:00), Max: 98.3 (09 Apr 2023 11:20)  HR: 78 (10 Apr 2023 04:00) (77 - 98)  BP: 151/83 (10 Apr 2023 04:00) (126/80 - 151/83)  BP(mean): --  RR: 18 (10 Apr 2023 04:00) (17 - 18)  SpO2: 96% (10 Apr 2023 04:00) (95% - 96%)    Parameters below as of 10 Apr 2023 04:00  Patient On (Oxygen Delivery Method): room air    EXAM PENDING     General:     NEUROLOGICAL EXAM:    Mental status: awake, alert, oriented to self, place, dates, and situation.  Able to ID pen and  glove. Follows simple commands and repeats phrases.     CN: pupils equal and reactive to light,  able to count fingers in both visual fields, slight left gaze palsy,  left facial palsy    Motor: moves right UE/LE 5/5  Moves left UE 1/5, trace , LLE moves  against gravity with drift    Coordination, no ataxia on right     Sensory:  left sensory extinction     Gait: deferred    LABS:                        11.5   8.67  )-----------( 333      ( 09 Apr 2023 05:25 )             34.0    04-09    138  |  105  |  7.5<L>  ----------------------------<  129<H>  3.0<L>   |  21.0<L>  |  0.36<L>    Ca    8.3<L>      09 Apr 2023 05:25  Phos  3.1     04-09  Mg     2.0     04-09          IMAGING: Reviewed by me.     MR Head No Cont (04.07.23 @ 08:48)   IMPRESSION:  Large subacute right MCA infarct with hemorrhagic transformation in the   right basal ganglia and right temporal lobe. Mildly improving 5 mm   right-to-left midline shift.  FLAIR hyperintense signal along the right frontalparietal temporal sulci   suggesting small subarachnoid hemorrhage.    CT Head No Cont (04.04.23 @ 07:59)   IMPRESSION: Stable acute/subacute large right MCA territory infarction   with stable hemorrhagic transformation in the right basal ganglia and   right temporal lobe. Stable mass effect and right-to-left midline shift   measuring 6.7 mm. No new hemorrhage. Moderate adjacent cytotoxic edema,   unchanged.    CT Head No Cont (04.02.23 @ 08:08)   Unchanged hemorrhagic RIGHT MCA infarction with effacement   of the RIGHT lateral ventricle and 6 mm subfalcine herniation to the LEFT.    US Duplex Carotid Arteries Complete, Bilateral (04.01.23 @ 22:21)   IMPRESSION: No significant hemodynamic stenosis of the right internal   carotid artery.  Patent right ICA stent.  Over 70% left internal carotid artery stenosis.  Bilateral external carotid artery stenosis.    CT Head No Cont (03.30.23 @ 03:40)   Compared with 3/29/2023 there is decreasing retained contrast   in the right basal ganglia and right frontal parietal cortex with   evolving lucency consistent with an acute right artery infarct. Residual   high density although decreased compared to the prior exam may represent   retained contrast as well as a small amount of hemorrhage. No increased   hemorrhage identified.    CT BRAIN STROKE PROTOCOL   03/29/2023    IMPRESSION: Dense right MCA sign with early right temporal lobe   infarction. No acute intracranial hemorrhage.    CT PERFUSION W MAPS IC    03/29/2023    CBF<30% volume: 72 ml right MCA territory.  Tmax>6.0 s volume: 180 ml  Mismatch volume: 108 ml  Mismatch ratio: 2.5    TTE  3/29/2023  Left ventricular ejection fraction, by visual estimation, is >75%. Technically difficult study. Hyperdynamic global left ventricular systolic function. The left ventricular diastolic function could not be assessed in this study. There is mild concentric left ventricular hypertrophy. There is no evidence of pericardial effusion. Mild mitral annular calcification. Trace mitral valve regurgitation.    EEG:  EEG Classification / Summary:  Abnormal EEG in the awake, drowsy, and asleep states due to:  1. Continuous, right hemispheric focal polymorphic delta activity  2. Asymmetry and attenuation of sleep spindles from the right hemisphere  Clinical Impression:  This is an abnormal study indicative of a focal cerebral dysfunction in the right hemisphere, consistent with history of R MCA infarction. No epileptiform abnormalities or seizures were captured.    CT Chest Abdomen and Pelvis w/ IV Cont (04.08.23 @ 17:09)   IMPRESSION: Inflammation surrounding the gastrostomy tube concerning for   infection.  Right greater than left lower lobe airspace opacities in the lungs of   uncertain etiology, possibly infectious. A 1 month follow-up chest CT   recommended.  Right lower lobe mucous plugging.  Indeterminate 11 mm right adrenal nodule.               [facility] Stroke Team  Progress Note     HPI:  59 y/o male with PMHx of HTN and DM transfered from [facility] to [facility] as a code stroke. [facility] EMS reports pt LKW was  night prior to admission around 20:00 and was c/o right arm pain, day of admission his family found his around 05:00 with left sided hemiparesis, slurred speech and was incontinent of urine, they called EMS as pt arrived at [facility] at 05:56, he was found to have right ICA occlusion and transferred to [facility], code stroke called upon arrival. Transferred to IR for mechanical thrombectomy.    SUBJECTIVE: No events overnight.  No new neurologic complaints.  States doing really good. ROS reported negative unless otherwise noted.    acetaminophen   Oral Liquid .. 650 milliGRAM(s) Oral every 6 hours PRN  artificial  tears Solution 1 Drop(s) Both EYES two times a day  aspirin  chewable 81 milliGRAM(s) Enteral Tube daily  atorvastatin 80 milliGRAM(s) Oral daily  chlorhexidine 2% Cloths 1 Application(s) Topical daily  dextrose 50% Injectable 25 Gram(s) IV Push once  dextrose 50% Injectable 12.5 Gram(s) IV Push once  dextrose 50% Injectable 25 Gram(s) IV Push once  doxazosin 4 milliGRAM(s) Oral at bedtime  enoxaparin Injectable 40 milliGRAM(s) SubCutaneous every 24 hours  hydrALAZINE Injectable 10 milliGRAM(s) IV Push every 4 hours PRN  insulin lispro (ADMELOG) corrective regimen sliding scale   SubCutaneous every 6 hours  lisinopril 10 milliGRAM(s) Oral daily  pantoprazole  Injectable 40 milliGRAM(s) IV Push daily  petrolatum Ophthalmic Ointment 1 Application(s) Right EYE every 12 hours  polyethylene glycol 3350 17 Gram(s) Oral daily  senna Syrup 10 milliLiter(s) Oral at bedtime  sodium chloride 0.9%. 1000 milliLiter(s) IV Continuous <Continuous>  ticagrelor 90 milliGRAM(s) Enteral Tube every 12 hours      PHYSICAL EXAM:   Vital Signs Last 24 Hrs  T(C): 36.8 (10 Apr 2023 04:00), Max: 36.8 (09 Apr 2023 11:20)  T(F): 98.3 (10 Apr 2023 04:00), Max: 98.3 (09 Apr 2023 11:20)  HR: 78 (10 Apr 2023 04:00) (77 - 98)  BP: 151/83 (10 Apr 2023 04:00) (126/80 - 151/83)  BP(mean): --  RR: 18 (10 Apr 2023 04:00) (17 - 18)  SpO2: 96% (10 Apr 2023 04:00) (95% - 96%)    Parameters below as of 10 Apr 2023 04:00  Patient On (Oxygen Delivery Method): room air    EXAM PENDING     General:     NEUROLOGICAL EXAM:    Mental status: awake, alert, oriented to self, place, dates, and situation.  Able to ID pen and  glove. Follows simple commands and repeats phrases.     CN: pupils equal and reactive to light,  able to count fingers in both visual fields, slight left gaze palsy,  left facial palsy    Motor: moves right UE/LE 5/5  Moves left UE 1/5, trace , LLE moves  against gravity with drift    Coordination, no ataxia on right     Sensory:  left sensory extinction     Gait: deferred    LABS:                        11.5   8.67  )-----------( 333      ( 09 Apr 2023 05:25 )             34.0    04-09    138  |  105  |  7.5<L>  ----------------------------<  129<H>  3.0<L>   |  21.0<L>  |  0.36<L>    Ca    8.3<L>      09 Apr 2023 05:25  Phos  3.1     04-09  Mg     2.0     04-09          IMAGING: Reviewed by me.     MR Head No Cont (04.07.23 @ 08:48)   IMPRESSION:  Large subacute right MCA infarct with hemorrhagic transformation in the   right basal ganglia and right temporal lobe. Mildly improving 5 mm   right-to-left midline shift.  FLAIR hyperintense signal along the right frontalparietal temporal sulci   suggesting small subarachnoid hemorrhage.    CT Head No Cont (04.04.23 @ 07:59)   IMPRESSION: Stable acute/subacute large right MCA territory infarction   with stable hemorrhagic transformation in the right basal ganglia and   right temporal lobe. Stable mass effect and right-to-left midline shift   measuring 6.7 mm. No new hemorrhage. Moderate adjacent cytotoxic edema,   unchanged.    CT Head No Cont (04.02.23 @ 08:08)   Unchanged hemorrhagic RIGHT MCA infarction with effacement   of the RIGHT lateral ventricle and 6 mm subfalcine herniation to the LEFT.    US Duplex Carotid Arteries Complete, Bilateral (04.01.23 @ 22:21)   IMPRESSION: No significant hemodynamic stenosis of the right internal   carotid artery.  Patent right ICA stent.  Over 70% left internal carotid artery stenosis.  Bilateral external carotid artery stenosis.    CT Head No Cont (03.30.23 @ 03:40)   Compared with 3/29/2023 there is decreasing retained contrast   in the right basal ganglia and right frontal parietal cortex with   evolving lucency consistent with an acute right artery infarct. Residual   high density although decreased compared to the prior exam may represent   retained contrast as well as a small amount of hemorrhage. No increased   hemorrhage identified.    CT BRAIN STROKE PROTOCOL   03/29/2023    IMPRESSION: Dense right MCA sign with early right temporal lobe   infarction. No acute intracranial hemorrhage.    CT PERFUSION W MAPS IC    03/29/2023    CBF<30% volume: 72 ml right MCA territory.  Tmax>6.0 s volume: 180 ml  Mismatch volume: 108 ml  Mismatch ratio: 2.5    TTE  3/29/2023  Left ventricular ejection fraction, by visual estimation, is >75%. Technically difficult study. Hyperdynamic global left ventricular systolic function. The left ventricular diastolic function could not be assessed in this study. There is mild concentric left ventricular hypertrophy. There is no evidence of pericardial effusion. Mild mitral annular calcification. Trace mitral valve regurgitation.    EEG:  EEG Classification / Summary:  Abnormal EEG in the awake, drowsy, and asleep states due to:  1. Continuous, right hemispheric focal polymorphic delta activity  2. Asymmetry and attenuation of sleep spindles from the right hemisphere  Clinical Impression:  This is an abnormal study indicative of a focal cerebral dysfunction in the right hemisphere, consistent with history of R MCA infarction. No epileptiform abnormalities or seizures were captured.    CT Chest Abdomen and Pelvis w/ IV Cont (04.08.23 @ 17:09)   IMPRESSION: Inflammation surrounding the gastrostomy tube concerning for   infection.  Right greater than left lower lobe airspace opacities in the lungs of   uncertain etiology, possibly infectious. A 1 month follow-up chest CT   recommended.  Right lower lobe mucous plugging.  Indeterminate 11 mm right adrenal nodule.

## 2023-04-10 NOTE — PROGRESS NOTE ADULT - SUBJECTIVE AND OBJECTIVE BOX
CONG AGUAYO Patient is a 58y old  Male who presents with a chief complaint of Stroke (09 Apr 2023 19:42)     HPI:  57 y/o male with PMHx of HTN and DM transfered from [facility] to [facility] as a code stroke. [facility] EMS reports pt BLAYNE was last night around 20:00 and was c/o right arm pain, today his family found his around 05:00 with left sided hemiparesis, slurred speech and was incontinent of urine, they called EMS as pt arrived at [facility] at 05:56, he was found to have right ICA occlusion and transferred to [facility], code stroke called upon arrival. Pt was on Cardene however per EMS neuro wanted pressure around 200 and it was d/monet, pt sedated with propofol.     (29 Mar 2023 09:40)    The patient was seen and evaluated states feels a lot better now that he's gotten food- some improvement in arm and left side too  The patient is in no acute distress.  Denied any fever chest pain, palpitations, shortness of breath, abdominal pain, fever, dysuria, cough, edema       I&O's Summary    09 Apr 2023 07:01  -  10 Apr 2023 07:00  --------------------------------------------------------  IN: 1050 mL / OUT: 2250 mL / NET: -1200 mL      Allergies    No Known Allergies    Intolerances      HEALTH ISSUES - PROBLEM Dx:  Suspected deep vein thrombosis (DVT)    Dysphagia    Acute cerebrovascular accident (CVA)    Left carotid artery stenosis          PAST MEDICAL & SURGICAL HISTORY:          Vital Signs Last 24 Hrs  T(C): 36.7 (10 Apr 2023 15:15), Max: 36.9 (10 Apr 2023 08:36)  T(F): 98 (10 Apr 2023 15:15), Max: 98.4 (10 Apr 2023 08:36)  HR: 91 (10 Apr 2023 15:15) (77 - 97)  BP: 132/80 (10 Apr 2023 15:15) (132/80 - 152/83)  BP(mean): --  RR: 18 (10 Apr 2023 15:15) (17 - 18)  SpO2: 96% (10 Apr 2023 15:15) (96% - 97%)    Parameters below as of 10 Apr 2023 15:15  Patient On (Oxygen Delivery Method): room air    T(C): 36.7 (04-10-23 @ 15:15), Max: 36.9 (04-10-23 @ 08:36)  HR: 91 (04-10-23 @ 15:15) (77 - 97)  BP: 132/80 (04-10-23 @ 15:15) (132/80 - 152/83)  RR: 18 (04-10-23 @ 15:15) (17 - 18)  SpO2: 96% (04-10-23 @ 15:15) (96% - 97%)  Wt(kg): --    PHYSICAL EXAM:    GENERAL: NAD  HEAD:  Atraumatic, Normocephalic  EYES: EOMI, PERRL, conjunctiva and sclera clear  ENMT:  Moist mucous membranes,  No lesions  NECK: Supple, No JVD, Normal thyroid  NERVOUS SYSTEM:  Alert & Oriented X3,  Moves upper and lower right - left arm weakness - moves leg ; CNS-II-XII  CHEST/LUNG: Clear to auscultation bilaterally; No rales, rhonchi, wheezing,   HEART: Regular rate and rhythm; No murmurs,   ABDOMEN: Soft, Nontender, Nondistended; Bowel sounds present  EXTREMITIES:  Peripheral Pulses, No  cyanosis, or edema  SKIN: No rashes or lesions  psychiatry- mood and affect appropriate, Insight and judgement intact     acetaminophen   Oral Liquid .. 650 milliGRAM(s) Oral every 6 hours PRN  artificial  tears Solution 1 Drop(s) Both EYES two times a day  aspirin  chewable 81 milliGRAM(s) Enteral Tube daily  atorvastatin 80 milliGRAM(s) Oral daily  chlorhexidine 2% Cloths 1 Application(s) Topical daily  dextrose 50% Injectable 25 Gram(s) IV Push once  dextrose 50% Injectable 12.5 Gram(s) IV Push once  dextrose 50% Injectable 25 Gram(s) IV Push once  doxazosin 4 milliGRAM(s) Oral at bedtime  enoxaparin Injectable 40 milliGRAM(s) SubCutaneous every 24 hours  hydrALAZINE Injectable 10 milliGRAM(s) IV Push every 4 hours PRN  insulin lispro (ADMELOG) corrective regimen sliding scale   SubCutaneous every 6 hours  lisinopril 10 milliGRAM(s) Oral daily  melatonin 5 milliGRAM(s) Oral at bedtime  pantoprazole  Injectable 40 milliGRAM(s) IV Push daily  petrolatum Ophthalmic Ointment 1 Application(s) Right EYE every 12 hours  polyethylene glycol 3350 17 Gram(s) Oral daily  senna Syrup 10 milliLiter(s) Oral at bedtime  sodium chloride 0.9%. 1000 milliLiter(s) IV Continuous <Continuous>  ticagrelor 90 milliGRAM(s) Enteral Tube every 12 hours      LABS:                          11.5   8.67  )-----------( 333      ( 09 Apr 2023 05:25 )             34.0     04-09    138  |  105  |  7.5<L>  ----------------------------<  129<H>  3.0<L>   |  21.0<L>  |  0.36<L>    Ca    8.3<L>      09 Apr 2023 05:25  Phos  3.1     04-09  Mg     2.0     04-09                CAPILLARY BLOOD GLUCOSE      POCT Blood Glucose.: 163 mg/dL (10 Apr 2023 12:39)  POCT Blood Glucose.: 109 mg/dL (10 Apr 2023 05:51)  POCT Blood Glucose.: 114 mg/dL (09 Apr 2023 23:26)  POCT Blood Glucose.: 120 mg/dL (09 Apr 2023 17:27)      RADIOLOGY & ADDITIONAL TESTS:      Consultant notes reviewed    Case discussed with consultant/provider/ family /patient

## 2023-04-10 NOTE — SWALLOW VFSS/MBS ASSESSMENT ADULT - COMMENTS
As per MD note: "58y Male with PMH HTN and DM, found by his family 0500 AM on 3/29/23 with right hemiparesis, slurred speech, and incontinent of urine.  Last well known 2000  on 3/28/23. Patient brought in by EMS to the [facility] at 0556, was noted not to be a candidate for Tenecteplase as patient out of time window. Patient was transferred to [facility] for thrombectomy due to right ICA occlusion with tandem right MCA occlusion. NIHSS 28 MRS pre - 0 . Etiology likely large artery atherosclerotic disease. Post mechanical thrombectomy TICI 2B and Right carotid stent with angioplasty. Small right frontalparietal SAH."

## 2023-04-10 NOTE — SWALLOW VFSS/MBS ASSESSMENT ADULT - RESIDUE IN [place]LE[place]
Mild/Moderate Mild Cleared pharyngeal stasis from prior trials reduced with cued successive swallow/Moderate

## 2023-04-10 NOTE — SWALLOW VFSS/MBS ASSESSMENT ADULT - RECOMMENDED FEEDING/EATING TECHNIQUES
no straws/allow for swallow between intakes/alternate food with liquid/maintain upright posture during/after eating for 30 mins/oral hygiene/position upright (90 degrees)/provide rest periods between swallows/small sips/bites

## 2023-04-10 NOTE — PROGRESS NOTE ADULT - ATTENDING COMMENTS
Patient seen and examined, discussed patient with Dr. Lawson and agree with recommendations.    Rehab/Impaired mobility and function - Based on the patient's diagnosis, functional status and potential for progress, recommend ACUTE inpatient rehabilitation for the functional deficits consisting of 3 hours of therapy/day & 24 hour RN/daily PMR physician for comorbid medical management. Patient will be able to tolerate 3 hours a day.    Will continue to follow. Rehab recommendations are dependent on how functional progress changes as well as how patient continues to participate and tolerate therapeutic interventions, which may change. Recommend ongoing mobilization by staff to maintain cardiopulmonary function and prevention of secondary complications related to debility. Discussed the specific management and recommendations above with rehab clinical care team/rehab liaison.      Total Time Spent on Encounter (reviewing clinical notes, labs, radiology, medications, patient history/exam, assessment and plan) - 50 minutes

## 2023-04-10 NOTE — CHART NOTE - NSCHARTNOTESELECT_GEN_ALL_CORE
EEG prelim
Event Note
Neurointerventional Surgery Post Procedure Note/Event Note
Neurointerventional Surgery Pre Procedure Note/Event Note
Nutrition Services
Nutrition Services
Transfer Note

## 2023-04-10 NOTE — SWALLOW VFSS/MBS ASSESSMENT ADULT - ADDITIONAL RECOMMENDATIONS
Will follow to check PO tolerance Will follow to check PO tolerance  Trials of regular solids with ST only

## 2023-04-11 ENCOUNTER — TRANSCRIPTION ENCOUNTER (OUTPATIENT)
Age: 58
End: 2023-04-11

## 2023-04-11 ENCOUNTER — INPATIENT (INPATIENT)
Facility: HOSPITAL | Age: 58
LOS: 23 days | Discharge: NURSING, REHAB OR LONG-TERM CARE | DRG: 949 | End: 2023-05-05
Attending: INTERNAL MEDICINE | Admitting: INTERNAL MEDICINE
Payer: MEDICAID

## 2023-04-11 VITALS
HEART RATE: 97 BPM | RESPIRATION RATE: 16 BRPM | TEMPERATURE: 98 F | OXYGEN SATURATION: 95 % | WEIGHT: 175.05 LBS | SYSTOLIC BLOOD PRESSURE: 117 MMHG | HEIGHT: 67 IN | DIASTOLIC BLOOD PRESSURE: 75 MMHG

## 2023-04-11 VITALS
TEMPERATURE: 99 F | SYSTOLIC BLOOD PRESSURE: 126 MMHG | RESPIRATION RATE: 18 BRPM | HEART RATE: 83 BPM | DIASTOLIC BLOOD PRESSURE: 75 MMHG | OXYGEN SATURATION: 94 %

## 2023-04-11 DIAGNOSIS — I63.9 CEREBRAL INFARCTION, UNSPECIFIED: ICD-10-CM

## 2023-04-11 DIAGNOSIS — E11.9 TYPE 2 DIABETES MELLITUS WITHOUT COMPLICATIONS: ICD-10-CM

## 2023-04-11 DIAGNOSIS — I69.354 HEMIPLEGIA AND HEMIPARESIS FOLLOWING CEREBRAL INFARCTION AFFECTING LEFT NON-DOMINANT SIDE: ICD-10-CM

## 2023-04-11 DIAGNOSIS — H54.61 UNQUALIFIED VISUAL LOSS, RIGHT EYE, NORMAL VISION LEFT EYE: ICD-10-CM

## 2023-04-11 DIAGNOSIS — R00.0 TACHYCARDIA, UNSPECIFIED: ICD-10-CM

## 2023-04-11 DIAGNOSIS — I95.1 ORTHOSTATIC HYPOTENSION: ICD-10-CM

## 2023-04-11 DIAGNOSIS — R30.0 DYSURIA: ICD-10-CM

## 2023-04-11 DIAGNOSIS — Z43.1 ENCOUNTER FOR ATTENTION TO GASTROSTOMY: ICD-10-CM

## 2023-04-11 DIAGNOSIS — B95.2 ENTEROCOCCUS AS THE CAUSE OF DISEASES CLASSIFIED ELSEWHERE: ICD-10-CM

## 2023-04-11 DIAGNOSIS — D72.829 ELEVATED WHITE BLOOD CELL COUNT, UNSPECIFIED: ICD-10-CM

## 2023-04-11 DIAGNOSIS — I69.311 MEMORY DEFICIT FOLLOWING CEREBRAL INFARCTION: ICD-10-CM

## 2023-04-11 DIAGNOSIS — M79.18 MYALGIA, OTHER SITE: ICD-10-CM

## 2023-04-11 DIAGNOSIS — I63.511 CEREBRAL INFARCTION DUE TO UNSPECIFIED OCCLUSION OR STENOSIS OF RIGHT MIDDLE CEREBRAL ARTERY: ICD-10-CM

## 2023-04-11 DIAGNOSIS — R53.83 OTHER FATIGUE: ICD-10-CM

## 2023-04-11 DIAGNOSIS — N39.0 URINARY TRACT INFECTION, SITE NOT SPECIFIED: ICD-10-CM
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
## 2023-04-11 DIAGNOSIS — E43 UNSPECIFIED SEVERE PROTEIN-CALORIE MALNUTRITION: ICD-10-CM

## 2023-04-11 DIAGNOSIS — Z51.89 ENCOUNTER FOR OTHER SPECIFIED AFTERCARE: ICD-10-CM

## 2023-04-11 DIAGNOSIS — Z16.12 EXTENDED SPECTRUM BETA LACTAMASE (ESBL) RESISTANCE: ICD-10-CM

## 2023-04-11 DIAGNOSIS — N40.0 BENIGN PROSTATIC HYPERPLASIA WITHOUT LOWER URINARY TRACT SYMPTOMS: ICD-10-CM

## 2023-04-11 DIAGNOSIS — I10 ESSENTIAL (PRIMARY) HYPERTENSION: ICD-10-CM

## 2023-04-11 DIAGNOSIS — R50.9 FEVER, UNSPECIFIED: ICD-10-CM

## 2023-04-11 DIAGNOSIS — R25.2 CRAMP AND SPASM: ICD-10-CM

## 2023-04-11 DIAGNOSIS — L29.9 PRURITUS, UNSPECIFIED: ICD-10-CM

## 2023-04-11 DIAGNOSIS — I69.391 DYSPHAGIA FOLLOWING CEREBRAL INFARCTION: ICD-10-CM

## 2023-04-11 DIAGNOSIS — E86.0 DEHYDRATION: ICD-10-CM
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
## 2023-04-11 DIAGNOSIS — E78.5 HYPERLIPIDEMIA, UNSPECIFIED: ICD-10-CM

## 2023-04-11 DIAGNOSIS — I69.392 FACIAL WEAKNESS FOLLOWING CEREBRAL INFARCTION: ICD-10-CM

## 2023-04-11 DIAGNOSIS — G47.00 INSOMNIA, UNSPECIFIED: ICD-10-CM

## 2023-04-11 DIAGNOSIS — I69.322 DYSARTHRIA FOLLOWING CEREBRAL INFARCTION: ICD-10-CM

## 2023-04-11 DIAGNOSIS — K59.00 CONSTIPATION, UNSPECIFIED: ICD-10-CM

## 2023-04-11 DIAGNOSIS — E27.9 DISORDER OF ADRENAL GLAND, UNSPECIFIED: ICD-10-CM

## 2023-04-11 LAB
ALBUMIN SERPL ELPH-MCNC: 3.2 G/DL — LOW (ref 3.3–5.2)
ALP SERPL-CCNC: 46 U/L — SIGNIFICANT CHANGE UP (ref 40–120)
ALT FLD-CCNC: 77 U/L — HIGH
ANION GAP SERPL CALC-SCNC: 11 MMOL/L — SIGNIFICANT CHANGE UP (ref 5–17)
AST SERPL-CCNC: 60 U/L — HIGH
BASOPHILS # BLD AUTO: 0.03 K/UL — SIGNIFICANT CHANGE UP (ref 0–0.2)
BASOPHILS NFR BLD AUTO: 0.4 % — SIGNIFICANT CHANGE UP (ref 0–2)
BILIRUB SERPL-MCNC: 0.6 MG/DL — SIGNIFICANT CHANGE UP (ref 0.4–2)
BUN SERPL-MCNC: 7.7 MG/DL — LOW (ref 8–20)
CALCIUM SERPL-MCNC: 8.4 MG/DL — SIGNIFICANT CHANGE UP (ref 8.4–10.5)
CHLORIDE SERPL-SCNC: 106 MMOL/L — SIGNIFICANT CHANGE UP (ref 96–108)
CO2 SERPL-SCNC: 23 MMOL/L — SIGNIFICANT CHANGE UP (ref 22–29)
CREAT SERPL-MCNC: 0.41 MG/DL — LOW (ref 0.5–1.3)
EGFR: 124 ML/MIN/1.73M2 — SIGNIFICANT CHANGE UP
EOSINOPHIL # BLD AUTO: 0.28 K/UL — SIGNIFICANT CHANGE UP (ref 0–0.5)
EOSINOPHIL NFR BLD AUTO: 3.4 % — SIGNIFICANT CHANGE UP (ref 0–6)
GLUCOSE SERPL-MCNC: 115 MG/DL — HIGH (ref 70–99)
HCT VFR BLD CALC: 33 % — LOW (ref 39–50)
HGB BLD-MCNC: 11 G/DL — LOW (ref 13–17)
IMM GRANULOCYTES NFR BLD AUTO: 0.5 % — SIGNIFICANT CHANGE UP (ref 0–0.9)
LYMPHOCYTES # BLD AUTO: 1.29 K/UL — SIGNIFICANT CHANGE UP (ref 1–3.3)
LYMPHOCYTES # BLD AUTO: 15.8 % — SIGNIFICANT CHANGE UP (ref 13–44)
MCHC RBC-ENTMCNC: 26.9 PG — LOW (ref 27–34)
MCHC RBC-ENTMCNC: 33.3 GM/DL — SIGNIFICANT CHANGE UP (ref 32–36)
MCV RBC AUTO: 80.7 FL — SIGNIFICANT CHANGE UP (ref 80–100)
MONOCYTES # BLD AUTO: 0.59 K/UL — SIGNIFICANT CHANGE UP (ref 0–0.9)
MONOCYTES NFR BLD AUTO: 7.2 % — SIGNIFICANT CHANGE UP (ref 2–14)
NEUTROPHILS # BLD AUTO: 5.94 K/UL — SIGNIFICANT CHANGE UP (ref 1.8–7.4)
NEUTROPHILS NFR BLD AUTO: 72.7 % — SIGNIFICANT CHANGE UP (ref 43–77)
PLATELET # BLD AUTO: 383 K/UL — SIGNIFICANT CHANGE UP (ref 150–400)
POTASSIUM SERPL-MCNC: 3.3 MMOL/L — LOW (ref 3.5–5.3)
POTASSIUM SERPL-SCNC: 3.3 MMOL/L — LOW (ref 3.5–5.3)
PROT SERPL-MCNC: 6.2 G/DL — LOW (ref 6.6–8.7)
RBC # BLD: 4.09 M/UL — LOW (ref 4.2–5.8)
RBC # FLD: 13.4 % — SIGNIFICANT CHANGE UP (ref 10.3–14.5)
SARS-COV-2 RNA SPEC QL NAA+PROBE: SIGNIFICANT CHANGE UP
SODIUM SERPL-SCNC: 140 MMOL/L — SIGNIFICANT CHANGE UP (ref 135–145)
WBC # BLD: 8.17 K/UL — SIGNIFICANT CHANGE UP (ref 3.8–10.5)
WBC # FLD AUTO: 8.17 K/UL — SIGNIFICANT CHANGE UP (ref 3.8–10.5)

## 2023-04-11 PROCEDURE — 36415 COLL VENOUS BLD VENIPUNCTURE: CPT

## 2023-04-11 PROCEDURE — 87070 CULTURE OTHR SPECIMN AEROBIC: CPT

## 2023-04-11 PROCEDURE — L8699: CPT

## 2023-04-11 PROCEDURE — 70450 CT HEAD/BRAIN W/O DYE: CPT

## 2023-04-11 PROCEDURE — 85576 BLOOD PLATELET AGGREGATION: CPT

## 2023-04-11 PROCEDURE — 86146 BETA-2 GLYCOPROTEIN ANTIBODY: CPT

## 2023-04-11 PROCEDURE — 71045 X-RAY EXAM CHEST 1 VIEW: CPT

## 2023-04-11 PROCEDURE — C1894: CPT

## 2023-04-11 PROCEDURE — 97535 SELF CARE MNGMENT TRAINING: CPT

## 2023-04-11 PROCEDURE — 92526 ORAL FUNCTION THERAPY: CPT

## 2023-04-11 PROCEDURE — 85730 THROMBOPLASTIN TIME PARTIAL: CPT

## 2023-04-11 PROCEDURE — 86900 BLOOD TYPING SEROLOGIC ABO: CPT

## 2023-04-11 PROCEDURE — 86147 CARDIOLIPIN ANTIBODY EA IG: CPT

## 2023-04-11 PROCEDURE — 70551 MRI BRAIN STEM W/O DYE: CPT

## 2023-04-11 PROCEDURE — 86140 C-REACTIVE PROTEIN: CPT

## 2023-04-11 PROCEDURE — 84145 PROCALCITONIN (PCT): CPT

## 2023-04-11 PROCEDURE — 93880 EXTRACRANIAL BILAT STUDY: CPT

## 2023-04-11 PROCEDURE — 84295 ASSAY OF SERUM SODIUM: CPT

## 2023-04-11 PROCEDURE — 83735 ASSAY OF MAGNESIUM: CPT

## 2023-04-11 PROCEDURE — 85018 HEMOGLOBIN: CPT

## 2023-04-11 PROCEDURE — 93306 TTE W/DOPPLER COMPLETE: CPT

## 2023-04-11 PROCEDURE — 86850 RBC ANTIBODY SCREEN: CPT

## 2023-04-11 PROCEDURE — 87641 MR-STAPH DNA AMP PROBE: CPT

## 2023-04-11 PROCEDURE — 99232 SBSQ HOSP IP/OBS MODERATE 35: CPT | Mod: 24

## 2023-04-11 PROCEDURE — 80061 LIPID PANEL: CPT

## 2023-04-11 PROCEDURE — 83935 ASSAY OF URINE OSMOLALITY: CPT

## 2023-04-11 PROCEDURE — 97167 OT EVAL HIGH COMPLEX 60 MIN: CPT

## 2023-04-11 PROCEDURE — 86901 BLOOD TYPING SEROLOGIC RH(D): CPT

## 2023-04-11 PROCEDURE — 85303 CLOT INHIBIT PROT C ACTIVITY: CPT

## 2023-04-11 PROCEDURE — 95714 VEEG EA 12-26 HR UNMNTR: CPT

## 2023-04-11 PROCEDURE — 94003 VENT MGMT INPAT SUBQ DAY: CPT

## 2023-04-11 PROCEDURE — 85300 ANTITHROMBIN III ACTIVITY: CPT

## 2023-04-11 PROCEDURE — 74018 RADEX ABDOMEN 1 VIEW: CPT

## 2023-04-11 PROCEDURE — 94640 AIRWAY INHALATION TREATMENT: CPT

## 2023-04-11 PROCEDURE — 83930 ASSAY OF BLOOD OSMOLALITY: CPT

## 2023-04-11 PROCEDURE — 87077 CULTURE AEROBIC IDENTIFY: CPT

## 2023-04-11 PROCEDURE — 82728 ASSAY OF FERRITIN: CPT

## 2023-04-11 PROCEDURE — 82947 ASSAY GLUCOSE BLOOD QUANT: CPT

## 2023-04-11 PROCEDURE — 80048 BASIC METABOLIC PNL TOTAL CA: CPT

## 2023-04-11 PROCEDURE — C9460: CPT

## 2023-04-11 PROCEDURE — C1757: CPT

## 2023-04-11 PROCEDURE — 82435 ASSAY OF BLOOD CHLORIDE: CPT

## 2023-04-11 PROCEDURE — 85045 AUTOMATED RETICULOCYTE COUNT: CPT

## 2023-04-11 PROCEDURE — 83036 HEMOGLOBIN GLYCOSYLATED A1C: CPT

## 2023-04-11 PROCEDURE — 82803 BLOOD GASES ANY COMBINATION: CPT

## 2023-04-11 PROCEDURE — 85014 HEMATOCRIT: CPT

## 2023-04-11 PROCEDURE — 84100 ASSAY OF PHOSPHORUS: CPT

## 2023-04-11 PROCEDURE — 84443 ASSAY THYROID STIM HORMONE: CPT

## 2023-04-11 PROCEDURE — 97530 THERAPEUTIC ACTIVITIES: CPT

## 2023-04-11 PROCEDURE — 85613 RUSSELL VIPER VENOM DILUTED: CPT

## 2023-04-11 PROCEDURE — 83605 ASSAY OF LACTIC ACID: CPT

## 2023-04-11 PROCEDURE — 74177 CT ABD & PELVIS W/CONTRAST: CPT

## 2023-04-11 PROCEDURE — U0005: CPT

## 2023-04-11 PROCEDURE — 83090 ASSAY OF HOMOCYSTEINE: CPT

## 2023-04-11 PROCEDURE — 82746 ASSAY OF FOLIC ACID SERUM: CPT

## 2023-04-11 PROCEDURE — 0042T: CPT

## 2023-04-11 PROCEDURE — 99232 SBSQ HOSP IP/OBS MODERATE 35: CPT

## 2023-04-11 PROCEDURE — U0003: CPT

## 2023-04-11 PROCEDURE — 84466 ASSAY OF TRANSFERRIN: CPT

## 2023-04-11 PROCEDURE — 95813 EEG EXTND MNTR 61-119 MIN: CPT

## 2023-04-11 PROCEDURE — 93005 ELECTROCARDIOGRAM TRACING: CPT

## 2023-04-11 PROCEDURE — P9047: CPT

## 2023-04-11 PROCEDURE — 85306 CLOT INHIBIT PROT S FREE: CPT

## 2023-04-11 PROCEDURE — 85610 PROTHROMBIN TIME: CPT

## 2023-04-11 PROCEDURE — C1725: CPT

## 2023-04-11 PROCEDURE — 87640 STAPH A DNA AMP PROBE: CPT

## 2023-04-11 PROCEDURE — 36600 WITHDRAWAL OF ARTERIAL BLOOD: CPT

## 2023-04-11 PROCEDURE — C1887: CPT

## 2023-04-11 PROCEDURE — 85027 COMPLETE CBC AUTOMATED: CPT

## 2023-04-11 PROCEDURE — C1769: CPT

## 2023-04-11 PROCEDURE — 87040 BLOOD CULTURE FOR BACTERIA: CPT

## 2023-04-11 PROCEDURE — 81001 URINALYSIS AUTO W/SCOPE: CPT

## 2023-04-11 PROCEDURE — 74230 X-RAY XM SWLNG FUNCJ C+: CPT

## 2023-04-11 PROCEDURE — 86038 ANTINUCLEAR ANTIBODIES: CPT

## 2023-04-11 PROCEDURE — 94002 VENT MGMT INPAT INIT DAY: CPT

## 2023-04-11 PROCEDURE — 95700 EEG CONT REC W/VID EEG TECH: CPT

## 2023-04-11 PROCEDURE — 82607 VITAMIN B-12: CPT

## 2023-04-11 PROCEDURE — 92610 EVALUATE SWALLOWING FUNCTION: CPT

## 2023-04-11 PROCEDURE — C1874: CPT

## 2023-04-11 PROCEDURE — 84132 ASSAY OF SERUM POTASSIUM: CPT

## 2023-04-11 PROCEDURE — C1884: CPT

## 2023-04-11 PROCEDURE — 99233 SBSQ HOSP IP/OBS HIGH 50: CPT

## 2023-04-11 PROCEDURE — 82962 GLUCOSE BLOOD TEST: CPT

## 2023-04-11 PROCEDURE — 86803 HEPATITIS C AB TEST: CPT

## 2023-04-11 PROCEDURE — 80053 COMPREHEN METABOLIC PANEL: CPT

## 2023-04-11 PROCEDURE — 93970 EXTREMITY STUDY: CPT

## 2023-04-11 PROCEDURE — 85025 COMPLETE CBC W/AUTO DIFF WBC: CPT

## 2023-04-11 PROCEDURE — 71260 CT THORAX DX C+: CPT

## 2023-04-11 PROCEDURE — 82330 ASSAY OF CALCIUM: CPT

## 2023-04-11 RX ORDER — ASPIRIN/CALCIUM CARB/MAGNESIUM 324 MG
81 TABLET ORAL DAILY
Refills: 0 | Status: DISCONTINUED | OUTPATIENT
Start: 2023-04-11 | End: 2023-04-11

## 2023-04-11 RX ORDER — POTASSIUM CHLORIDE 20 MEQ
40 PACKET (EA) ORAL ONCE
Refills: 0 | Status: COMPLETED | OUTPATIENT
Start: 2023-04-11 | End: 2023-04-11

## 2023-04-11 RX ORDER — TICAGRELOR 90 MG/1
1 TABLET ORAL
Qty: 0 | Refills: 0 | DISCHARGE
Start: 2023-04-11

## 2023-04-11 RX ORDER — SENNA PLUS 8.6 MG/1
10 TABLET ORAL AT BEDTIME
Refills: 0 | Status: DISCONTINUED | OUTPATIENT
Start: 2023-04-11 | End: 2023-05-05

## 2023-04-11 RX ORDER — PANTOPRAZOLE SODIUM 20 MG/1
40 TABLET, DELAYED RELEASE ORAL
Refills: 0 | Status: DISCONTINUED | OUTPATIENT
Start: 2023-04-11 | End: 2023-05-05

## 2023-04-11 RX ORDER — SENNA PLUS 8.6 MG/1
10 TABLET ORAL AT BEDTIME
Refills: 0 | Status: DISCONTINUED | OUTPATIENT
Start: 2023-04-11 | End: 2023-04-11

## 2023-04-11 RX ORDER — LISINOPRIL 2.5 MG/1
10 TABLET ORAL DAILY
Refills: 0 | Status: DISCONTINUED | OUTPATIENT
Start: 2023-04-11 | End: 2023-04-11

## 2023-04-11 RX ORDER — PANTOPRAZOLE SODIUM 20 MG/1
1 TABLET, DELAYED RELEASE ORAL
Qty: 0 | Refills: 0 | DISCHARGE
Start: 2023-04-11

## 2023-04-11 RX ORDER — TICAGRELOR 90 MG/1
90 TABLET ORAL EVERY 12 HOURS
Refills: 0 | Status: DISCONTINUED | OUTPATIENT
Start: 2023-04-11 | End: 2023-05-05

## 2023-04-11 RX ORDER — LISINOPRIL 2.5 MG/1
1 TABLET ORAL
Qty: 0 | Refills: 0 | DISCHARGE
Start: 2023-04-11

## 2023-04-11 RX ORDER — LISINOPRIL 2.5 MG/1
10 TABLET ORAL DAILY
Refills: 0 | Status: DISCONTINUED | OUTPATIENT
Start: 2023-04-11 | End: 2023-04-13

## 2023-04-11 RX ORDER — ASPIRIN/CALCIUM CARB/MAGNESIUM 324 MG
1 TABLET ORAL
Qty: 0 | Refills: 0 | DISCHARGE
Start: 2023-04-11

## 2023-04-11 RX ORDER — DOXAZOSIN MESYLATE 4 MG
4 TABLET ORAL AT BEDTIME
Refills: 0 | Status: DISCONTINUED | OUTPATIENT
Start: 2023-04-11 | End: 2023-04-11

## 2023-04-11 RX ORDER — ATORVASTATIN CALCIUM 80 MG/1
80 TABLET, FILM COATED ORAL AT BEDTIME
Refills: 0 | Status: DISCONTINUED | OUTPATIENT
Start: 2023-04-11 | End: 2023-04-11

## 2023-04-11 RX ORDER — ACETAMINOPHEN 500 MG
650 TABLET ORAL EVERY 6 HOURS
Refills: 0 | Status: DISCONTINUED | OUTPATIENT
Start: 2023-04-11 | End: 2023-04-11

## 2023-04-11 RX ORDER — DOXAZOSIN MESYLATE 4 MG
4 TABLET ORAL AT BEDTIME
Refills: 0 | Status: DISCONTINUED | OUTPATIENT
Start: 2023-04-11 | End: 2023-05-02

## 2023-04-11 RX ORDER — ATORVASTATIN CALCIUM 80 MG/1
1 TABLET, FILM COATED ORAL
Qty: 0 | Refills: 0 | DISCHARGE
Start: 2023-04-11

## 2023-04-11 RX ORDER — ENOXAPARIN SODIUM 100 MG/ML
40 INJECTION SUBCUTANEOUS EVERY 24 HOURS
Refills: 0 | Status: DISCONTINUED | OUTPATIENT
Start: 2023-04-11 | End: 2023-05-05

## 2023-04-11 RX ORDER — ATORVASTATIN CALCIUM 80 MG/1
80 TABLET, FILM COATED ORAL AT BEDTIME
Refills: 0 | Status: DISCONTINUED | OUTPATIENT
Start: 2023-04-11 | End: 2023-05-05

## 2023-04-11 RX ORDER — POLYETHYLENE GLYCOL 3350 17 G/17G
17 POWDER, FOR SOLUTION ORAL
Qty: 0 | Refills: 0 | DISCHARGE
Start: 2023-04-11

## 2023-04-11 RX ORDER — ASPIRIN/CALCIUM CARB/MAGNESIUM 324 MG
81 TABLET ORAL DAILY
Refills: 0 | Status: DISCONTINUED | OUTPATIENT
Start: 2023-04-11 | End: 2023-05-05

## 2023-04-11 RX ORDER — LANOLIN ALCOHOL/MO/W.PET/CERES
5 CREAM (GRAM) TOPICAL AT BEDTIME
Refills: 0 | Status: DISCONTINUED | OUTPATIENT
Start: 2023-04-11 | End: 2023-04-11

## 2023-04-11 RX ORDER — PANTOPRAZOLE SODIUM 20 MG/1
40 TABLET, DELAYED RELEASE ORAL
Refills: 0 | Status: DISCONTINUED | OUTPATIENT
Start: 2023-04-11 | End: 2023-04-11

## 2023-04-11 RX ORDER — ACETAMINOPHEN 500 MG
20.31 TABLET ORAL
Qty: 0 | Refills: 0 | DISCHARGE
Start: 2023-04-11

## 2023-04-11 RX ORDER — SENNA PLUS 8.6 MG/1
10 TABLET ORAL
Qty: 0 | Refills: 0 | DISCHARGE
Start: 2023-04-11

## 2023-04-11 RX ORDER — DOXAZOSIN MESYLATE 4 MG
1 TABLET ORAL
Qty: 0 | Refills: 0 | DISCHARGE
Start: 2023-04-11

## 2023-04-11 RX ORDER — POLYETHYLENE GLYCOL 3350 17 G/17G
17 POWDER, FOR SOLUTION ORAL DAILY
Refills: 0 | Status: DISCONTINUED | OUTPATIENT
Start: 2023-04-11 | End: 2023-04-11

## 2023-04-11 RX ORDER — LANOLIN ALCOHOL/MO/W.PET/CERES
6 CREAM (GRAM) TOPICAL AT BEDTIME
Refills: 0 | Status: DISCONTINUED | OUTPATIENT
Start: 2023-04-11 | End: 2023-04-18

## 2023-04-11 RX ORDER — POLYETHYLENE GLYCOL 3350 17 G/17G
17 POWDER, FOR SOLUTION ORAL DAILY
Refills: 0 | Status: DISCONTINUED | OUTPATIENT
Start: 2023-04-11 | End: 2023-04-13

## 2023-04-11 RX ORDER — TICAGRELOR 90 MG/1
90 TABLET ORAL EVERY 12 HOURS
Refills: 0 | Status: DISCONTINUED | OUTPATIENT
Start: 2023-04-11 | End: 2023-04-11

## 2023-04-11 RX ADMIN — Medication 1 APPLICATION(S): at 17:52

## 2023-04-11 RX ADMIN — Medication 40 MILLIEQUIVALENT(S): at 09:50

## 2023-04-11 RX ADMIN — ENOXAPARIN SODIUM 40 MILLIGRAM(S): 100 INJECTION SUBCUTANEOUS at 17:53

## 2023-04-11 RX ADMIN — TICAGRELOR 90 MILLIGRAM(S): 90 TABLET ORAL at 17:53

## 2023-04-11 RX ADMIN — LISINOPRIL 10 MILLIGRAM(S): 2.5 TABLET ORAL at 06:30

## 2023-04-11 RX ADMIN — LISINOPRIL 10 MILLIGRAM(S): 2.5 TABLET ORAL at 12:49

## 2023-04-11 RX ADMIN — Medication 4 MILLIGRAM(S): at 21:35

## 2023-04-11 RX ADMIN — Medication 1 DROP(S): at 06:30

## 2023-04-11 RX ADMIN — TICAGRELOR 90 MILLIGRAM(S): 90 TABLET ORAL at 06:31

## 2023-04-11 RX ADMIN — CHLORHEXIDINE GLUCONATE 1 APPLICATION(S): 213 SOLUTION TOPICAL at 06:31

## 2023-04-11 RX ADMIN — Medication 81 MILLIGRAM(S): at 12:48

## 2023-04-11 RX ADMIN — ATORVASTATIN CALCIUM 80 MILLIGRAM(S): 80 TABLET, FILM COATED ORAL at 21:34

## 2023-04-11 RX ADMIN — Medication 1 DROP(S): at 17:52

## 2023-04-11 RX ADMIN — Medication 1 APPLICATION(S): at 06:31

## 2023-04-11 NOTE — H&P ADULT - ASSESSMENT
Assessment/Plan:  CONG AGUAYO is a 58y with a history of *** who presented to (name of hospital) on (date of acute admission) with (presenting sx) found to have ****.  Hospital Course complicated by ***. Patient now admitted for a multidisciplinary rehab program. 04-11-23 @ 13:54    -------------    Rehab Management/MEDICAL MANAGEMENT     #  - Gait Instability, ADL impairments and Functional impairments: start Comprehensive Rehab Program of PT/OT/SLP  - 3 hours a day, 5 days a week  - P&O as needed     #    #Sleep  - melatonin PRN     #Skin  - Skin on admission  - Pressure injury/Skin: OOB to Chair, PT/OT     #Pain Mgmt   - Tylenol PRN, Oxycodone PRN    #GI/Bowel Mgmt   - Continent c/w Senna, Miralax     #/Bladder Mgmt   -  PVR q8h, CIC>400cc    #FEN   - Diet - Easy to Chew  - Dysphagia  SLP - evaluation and treatment    #Precautions / PROPHYLAXIS:   - Falls, Cardiac, Sternal, Spinal, Seizure   - ortho: Weight bearing status: WBAT   - Lungs: Aspiration, Incentive Spirometer   - DVT: Lovenox  ---------------------------------------------------------------  FOLLOW UP/OUTPATIENT:    Bev Song)  Cardiology; Internal Medicine  [address] [facility] [address] [facility], [address]  [place], [place] [address]  Phone: [phone]  Fax: [phone]  Follow Up Time:     Ivan Reid; PhD)  Neurology; Vascular Neurology  [address] [facility] [address]  [place] [address]  Phone: [phone]  Fax: [phone]  Follow Up Time:     Primary doctor,   Phone: (   )    -  Fax: (   )    -  Follow Up Time:     Sanchez Allen)  Neurology; Vascular Neurology  [address] [place] [address]  Phone: [phone]  Fax: [phone]  Follow Up Time:     Lily Moon ()  Gastroenterology  [address] [facility] [address] [facility], [address]  [place] [address]  Phone: [phone]  Fax: [phone]  Follow Up Time:  ---------------------------------------------------------------  MEDICAL PROGNOSIS: GOOD                                   REHAB POTENTIAL: GOOD  ESTIMATED DISPOSITION: HOME                             ELOS: 10-14 Days   EXPECTED THERAPY:     P.T. 1hr/day       O.T. 1hr/day      S.L.P. 1hr/day      EXP FREQUENCY: 5 days per 7 day period     PRESCREEN COMPARISON: I have reviewed the prescreen information and I have found no relevant changes between the preadmission screening and my post admission evaluation     RATIONALE FOR INPATIENT ADMISSION - Patient demonstrates the following: (check all that apply)  [X] Medically appropriate for rehabilitation admission  [X] Has attainable rehab goals with an appropriate initial discharge plan  [X] Has rehabilitation potential (expected to make a significant improvement within a reasonable period of time)   [X] Requires close medical managment by a rehab physician, rehab nursing care, Hospitalist and comprehensive interdisciplinary team (including PT, OT, & or SLP, Prosthetics and Orthotics)     Assessment/Plan:  CONG AGUAYO is a 58 year old male with PMH of HTN, and DM; who presented as a transfer from [facility] to [facility] for stroke on 3/29, where he was found to have a R ICA occlusion and he underwent a TICI 2b thromectomy of R ICA and R MCA with R ICA stent placement. Extubated on 4/7. His brain MRI was significant for improved midline shift and small frontal parietal SAH. His hospital course was complicated by aspiration PNA (s/p Zosyn); and dysphagia (requiring PEG - placed 4/6). Patient now admitted for a multidisciplinary rehab program. 04-11-23 @ 13:54    -------------    Rehab Management/MEDICAL MANAGEMENT     #Functional deficits s/p CVA  - Gait Instability, ADL impairments and Functional impairments: start Comprehensive Rehab Program of PT/OT/SLP  - 3 hours a day, 5 days a week  - P&O as needed   - R ICA occlusion, s/p TICI 2b thrombectomy of R ICA and R MCA with R ICA stent placement  - ASA & Brilinta  - Outpt Neurology follow up- Dr. Reid  - Outpt IR follow up - Dr. Allen    #HTN  - Lisinopril  - Doxazosin  - Outpt Cardiology follow up - Dr. Song    #HLD  -Atorvastatin    #DM  - ISS  - FS ACHS    #Dry eye  - Occular lubricant ointment  - Artificial tears    #Sleep  - Melatonin PRN     #Skin  - Skin on admission  - Pressure injury/Skin: OOB to Chair, PT/OT     #Pain Mgmt   - Tylenol PRN    #GI/Bowel Mgmt   - Pantoprazole  - Continent c/w Senna, Miralax     #/Bladder Mgmt   -  PVR q8h, CIC>400cc    #FEN   - PEG  - Diet - Easy to Chew  - Dysphagia  SLP - evaluation and treatment    #Precautions / PROPHYLAXIS:   - Falls  - ortho: Weight bearing status: WBAT   - Lungs: Aspiration, Incentive Spirometer   - DVT: Lovenox  ---------------------------------------------------------------  FOLLOW UP/OUTPATIENT:    Bev Song)  Cardiology; Internal Medicine  [address] [facility] [address] [facility], [address]  [place], [place] [id number]  Phone: [phone]  Fax: [phone]  Follow Up Time:     Ivan Reid; PhD)  Neurology; Vascular Neurology  [address] [facility], [address]  [place] [address]  Phone: [phone]  Fax: [phone]  Follow Up Time:     Primary doctor,   Phone: (   )    -  Fax: (   )    -  Follow Up Time:     Sanchez Allen)  Neurology; Vascular Neurology  [address] [place] [address]  Phone: [phone]  Fax: [phone]  Follow Up Time:     Lily Moon (DO)  Gastroenterology  [address] [facility], [address]  [place] [id number]  Phone: [phone]  Fax: [phone]  Follow Up Time:  ---------------------------------------------------------------  MEDICAL PROGNOSIS: GOOD                                   REHAB POTENTIAL: GOOD  ESTIMATED DISPOSITION: HOME                             ELOS: 10-14 Days   EXPECTED THERAPY:     P.T. 1hr/day       O.T. 1hr/day      S.L.P. 1hr/day      EXP FREQUENCY: 5 days per 7 day period     PRESCREEN COMPARISON: I have reviewed the prescreen information and I have found no relevant changes between the preadmission screening and my post admission evaluation     RATIONALE FOR INPATIENT ADMISSION - Patient demonstrates the following: (check all that apply)  [X] Medically appropriate for rehabilitation admission  [X] Has attainable rehab goals with an appropriate initial discharge plan  [X] Has rehabilitation potential (expected to make a significant improvement within a reasonable period of time)   [X] Requires close medical managment by a rehab physician, rehab nursing care, Hospitalist and comprehensive interdisciplinary team (including PT, OT, & or SLP, Prosthetics and Orthotics)     Assessment/Plan:  CONG AGUAYO is a 58 year old male with PMH of HTN, and DM; who presented as a transfer from [facility] to [facility] for stroke on 3/29, where he was found to have a R ICA occlusion and he underwent a TICI 2b thromectomy of R ICA and R MCA with R ICA stent placement. Extubated on 4/7. His brain MRI was significant for improved midline shift and small frontal parietal SAH. His hospital course was complicated by aspiration PNA (s/p Zosyn); and dysphagia (requiring PEG - placed 4/6). Patient now admitted for a multidisciplinary rehab program. 04-11-23 @ 13:54  -------------  Rehab Management/MEDICAL MANAGEMENT     #Functional deficits s/p CVA  - Gait Instability, ADL impairments and Functional impairments: start Comprehensive Rehab Program of PT/OT/SLP  - 3 hours a day, 5 days a week  - P&O as needed   - R ICA occlusion, s/p TICI 2b thrombectomy of R ICA and R MCA with R ICA stent placement  - ASA & Brilinta  - Outpt Neurology follow up- Dr. Reid  - Outpt IR follow up - Dr. Allen    #dysphagia/aspiration PNA  - S/p Zosyn  - PEG tube  - Repeat CT chest in 1 month (~5/8)    #HTN  - Lisinopril  - Doxazosin  - Outpt Cardiology follow up - Dr. Song    #HLD  -Atorvastatin    #DM  - A1c: 7.3  - ISS  - FS ACHS    #Dry eye  - Occular lubricant ointment  - Artificial tears    #Sleep  - Melatonin PRN     #Skin  - Skin on admission  - Pressure injury/Skin: OOB to Chair, PT/OT     #Pain Mgmt   - Tylenol PRN    #GI/Bowel Mgmt   - Pantoprazole  - Continent c/w Senna, Miralax     #/Bladder Mgmt   -  PVR q8h, CIC>400cc    #FEN   - PEG  - Diet - Easy to Chew  - Dysphagia  SLP - evaluation and treatment    #Precautions / PROPHYLAXIS:   - Falls  - ortho: Weight bearing status: WBAT   - Lungs: Aspiration, Incentive Spirometer   - DVT: Lovenox  ---------------------------------------------------------------  FOLLOW UP/OUTPATIENT:    Bev Song (MD)  Cardiology; Internal Medicine  [address] [facility], [address]  [place], [place] [address]  Phone: [phone]  Fax: [phone]  Follow Up Time:     Iavn Reid; PhD)  Neurology; Vascular Neurology  [address] [facility] [facility] [address]  [place] [address]  Phone: [phone]  Fax: [phone]  Follow Up Time:     Primary doctor,   Phone: (   )    -  Fax: (   )    -  Follow Up Time:     Sanchez Allen (MD)  Neurology; Vascular Neurology  [address] [place] [address]  Phone: [phone]  Fax: [phone]  Follow Up Time:     Lily Moon (DO)  Gastroenterology  [address] [facility], [address]  [place] [address]  Phone: [phone]  Fax: [phone]  Follow Up Time:  ---------------------------------------------------------------  MEDICAL PROGNOSIS: GOOD                                   REHAB POTENTIAL: GOOD  ESTIMATED DISPOSITION: HOME                             ELOS: 10-14 Days   EXPECTED THERAPY:     P.T. 1hr/day       O.T. 1hr/day      S.L.P. 1hr/day      EXP FREQUENCY: 5 days per 7 day period     PRESCREEN COMPARISON: I have reviewed the prescreen information and I have found no relevant changes between the preadmission screening and my post admission evaluation     RATIONALE FOR INPATIENT ADMISSION - Patient demonstrates the following: (check all that apply)  [X] Medically appropriate for rehabilitation admission  [X] Has attainable rehab goals with an appropriate initial discharge plan  [X] Has rehabilitation potential (expected to make a significant improvement within a reasonable period of time)   [X] Requires close medical managment by a rehab physician, rehab nursing care, Hospitalist and comprehensive interdisciplinary team (including PT, OT, & or SLP, Prosthetics and Orthotics)     Assessment/Plan:  CONG AGUAYO is a 58 year old male with PMH of HTN, and DM; who presented as a transfer from [facility] to [facility] for stroke on 3/29, where he was found to have a R ICA occlusion and he underwent a TICI 2b thromectomy of R ICA and R MCA with R ICA stent placement. Extubated on 4/7. His brain MRI was significant for improved midline shift and small frontal parietal SAH. His hospital course was complicated by aspiration PNA (s/p Zosyn); and dysphagia (requiring PEG - placed 4/6). Patient now admitted for a multidisciplinary rehab program. 04-11-23 @ 13:54  -------------  Rehab Management/MEDICAL MANAGEMENT     #Functional deficits s/p CVA  - Gait Instability, ADL impairments and Functional impairments: start Comprehensive Rehab Program of PT/OT/SLP  - 3 hours a day, 5 days a week  - P&O as needed   - R ICA occlusion, s/p TICI 2b thrombectomy of R ICA and R MCA with R ICA stent placement  - ASA & Brilinta  - Outpt Neurology follow up- Dr. Reid  - Outpt IR follow up - Dr. Allen    #dysphagia/aspiration PNA  - S/p Zosyn  - PEG tube  - Repeat CT chest in 1 month (~5/8)    #HTN  - Lisinopril  - Doxazosin  - Outpt Cardiology follow up - Dr. Song    #HLD  -Atorvastatin    #DM  - A1c: 7.3  - ISS  - FS ACHS    #Dry eye  - Occular lubricant ointment  - Artificial tears    #Sleep  - Melatonin 6mg nightly PRN     #Skin  - Skin on admission: intact  - Pressure injury/Skin: OOB to Chair, PT/OT     #Pain Mgmt   - Tylenol PRN    #GI/Bowel Mgmt   - Pantoprazole  - Continent c/w Senna, Miralax     #/Bladder Mgmt   -  PVR q8h, CIC>400cc    #FEN   - PEG  - Diet - Easy to Chew, halal  - Dysphagia  SLP - evaluation and treatment    #Precautions / PROPHYLAXIS:   - Falls  - ortho: Weight bearing status: WBAT   - Lungs: Aspiration, Incentive Spirometer   - DVT: Lovenox  ---------------------------------------------------------------  FOLLOW UP/OUTPATIENT:    Bev Song)  Cardiology; Internal Medicine  [address] [facility], [address]  [place], [place] [id number]  Phone: [phone]  Fax: [phone]    Ivan eRid; PhD)  Neurology; Vascular Neurology  [address] [facility] [facility] [address]  [place] [address]  Phone: [phone]  Fax: [phone]  Follow Up Time:     Primary doctor,   Phone: (   )    -  Fax: (   )    -  Follow Up Time:     Sanchez Allen)  Neurology; Vascular Neurology  [address] [place] [address]  Phone: [phone]  Fax: [phone]  Follow Up Time:     Lily Moon (DO)  Gastroenterology  [address] [facility], [address]  [place] [address]  Phone: [phone]  Fax: [phone]  Follow Up Time:  ---------------------------------------------------------------  MEDICAL PROGNOSIS: GOOD                                   REHAB POTENTIAL: GOOD  ESTIMATED DISPOSITION: HOME                             ELOS: 10-14 Days   EXPECTED THERAPY:     P.T. 1hr/day       O.T. 1hr/day      S.L.P. 1hr/day      EXP FREQUENCY: 5 days per 7 day period     PRESCREEN COMPARISON: I have reviewed the prescreen information and I have found no relevant changes between the preadmission screening and my post admission evaluation     RATIONALE FOR INPATIENT ADMISSION - Patient demonstrates the following: (check all that apply)  [X] Medically appropriate for rehabilitation admission  [X] Has attainable rehab goals with an appropriate initial discharge plan  [X] Has rehabilitation potential (expected to make a significant improvement within a reasonable period of time)   [X] Requires close medical managment by a rehab physician, rehab nursing care, Hospitalist and comprehensive interdisciplinary team (including PT, OT, & or SLP, Prosthetics and Orthotics)     Assessment/Plan:  CONG AGUAYO is a 58 year old male with PMH of HTN, and DM; who presented as a transfer from [facility] to [facility] for stroke on 3/29, where he was found to have a R ICA occlusion and he underwent a TICI 2b thromectomy of R ICA and R MCA with R ICA stent placement. Extubated on 4/7. His brain MRI was significant for improved midline shift and small frontal parietal SAH. His hospital course was complicated by aspiration PNA (s/p Zosyn); and dysphagia (requiring PEG - placed 4/6). Patient now admitted for a multidisciplinary rehab program. 04-11-23 @ 13:54    * Nightly Condom cath  *     Rehab Management/MEDICAL MANAGEMENT     #Functional deficits s/p CVA  -Continue  Comprehensive Rehab Program of PT/OT/SLP  - 3 hours a day, 5 days a week  - P&O as needed   - R ICA occlusion, s/p TICI 2b thrombectomy of R ICA and R MCA with R ICA stent placement  - ASA & Brilinta  - Outpt Neurology follow up- Dr. Reid  - Outpt IR follow up - Dr. Allen    #dysphagia/aspiration PNA  - S/p Zosyn  - PEG tube  - Repeat CT chest in 1 month (~5/8)    #HTN  - Lisinopril  - Doxazosin  - Outpt Cardiology follow up - Dr. Song    #HLD  -Atorvastatin    #DM  - A1c: 7.3  - ISS  - FS ACHS    #Dry eye  - Occular lubricant ointment  - Artificial tears    #Sleep  - Melatonin 6mg nightly PRN     #Skin  - Skin on admission: intact  - Pressure injury/Skin: OOB to Chair, PT/OT     #Pain Mgmt   - Tylenol PRN    #GI/Bowel Mgmt   - Pantoprazole  - Continent c/w Senna, Miralax     #/Bladder Mgmt   -  PVR q8h, CIC>400cc    #FEN   - PEG  - Diet - Easy to Chew, halal  - Dysphagia  SLP - evaluation and treatment    #Precautions / PROPHYLAXIS:   - Falls  - ortho: Weight bearing status: WBAT   - Lungs: Aspiration, Incentive Spirometer   - DVT: Lovenox  ---------------------------------------------------------------  FOLLOW UP/OUTPATIENT:    Bev Song)  Cardiology; Internal Medicine  [address] [facility], [address]  [place], [place] [address]  Phone: [phone]  Fax: [phone]    Ivan Reid; PhD)  Neurology; Vascular Neurology  [address] [facility] [address]  [place] [address]  Phone: [phone]  Fax: [phone]  Follow Up Time:     Primary doctor,   Phone: (   )    -  Fax: (   )    -  Follow Up Time:     Sanchez Allen (MD)  Neurology; Vascular Neurology  [address] [place] [address]  Phone: [phone]  Fax: [phone]  Follow Up Time:     Lily Moon (DO)  Gastroenterology  [address] [facility], [address]  [place] [address]  Phone: [phone]  Fax: [phone]  Follow Up Time:  ---------------------------------------------------------------  MEDICAL PROGNOSIS: GOOD                                   REHAB POTENTIAL: GOOD  ESTIMATED DISPOSITION: HOME                             ELOS: 10-14 Days   EXPECTED THERAPY:     P.T. 1hr/day       O.T. 1hr/day      S.L.P. 1hr/day      EXP FREQUENCY: 5 days per 7 day period     PRESCREEN COMPARISON: I have reviewed the prescreen information and I have found no relevant changes between the preadmission screening and my post admission evaluation     RATIONALE FOR INPATIENT ADMISSION - Patient demonstrates the following: (check all that apply)  [X] Medically appropriate for rehabilitation admission  [X] Has attainable rehab goals with an appropriate initial discharge plan  [X] Has rehabilitation potential (expected to make a significant improvement within a reasonable period of time)   [X] Requires close medical managment by a rehab physician, rehab nursing care, Hospitalist and comprehensive interdisciplinary team (including PT, OT, & or SLP, Prosthetics and Orthotics)

## 2023-04-11 NOTE — PROGRESS NOTE ADULT - NS ATTEND OPT1 GEN_ALL_CORE

## 2023-04-11 NOTE — PROGRESS NOTE ADULT - SUBJECTIVE AND OBJECTIVE BOX
Preliminary note, offical recommendations pending attending review/signature   [facility] Stroke Team  Progress Note     HPI:  59 y/o male with PMHx of HTN and DM transfered from [facility] to [facility] as a code stroke. [facility] EMS reports pt LKW was  night prior to admission around 20:00 and was c/o right arm pain, day of admission his family found his around 05:00 with left sided hemiparesis, slurred speech and was incontinent of urine, they called EMS as pt arrived at [facility] at 05:56, he was found to have right ICA occlusion and transferred to [facility], code stroke called upon arrival. Transferred to IR for mechanical thrombectomy.    SUBJECTIVE: No events overnight.  No new neurologic complaints.  ROS reported negative unless otherwise noted.    acetaminophen   Oral Liquid .. 650 milliGRAM(s) Oral every 6 hours PRN  artificial  tears Solution 1 Drop(s) Both EYES two times a day  aspirin  chewable 81 milliGRAM(s) Enteral Tube daily  atorvastatin 80 milliGRAM(s) Oral daily  chlorhexidine 2% Cloths 1 Application(s) Topical daily  dextrose 50% Injectable 25 Gram(s) IV Push once  dextrose 50% Injectable 12.5 Gram(s) IV Push once  dextrose 50% Injectable 25 Gram(s) IV Push once  doxazosin 4 milliGRAM(s) Oral at bedtime  enoxaparin Injectable 40 milliGRAM(s) SubCutaneous every 24 hours  hydrALAZINE Injectable 10 milliGRAM(s) IV Push every 4 hours PRN  insulin lispro (ADMELOG) corrective regimen sliding scale   SubCutaneous every 6 hours  lisinopril 10 milliGRAM(s) Oral daily  melatonin 5 milliGRAM(s) Oral at bedtime  pantoprazole  Injectable 40 milliGRAM(s) IV Push daily  petrolatum Ophthalmic Ointment 1 Application(s) Right EYE every 12 hours  polyethylene glycol 3350 17 Gram(s) Oral daily  potassium chloride    Tablet ER 40 milliEquivalent(s) Oral once  senna Syrup 10 milliLiter(s) Oral at bedtime  sodium chloride 0.9%. 1000 milliLiter(s) IV Continuous <Continuous>  ticagrelor 90 milliGRAM(s) Enteral Tube every 12 hours      PHYSICAL EXAM:   Vital Signs Last 24 Hrs  T(C): 36.6 (11 Apr 2023 07:30), Max: 36.8 (10 Apr 2023 20:14)  T(F): 97.8 (11 Apr 2023 07:30), Max: 98.2 (10 Apr 2023 20:14)  HR: 78 (11 Apr 2023 07:30) (78 - 92)  BP: 139/87 (11 Apr 2023 07:30) (126/88 - 139/87)  BP(mean): --  RR: 18 (11 Apr 2023 07:30) (18 - 18)  SpO2: 94% (11 Apr 2023 07:30) (92% - 96%)    Parameters below as of 11 Apr 2023 07:40  Patient On (Oxygen Delivery Method): room air    EXAM PENDING:  General:     NEUROLOGICAL EXAM:    Mental status: awake, alert, oriented to self, place, dates, and situation.  Able to ID pen and  glove. Follows simple commands and repeats phrases.     CN: pupils equal and reactive to light,  able to count fingers in both visual fields, slight left gaze palsy,  left facial palsy    Motor: moves right UE/LE 5/5  Moves left UE 1/5, trace , LLE moves  against gravity with drift    Coordination, no ataxia on right     Sensory:  left sensory extinction     Gait: deferred    LABS:                        11.0   8.17  )-----------( 383      ( 11 Apr 2023 04:30 )             33.0    04-11    140  |  106  |  7.7<L>  ----------------------------<  115<H>  3.3<L>   |  23.0  |  0.41<L>    Ca    8.4      11 Apr 2023 04:30    TPro  6.2<L>  /  Alb  3.2<L>  /  TBili  0.6  /  DBili  x   /  AST  60<H>  /  ALT  77<H>  /  AlkPhos  46  04-11        IMAGING: Reviewed by me.     MR Head No Cont (04.07.23 @ 08:48)   IMPRESSION:  Large subacute right MCA infarct with hemorrhagic transformation in the   right basal ganglia and right temporal lobe. Mildly improving 5 mm   right-to-left midline shift.  FLAIR hyperintense signal along the right frontalparietal temporal sulci   suggesting small subarachnoid hemorrhage.    CT Head No Cont (04.04.23 @ 07:59)   IMPRESSION: Stable acute/subacute large right MCA territory infarction   with stable hemorrhagic transformation in the right basal ganglia and   right temporal lobe. Stable mass effect and right-to-left midline shift   measuring 6.7 mm. No new hemorrhage. Moderate adjacent cytotoxic edema,   unchanged.    CT Head No Cont (04.02.23 @ 08:08)   Unchanged hemorrhagic RIGHT MCA infarction with effacement   of the RIGHT lateral ventricle and 6 mm subfalcine herniation to the LEFT.    US Duplex Carotid Arteries Complete, Bilateral (04.01.23 @ 22:21)   IMPRESSION: No significant hemodynamic stenosis of the right internal   carotid artery.  Patent right ICA stent.  Over 70% left internal carotid artery stenosis.  Bilateral external carotid artery stenosis.    CT Head No Cont (03.30.23 @ 03:40)   Compared with 3/29/2023 there is decreasing retained contrast   in the right basal ganglia and right frontal parietal cortex with   evolving lucency consistent with an acute right artery infarct. Residual   high density although decreased compared to the prior exam may represent   retained contrast as well as a small amount of hemorrhage. No increased   hemorrhage identified.    CT BRAIN STROKE PROTOCOL   03/29/2023    IMPRESSION: Dense right MCA sign with early right temporal lobe   infarction. No acute intracranial hemorrhage.    CT PERFUSION W MAPS IC    03/29/2023    CBF<30% volume: 72 ml right MCA territory.  Tmax>6.0 s volume: 180 ml  Mismatch volume: 108 ml  Mismatch ratio: 2.5    TTE  3/29/2023  Left ventricular ejection fraction, by visual estimation, is >75%. Technically difficult study. Hyperdynamic global left ventricular systolic function. The left ventricular diastolic function could not be assessed in this study. There is mild concentric left ventricular hypertrophy. There is no evidence of pericardial effusion. Mild mitral annular calcification. Trace mitral valve regurgitation.    EEG:  EEG Classification / Summary:  Abnormal EEG in the awake, drowsy, and asleep states due to:  1. Continuous, right hemispheric focal polymorphic delta activity  2. Asymmetry and attenuation of sleep spindles from the right hemisphere  Clinical Impression:  This is an abnormal study indicative of a focal cerebral dysfunction in the right hemisphere, consistent with history of R MCA infarction. No epileptiform abnormalities or seizures were captured.    CT Chest Abdomen and Pelvis w/ IV Cont (04.08.23 @ 17:09)   IMPRESSION: Inflammation surrounding the gastrostomy tube concerning for   infection.  Right greater than left lower lobe airspace opacities in the lungs of   uncertain etiology, possibly infectious. A 1 month follow-up chest CT   recommended.  Right lower lobe mucous plugging.  Indeterminate 11 mm right adrenal nodule.         Preliminary note, offical recommendations pending attending review/signature   [facility] Stroke Team  Progress Note     HPI:  59 y/o male with PMHx of HTN and DM transfered from [facility] to [facility] as a code stroke. [facility] EMS reports pt LKW was  night prior to admission around 20:00 and was c/o right arm pain, day of admission his family found his around 05:00 with left sided hemiparesis, slurred speech and was incontinent of urine, they called EMS as pt arrived at [facility] at 05:56, he was found to have right ICA occlusion and transferred to [facility], code stroke called upon arrival. Transferred to IR for mechanical thrombectomy.    SUBJECTIVE: No events overnight.  No new neurologic complaints. Says doing really good.  ROS reported negative unless otherwise noted.    acetaminophen   Oral Liquid .. 650 milliGRAM(s) Oral every 6 hours PRN  artificial  tears Solution 1 Drop(s) Both EYES two times a day  aspirin  chewable 81 milliGRAM(s) Enteral Tube daily  atorvastatin 80 milliGRAM(s) Oral daily  chlorhexidine 2% Cloths 1 Application(s) Topical daily  dextrose 50% Injectable 25 Gram(s) IV Push once  dextrose 50% Injectable 12.5 Gram(s) IV Push once  dextrose 50% Injectable 25 Gram(s) IV Push once  doxazosin 4 milliGRAM(s) Oral at bedtime  enoxaparin Injectable 40 milliGRAM(s) SubCutaneous every 24 hours  hydrALAZINE Injectable 10 milliGRAM(s) IV Push every 4 hours PRN  insulin lispro (ADMELOG) corrective regimen sliding scale   SubCutaneous every 6 hours  lisinopril 10 milliGRAM(s) Oral daily  melatonin 5 milliGRAM(s) Oral at bedtime  pantoprazole  Injectable 40 milliGRAM(s) IV Push daily  petrolatum Ophthalmic Ointment 1 Application(s) Right EYE every 12 hours  polyethylene glycol 3350 17 Gram(s) Oral daily  potassium chloride    Tablet ER 40 milliEquivalent(s) Oral once  senna Syrup 10 milliLiter(s) Oral at bedtime  sodium chloride 0.9%. 1000 milliLiter(s) IV Continuous <Continuous>  ticagrelor 90 milliGRAM(s) Enteral Tube every 12 hours      PHYSICAL EXAM:   Vital Signs Last 24 Hrs  T(C): 36.6 (11 Apr 2023 07:30), Max: 36.8 (10 Apr 2023 20:14)  T(F): 97.8 (11 Apr 2023 07:30), Max: 98.2 (10 Apr 2023 20:14)  HR: 78 (11 Apr 2023 07:30) (78 - 92)  BP: 139/87 (11 Apr 2023 07:30) (126/88 - 139/87)  BP(mean): --  RR: 18 (11 Apr 2023 07:30) (18 - 18)  SpO2: 94% (11 Apr 2023 07:30) (92% - 96%)    Parameters below as of 11 Apr 2023 07:40  Patient On (Oxygen Delivery Method): room air       General: NAD in bed eating.    NEUROLOGICAL EXAM:    Mental status: awake, alert, oriented to self, place, dates, and situation.  Able to ID objects  Follows simple commands and repeats phrases.     CN: pupils equal and reactive to light,  able to count fingers in both visual fields, slight left gaze palsy,  left facial palsy    Motor: moves right UE/LE 5/5  Moves left UE 1/5 proximally,3/5 elbow and , LLE moves  against gravity with drift    Coordination, no ataxia on right     Sensory:  intact to light tough throughout     Gait: deferred    LABS:                        11.0   8.17  )-----------( 383      ( 11 Apr 2023 04:30 )             33.0    04-11    140  |  106  |  7.7<L>  ----------------------------<  115<H>  3.3<L>   |  23.0  |  0.41<L>    Ca    8.4      11 Apr 2023 04:30    TPro  6.2<L>  /  Alb  3.2<L>  /  TBili  0.6  /  DBili  x   /  AST  60<H>  /  ALT  77<H>  /  AlkPhos  46  04-11        IMAGING: Reviewed by me.     MR Head No Cont (04.07.23 @ 08:48)   IMPRESSION:  Large subacute right MCA infarct with hemorrhagic transformation in the   right basal ganglia and right temporal lobe. Mildly improving 5 mm   right-to-left midline shift.  FLAIR hyperintense signal along the right frontalparietal temporal sulci   suggesting small subarachnoid hemorrhage.    CT Head No Cont (04.04.23 @ 07:59)   IMPRESSION: Stable acute/subacute large right MCA territory infarction   with stable hemorrhagic transformation in the right basal ganglia and   right temporal lobe. Stable mass effect and right-to-left midline shift   measuring 6.7 mm. No new hemorrhage. Moderate adjacent cytotoxic edema,   unchanged.    CT Head No Cont (04.02.23 @ 08:08)   Unchanged hemorrhagic RIGHT MCA infarction with effacement   of the RIGHT lateral ventricle and 6 mm subfalcine herniation to the LEFT.    US Duplex Carotid Arteries Complete, Bilateral (04.01.23 @ 22:21)   IMPRESSION: No significant hemodynamic stenosis of the right internal   carotid artery.  Patent right ICA stent.  Over 70% left internal carotid artery stenosis.  Bilateral external carotid artery stenosis.    CT Head No Cont (03.30.23 @ 03:40)   Compared with 3/29/2023 there is decreasing retained contrast   in the right basal ganglia and right frontal parietal cortex with   evolving lucency consistent with an acute right artery infarct. Residual   high density although decreased compared to the prior exam may represent   retained contrast as well as a small amount of hemorrhage. No increased   hemorrhage identified.    CT BRAIN STROKE PROTOCOL   03/29/2023    IMPRESSION: Dense right MCA sign with early right temporal lobe   infarction. No acute intracranial hemorrhage.    CT PERFUSION W MAPS IC    03/29/2023    CBF<30% volume: 72 ml right MCA territory.  Tmax>6.0 s volume: 180 ml  Mismatch volume: 108 ml  Mismatch ratio: 2.5    TTE  3/29/2023  Left ventricular ejection fraction, by visual estimation, is >75%. Technically difficult study. Hyperdynamic global left ventricular systolic function. The left ventricular diastolic function could not be assessed in this study. There is mild concentric left ventricular hypertrophy. There is no evidence of pericardial effusion. Mild mitral annular calcification. Trace mitral valve regurgitation.    EEG:  EEG Classification / Summary:  Abnormal EEG in the awake, drowsy, and asleep states due to:  1. Continuous, right hemispheric focal polymorphic delta activity  2. Asymmetry and attenuation of sleep spindles from the right hemisphere  Clinical Impression:  This is an abnormal study indicative of a focal cerebral dysfunction in the right hemisphere, consistent with history of R MCA infarction. No epileptiform abnormalities or seizures were captured.    CT Chest Abdomen and Pelvis w/ IV Cont (04.08.23 @ 17:09)   IMPRESSION: Inflammation surrounding the gastrostomy tube concerning for   infection.  Right greater than left lower lobe airspace opacities in the lungs of   uncertain etiology, possibly infectious. A 1 month follow-up chest CT   recommended.  Right lower lobe mucous plugging.  Indeterminate 11 mm right adrenal nodule.         [facility] Stroke Team  Progress Note     HPI:  59 y/o male with PMHx of HTN and DM transferred from [facility] to [facility] as a code stroke. [facility] EMS reports pt LKW was  night prior to admission around 20:00 and was c/o right arm pain, day of admission his family found his around 05:00 with left sided hemiparesis, slurred speech and was incontinent of urine, they called EMS as pt arrived at [facility] at 05:56, he was found to have right ICA occlusion and transferred to [facility], code stroke called upon arrival. Transferred to IR for mechanical thrombectomy.    SUBJECTIVE: No events overnight.  No new neurologic complaints. Says doing really good.  ROS reported negative unless otherwise noted.    acetaminophen   Oral Liquid .. 650 milliGRAM(s) Oral every 6 hours PRN  artificial  tears Solution 1 Drop(s) Both EYES two times a day  aspirin  chewable 81 milliGRAM(s) Enteral Tube daily  atorvastatin 80 milliGRAM(s) Oral daily  chlorhexidine 2% Cloths 1 Application(s) Topical daily  dextrose 50% Injectable 25 Gram(s) IV Push once  dextrose 50% Injectable 12.5 Gram(s) IV Push once  dextrose 50% Injectable 25 Gram(s) IV Push once  doxazosin 4 milliGRAM(s) Oral at bedtime  enoxaparin Injectable 40 milliGRAM(s) SubCutaneous every 24 hours  hydrALAZINE Injectable 10 milliGRAM(s) IV Push every 4 hours PRN  insulin lispro (ADMELOG) corrective regimen sliding scale   SubCutaneous every 6 hours  lisinopril 10 milliGRAM(s) Oral daily  melatonin 5 milliGRAM(s) Oral at bedtime  pantoprazole  Injectable 40 milliGRAM(s) IV Push daily  petrolatum Ophthalmic Ointment 1 Application(s) Right EYE every 12 hours  polyethylene glycol 3350 17 Gram(s) Oral daily  potassium chloride    Tablet ER 40 milliEquivalent(s) Oral once  senna Syrup 10 milliLiter(s) Oral at bedtime  sodium chloride 0.9%. 1000 milliLiter(s) IV Continuous <Continuous>  ticagrelor 90 milliGRAM(s) Enteral Tube every 12 hours      PHYSICAL EXAM:   Vital Signs Last 24 Hrs  T(C): 36.6 (11 Apr 2023 07:30), Max: 36.8 (10 Apr 2023 20:14)  T(F): 97.8 (11 Apr 2023 07:30), Max: 98.2 (10 Apr 2023 20:14)  HR: 78 (11 Apr 2023 07:30) (78 - 92)  BP: 139/87 (11 Apr 2023 07:30) (126/88 - 139/87)  BP(mean): --  RR: 18 (11 Apr 2023 07:30) (18 - 18)  SpO2: 94% (11 Apr 2023 07:30) (92% - 96%)    Parameters below as of 11 Apr 2023 07:40  Patient On (Oxygen Delivery Method): room air       General: NAD in bed eating.    NEUROLOGICAL EXAM:    Mental status: awake, alert, oriented to self, place, dates, and situation.  Able to ID objects  Follows simple commands and repeats phrases.     CN: pupils equal and reactive to light,  able to count fingers in both visual fields, slight left gaze palsy,  left facial palsy    Motor: moves right UE/LE 5/5  Moves left UE 1/5 proximally,3/5 elbow and , LLE moves  against gravity with drift    Coordination, no ataxia on right     Sensory:  intact to light tough throughout     Gait: deferred    LABS:                        11.0   8.17  )-----------( 383      ( 11 Apr 2023 04:30 )             33.0    04-11    140  |  106  |  7.7<L>  ----------------------------<  115<H>  3.3<L>   |  23.0  |  0.41<L>    Ca    8.4      11 Apr 2023 04:30    TPro  6.2<L>  /  Alb  3.2<L>  /  TBili  0.6  /  DBili  x   /  AST  60<H>  /  ALT  77<H>  /  AlkPhos  46  04-11      IMAGING: Reviewed by me.     MR Head No Cont (04.07.23 @ 08:48)   IMPRESSION:  Large subacute right MCA infarct with hemorrhagic transformation in the   right basal ganglia and right temporal lobe. Mildly improving 5 mm   right-to-left midline shift.  FLAIR hyperintense signal along the right frontalparietal temporal sulci   suggesting small subarachnoid hemorrhage.    CT Head No Cont (04.04.23 @ 07:59)   IMPRESSION: Stable acute/subacute large right MCA territory infarction   with stable hemorrhagic transformation in the right basal ganglia and   right temporal lobe. Stable mass effect and right-to-left midline shift   measuring 6.7 mm. No new hemorrhage. Moderate adjacent cytotoxic edema,   unchanged.    CT Head No Cont (04.02.23 @ 08:08)   Unchanged hemorrhagic RIGHT MCA infarction with effacement   of the RIGHT lateral ventricle and 6 mm subfalcine herniation to the LEFT.    US Duplex Carotid Arteries Complete, Bilateral (04.01.23 @ 22:21)   IMPRESSION: No significant hemodynamic stenosis of the right internal   carotid artery.  Patent right ICA stent.  Over 70% left internal carotid artery stenosis.  Bilateral external carotid artery stenosis.    CT Head No Cont (03.30.23 @ 03:40)   Compared with 3/29/2023 there is decreasing retained contrast   in the right basal ganglia and right frontal parietal cortex with   evolving lucency consistent with an acute right artery infarct. Residual   high density although decreased compared to the prior exam may represent   retained contrast as well as a small amount of hemorrhage. No increased   hemorrhage identified.    CT BRAIN STROKE PROTOCOL   03/29/2023    IMPRESSION: Dense right MCA sign with early right temporal lobe   infarction. No acute intracranial hemorrhage.    CT PERFUSION W MAPS IC    03/29/2023    CBF<30% volume: 72 ml right MCA territory.  Tmax>6.0 s volume: 180 ml  Mismatch volume: 108 ml  Mismatch ratio: 2.5    TTE  3/29/2023  Left ventricular ejection fraction, by visual estimation, is >75%. Technically difficult study. Hyperdynamic global left ventricular systolic function. The left ventricular diastolic function could not be assessed in this study. There is mild concentric left ventricular hypertrophy. There is no evidence of pericardial effusion. Mild mitral annular calcification. Trace mitral valve regurgitation.    EEG:  EEG Classification / Summary:  Abnormal EEG in the awake, drowsy, and asleep states due to:  1. Continuous, right hemispheric focal polymorphic delta activity  2. Asymmetry and attenuation of sleep spindles from the right hemisphere  Clinical Impression:  This is an abnormal study indicative of a focal cerebral dysfunction in the right hemisphere, consistent with history of R MCA infarction. No epileptiform abnormalities or seizures were captured.    CT Chest Abdomen and Pelvis w/ IV Cont (04.08.23 @ 17:09)   IMPRESSION: Inflammation surrounding the gastrostomy tube concerning for   infection.  Right greater than left lower lobe airspace opacities in the lungs of   uncertain etiology, possibly infectious. A 1 month follow-up chest CT   recommended.  Right lower lobe mucous plugging.  Indeterminate 11 mm right adrenal nodule.

## 2023-04-11 NOTE — PROGRESS NOTE ADULT - NS ATTEND AMEND GEN_ALL_CORE FT
Neurosurgery Attending Attestation:    Patient seen and examined at bedside. Agree with plan and note as documented above.    57 y/o M w/ PMHx significant for HTN, DM who presented with L sided weakness, and found to have tandem AYAKA/RMCA occlusions s/p stent placement and TICI 2b thrombectomy with now bleeding into the infarct area vs contrast staining. On exam, eyes open to voice, following commands in RUE and RLE, LUE no movement, LLE lifts at least 3/5 to command. Recommend continued NCCU care, neurochecks q2h, keppra, recommend Na goal >140, continue cangrelor gtt for stent per MELANIE, transition to oral DAPT as appropriate, will keep on hemicrani watch.    -Gokul Gayle MD
Neurosurgery Attending Attestation:    Patient seen and examined at bedside. Agree with plan and note as documented above.    59 y/o M w/ PMHx significant for HTN, DM who presented with L sided weakness, and found to have tandem AYAKA/RMCA occlusions s/p stent placement and TICI 2b thrombectomy with now bleeding into the infarct area vs contrast staining. On exam, eyes open to voice, following commands in RUE and RLE, LUE no movement, LLE lifts at least 3/5 to command. Recommend continued NCCU care, neurochecks q2h, keppra, recommend Na goal >140, continue cangrelor gtt for stent per MELANIE, transition to oral DAPT as appropriate, will keep on hemicrani watch.    -Gokul Gayle MD
NSGY Attg:    see above    patient seen and examined  opens eyes  oriented x 3  follows on right    repeat CT reviewed -- possible slight increase in mass effect/stable per official report    agree with exam and plan as above
I evaluated this pt. with my ACP and agree with the above assessment and management plan. Gastrostomy site examined. Clean and dry w/o bleeding. Minimal tenderness not unusual s/p PEG placement yesterday. OK to use G-tube now for both medications and TF. Signing off. Reconsult as needed. Thank you.
Neurosurgery Attending Attestation:    Patient seen and examined at bedside. Agree with plan and note as documented above.     57 y/o M w/ PMHx significant for HTN, DM who presented with L sided weakness, and found to have tandem AYAKA/RMCA occlusions s/p stent placement and TICI 2b thrombectomy with now bleeding into the infarct area vs contrast staining. On exam, eye opening apraxia, following commands in RUE and RLE, extends in LUE and triple flexion in LLE. Recommend continued NCCU care, neurochecks q2h, keppra, recommend Na goal >140, continue cangrelor gtt for stent per MELANIE, will keep on hemicrani watch.
Seen and examined with the PA.  Plan discussed with PA.  I agree with as written above with modifications as below    Right ICA/MCA stroke s/p intervention with right MCA thrombectomy and Right ICA stent  continue DAPT  may need extended cardiac monitoring  OK to go to rehab when medically stable    will follow with you    Ivan Reid MD PhD   [id number]
NSGY Attg:    see above    patient seen and examined    agree with exam and plan as above
Agree with PA's assessment and plan
Agree with PA's assessment and plan  - on VEEG  will follow up
Seen and examined with the PA and NP  Plan discussed with PA and NP  I agree with as written above with modifications as below    Pt with Right ICA occlusion and Right MCA thrombus  s/p right MCA thrombectomy and Right ICA stent  f/u head CTs for bleed, stroke and edema/mass effect (hemicrani watch)  exam has improved, continue PT/OT  continue cangrelor, transition to oral DAPT per MELANIE based on clinical course and imaging    will follow with you    Ivan Reid MD PhD   [id number]
right MCA stroke.  Large vessel athersclerotic occlusion s/p thrombectomy and then right ICA stent. no cardioembolic source suspected.   dual antiplatelet therapy . statins.   outpaitent follow up with intensive medical atherapy.  will sign off.
Patient was seen and examined by me. History and exam as documented above by PA/NP was confirmed by me.  Agree with plan as outlined above.
Seen and examined with the PA.  Plan discussed with PA.  I agree with as written above with modifications as below    Right ICA/MCA stroke s/p intervention with right MCA thrombectomy and Right ICA stent  continue DAPT  may need extended cardiac monitoring  will need rehab    will follow with you    Ivan Reid MD PhD   [id number]
Seen and examined with the PA and NP  Plan discussed with PA and NP  I agree with as written above with modifications as below    Pt with Right ICA occlusion and Right MCA thrombus  s/p right MCA thrombectomy and Right ICA stent  f/u head CTs for bleed, stroke and edema/mass effect (hemicrani watch)    will follow with you    Ivan Reid MD PhD   [id number]

## 2023-04-11 NOTE — PROGRESS NOTE ADULT - SUBJECTIVE AND OBJECTIVE BOX
Patient is a 58y old  Male who presents with a chief complaint of Stroke (09 Apr 2023 19:42)    HPI:  57 y/o male with PMHx of HTN and DM transfered from [facility] to [facility] as a code stroke. [facility] reports pt BLAYNE was last night around 20:00 and was c/o right arm pain, today his family found his around 05:00 with left sided hemiparesis, slurred speech and was incontinent of urine, they called EMS as pt arrived at [facility] at 05:56, he was found to have right ICA occlusion and transferred to [facility], code stroke called upon arrival. Pt was on Cardene however per EMS neuro wanted pressure around 200 and it was d/monet, pt sedated with propofol. (29 Mar 2023 09:40)      Interval history:  Patient seen and examined by neuro IR team. Patient reports he is doing well, passed for a diet yesterday and has been enjoying eating again. Patient reports he is being discharged to [facility] later this afternoon.       Vital Signs Last 24 Hrs  T(C): 36.6 (11 Apr 2023 07:30), Max: 36.8 (10 Apr 2023 20:14)  T(F): 97.8 (11 Apr 2023 07:30), Max: 98.2 (10 Apr 2023 20:14)  HR: 78 (11 Apr 2023 07:30) (78 - 92)  BP: 139/87 (11 Apr 2023 07:30) (126/88 - 139/87)  RR: 18 (11 Apr 2023 07:30) (18 - 18)  SpO2: 94% (11 Apr 2023 07:30) (92% - 96%)    Parameters below as of 11 Apr 2023 07:40  Patient On (Oxygen Delivery Method): room air      Physical Exam:  Constitutional: NAD, lying in bed  Neuro  * Mental Status:  GCS 15: Awake, alert, oriented to conversation. No aphasia. Mild dysarthria.    * Cranial Nerves: L pupil 3mm reactive, R pupil 4mm sluggish, dysconjugate gaze, EOMI, L facial droop  * Motor: RUE 5/5, LUE 4/5 HG, 2-3/5 bicep/tricep/delt, RLE 5/5, LLE 4/5, no drift   * Sensory: Sensation intact to light touch  * Reflexes: not assessed       LABS:                        11.0   8.17  )-----------( 383      ( 11 Apr 2023 04:30 )             33.0     04-11    140  |  106  |  7.7<L>  ----------------------------<  115<H>  3.3<L>   |  23.0  |  0.41<L>    Ca    8.4      11 Apr 2023 04:30    TPro  6.2<L>  /  Alb  3.2<L>  /  TBili  0.6  /  DBili  x   /  AST  60<H>  /  ALT  77<H>  /  AlkPhos  46  04-11      CULTURES:  Culture Results:   No Growth Final (04-01 @ 17:25)  Culture Results:   No Growth Final (04-01 @ 17:25)      Medications:  MEDICATIONS  (STANDING):  artificial  tears Solution 1 Drop(s) Both EYES two times a day  aspirin  chewable 81 milliGRAM(s) Enteral Tube daily  atorvastatin 80 milliGRAM(s) Oral daily  chlorhexidine 2% Cloths 1 Application(s) Topical daily  dextrose 50% Injectable 25 Gram(s) IV Push once  dextrose 50% Injectable 12.5 Gram(s) IV Push once  dextrose 50% Injectable 25 Gram(s) IV Push once  doxazosin 4 milliGRAM(s) Oral at bedtime  enoxaparin Injectable 40 milliGRAM(s) SubCutaneous every 24 hours  insulin lispro (ADMELOG) corrective regimen sliding scale   SubCutaneous every 6 hours  lisinopril 10 milliGRAM(s) Oral daily  melatonin 5 milliGRAM(s) Oral at bedtime  pantoprazole  Injectable 40 milliGRAM(s) IV Push daily  petrolatum Ophthalmic Ointment 1 Application(s) Right EYE every 12 hours  polyethylene glycol 3350 17 Gram(s) Oral daily  senna Syrup 10 milliLiter(s) Oral at bedtime  sodium chloride 0.9%. 1000 milliLiter(s) (75 mL/Hr) IV Continuous <Continuous>  ticagrelor 90 milliGRAM(s) Enteral Tube every 12 hours    MEDICATIONS  (PRN):  acetaminophen   Oral Liquid .. 650 milliGRAM(s) Oral every 6 hours PRN Moderate Pain (4 - 6)  hydrALAZINE Injectable 10 milliGRAM(s) IV Push every 4 hours PRN SBP >160      RADIOLOGY & ADDITIONAL STUDIES:  No new neuro IR studies to review

## 2023-04-11 NOTE — DISCHARGE NOTE PROVIDER - HOSPITAL COURSE
58 year old male with HTN, DM who presented from [facility] to [facility] as a code stroke for left sided weakness and slurred speech on 3/29, LKW the night prior so was outside of the window for TNK. He was intubated on prior to transfer for airway protection. He was found to have a right ICA occlusion and was taken to angio on arrival as a code biplane. He underwent a TICI 2b thrombectomy of the AYAKA and RMCA and AYAKA stent placement. CT head post procedure demonstrated new MLS with some hemorrhagic conversion, neurosurgery was consulted however no intervention was required. His mental status improved following the thrombectomy and he was able to be extubated on 3/31 without complications. MRI Brain 4-07-23 showed improved midline shift, noted small frontal parietal SAH.  Hypercoagulable work up sent.  Hospital course was complicated by aspiration pneumonia s/p antibiotics zosyn iv.  Dysphagia requiring PEG placement on 4/6. He was maintained on a cangrelor drip from 3/29 to 4/7 for stent patency and now remains on Brilinta. He remained undergoing a stroke work-up which has been notable for elevated Hgb A1c and LDL, cardiology was consulted and recommend outpatient follow-up. Patient had CT chest noted with lower lobe airway opacities, recently treated with IV antibiotics for aspiration pneumonia.  Recommend follow up CT in 4 weeks. Patient had CT A/P which showed inflammation around peg tube.  Patient clinically without symptoms. Patient followed by S/S and diet slowly advanced, tolerating PO.  Patient followed by PT and recommended Acute rehab.  Patient stable for discharge to acute rehab with outpatient follow up with primary doctor, neurology, Neuro IR, cardiology, and GI.      Vital Signs Last 24 Hrs  T(C): 36.6 (11 Apr 2023 07:30), Max: 36.8 (10 Apr 2023 20:14)  T(F): 97.8 (11 Apr 2023 07:30), Max: 98.2 (10 Apr 2023 20:14)  HR: 78 (11 Apr 2023 07:30) (78 - 92)  BP: 139/87 (11 Apr 2023 07:30) (126/88 - 139/87)  BP(mean): --  RR: 18 (11 Apr 2023 07:30) (18 - 18)  SpO2: 94% (11 Apr 2023 07:30) (92% - 96%)    Parameters below as of 11 Apr 2023 07:40  Patient On (Oxygen Delivery Method): room air    PHYSICAL EXAM:  GENERAL: NAD  HEAD:  Atraumatic, Normocephalic  NECK: Supple, No JVD, Normal thyroid  NERVOUS SYSTEM:  Alert & Oriented X3, Good concentration; left facial droop, LUE weakness  CHEST/LUNG: Clear to auscultation bilaterally  HEART: Regular rate and rhythm; No murmurs, rubs, or gallops  ABDOMEN: Soft, Nontender, Nondistended; Bowel sounds present  EXTREMITIES:  2+ Peripheral Pulses, No clubbing, cyanosis, or edema

## 2023-04-11 NOTE — DISCHARGE NOTE PROVIDER - CARE PROVIDERS DIRECT ADDRESSES
,[email or web address]

## 2023-04-11 NOTE — PROGRESS NOTE ADULT - SUBJECTIVE AND OBJECTIVE BOX
Patient very fatigued this AM.  Reports no pain.     FUNCTIONAL PROGRESS  4/10 SLP  Speech Language Pathology Recommendations: 1. Modify diet to: Easy to chew solids, thin fluids, as tolerated  2. 1:1 assist/supervision w/ PO 3. STRICT aspiration precautions 4. Upright for PO, slow rate, small bites/sips, alternate solids/liquids 5. Oral care6. SLP to follow to check PO tolerance & for speech tx, as schedule permits     4/7 PT  Bed/Chair Transfer Safety Analysis:     · Impairments Contributing to Impaired Transfers	impaired balance; impaired postural control; decreased strength  · Transfer Safety Concerns Noted	decreased sequencing ability; decreased weight-shifting ability    Transfer: Sit to Stand:     · Level of [place]	maximum assist (25% patients effort)  · Physical Assist/Nonphysical Assist	2 person assist  · Weight-Bearing Restrictions	weight-bearing as tolerated  · Assistive Device	bilateral hand held assist    Transfer: Stand to Sit:     · Level of [place]	maximum assist (25% patients effort)  · Physical Assist/Nonphysical Assist	2 person assist  · Weight-Bearing Restrictions	weight-bearing as tolerated  · Assistive Device	bilateral hand held assist    Sit/Stand Transfer Safety Analysis:     · Transfer Safety Concerns Noted	decreased weight-shifting ability  · Impairments Contributing to Impaired Transfers	impaired balance; decreased strength; impaired postural control    Gait Skills:     · Level of [place]	bed to chair only    Stair Negotiation:     · Level of [place]	unable to perform    4/7 OT  Bathing Training:     · Level of [place]	maximum assist (25% patients effort)    Upper Body Dressing Training:     · Level of [place]	moderate assist (50% patients effort)    Lower Body Dressing Training:     · Level of [place]	maximum assist (25% patients effort)    Toilet Hygiene Training:     · Level of [place]	maximum assist (25% patients effort)    Grooming Training:     · Level of [place]	moderate assist (50% patients effort)    Eating/Self-Feeding Training:     · Level of [place]	N/A    VITALS  T(C): 36.6 (04-11-23 @ 07:30), Max: 36.8 (04-10-23 @ 20:14)  HR: 78 (04-11-23 @ 07:30) (78 - 92)  BP: 139/87 (04-11-23 @ 07:30) (126/88 - 139/87)  RR: 18 (04-11-23 @ 07:30) (18 - 18)  SpO2: 94% (04-11-23 @ 07:30) (92% - 96%)  Wt(kg): --    MEDICATIONS   acetaminophen   Oral Liquid .. 650 milliGRAM(s) every 6 hours PRN  artificial  tears Solution 1 Drop(s) two times a day  aspirin  chewable 81 milliGRAM(s) daily  atorvastatin 80 milliGRAM(s) daily  chlorhexidine 2% Cloths 1 Application(s) daily  dextrose 50% Injectable 25 Gram(s) once  dextrose 50% Injectable 12.5 Gram(s) once  dextrose 50% Injectable 25 Gram(s) once  doxazosin 4 milliGRAM(s) at bedtime  enoxaparin Injectable 40 milliGRAM(s) every 24 hours  hydrALAZINE Injectable 10 milliGRAM(s) every 4 hours PRN  insulin lispro (ADMELOG) corrective regimen sliding scale   every 6 hours  lisinopril 10 milliGRAM(s) daily  melatonin 5 milliGRAM(s) at bedtime  pantoprazole  Injectable 40 milliGRAM(s) daily  petrolatum Ophthalmic Ointment 1 Application(s) every 12 hours  polyethylene glycol 3350 17 Gram(s) daily  potassium chloride    Tablet ER 40 milliEquivalent(s) once  senna Syrup 10 milliLiter(s) at bedtime  sodium chloride 0.9%. 1000 milliLiter(s) <Continuous>  ticagrelor 90 milliGRAM(s) every 12 hours      RECENT LABS/IMAGING  - Reviewed                        11.0   8.17  )-----------( 383      ( 11 Apr 2023 04:30 )             33.0     04-11    140  |  106  |  7.7<L>  ----------------------------<  115<H>  3.3<L>   |  23.0  |  0.41<L>    Ca    8.4      11 Apr 2023 04:30    TPro  6.2<L>  /  Alb  3.2<L>  /  TBili  0.6  /  DBili  x   /  AST  60<H>  /  ALT  77<H>  /  AlkPhos  46  04-11              3/29 CT head- 72 mL area of core infarct in the right MCA territory. 108 cc penumbra in the right MCA territory.    3/30 CT head-Compared with 3/29/2023 there is decreasing retained contrast in the right basal ganglia and right frontal parietal cortex with evolving lucency consistent with an acute right artery infarct. Residual high density although decreased compared to the prior exam may represent retained contrast as well as a small amount of hemorrhage. No increased   hemorrhage identified.    4/1 Carotid doppler- No significant hemodynamic stenosis of the right internal carotid artery.Patent right ICA stent.  Over 70% left internal carotid artery stenosis.Bilateral external carotid artery stenosis.    Measurement of carotid stenosis is based on velocity parameters that   correlate the residual internal carotid diameter with that of the more   distal vessel in accordance with a method such as the [place]   Symptomatic Carotid Endarterectomy Trial (NASCET).    4/2 CT head-Unchanged hemorrhagic RIGHT MCA infarction with effacement of the RIGHT lateral ventricle and 6 mm subfalcine herniation to the LEFT.    4/4 CT head-Stable acute/subacute large right MCA territory infarction with stable hemorrhagic transformation in the right basal ganglia and right temporal lobe. Stable mass effect and right-to-left midline shift measuring 6.7 mm. No new hemorrhage. Moderate adjacent cytotoxic edema, unchanged.    4/4 EEG-This is an abnormal study indicative of a focal cortical dysfunction in the right hemisphere, consistent with history of R MCA infarction. No epileptiform abnormalities or seizures were seen.    4/7 MRI head-Large subacute right MCA infarct with hemorrhagic transformation in the right basal ganglia and right temporal lobe. Mildly improving 5 mm right-to-left midline shift.FLAIR hyperintense signal along the right frontal parietal temporal sulci   suggesting small subarachnoid hemorrhage.    4/8 CT chest/abdomen- Inflammation surrounding the gastrostomy tube concerning for infection. Right greater than left lower lobe airspace opacities in the lungs of uncertain etiology, possibly infectious. A 1 month follow-up chest CT recommended. Right lower lobe mucous plugging. Indeterminate 11 mm right adrenal nodule.    ----------------------------------------------------------------------------------------  PHYSICAL EXAM  Constitutional - NAD, Fatigued  Abdomen - +PEG  Extremities - Left UE swelling/Splint  Neurologic Exam -                    Cognitive - AAox self, PART situation     Communication - Fluent     Cranial Nerves - Left neglect, left visual field deficit     Motor - Left hemiparesis  Psychiatric - Fatigued  ----------------------------------------------------------------------------------------  ASSESSMENT/PLAN  58yMale with functional deficits after an acute CVA  Acute right MCA s/p mechanical thrombectomy/carotid stent & angioplasty - ASA, Lipitor, Brilinta  Left hemiparesis - Resting hand splint, PRAFO  HTN - Hydralazine, Lisinopril  Pain - Tylenol  Sleep - Melatonin   Oropharyngeal Dysphagia s/p PEG - Easy Chew  DVT PPX - SCDs, Lovenox  Rehab/Impaired mobility and function - Based on the patient's diagnosis, functional status and potential for progress, continue to recommend ACUTE inpatient rehabilitation for the functional deficits consisting of 3 hours of therapy/day & 24 hour RN/daily PMR physician for comorbid medical management. Patient will be able to tolerate 3 hours a day.    Will continue to follow. Rehab recommendations are dependent on how functional progress changes as well as how patient continues to participate and tolerate therapeutic interventions, which may change. Recommend ongoing mobilization by staff to maintain cardiopulmonary function and prevention of secondary complications related to debility. Discussed the specific management and recommendations above with rehab clinical care team/rehab liaison.

## 2023-04-11 NOTE — H&P ADULT - NSHPLABSRESULTS_GEN_ALL_CORE
LABS:                        11.0   8.17  )-----------( 383      ( 11 Apr 2023 04:30 )             33.0     04-11    140  |  106  |  7.7<L>  ----------------------------<  115<H>  3.3<L>   |  23.0  |  0.41<L>    Ca    8.4      11 Apr 2023 04:30    TPro  6.2<L>  /  Alb  3.2<L>  /  TBili  0.6  /  DBili  x   /  AST  60<H>  /  ALT  77<H>  /  AlkPhos  46  04-11    IMAGING/RADIOLOGY:  CT CHEST/ CT AP (4/8)   IMPRESSION: Inflammation surrounding the gastrostomy tube concerning for infection. Right greater than left lower lobe airspace opacities in the lungs of uncertain etiology, possibly infectious. A 1 month follow-up chest CT recommended. Right lower lobe mucous plugging. Indeterminate 11 mm right adrenal nodule.    MR HEAD (4/7)  IMPRESSION:  Large subacute right MCA infarct with hemorrhagic transformation in the right basal ganglia and right temporal lobe. Mildly improving 5 mm right-to-left midline shift.  FLAIR hyperintense signal along the right frontal parietal temporal sulci suggesting small subarachnoid hemorrhage.    ABDOMINAL XRAY (4/5)  IMPRESSION: Small bowel obstruction.    CT HEAD (4/4)   IMPRESSION: Stable acute/subacute large right MCA territory infarction with stable hemorrhagic transformation in the right basal ganglia and right temporal lobe. Stable mass effect and right-to-left midline shift measuring 6.7 mm. No new hemorrhage. Moderate adjacent cytotoxic edema, unchanged.    CT HEAD (4/2)   IMPRESSION:   Unchanged hemorrhagic RIGHT MCA infarction with effacement  of the RIGHT lateral ventricle and 6 mm subfalcine herniation to the LEFT.    CAROTID DOPPLER (4/1)   IMPRESSION: No significant hemodynamic stenosis of the right internal carotid artery. Patent right ICA stent. Over 70% left internal carotid artery stenosis. Bilateral external carotid artery stenosis.    CT HEAD (3/30)   IMPRESSION: Compared with 3/29/2023 there is decreasing retained contrast in the right basal ganglia and right frontal parietal cortex with evolving lucency consistent with an acute right artery infarct. Residual high density although decreased compared to the prior exam may represent retained contrast as well as a small amount of hemorrhage. No increased hemorrhage identified.    CT HEAD (3/29)  IMPRESSION: New increased density in the right basal ganglia right frontal temporal cortex and right caudate head since 9:02 AM likely representing retained contrast in the right middle cerebral artery infarct although a small amount of hemorrhage is not excluded. There is new mass effect on the right frontal horn and new minimal midline shift.    CT PERFUSION MAPPING (3/29)  IMPRESSION: 72 mL area of core infarct in the right MCA territory. 108 cc penumbra in the right MCA territory.      CARDIOLOGY:  TTE (3/29)  Summary:   1. Left ventricular ejection fraction, by visual estimation, is >75%.   2. Technically difficult study.   3. Hyperdynamic global left ventricular systolic function.   4. The left ventricular diastolic function could not be assessed in this   study.   5. There is mild concentric left ventricular hypertrophy.   6. There is no evidence of pericardial effusion.   7. Mild mitral annular calcification.   8. Trace mitral valve regurgitation.

## 2023-04-11 NOTE — H&P ADULT - NSHPREVIEWOFSYSTEMS_GEN_ALL_CORE
REVIEW OF SYSTEMS  Constitutional: No fever, No Chills, + fatigue  HEENT: No eye pain, + visual disturbances, No difficulty hearing  Pulm: No cough,  No shortness of breath  Cardio: No chest pain, No palpitations  GI:  + abdominal pain to PEG site, No nausea, No vomiting, No diarrhea, No constipation  : No dysuria, No frequency, No hematuria  Neuro: No headaches, No memory loss, + loss of strength, no numbness, No tremors  Skin: No itching, No rashes, No lesions   Endo: No temperature intolerance  MSK: No joint pain, No joint swelling, No muscle pain, No Neck pain,  No back pain  Psych:  No depression, No anxiety

## 2023-04-11 NOTE — PATIENT PROFILE ADULT - FUNCTIONAL ASSESSMENT - BASIC MOBILITY 2.
CC: Anemia, LE wounds.     HPI: 80 year old male with history of CAD s/p stents (LAD, RCA bare metal around 9/16), A.Fib not on anticoagulation due to GI bleeding, Hypertension, COPD on home O2 2L, SHAYY on nocturnal BIPAP, ex-smoker (smoked 1ppd X 50 years, quit 22 years ago),  Chronic diastolic CHF, Hyperlipidemia, PVD, Iron deficiency anemia, Chronic back pain, Gout, BPH, CKD III with baseline Cr 2, recently admitted to  in 4/17 with anemia of GI bleeding, RLE and RUE DVTs, received pRBCs and IVC filter discharged to rehab with aspirin was sent to  ER on 5/4/17 for worsening anemia with Hb 6, worsening leg pain from ulcers and worsening renal function Cr 3.99. In ED, + guaiac.     Hospital course: prolonged including stabilization of suspected GIB with RBC transfusion and PPI BID. EGD on hold given ongoing LE wounds and angioplasty this admission. Patient underwent RLE stent and angioplasty with Vascular and prolonged IV Cefepime for wounds. HD stable.     5/15/17: NAD and willing to sit up in chair. No CP or SOB. Better kidney function today. Will pull hoffman and attempt voiding trial.     5/16/17: Seen earlier with daughter at bedside. Pain overnight improved w/ small dose of IV Dilaudid. Hoffman pulled and voiding. No CP, stable dyspnea.     ROS: stated above.     Vital Signs Last 24 Hrs  T(C): 37.2, Max: 37.2 (05-15 @ 23:23)  T(F): 98.9, Max: 98.9 (05-15 @ 23:23)  HR: 85 (73 - 101)  BP: 149/41 (121/42 - 157/55)  BP(mean): 69 (60 - 83)  RR: 18 (14 - 24)  SpO2: 99% (91% - 100%)    Physical exam:  Appears chronically ill but in NAD, awake and alert.   HEENT: PERRLA  Lungs- decrease air entry at bases, no active wheezing.   S1S2, normal rhthym  Abd- soft, NT, BS+, obese and mildly distended.   Ext- dressing both legs,. + 2 LE edema. Reviewed pics with daughter. Black ischar to dorsal surface.   : + scrotal edema now voiding w/o hoffman  Neuro- AAOx3    LABS:                        10.8   x     )-----------( x        ( 16 May 2017 07:48 )             34.6                                               10.2   6.4   )-----------( 302      ( 15 May 2017 05:19 )             33.5   05-15    148<H>  |  120<H>  |  80<H>  ----------------------------<  97  4.7   |  19<L>  |  1.49<H>    Ca    7.9<L>      15 May 2017 05:19      148<H>  |  120<H>  |  80<H>  ----------------------------<  97  4.7   |  19<L>  |  1.49<H>    Ca    7.9<L>      15 May 2017 05:19      5/14 CXR: Since May 12, 2017, unchanged small left pleural effusion with basilar   atelectasis.    MEDICATIONS  (STANDING):  buDESOnide   0.5 milliGRAM(s) Respule 0.5milliGRAM(s) Inhalation two times a day  doxazosin 8milliGRAM(s) Oral at bedtime  isosorbide   mononitrate ER Tablet (IMDUR) 120milliGRAM(s) Oral daily  diltiazem   CD 120milliGRAM(s) Oral daily  gabapentin 300milliGRAM(s) Oral daily  allopurinol 100milliGRAM(s) Oral daily  fenofibrate Tablet 145milliGRAM(s) Oral daily  simvastatin 10milliGRAM(s) Oral at bedtime  pramipexole 0.125milliGRAM(s) Oral three times a day  ALBUTerol    90 MICROgram(s) HFA Inhaler 2Puff(s) Inhalation every 6 hours  ammonium lactate 12% Lotion 1Application(s) Topical two times a day  fluticasone propionate 50 MICROgram(s)/spray Nasal Spray 1Spray(s) Both Nostrils two times a day  metoprolol 12.5milliGRAM(s) Oral two times a day  cefepime  IVPB 1000milliGRAM(s) IV Intermittent every 24 hours  hydrALAZINE 150milliGRAM(s) Oral two times a day  lactobacillus acidophilus 1Tablet(s) Oral daily  gabapentin 600milliGRAM(s) Oral at bedtime  pantoprazole  Injectable 40milliGRAM(s) IV Push every 12 hours  furosemide   Injectable 40milliGRAM(s) IV Push daily        Assessment/Plan:   This is an 80 year old male with history of CAD s/p stents (LAD, RCA bare metal around 9/16), A.Fib not on anticoagulation due to GI bleeding, Hypertension, COPD on home O2 2L,   Chronic diastolic CHF, Hyperlipidemia, PVD, CKD III with baseline Cr 2 admitted for anemia and GIB now stable with ongoing LE wounds s/p angioplasty with vascular surgery on abx:    #Severe PAD.   - 05/11 endarterectomy of CFA plaque in an attempt of limb preservation.  05/08 angiogram + angioplasy.  stenting of right common iliac + external iliac arteries.  statin.   Further plan as per DR Clement.  - vascular to decide on need for further intervention this week.   - increase activity today, PT consult, non-weightbearing on RLE for pain control and f/u Vascular recs.   - add IV Dilaudid for pain control.     #chronic right LE stasis ulcers, dermatitis w/ cellulitis.    - completing IV Cefepime day #13 today. Discussed with ID. No signs of sepsis.     #GIB/acute blood loss anemia.  s/p 5 units PRBCs. No plans for EGD given ongoing resp issues on chronic O2.   Cont PPI BID per GI. Hb ~10.   GI f/u appreciated  - daily CBC. To decide on starting antiplatelet therapy in the setting of LE stent.     #pulmonary vascular congestion and LE edema.   # Scrotal Edema  - voiding, hoffman out on 5/15.   - rales resolved on PE, on IV lasix daily. Repeat CXR and appreciate pulm recs.   - incentive spirometer post op.       #ARYA upon CKD.  Cr improved, back on Lasix.    Nephrology following.    #multiple DVT RUE/RLL.  s/p IVC Filter.    DVT ppx: no pharmacological ppx 2/2 GIB, no venodynes 2/2 LLE wounds. Will start mobilization as tolerated.     Dispo: stable for Med-surg transfer. No arrthymias on telemetry.     Total time 40 mins. Discussed with daughter at length and ID. 1 = Total assistance

## 2023-04-11 NOTE — PATIENT PROFILE ADULT - FALL HARM RISK - HARM RISK INTERVENTIONS
Communicate Risk of Fall with Harm to all staff/Reinforce activity limits and safety measures with patient and family/Tailored Fall Risk Interventions/Visual Cue: Yellow wristband and red socks/Bed in lowest position, wheels locked, appropriate side rails in place/Call bell, personal items and telephone in reach/Instruct patient to call for assistance before getting out of bed or chair/Non-slip footwear when patient is out of bed/[place] to call system/Physically safe environment - no spills, clutter or unnecessary equipment/Purposeful Proactive Rounding/Room/bathroom lighting operational, light cord in reach Assistance with ambulation/Assistance OOB with selected safe patient handling equipment/Communicate Risk of Fall with Harm to all staff/Reinforce activity limits and safety measures with patient and family/Tailored Fall Risk Interventions/Visual Cue: Yellow wristband and red socks/Bed in lowest position, wheels locked, appropriate side rails in place/Call bell, personal items and telephone in reach/Instruct patient to call for assistance before getting out of bed or chair/Non-slip footwear when patient is out of bed/[place] to call system/Physically safe environment - no spills, clutter or unnecessary equipment/Purposeful Proactive Rounding/Room/bathroom lighting operational, light cord in reach

## 2023-04-11 NOTE — DISCHARGE NOTE PROVIDER - NSDCMRMEDTOKEN_GEN_ALL_CORE_FT
acetaminophen 160 mg/5 mL oral suspension: 20.31 milliliter(s) orally every 6 hours as needed for Moderate Pain (4 - 6)  aspirin 81 mg oral tablet, chewable: 1 tab(s) orally once a day  atorvastatin 80 mg oral tablet: 1 tab(s) orally once a day  doxazosin 4 mg oral tablet: 1 tab(s) orally once a day (at bedtime)  lisinopril 10 mg oral tablet: 1 tab(s) orally once a day  ocular lubricant ophthalmic ointment: 1 application to each affected eye every 12 hours  ocular lubricant ophthalmic solution: 1 drop(s) to each affected eye 2 times a day  pantoprazole 40 mg oral delayed release tablet: 1 tab(s) orally once a day  polyethylene glycol 3350 oral powder for reconstitution: 17 gram(s) orally once a day  senna (sennosides) 8.8 mg/5 mL oral syrup: 10 milliliter(s) orally once a day (at bedtime)  ticagrelor 90 mg oral tablet: 1 tab(s) orally every 12 hours

## 2023-04-11 NOTE — H&P ADULT - NSHPSOCIALHISTORY_GEN_ALL_CORE
SOCIAL HISTORY  Smoking - Denied  EtOH - Denied   Drugs - Denied    FUNCTIONAL HISTORY  Pt lives in a basement apartment with his spouse and 2 sons. Pt has another son nearby that is supportive. 12 Steps down to enter with a handrail, no steps inside. Pt was independent PTA without an assist device. Pt owns no DME.      CURRENT FUNCTIONAL STATUS  - Bed Mobility: Dependent, 2 pr A   - Transfers: Max A, 2 pr A  - Gait: Unable to perform  - ADLs: Mod-max A

## 2023-04-11 NOTE — DISCHARGE NOTE PROVIDER - CARE PROVIDER_API CALL
Bev Song (MD)  Cardiology; Internal Medicine  [address], [address]  [place], [place] [address]  Phone: [phone]  Fax: [phone]  Follow Up Time:     Ivan Reid; PhD)  Neurology; Vascular Neurology  [address], [address]  [place] [address]  Phone: [phone]  Fax: [phone]  Follow Up Time:     Primary doctor,   Phone: (   )    -  Fax: (   )    -  Follow Up Time:     Sanchez Allen)  Neurology; Vascular Neurology  [address] [place] [address]  Phone: [phone]  Fax: [phone]  Follow Up Time:     Lily Moon (DO)  Gastroenterology  [address], [address]  [place] [address]  Phone: [phone]  Fax: [phone]  Follow Up Time:

## 2023-04-11 NOTE — PROGRESS NOTE ADULT - PROVIDER SPECIALTY LIST ADULT
Brain Injury Medicine
Brain Injury Medicine
NSICU
Neurology
Neurosurgery
Orthopedics
Cardiology
Intervent Radiology
NSICU
Neurology
Neurology
Neurosurgery
Hospitalist
Intervent Radiology
Intervent Radiology
NSICU
Neurology
Neurology
Neurosurgery
Hospitalist
Hospitalist
Intervent Radiology
Neurology
Gastroenterology
NSICU

## 2023-04-11 NOTE — PROGRESS NOTE ADULT - NUTRITIONAL ASSESSMENT
This patient has been assessed with a concern for Malnutrition and has been determined to have a diagnosis/diagnoses of Moderate protein-calorie malnutrition.    This patient is being managed with:   Diet NPO with Tube Feed-  Tube Feeding Modality: Gastrostomy  Glucerna 1.5 Gene (GLUCERNA1.5)  Total Volume for 24 Hours (mL): 200  Continuous  Starting Tube Feed Rate {mL per Hour}: 10     Every 4 hours  Until Goal Tube Feed Rate (mL per Hour): 10  Tube Feed Duration (in Hours): 20  Tube Feed Start Time: 14:00  Entered: Apr 8 2023  1:58PM  
PROCEDURE:  3/29 TICI 2B thrombectomy of AYAKA/MCA stroke, PPD 10   4/6: S/P PEG POD 2    This patient has been assessed with a concern for Malnutrition and has been determined to have a diagnosis/diagnoses of Moderate protein-calorie malnutrition.    This patient is being managed with:   Diet NPO with Tube Feed-  Tube Feeding Modality: Gastrostomy  Glucerna 1.5 Gene (GLUCERNA1.5)  Total Volume for 24 Hours (mL): 200  Continuous  Starting Tube Feed Rate {mL per Hour}: 10     Every 4 hours  Until Goal Tube Feed Rate (mL per Hour): 10  Tube Feed Duration (in Hours): 20  Tube Feed Start Time: 14:00  Entered: Apr 8 2023  1:58PM
This patient has been assessed with a concern for Malnutrition and has been determined to have a diagnosis/diagnoses of Moderate protein-calorie malnutrition.    This patient is being managed with:   Diet DASH/TLC-  Sodium & Cholesterol Restricted  Easy to Chew (EASYTOCHEW)  Entered: Apr 10 2023  3:45PM  
This patient has been assessed with a concern for Malnutrition and has been determined to have a diagnosis/diagnoses of Moderate protein-calorie malnutrition.    This patient is being managed with:   Diet NPO with Tube Feed-  Tube Feeding Modality: Gastrostomy  Glucerna 1.5 Gene (GLUCERNA1.5)  Total Volume for 24 Hours (mL): 200  Continuous  Starting Tube Feed Rate {mL per Hour}: 10     Every 4 hours  Until Goal Tube Feed Rate (mL per Hour): 10  Tube Feed Duration (in Hours): 20  Tube Feed Start Time: 14:00  Entered: Apr 8 2023  1:58PM  
This patient has been assessed with a concern for Malnutrition and has been determined to have a diagnosis/diagnoses of Moderate protein-calorie malnutrition.    This patient is being managed with:   Diet NPO with Tube Feed-  Tube Feeding Modality: Nasogastric  Glucerna 1.5 Gene (GLUCERNA1.5)  Total Volume for 24 Hours (mL): 1200  Continuous  Starting Tube Feed Rate {mL per Hour}: 10  Increase Tube Feed Rate by (mL): 10     Every 4 hours  Until Goal Tube Feed Rate (mL per Hour): 50  Tube Feed Duration (in Hours): 24  Tube Feed Start Time: 12:45  Entered: Apr 4 2023  5:31PM  
This patient has been assessed with a concern for Malnutrition and has been determined to have a diagnosis/diagnoses of Moderate protein-calorie malnutrition.    This patient is being managed with:   Diet NPO-  Entered: Apr 5 2023 11:28AM  
This patient has been assessed with a concern for Malnutrition and has been determined to have a diagnosis/diagnoses of Moderate protein-calorie malnutrition.    This patient is being managed with:   Diet Pureed-  Entered: Apr 9 2023  2:18PM  
This patient has been assessed with a concern for Malnutrition and has been determined to have a diagnosis/diagnoses of Moderate protein-calorie malnutrition.    This patient is being managed with:   Diet NPO after Midnight-     NPO Start Date: 05-Apr-2023   NPO Start Time: 23:59  Except Medications  Entered: Apr 5 2023  1:12PM    Diet NPO-  Entered: Apr 5 2023 11:28AM  
This patient has been assessed with a concern for Malnutrition and has been determined to have a diagnosis/diagnoses of Moderate protein-calorie malnutrition.    This patient is being managed with:   Diet NPO-  Entered: Apr 5 2023 11:28AM  
This patient has been assessed with a concern for Malnutrition and has been determined to have a diagnosis/diagnoses of Moderate protein-calorie malnutrition.    This patient is being managed with:   Diet NPO after Midnight-     NPO Start Date: 05-Apr-2023   NPO Start Time: 23:59  Except Medications  Entered: Apr 5 2023  1:12PM    Diet NPO-  Entered: Apr 5 2023 11:28AM  
This patient has been assessed with a concern for Malnutrition and has been determined to have a diagnosis/diagnoses of Moderate protein-calorie malnutrition.    This patient is being managed with:   Diet NPO after Midnight-     NPO Start Date: 05-Apr-2023   NPO Start Time: 23:59  Except Medications  Entered: Apr 5 2023  1:12PM    Diet NPO-  Entered: Apr 5 2023 11:28AM  
This patient has been assessed with a concern for Malnutrition and has been determined to have a diagnosis/diagnoses of Moderate protein-calorie malnutrition.    This patient is being managed with:   Diet Pureed-  Entered: Apr 9 2023  2:18PM  
This patient has been assessed with a concern for Malnutrition and has been determined to have a diagnosis/diagnoses of Moderate protein-calorie malnutrition.    This patient is being managed with:   Diet NPO after Midnight-     NPO Start Date: 05-Apr-2023   NPO Start Time: 23:59  Except Medications  Entered: Apr 5 2023  1:12PM    Diet NPO-  Entered: Apr 5 2023 11:28AM

## 2023-04-11 NOTE — H&P ADULT - ATTENDING COMMENTS
Seen and examined with NP     58 year old male, HTN, and DM;, Initial acute care presentation to [facility] with left sided weakness on waking up in the morning, intubated prior to T/F   [facility]  3/29, Dx  R ICA occlusion s/p  Thromectomy of R ICA and R MCA with R ICA stent placement.. Had subsequent . His brain MRI was significant small frontal parietal SAH., Dysphagia requiring PEG feeding and Pneumonia s/p antibiotic treatment, On DVT ppx with lovenox. Acute Rehab admission 4/11    Community dwelling, , worked till admission to acute care, independent with ADLs/IADLs, no device use  Now requiring mod assistance for transfers, has left hemiparesis  Reports being continent bowel/bladder, but has impaired motor function due to left sided weakness  Unable to use urinal    Orthostatic Hypotension noted   Alert oriented but fatigue  Dysarthric  Tongue deviating to left,, mildly flat Rt nasolabial fold  LT sensation equal  Chest--clear   Abd--PEG in situ  Ext--no edema, dry skin both feet    MMT--Left side flaccid  Rt antigravity  Reflexes--reduced on right patella, absent on left side   Plantar response withdrawal b/l    RECENT LABS/IMAGING                        11.5   8.34  )-----------( 405      ( 12 Apr 2023 06:20 )             34.8     04-12    141  |  104  |  11  ----------------------------<  131<H>  3.7   |  26  |  0.78    Ca    8.6      12 Apr 2023 06:20    TPro  6.5  /  Alb  2.6<L>  /  TBili  0.6  /  DBili  x   /  AST  46<H>  /  ALT  96<H>  /  AlkPhos  52  04-12      Dx; CVA--with left sided weakness  Right Carotid stenosis/Rt MCA infarct S/P endovascular Thrombectomy   Subsequent SAH  Commence PT/OT/S,LP  Continue Antiplatelet  Precautions--Fall, Aspiration,     DVT ppx--lovenox  Continue management of comorbid conditions in liaison with hospitalist  [place] cath, for bladder drainage  Collateral hx from family  Est dc to be decided at next IDT

## 2023-04-11 NOTE — PROGRESS NOTE ADULT - ASSESSMENT
ASSESSMENT: 58y Male with PMH HTN and DM, found by his family 0500 AM on 3/29/23 with right hemiparesis, slurred speech, and incontinent of urine.  Last well known 2000  on 3/28/23. Patient brought in by EMS to the [facility] at 0556, was noted not to be a candidate for Tenecteplase as patient out of time window. Patient was transferred to [facility] for thrombectomy due to right ICA occlusion with tandem right MCA occlusion. NIHSS 28 MRS pre - 0 . Etiology likely large artery atherosclerotic disease. Post mechanical thrombectomy TICI 2B and Right carotid stent with angioplasty. Small right frontal parietal SAH.     NEURO: neurologically with increased movement in LUE overall more awake and interactive,  -   MRI Brain 4-07-23 -images and reports were reviewed shows Improved midline shift, noted small frontal parietal SAH, Continue close monitoring for neurologic deterioration given cerebral edema with mass effect and brain compression,  stroke neuro checks ,  Na goal gradually of 140-145 for now- avoid hyponatremia and rapid fluctuations,  permissive HTN as per post thrombectomy recommendations 110-140mmHg in setting of recent revascularization/hemorrhagic transformation as to avoid hypo/hyperperfusion injury. Titrate statin to LDL goal less than 70, EEG without evidence of seizure. Carotid duplex as noted- close monitoring of left ICA stenosis for eventual consideration in elective revascularization.   - Physical therapy/OT/Speech eval/treatment.     ANTITHROMBOTIC THERAPY: ASA 81mg daily, and Brilinta 90 BID.    PULMONARY:  aspiration precautions, incentive spirometry as tolerated , CT chest noted lower lobe airway opacities- monitor for infection, follow up 4 week CT chest for further evaluation     CARDIOVASCULAR: cardiac monitoring to screen for atrial fibrillation,     GASTROINTESTINAL: GI follow up for noted gastrostomy tube inflammation    On pureed diet.    RENAL: BUN/Cr without acute change, maintain adequate hydration,  monitor urine output, hypokalemia- replete as needed per primary team, trend daily      Na Goal: 140-145, minimum 135     Quispe: Y    HEMATOLOGY: H/H with anemia, monitor for si/sx of bleeding, Platelets 383. Patient should have all age and risk appropriate malignancy screening.      DVT ppx      ID: previously febrile., leukocytosis resolved, post 7 day course of zosyn.   aspiration precautions     OTHER:   A1C 7.3- continue glycemic control, follow up for CT CAP findings with PCP including but not limited to adrenal nodule.     DISPOSITION: Rehab depending on PT eval once stable and workup is complete    CORE MEASURES:        Admission NIHSS: 28     TPA: [] YES [x] NO      LDL/HDL/A1C: 165/58/7.3     Depression Screen: pending     Statin Therapy: pending     Dysphagia Screen: [] PASS [x] FAIL       Smoking [] YES [x] NO      Afib [] YES [x] NO     Stroke Education [x] YES [] NO       ASSESSMENT: 58y Male with PMH HTN and DM, found by his family 0500 AM on 3/29/23 with right hemiparesis, slurred speech, and incontinent of urine.  Last well known 2000  on 3/28/23. Patient brought in by EMS to the [facility] at 0556, was noted not to be a candidate for Tenecteplase as patient out of time window. Patient was transferred to [facility] for thrombectomy due to right ICA occlusion with tandem right MCA occlusion. NIHSS 28 MRS pre - 0 . Etiology likely large artery atherosclerotic disease. Post mechanical thrombectomy TICI 2B and Right carotid stent with angioplasty. Small right frontal parietal SAH.     NEURO: neurologically with increased movement in LUE overall more awake and interactive,  -   MRI Brain 4-07-23 -images and reports were reviewed shows Improved midline shift, noted small frontal parietal SAH, Continue close monitoring for neurologic deterioration given cerebral edema with mass effect and brain compression,  stroke neuro checks ,  Na goal gradually of 140-145 for now- avoid hyponatremia and rapid fluctuations,  permissive HTN as per post thrombectomy recommendations 110-140mmHg in setting of recent revascularization/hemorrhagic transformation as to avoid hypo/hyperperfusion injury. Titrate statin to LDL goal less than 70, EEG without evidence of seizure. Carotid duplex as noted- close monitoring of left ICA stenosis for eventual consideration in elective revascularization.   - Physical therapy/OT/Speech eval/treatment.     ANTITHROMBOTIC THERAPY: ASA 81mg daily, and Brilinta 90 BID.    PULMONARY:  aspiration precautions, incentive spirometry as tolerated , CT chest noted lower lobe airway opacities- monitor for infection, follow up 4 week CT chest for further evaluation     CARDIOVASCULAR: cardiac monitoring to screen for atrial fibrillation,     GASTROINTESTINAL: GI follow up for noted gastrostomy tube inflammation    On pureed diet.    RENAL: BUN/Cr without acute change, maintain adequate hydration,  monitor urine output, hypokalemia- replete as needed per primary team, trend daily      Na Goal: 140-145, minimum 135     Quispe: Y    HEMATOLOGY: H/H with anemia, monitor for si/sx of bleeding, Platelets 383. Patient should have all age and risk appropriate malignancy screening.      DVT ppx      ID: previously febrile., leukocytosis resolved, post 7 day course of zosyn.   aspiration precautions     OTHER:   A1C 7.3- continue glycemic control, follow up for CT CAP findings with PCP including but not limited to adrenal nodule.     DISPOSITION: Rehab depending on PT eval once stable and workup is complete    CORE MEASURES:        Admission NIHSS: 28     TPA: [] YES [x] NO      LDL/HDL/A1C: 165/58/7.3     Depression Screen: pending     Statin Therapy: pending     Dysphagia Screen: [] PASS [x] FAIL       Smoking [] YES [x] NO      Afib [] YES [x] NO     Stroke Education [x] YES [] NO

## 2023-04-11 NOTE — DISCHARGE NOTE PROVIDER - NSDCCPCAREPLAN_GEN_ALL_CORE_FT
PRINCIPAL DISCHARGE DIAGNOSIS  Diagnosis: Acute cerebrovascular accident (CVA)  Assessment and Plan of Treatment: Follow up with primary doctor, neurology, Neuro IR, cardiology and GI.  Continue current medications as prescribed.      SECONDARY DISCHARGE DIAGNOSES  Diagnosis: Left carotid artery stenosis  Assessment and Plan of Treatment: Continue current medications as prescribed.  Follow up with primary doctor, neurology and neuro IR.    Diagnosis: Dysphagia  Assessment and Plan of Treatment: Continue current medications as prescribed.  Follow up with primary doctor and GI.

## 2023-04-11 NOTE — H&P ADULT - NSHPPHYSICALEXAM_GEN_ALL_CORE
PHYSICAL EXAM- limited, patient did participate fully 2/2 fatigue  VITALS  T(C): 36.8 (04-11-23 @ 19:58), Max: 37.1 (04-11-23 @ 15:15)  HR: 97 (04-11-23 @ 19:58) (78 - 97)  BP: 117/75 (04-11-23 @ 19:58) (117/75 - 139/87)  RR: 16 (04-11-23 @ 19:58) (16 - 18)  SpO2: 95% (04-11-23 @ 19:58) (92% - 95%)    Gen - NAD, Comfortable  HEENT - NCAT, EOMI, MMM, right  eye vision loss  Neck - Supple, No limited ROM  Pulm - CTAB, No wheeze, No rhonchi, No crackles  Cardiovascular - RRR, S1S2  Chest - good chest expansion, good respiratory effort  Abdomen - Soft, NT/ND, +BS, + PEG   Extremities - No Cyanosis, no clubbing, no edema, no calf tenderness  Neuro-     Cognitive - awake, alert, oriented to person, place, date, year, and situation.  Able  to follow command     Communication - subtle dysarthria     Attention: Intact, able to state days of week chronologically and backwards.      Memory: Recall 3 objects immediate and 3 min later     Cranial Nerves - Left facial weakness, left eye vision loss, absent left shoulder shrug      Motor -  Left hemiparesis                    LEFT    UE - ShAB 0/5, EF 0/5, EE 0/5,  0/5                    RIGHT UE - ShAB 5/5, EF 5/5, EE 5/5,   5/5                    LEFT    LE - not tested, patient requested to stop because he was tired. however, slight movement noted when turning patient                    RIGHT LE - HF 5/5, KE 5/5, DF 5/5, PF 5/5        Sensory - Intact to LT     Reflexes - DTR Intact     Coordination - FTN/HTS  impaired to left side     Tone - normal  Psychiatric - Mood stable, Affect WNL  Skin:  all skin intact

## 2023-04-11 NOTE — PROGRESS NOTE ADULT - ASSESSMENT
Assessment:  58M with PMH HTN, DM who presented with L sided weakness, found to have AYAKA/RMCA occlusions s/p thrombectomy TICI 2B recanalization and AYAKA stent on 3/29. Post op CTH showed contrast vs hemorrhage.   - PSD 13/14, POD 13  - Exam improving  - Passed for diet  - Plan for d/c to rehab       Plan:  - Discussed with Dr. Allen  - Continue Brilinta 90 BID, last P2Y12 was 65  - Continue ASA 81   - , on atorvastatin 80  - A1C 7.3%  - TTE performed  - Patient to follow up with Dr. Allen 1-2 weeks after discharge  - PA only visit

## 2023-04-11 NOTE — H&P ADULT - HISTORY OF PRESENT ILLNESS
Sakina Dowell is a 58 year old male with PMH of HTN, and DM; who presented as a transfer from [facility] to [facility] for stroke on 3/29. Last known normal was the night before and he was outside the TKN window, and was intubated prior to [facility] transfer. Imaging was significant for R ICA occlusion and was immediately taken to angio where he underwent a TICI 2b thrombectomy of the R ICA and R MCA, with R ICA stent placement.  Postoperative CT head demonstrated new MLS with some hemorrhagic conversion. Neurosurgery was consulted but deemed no required intervention.  He was extubated on 4/7. His brain MRI showed improved midline shift and subsequent small frontal parietal SAH.  His hospital course was complicated by aspiration PNA (s/p Zosyn); and dysphagia (requiring PEG - placed 4/6). He is on Brilinta for stent placement. PM&R was consulted and deemed him an appropriate candidate for IRF. He was medically stabilized and cleared for discharge to [facility].

## 2023-04-11 NOTE — DISCHARGE NOTE PROVIDER - PROVIDER TOKENS
PROVIDER:[TOKEN:[[id number]]],PROVIDER:[TOKEN:[[id number]]],FREE:[LAST:[Primary doctor],PHONE:[(   )    -],FAX:[(   )    -]],PROVIDER:[TOKEN:[[id number]]],PROVIDER:[TOKEN:[[id number]]]

## 2023-04-11 NOTE — PATIENT PROFILE ADULT - VISION (WITH CORRECTIVE LENSES IF THE PATIENT USUALLY WEARS THEM):
unable to assess intubated unable to see out of right eye/Severely impaired: cannot locate objects without hearing or touching them or patient nonresponsive.

## 2023-04-12 LAB
ALBUMIN SERPL ELPH-MCNC: 2.6 G/DL — LOW (ref 3.3–5)
ALP SERPL-CCNC: 52 U/L — SIGNIFICANT CHANGE UP (ref 40–120)
ALT FLD-CCNC: 96 U/L — HIGH (ref 10–45)
ANA TITR SER: NEGATIVE — SIGNIFICANT CHANGE UP
ANION GAP SERPL CALC-SCNC: 11 MMOL/L — SIGNIFICANT CHANGE UP (ref 5–17)
AST SERPL-CCNC: 46 U/L — HIGH (ref 10–40)
BILIRUB SERPL-MCNC: 0.6 MG/DL — SIGNIFICANT CHANGE UP (ref 0.2–1.2)
BUN SERPL-MCNC: 11 MG/DL — SIGNIFICANT CHANGE UP (ref 7–23)
CALCIUM SERPL-MCNC: 8.6 MG/DL — SIGNIFICANT CHANGE UP (ref 8.4–10.5)
CHLORIDE SERPL-SCNC: 104 MMOL/L — SIGNIFICANT CHANGE UP (ref 96–108)
CO2 SERPL-SCNC: 26 MMOL/L — SIGNIFICANT CHANGE UP (ref 22–31)
CREAT SERPL-MCNC: 0.78 MG/DL — SIGNIFICANT CHANGE UP (ref 0.5–1.3)
EGFR: 102 ML/MIN/1.73M2 — SIGNIFICANT CHANGE UP
GLUCOSE SERPL-MCNC: 131 MG/DL — HIGH (ref 70–99)
HCT VFR BLD CALC: 34.8 % — LOW (ref 39–50)
HGB BLD-MCNC: 11.5 G/DL — LOW (ref 13–17)
MCHC RBC-ENTMCNC: 27.1 PG — SIGNIFICANT CHANGE UP (ref 27–34)
MCHC RBC-ENTMCNC: 33 GM/DL — SIGNIFICANT CHANGE UP (ref 32–36)
MCV RBC AUTO: 82.1 FL — SIGNIFICANT CHANGE UP (ref 80–100)
NRBC # BLD: 0 /100 WBCS — SIGNIFICANT CHANGE UP (ref 0–0)
PLATELET # BLD AUTO: 405 K/UL — HIGH (ref 150–400)
POTASSIUM SERPL-MCNC: 3.7 MMOL/L — SIGNIFICANT CHANGE UP (ref 3.5–5.3)
POTASSIUM SERPL-SCNC: 3.7 MMOL/L — SIGNIFICANT CHANGE UP (ref 3.5–5.3)
PROT SERPL-MCNC: 6.5 G/DL — SIGNIFICANT CHANGE UP (ref 6–8.3)
RBC # BLD: 4.24 M/UL — SIGNIFICANT CHANGE UP (ref 4.2–5.8)
RBC # FLD: 13.6 % — SIGNIFICANT CHANGE UP (ref 10.3–14.5)
SARS-COV-2 RNA SPEC QL NAA+PROBE: SIGNIFICANT CHANGE UP
SODIUM SERPL-SCNC: 141 MMOL/L — SIGNIFICANT CHANGE UP (ref 135–145)
WBC # BLD: 8.34 K/UL — SIGNIFICANT CHANGE UP (ref 3.8–10.5)
WBC # FLD AUTO: 8.34 K/UL — SIGNIFICANT CHANGE UP (ref 3.8–10.5)

## 2023-04-12 PROCEDURE — 99223 1ST HOSP IP/OBS HIGH 75: CPT

## 2023-04-12 RX ORDER — PETROLATUM,WHITE
1 JELLY (GRAM) TOPICAL DAILY
Refills: 0 | Status: DISCONTINUED | OUTPATIENT
Start: 2023-04-12 | End: 2023-05-01

## 2023-04-12 RX ADMIN — Medication 1 DROP(S): at 05:22

## 2023-04-12 RX ADMIN — TICAGRELOR 90 MILLIGRAM(S): 90 TABLET ORAL at 17:55

## 2023-04-12 RX ADMIN — POLYETHYLENE GLYCOL 3350 17 GRAM(S): 17 POWDER, FOR SOLUTION ORAL at 11:55

## 2023-04-12 RX ADMIN — Medication 1 APPLICATION(S): at 18:38

## 2023-04-12 RX ADMIN — ATORVASTATIN CALCIUM 80 MILLIGRAM(S): 80 TABLET, FILM COATED ORAL at 21:41

## 2023-04-12 RX ADMIN — TICAGRELOR 90 MILLIGRAM(S): 90 TABLET ORAL at 05:39

## 2023-04-12 RX ADMIN — PANTOPRAZOLE SODIUM 40 MILLIGRAM(S): 20 TABLET, DELAYED RELEASE ORAL at 05:20

## 2023-04-12 RX ADMIN — Medication 4 MILLIGRAM(S): at 21:40

## 2023-04-12 RX ADMIN — ENOXAPARIN SODIUM 40 MILLIGRAM(S): 100 INJECTION SUBCUTANEOUS at 17:55

## 2023-04-12 RX ADMIN — Medication 81 MILLIGRAM(S): at 11:55

## 2023-04-12 RX ADMIN — Medication 1 APPLICATION(S): at 11:58

## 2023-04-12 RX ADMIN — Medication 1 DROP(S): at 17:56

## 2023-04-12 RX ADMIN — Medication 1 APPLICATION(S): at 05:22

## 2023-04-12 RX ADMIN — LISINOPRIL 10 MILLIGRAM(S): 2.5 TABLET ORAL at 05:22

## 2023-04-12 NOTE — DIETITIAN INITIAL EVALUATION ADULT - PERTINENT MEDS FT
MEDICATIONS  (STANDING):  AQUAPHOR (petrolatum Ointment) 1 Application(s) Topical daily  artificial  tears Solution 1 Drop(s) Both EYES two times a day  aspirin  chewable 81 milliGRAM(s) Oral daily  atorvastatin 80 milliGRAM(s) Oral at bedtime  doxazosin 4 milliGRAM(s) Oral at bedtime  enoxaparin Injectable 40 milliGRAM(s) SubCutaneous every 24 hours  lisinopril 10 milliGRAM(s) Oral daily  pantoprazole    Tablet 40 milliGRAM(s) Oral before breakfast  petrolatum Ophthalmic Ointment 1 Application(s) Right EYE every 12 hours  polyethylene glycol 3350 17 Gram(s) Oral daily  senna Syrup 10 milliLiter(s) Oral at bedtime  ticagrelor 90 milliGRAM(s) Oral every 12 hours    MEDICATIONS  (PRN):  melatonin 6 milliGRAM(s) Oral at bedtime PRN Insomnia

## 2023-04-12 NOTE — DIETITIAN INITIAL EVALUATION ADULT - ORAL INTAKE PTA/DIET HISTORY
Pt reports prior to hospitalization, he had a good appetite. Ate 3 meals/day, did not follow a specific diet but tried to avoid sugar/excess carbohydrates. Pt reports UBW of 189 lbs PTA.

## 2023-04-12 NOTE — CONSULT NOTE ADULT - ASSESSMENT
58M PMH HTN, DM2 who presented as a transfer from [facility] to [facility] for stroke on 3/29, s/p TICI 2b thrombectomy of the R ICA and R MCA, with R ICA stent placement.  Postoperative CT head demonstrated new MLS with some hemorrhagic conversion.    Stroke s/p thrombectomy of Right ICA and Right MCA and right ICA stent placement  Left hemiparesis  - Full comprehensive rehab program  - cont asa/brillinta, statin    Hypokalemia  - resolved    Hypertension - normotensive currently  - lisinopril     DM2  - sugars at goal    DVT PPx  - lovenox    Will follow along with rehab team 58M PMH HTN, DM2 who presented as a transfer from [facility] to [facility] for stroke on 3/29, s/p TICI 2b thrombectomy of the R ICA and R MCA, with R ICA stent placement.  Postoperative CT head demonstrated new MLS with some hemorrhagic conversion.    Stroke s/p thrombectomy of Right ICA and Right MCA and right ICA stent placement  Left hemiparesis  - Full comprehensive rehab program  - cont asa/brillinta, statin    Hypokalemia  - resolved    Dysphagia with PEG tube  - tube feeds per nutrition  - speech therapy    Hypertension - normotensive currently  - lisinopril     DM2  - sugars at goal    DVT PPx  - lovenox    Will follow along with rehab team

## 2023-04-12 NOTE — DIETITIAN INITIAL EVALUATION ADULT - OTHER INFO
58 year old male with PMH of HTN, and DM; who presented as a transfer from [facility] to [facility] for stroke on 3/29. Last known normal was the night before and he was outside the TKN window, and was intubated prior to [facility] transfer. Imaging was significant for R ICA occlusion and was immediately taken to angio where he underwent a TICI 2b thrombectomy of the R ICA and R MCA, with R ICA stent placement.  Postoperative CT head demonstrated new MLS with some hemorrhagic conversion. Neurosurgery was consulted but deemed no required intervention.  He was extubated on 4/7. His brain MRI showed improved midline shift and subsequent small frontal parietal SAH.  His hospital course was complicated by aspiration PNA (s/p Zosyn); and dysphagia (requiring PEG - placed 4/6). He is on Brilinta for stent placement. PM&R was consulted and deemed him an appropriate candidate for IRF. He was medically stabilized and cleared for discharge to [facility].     Pt is currently on an easy to chew, Halal diet. Tolerating diet with fair PO intake. At previous hospital, pt had PEG placed due to dysphagia, was on tube feedings until he passed MBS. Current weight is 175.1 lbs. Noted w/ 14#/7.4% unintentional weight loss in ~2 weeks. Physical appearance consistent with malnutrition (muscle & subcutaneous fat loss observed). Pt has a hx of DM; not currently on finger sticks or insulin. Consider changing diet to consistent carb if glucose in BMP is elevated. Recommend Ensure Plus High Protein (provides 350 kcal, 20 g protein/serving) for additional kcal and protein. Denies nausea, vomiting, diarrhea, constipation. Last BM 4/11 Per nursing flowsheets.

## 2023-04-12 NOTE — CHART NOTE - NSCHARTNOTEFT_GEN_A_CORE
[facility] Interdisciplinary Plan of Care    REHABILITATION DIAGNOSIS:  Cerebral infarction--Right Internal carotid stenosis/Rt MCA infarct    COMORBIDITIES/COMPLICATING CONDITIONS IMPACTING REHABILITATION:  HEALTH ISSUES - PROBLEM Dx:    PAST MEDICAL & SURGICAL HISTORY:  HTN (hypertension)      Type 2 diabetes mellitus          Based upon consideration of the patient's impairments, functional status, complicating conditions and any other contributing factors and after information garnered from the assessments of all therapy disciplines involved in treating the patient and other pertinent clinicians:    INTERDISCIPLINARY REHABILITATION INTERVENTIONS:    [ X  ] Transfer Training  [ X  ] Bed Mobility  [ X  ] Therapeutic Exercise  [ X ] Balance/Coordination Exercises  [ X ] Locomotion retraining  [ X  ] Stairs  [  X ] Functional Transfer Training  [   ] Bowel/Bladder program  [   ] Pain Management  [   ] Skin/Wound Care  [   ] Visual/Perceptual Training  [   ] Therapeutic Recreation Activities  [   ] Neuromuscular Re-education  [ X  ] Activities of Daily Living  [  x ] Speech Exercise  [   ] Swallowing Exercises  [   ] Vital Stim  [   ] Dietary Supplements  [   ] Calorie Count  [   ] Cognitive Exercises  [ x  ] Congnitive/Linguistic Treatment  [   ] Behavior Program  [  x ] Neuropsych Therapy  [ X  ] Patient/Family Counseling  [ X ] Family Training  [ X  ] Community Re-entry  [   ] Orthotic Evaluation  [   ] Prosthetic Eval/Training    MEDICAL PROGNOSIS:  Fair     REHAB POTENTIAL:  Fair   EXPECTED DAILY THERAPY:         PT: 1 hr        OT: 1 hr        ST: 1 hr        P&O: will be evaluated    EXPECTED INTENSITY OF PROGRAM:  3 hrs / Day    EXPECTED FREQUENCY OF PROGRAM: 5 Days/ Week    ESTIMATED LOS:  [  ] 5-7 Days  [  ] 7-10 Days  [x  ] 10- 14 Days  [  ] 14- 18 Days  [  ] 18- 21 Days    ESTIMATED DISPOSITION:  [  ] Home   [  ] Home with Outpatient Therapies  [ x ] Home with Home Therapies  [  ] Assisted Living  [  ] Nursing Home  [  ] Long Term Acute Care    INTERDISCIPLINARY FUNCTIONAL OUTCOMES/GOALS:         Gait/Mobility: 4 with gait device        Transfers: 4       ADLs: 4       Functional Transfers: 4       Medication Management: 4       Communication: 4       Cognitive: 4       Dysphagia: 5       Bladder 5       Bowel: 5     Functional Independent Measures:   7 = Independent  6 = Modified Independent  5 = Supervision  4 = Minimal Assist/ Contact Guard  3 = Moderate Assistance  2 = Maximum Assistance  1 = Total Assistance  0 = Unable to assess [facility] Interdisciplinary Plan of Care    REHABILITATION DIAGNOSIS:  Cerebral infarction--Right Internal carotid stenosis/Rt MCA infarct    COMORBIDITIES/COMPLICATING CONDITIONS IMPACTING REHABILITATION:  HEALTH ISSUES - PROBLEM Dx:    Rt eye visual loss  Constipation  left Hemiparesis  Hypertension  Diabetes  Insomnia  Dysphagia      PAST MEDICAL & SURGICAL HISTORY:  HTN (hypertension)      Type 2 diabetes mellitus          Based upon consideration of the patient's impairments, functional status, complicating conditions and any other contributing factors and after information garnered from the assessments of all therapy disciplines involved in treating the patient and other pertinent clinicians:    INTERDISCIPLINARY REHABILITATION INTERVENTIONS:    [ X  ] Transfer Training  [ X  ] Bed Mobility  [ X  ] Therapeutic Exercise  [ X ] Balance/Coordination Exercises  [ X ] Locomotion retraining  [ X  ] Stairs  [  X ] Functional Transfer Training  [   ] Bowel/Bladder program  [   ] Pain Management  [   ] Skin/Wound Care  [   ] Visual/Perceptual Training  [   ] Therapeutic Recreation Activities  [   ] Neuromuscular Re-education  [ X  ] Activities of Daily Living  [  x ] Speech Exercise  [   ] Swallowing Exercises  [   ] Vital Stim  [   ] Dietary Supplements  [   ] Calorie Count  [   ] Cognitive Exercises  [ x  ] Congnitive/Linguistic Treatment  [   ] Behavior Program  [  x ] Neuropsych Therapy  [ X  ] Patient/Family Counseling  [ X ] Family Training  [ X  ] Community Re-entry  [   ] Orthotic Evaluation  [   ] Prosthetic Eval/Training    MEDICAL PROGNOSIS:  Fair     REHAB POTENTIAL:  Fair   EXPECTED DAILY THERAPY:         PT: 1 hr        OT: 1 hr        ST: 1 hr        P&O: will be evaluated    EXPECTED INTENSITY OF PROGRAM:  3 hrs / Day    EXPECTED FREQUENCY OF PROGRAM: 5 Days/ Week    ESTIMATED LOS:  [  ] 5-7 Days  [  ] 7-10 Days  [x  ] 10- 14 Days  [  ] 14- 18 Days  [  ] 18- 21 Days    ESTIMATED DISPOSITION:  [  ] Home   [  ] Home with Outpatient Therapies  [ x ] Home with Home Therapies  [  ] Assisted Living  [  ] Nursing Home  [  ] Long Term Acute Care    INTERDISCIPLINARY FUNCTIONAL OUTCOMES/GOALS:         Gait/Mobility: 4 with gait device        Transfers: 4       ADLs: 4       Functional Transfers: 4       Medication Management: 4       Communication: 4       Cognitive: 4       Dysphagia: 5       Bladder 5       Bowel: 5     Functional Independent Measures:   7 = Independent  6 = Modified Independent  5 = Supervision  4 = Minimal Assist/ Contact Guard  3 = Moderate Assistance  2 = Maximum Assistance  1 = Total Assistance  0 = Unable to assess

## 2023-04-12 NOTE — DIETITIAN INITIAL EVALUATION ADULT - ADD RECOMMEND
- encourage PO intake  - obtain and honor food preferences as able  - consider adding MVI   - will follow SLP recommendations  - consider consistent carb diet if glucose in BMP is elevated

## 2023-04-12 NOTE — DIETITIAN INITIAL EVALUATION ADULT - PERTINENT LABORATORY DATA
04-12    141  |  104  |  11  ----------------------------<  131<H>  3.7   |  26  |  0.78    Ca    8.6      12 Apr 2023 06:20    TPro  6.5  /  Alb  2.6<L>  /  TBili  0.6  /  DBili  x   /  AST  46<H>  /  ALT  96<H>  /  AlkPhos  52  04-12  A1C with Estimated Average Glucose Result: 7.3 % (03-29-23 @ 12:31)

## 2023-04-13 LAB
ALBUMIN SERPL ELPH-MCNC: 2.6 G/DL — LOW (ref 3.3–5)
ALP SERPL-CCNC: 47 U/L — SIGNIFICANT CHANGE UP (ref 40–120)
ALT FLD-CCNC: 81 U/L — HIGH (ref 10–45)
ANION GAP SERPL CALC-SCNC: 9 MMOL/L — SIGNIFICANT CHANGE UP (ref 5–17)
AST SERPL-CCNC: 33 U/L — SIGNIFICANT CHANGE UP (ref 10–40)
BASOPHILS # BLD AUTO: 0.03 K/UL — SIGNIFICANT CHANGE UP (ref 0–0.2)
BASOPHILS NFR BLD AUTO: 0.4 % — SIGNIFICANT CHANGE UP (ref 0–2)
BILIRUB SERPL-MCNC: 0.6 MG/DL — SIGNIFICANT CHANGE UP (ref 0.2–1.2)
BUN SERPL-MCNC: 10 MG/DL — SIGNIFICANT CHANGE UP (ref 7–23)
CALCIUM SERPL-MCNC: 8.7 MG/DL — SIGNIFICANT CHANGE UP (ref 8.4–10.5)
CHLORIDE SERPL-SCNC: 105 MMOL/L — SIGNIFICANT CHANGE UP (ref 96–108)
CO2 SERPL-SCNC: 27 MMOL/L — SIGNIFICANT CHANGE UP (ref 22–31)
CREAT SERPL-MCNC: 0.51 MG/DL — SIGNIFICANT CHANGE UP (ref 0.5–1.3)
EGFR: 116 ML/MIN/1.73M2 — SIGNIFICANT CHANGE UP
EOSINOPHIL # BLD AUTO: 0.24 K/UL — SIGNIFICANT CHANGE UP (ref 0–0.5)
EOSINOPHIL NFR BLD AUTO: 3 % — SIGNIFICANT CHANGE UP (ref 0–6)
GLUCOSE SERPL-MCNC: 142 MG/DL — HIGH (ref 70–99)
HCT VFR BLD CALC: 34.9 % — LOW (ref 39–50)
HGB BLD-MCNC: 11.6 G/DL — LOW (ref 13–17)
IMM GRANULOCYTES NFR BLD AUTO: 0.4 % — SIGNIFICANT CHANGE UP (ref 0–0.9)
LYMPHOCYTES # BLD AUTO: 1.43 K/UL — SIGNIFICANT CHANGE UP (ref 1–3.3)
LYMPHOCYTES # BLD AUTO: 18.1 % — SIGNIFICANT CHANGE UP (ref 13–44)
MCHC RBC-ENTMCNC: 26.9 PG — LOW (ref 27–34)
MCHC RBC-ENTMCNC: 33.2 GM/DL — SIGNIFICANT CHANGE UP (ref 32–36)
MCV RBC AUTO: 81 FL — SIGNIFICANT CHANGE UP (ref 80–100)
MONOCYTES # BLD AUTO: 0.68 K/UL — SIGNIFICANT CHANGE UP (ref 0–0.9)
MONOCYTES NFR BLD AUTO: 8.6 % — SIGNIFICANT CHANGE UP (ref 2–14)
NEUTROPHILS # BLD AUTO: 5.5 K/UL — SIGNIFICANT CHANGE UP (ref 1.8–7.4)
NEUTROPHILS NFR BLD AUTO: 69.5 % — SIGNIFICANT CHANGE UP (ref 43–77)
NRBC # BLD: 0 /100 WBCS — SIGNIFICANT CHANGE UP (ref 0–0)
PLATELET # BLD AUTO: 426 K/UL — HIGH (ref 150–400)
POTASSIUM SERPL-MCNC: 3.9 MMOL/L — SIGNIFICANT CHANGE UP (ref 3.5–5.3)
POTASSIUM SERPL-SCNC: 3.9 MMOL/L — SIGNIFICANT CHANGE UP (ref 3.5–5.3)
PROT SERPL-MCNC: 6.6 G/DL — SIGNIFICANT CHANGE UP (ref 6–8.3)
RBC # BLD: 4.31 M/UL — SIGNIFICANT CHANGE UP (ref 4.2–5.8)
RBC # FLD: 13.4 % — SIGNIFICANT CHANGE UP (ref 10.3–14.5)
SODIUM SERPL-SCNC: 141 MMOL/L — SIGNIFICANT CHANGE UP (ref 135–145)
WBC # BLD: 7.91 K/UL — SIGNIFICANT CHANGE UP (ref 3.8–10.5)
WBC # FLD AUTO: 7.91 K/UL — SIGNIFICANT CHANGE UP (ref 3.8–10.5)

## 2023-04-13 PROCEDURE — 74018 RADEX ABDOMEN 1 VIEW: CPT | Mod: 26

## 2023-04-13 PROCEDURE — 99232 SBSQ HOSP IP/OBS MODERATE 35: CPT

## 2023-04-13 RX ORDER — POLYETHYLENE GLYCOL 3350 17 G/17G
17 POWDER, FOR SOLUTION ORAL
Refills: 0 | Status: DISCONTINUED | OUTPATIENT
Start: 2023-04-13 | End: 2023-05-05

## 2023-04-13 RX ADMIN — TICAGRELOR 90 MILLIGRAM(S): 90 TABLET ORAL at 17:44

## 2023-04-13 RX ADMIN — Medication 4 MILLIGRAM(S): at 22:54

## 2023-04-13 RX ADMIN — Medication 1 APPLICATION(S): at 05:48

## 2023-04-13 RX ADMIN — Medication 81 MILLIGRAM(S): at 12:07

## 2023-04-13 RX ADMIN — Medication 1 DROP(S): at 05:47

## 2023-04-13 RX ADMIN — Medication 1 DROP(S): at 17:44

## 2023-04-13 RX ADMIN — ENOXAPARIN SODIUM 40 MILLIGRAM(S): 100 INJECTION SUBCUTANEOUS at 17:44

## 2023-04-13 RX ADMIN — TICAGRELOR 90 MILLIGRAM(S): 90 TABLET ORAL at 05:47

## 2023-04-13 RX ADMIN — Medication 1 APPLICATION(S): at 23:42

## 2023-04-13 RX ADMIN — POLYETHYLENE GLYCOL 3350 17 GRAM(S): 17 POWDER, FOR SOLUTION ORAL at 17:46

## 2023-04-13 RX ADMIN — ATORVASTATIN CALCIUM 80 MILLIGRAM(S): 80 TABLET, FILM COATED ORAL at 22:53

## 2023-04-13 RX ADMIN — PANTOPRAZOLE SODIUM 40 MILLIGRAM(S): 20 TABLET, DELAYED RELEASE ORAL at 05:46

## 2023-04-13 RX ADMIN — LISINOPRIL 10 MILLIGRAM(S): 2.5 TABLET ORAL at 05:47

## 2023-04-13 RX ADMIN — Medication 1 APPLICATION(S): at 17:45

## 2023-04-13 NOTE — CHART NOTE - NSCHARTNOTEFT_GEN_A_CORE
IDT conference on 4/13  TDD: 4/27 to home vs AMAN  Barriers: Distracted, tangentile, perseverative, requires cueing, R eye visual deficit.   Social Work: Lives in a basement apartment with spouse and 2 sons, 12 MEI. Patient is main source of income for family. Spouse does not work.  OT: Min A for grooming. Max A for UBD, total A for LBD. Mod A for transfers.  PT: Mod x2pr A for transfers. Ambulated 5 ft on parallel bars with mod x2 pr A. Stairs not yet assessed.   SLP: Easy to chew diet requiring liquid washes after feeding. Easily distracted, tangentile, perseverative.

## 2023-04-13 NOTE — PROGRESS NOTE ADULT - ASSESSMENT
Assessment/Plan:  CONG AGUAYO is a 58 year old male with PMH of HTN, and DM; who presented as a transfer from [facility] to [facility] for stroke on 3/29, where he was found to have a R ICA occlusion and he underwent a TICI 2b thromectomy of R ICA and R MCA with R ICA stent placement. Extubated on 4/7. His brain MRI was significant for improved midline shift and small frontal parietal SAH. His hospital course was complicated by aspiration PNA (s/p Zosyn); and dysphagia (requiring PEG - placed 4/6). Patient now admitted for a multidisciplinary rehab program. 04-11-23 @ 13:54    * Vital stimulation to Left side  * Encourage patient to engage in self care activitis,  using urinal during void  * IDT details as noted    * Orthostatic hypotension--abd binder and MEME EDMOND lisinopril   * Will get orthotics referral with some functional improvement     Rehab Management/MEDICAL MANAGEMENT     #Functional deficits s/p CVA  -Continue  Comprehensive Rehab Program of PT/OT/SLP  - 3 hours a day, 5 days a week  - P&O as needed   - R ICA occlusion, s/p TICI 2b thrombectomy of R ICA and R MCA with R ICA stent placement  - ASA & Brilinta  - Outpt Neurology follow up- Dr. Reid  - Outpt IR follow up - Dr. Allen  --* Will get orthotics referral with some functional improvement  Left sided weakness    #dysphagia/aspiration PNA  - S/p Zosyn  - PEG tube  - Repeat CT chest in 1 month (~5/8)    #HTN  - Lisinopril--D/CARSON 4/13  - Doxazosin  - Outpt Cardiology follow up - Dr. Song    #  * Orthostatic hypotension--abd binder and FELI DC lisinopril     #HLD  -Atorvastatin    #DM  - A1c: 7.3  - ISS  - FS ACHS    #Dry eye  - Occular lubricant ointment  - Artificial tears    #Sleep  - Melatonin 6mg nightly PRN     #Skin  - Skin on admission: intact  - Pressure injury/Skin: OOB to Chair, PT/OT     #Pain Mgmt   - Tylenol PRN    #GI/Bowel Mgmt   - Pantoprazole  - Continent c/w Senna, Miralax     #/Bladder Mgmt --Voiding appropriately    #FEN   - PEG  - Diet - Easy to Chew, halal  - Dysphagia  SLP - evaluation and treatment    #Precautions / PROPHYLAXIS:   - Falls  - ortho: Weight bearing status: WBAT   - Lungs: Aspiration, Incentive Spirometer   - DVT: Lovenox  ---------------------------------------------------------------  FOLLOW UP/OUTPATIENT:    Bev Song)  Cardiology; Internal Medicine  [address] [facility], [place] [id number]  Phone: [phone]  Fax: [phone]    Ivan Reid (MD; PhD)  Neurology; Vascular Neurology  [address] [facility], [place] [address]  Phone: [phone]  Fax: [phone]  Follow Up Time:     Primary doctor,   Phone: (   )    -  Fax: (   )    -  Follow Up Time:     Sanchez Allen)  Neurology; Vascular Neurology  [address] [place] [address]  Phone: [phone]  Fax: [phone]  Follow Up Time:     Lily Moon (DO)  Gastroenterology  [address] [facility], [address] [place] [address]  Phone: [phone]  Fax: [phone]  Follow Up Time:  ---------------------------------------------------------------       Assessment/Plan:  CONG AGUAYO is a 58 year old male with PMH of HTN, and DM; who presented as a transfer from [facility] to [facility] for stroke on 3/29, where he was found to have a R ICA occlusion and he underwent a TICI 2b thromectomy of R ICA and R MCA with R ICA stent placement. Extubated on 4/7. His brain MRI was significant for improved midline shift and small frontal parietal SAH. His hospital course was complicated by aspiration PNA (s/p Zosyn); and dysphagia (requiring PEG - placed 4/6). Patient now admitted for a multidisciplinary rehab program. 04-11-23 @ 13:54    * Vital stimulation to Left side  * Encourage patient to engage in self care activitis,  using urinal during void  * IDT details as noted    * Orthostatic hypotension--abd binder and MEME EDMOND lisinopril   * Will get orthotics referral with some functional improvement     Rehab Management/MEDICAL MANAGEMENT     #Functional deficits s/p CVA  -Continue  Comprehensive Rehab Program of PT/OT/SLP  - 3 hours a day, 5 days a week  - P&O as needed   - R ICA occlusion, s/p TICI 2b thrombectomy of R ICA and R MCA with R ICA stent placement  - ASA & Brilinta  - Outpt Neurology follow up- Dr. Reid  - Outpt IR follow up - Dr. Allen  --* Will get orthotics referral with some functional improvement  Left sided weakness    #dysphagia/aspiration PNA  - S/p Zosyn  - PEG tube  - Repeat CT chest in 1 month (~5/8)    #HTN  - Lisinopril--D/CARSON 4/13  - Doxazosin  - Outpt Cardiology follow up - Dr. Song    #  * Orthostatic hypotension--abd binder and FELI DC lisinopril     #HLD  -Atorvastatin    #DM  - A1c: 7.3  - ISS  - FS ACHS    #Dry eye  - Occular lubricant ointment  - Artificial tears    #Sleep  - Melatonin 6mg nightly PRN     #Skin  - Skin on admission: intact  - Pressure injury/Skin: OOB to Chair, PT/OT     #Pain Mgmt   - Tylenol PRN    #GI/Bowel Mgmt   - Pantoprazole  - Continent c/w Senna, Miralax     #/Bladder Mgmt --Voiding appropriately    #FEN   - PEG  - Diet - Easy to Chew, halal  - Dysphagia  SLP - evaluation and treatment    #Precautions / PROPHYLAXIS:   - Falls  - ortho: Weight bearing status: WBAT   - Lungs: Aspiration, Incentive Spirometer   - DVT: Lovenox  ---------------------------------------------------------------  FOLLOW UP/OUTPATIENT:    Bev Song)  Cardiology; Internal Medicine  [address] [facility], [address] [place] [id number]  Phone: [phone]  Fax: [phone]    Ivan Reid (MD; PhD)  Neurology; Vascular Neurology  [address] [facility] [address]  [place] [address]  Phone: [phone]  Fax: [phone]  Follow Up Time:     Primary doctor,   Phone: (   )    -  Fax: (   )    -  Follow Up Time:     Sanchez Allen)  Neurology; Vascular Neurology  [address] [place] [address]  Phone: [phone]  Fax: [phone]  Follow Up Time:     Lily Moon (DO)  Gastroenterology  [address] [facility], [address] [place] [address]  Phone: [phone]  Fax: [phone]  Follow Up Time:  ---------------------------------------------------------------    Non critical care  Time based billing  Spent 30 min, chart review, patient review, discussion of lab results, observation in therapy. IDT and liaison with multispecialty teams

## 2023-04-13 NOTE — PROGRESS NOTE ADULT - SUBJECTIVE AND OBJECTIVE BOX
Patient is a 58y old  Male who presents with a chief complaint of Cerebral infarction (12 Apr 2023 14:27)    Patient seen and examined at bedside. No acute overnight events. Denies pain. States he enjoyed ST yesterday    ALLERGIES:  No Known Allergies    MEDICATIONS  (STANDING):  AQUAPHOR (petrolatum Ointment) 1 Application(s) Topical daily  artificial  tears Solution 1 Drop(s) Both EYES two times a day  aspirin  chewable 81 milliGRAM(s) Oral daily  atorvastatin 80 milliGRAM(s) Oral at bedtime  doxazosin 4 milliGRAM(s) Oral at bedtime  enoxaparin Injectable 40 milliGRAM(s) SubCutaneous every 24 hours  lisinopril 10 milliGRAM(s) Oral daily  pantoprazole    Tablet 40 milliGRAM(s) Oral before breakfast  petrolatum Ophthalmic Ointment 1 Application(s) Right EYE every 12 hours  polyethylene glycol 3350 17 Gram(s) Oral daily  senna Syrup 10 milliLiter(s) Oral at bedtime  ticagrelor 90 milliGRAM(s) Oral every 12 hours    MEDICATIONS  (PRN):  melatonin 6 milliGRAM(s) Oral at bedtime PRN Insomnia    Vital Signs Last 24 Hrs  T(F): 98.1 (12 Apr 2023 20:01), Max: 98.1 (12 Apr 2023 20:01)  HR: 91 (13 Apr 2023 05:52) (91 - 94)  BP: 108/69 (13 Apr 2023 05:52) (107/68 - 108/69)  RR: 16 (12 Apr 2023 20:01) (16 - 16)  SpO2: 95% (12 Apr 2023 20:01) (95% - 95%)  I&O's Summary    BMI (kg/m2): 27.4 (04-11-23 @ 19:58)    PHYSICAL EXAM:  General: NAD, awake, alert  ENT: MMM, no tonsilar exudate  Neck: Supple, No JVD  Lungs: Clear to auscultation bilaterally, no wheezes. Good air entry bilaterally   Cardio: RRR, S1/S2, No murmurs  Abdomen: Soft, Nontender, Nondistended; Bowel sounds present +PEG  Extremities: No calf tenderness, No pitting edema    LABS:                        11.6   7.91  )-----------( 426      ( 13 Apr 2023 06:13 )             34.9       04-13    141  |  105  |  10  ----------------------------<  142  3.9   |  27  |  0.51    Ca    8.7      13 Apr 2023 06:13    TPro  6.6  /  Alb  2.6  /  TBili  0.6  /  DBili  x   /  AST  33  /  ALT  81  /  AlkPhos  47  04-13 03-29 Chol 270 mg/dL LDL -- HDL 58 mg/dL Trig 83 mg/dL    COVID-19 PCR: NotDetec (04-12-23 @ 05:00)  COVID-19 PCR: NotDetec (04-11-23 @ 09:52)  COVID-19 PCR: NotDetec (04-05-23 @ 17:15)  COVID-19 PCR: NotDetec (03-29-23 @ 10:00)    RADIOLOGY & ADDITIONAL TESTS:     Care Discussed with Consultants/Other Providers:

## 2023-04-13 NOTE — PROGRESS NOTE ADULT - ASSESSMENT
58M PMH HTN, DM2 who presented as a transfer from [facility] to [facility] for stroke on 3/29, s/p TICI 2b thrombectomy of the R ICA and R MCA, with R ICA stent placement.  Postoperative CT head demonstrated new MLS with some hemorrhagic conversion.    Stroke s/p thrombectomy of Right ICA and Right MCA and right ICA stent placement  Left hemiparesis  - PT/OT/ST per PMR  - cont asa/brillinta, statin    Dysphagia with PEG tube  - tube feeds per nutrition  - speech therapy    Hypertension - normotensive currently  - lisinopril     DM2  - HbA1C 7.3%  - sugars at goal, will consider addition of metformin if patient amenable and labs remain stable     DVT PPx  - lovenox

## 2023-04-13 NOTE — PROGRESS NOTE ADULT - SUBJECTIVE AND OBJECTIVE BOX
Subjective  Seen and examined  Demonstrates good understanding on English and preferring it at this time  Patient reports normal sleep  Tolerating restricted oral feeds    Therapy--observed in training with parallel bars  IDT details as noted   today  Has a flight of stairs to his basement apartment, a discharge barrier    Lab results--reviewed, normal  CBC and renal function    Vital Signs Last 24 Hrs  T(C): 36.7 (13 Apr 2023 09:42), Max: 36.7 (12 Apr 2023 20:01)  T(F): 98.1 (13 Apr 2023 09:42), Max: 98.1 (12 Apr 2023 20:01)  HR: 98 (13 Apr 2023 09:42) (91 - 98)  BP: 90/60 (13 Apr 2023 09:42) (90/60 - 108/69)  RR: 16 (13 Apr 2023 09:42) (16 - 16)  SpO2: 97% (13 Apr 2023 09:42) (95% - 97%)  O2 Parameters below as of 13 Apr 2023 09:42  Patient On (Oxygen Delivery Method): room air      EXAM  Gen - Comfortable  HEENT - NCAT, EOMI, MMM, right  eye vision loss   Neck - Supple, No limited ROM  Pulm - CTAB, No wheeze, No rhonchi, No crackles  Cardiovascular - RRR, S1S2  Chest - good chest expansion, good respiratory effort  Abdomen - Soft, NT/ND, +BS, + PEG   Extremities - No Cyanosis, no clubbing, no edema, no calf tenderness      Neuro-  Good recall, dysarthric  Cranial Nerves - Left facial weakness, left eye vision loss, absent left shoulder shrug   Right  eye vision loss Rt eye  Tongue deviation to left, Flat left nasolabial fold     Motor -  Left hemiparesis                    LEFT    UE - ShAB 0/5, EF 0/5, EE 0/5,  0/5                    RIGHT UE - ShAB 5/5, EF 5/5, EE 5/5,   5/5                    LEFT    LE -                     RIGHT LE - HF 5/5, KE 5/5, DF 5/5, PF 5/5        Sensory - Intact to LT     Reflexes - DTR Intact     Coordination - FTN/HTS  impaired to left side     Tone - normal  Psychiatric - Mood stable, Affect WNL  Skin:  all skin intact    RECENT LABS/IMAGING                        11.6   7.91  )-----------( 426      ( 13 Apr 2023 06:13 )             34.9     04-13    141  |  105  |  10  ----------------------------<  142<H>  3.9   |  27  |  0.51    Ca    8.7      13 Apr 2023 06:13    TPro  6.6  /  Alb  2.6<L>  /  TBili  0.6  /  DBili  x   /  AST  33  /  ALT  81<H>  /  AlkPhos  47  04-13      MEDICATIONS  (STANDING):  AQUAPHOR (petrolatum Ointment) 1 Application(s) Topical daily  artificial  tears Solution 1 Drop(s) Both EYES two times a day  aspirin  chewable 81 milliGRAM(s) Oral daily  atorvastatin 80 milliGRAM(s) Oral at bedtime  doxazosin 4 milliGRAM(s) Oral at bedtime  enoxaparin Injectable 40 milliGRAM(s) SubCutaneous every 24 hours  pantoprazole    Tablet 40 milliGRAM(s) Oral before breakfast  petrolatum Ophthalmic Ointment 1 Application(s) Right EYE every 12 hours  polyethylene glycol 3350 17 Gram(s) Oral two times a day  senna Syrup 10 milliLiter(s) Oral at bedtime  ticagrelor 90 milliGRAM(s) Oral every 12 hours    MEDICATIONS  (PRN):  melatonin 6 milliGRAM(s) Oral at bedtime PRN Insomnia           Subjective  Seen and examined  Demonstrates good understanding on English and preferring it at this time  Patient reports normal sleep  Tolerating restricted oral feeds  PEG in situ, not in use this time    Therapy--observed in training with parallel bars  IDT details as noted   today  Has a flight of stairs to his basement apartment, a discharge barrier    Lab results--reviewed, normal  CBC and renal function    Vital Signs Last 24 Hrs  T(C): 36.7 (13 Apr 2023 09:42), Max: 36.7 (12 Apr 2023 20:01)  T(F): 98.1 (13 Apr 2023 09:42), Max: 98.1 (12 Apr 2023 20:01)  HR: 98 (13 Apr 2023 09:42) (91 - 98)  BP: 90/60 (13 Apr 2023 09:42) (90/60 - 108/69)  RR: 16 (13 Apr 2023 09:42) (16 - 16)  SpO2: 97% (13 Apr 2023 09:42) (95% - 97%)  O2 Parameters below as of 13 Apr 2023 09:42  Patient On (Oxygen Delivery Method): room air      EXAM  Gen - Comfortable  HEENT - NCAT, EOMI, MMM, right  eye vision loss   Neck - Supple, No limited ROM  Pulm - CTAB, No wheeze, No rhonchi, No crackles  Cardiovascular - RRR, S1S2  Chest - good chest expansion, good respiratory effort  Abdomen - Soft, NT/ND, +BS, + PEG   Extremities - No Cyanosis, no clubbing, no edema, no calf tenderness      Neuro-  Good recall, dysarthric  Cranial Nerves - Left facial weakness, left eye vision loss, absent left shoulder shrug   Right  eye vision loss Rt eye  Tongue deviation to left, Flat left nasolabial fold     Motor -  Left hemiparesis                    LEFT    UE - ShAB 0/5, EF 0/5, EE 0/5,  0/5                    RIGHT UE - ShAB 5/5, EF 5/5, EE 5/5,   5/5                    LEFT    LE -                     RIGHT LE - HF 5/5, KE 5/5, DF 5/5, PF 5/5        Sensory - Intact to LT     Reflexes - DTR Intact     Coordination - FTN/HTS  impaired to left side     Tone - normal  Psychiatric - Mood stable, Affect WNL  Skin:  all skin intact    RECENT LABS/IMAGING                        11.6   7.91  )-----------( 426      ( 13 Apr 2023 06:13 )             34.9     04-13    141  |  105  |  10  ----------------------------<  142<H>  3.9   |  27  |  0.51    Ca    8.7      13 Apr 2023 06:13    TPro  6.6  /  Alb  2.6<L>  /  TBili  0.6  /  DBili  x   /  AST  33  /  ALT  81<H>  /  AlkPhos  47  04-13      MEDICATIONS  (STANDING):  AQUAPHOR (petrolatum Ointment) 1 Application(s) Topical daily  artificial  tears Solution 1 Drop(s) Both EYES two times a day  aspirin  chewable 81 milliGRAM(s) Oral daily  atorvastatin 80 milliGRAM(s) Oral at bedtime  doxazosin 4 milliGRAM(s) Oral at bedtime  enoxaparin Injectable 40 milliGRAM(s) SubCutaneous every 24 hours  pantoprazole    Tablet 40 milliGRAM(s) Oral before breakfast  petrolatum Ophthalmic Ointment 1 Application(s) Right EYE every 12 hours  polyethylene glycol 3350 17 Gram(s) Oral two times a day  senna Syrup 10 milliLiter(s) Oral at bedtime  ticagrelor 90 milliGRAM(s) Oral every 12 hours    MEDICATIONS  (PRN):  melatonin 6 milliGRAM(s) Oral at bedtime PRN Insomnia

## 2023-04-14 PROCEDURE — 99232 SBSQ HOSP IP/OBS MODERATE 35: CPT

## 2023-04-14 RX ORDER — MIDODRINE HYDROCHLORIDE 2.5 MG/1
2.5 TABLET ORAL
Refills: 0 | Status: DISCONTINUED | OUTPATIENT
Start: 2023-04-14 | End: 2023-04-17

## 2023-04-14 RX ORDER — LACTULOSE 10 G/15ML
20 SOLUTION ORAL
Refills: 0 | Status: COMPLETED | OUTPATIENT
Start: 2023-04-14 | End: 2023-04-17

## 2023-04-14 RX ADMIN — ATORVASTATIN CALCIUM 80 MILLIGRAM(S): 80 TABLET, FILM COATED ORAL at 21:01

## 2023-04-14 RX ADMIN — Medication 1 DROP(S): at 05:58

## 2023-04-14 RX ADMIN — POLYETHYLENE GLYCOL 3350 17 GRAM(S): 17 POWDER, FOR SOLUTION ORAL at 06:00

## 2023-04-14 RX ADMIN — Medication 1 ENEMA: at 17:37

## 2023-04-14 RX ADMIN — Medication 4 MILLIGRAM(S): at 21:01

## 2023-04-14 RX ADMIN — POLYETHYLENE GLYCOL 3350 17 GRAM(S): 17 POWDER, FOR SOLUTION ORAL at 17:36

## 2023-04-14 RX ADMIN — Medication 81 MILLIGRAM(S): at 12:39

## 2023-04-14 RX ADMIN — ENOXAPARIN SODIUM 40 MILLIGRAM(S): 100 INJECTION SUBCUTANEOUS at 17:36

## 2023-04-14 RX ADMIN — Medication 1 DROP(S): at 17:36

## 2023-04-14 RX ADMIN — TICAGRELOR 90 MILLIGRAM(S): 90 TABLET ORAL at 17:36

## 2023-04-14 RX ADMIN — Medication 1 APPLICATION(S): at 12:38

## 2023-04-14 RX ADMIN — Medication 1 APPLICATION(S): at 06:01

## 2023-04-14 RX ADMIN — TICAGRELOR 90 MILLIGRAM(S): 90 TABLET ORAL at 06:00

## 2023-04-14 RX ADMIN — LACTULOSE 20 GRAM(S): 10 SOLUTION ORAL at 17:36

## 2023-04-14 RX ADMIN — PANTOPRAZOLE SODIUM 40 MILLIGRAM(S): 20 TABLET, DELAYED RELEASE ORAL at 06:07

## 2023-04-14 RX ADMIN — MIDODRINE HYDROCHLORIDE 2.5 MILLIGRAM(S): 2.5 TABLET ORAL at 14:51

## 2023-04-14 NOTE — PROGRESS NOTE ADULT - ASSESSMENT
58M PMH HTN, DM2 who presented as a transfer from [facility] to [facility] for stroke on 3/29, s/p TICI 2b thrombectomy of the R ICA and R MCA, with R ICA stent placement.  Postoperative CT head demonstrated new MLS with some hemorrhagic conversion.    Stroke s/p thrombectomy of Right ICA and Right MCA and right ICA stent placement  Left hemiparesis  - PT/OT/ST per PMR  - cont asa/brillinta, statin    Dysphagia with PEG tube  - tube feeds per nutrition  - speech therapy    Hypertension - normotensive currently  - lisinopril     DM2  - HbA1C 7.3%  - sugars at goal, will consider addition of metformin if patient amenable and labs remain stable     DVT PPx  - lovenox

## 2023-04-14 NOTE — PROGRESS NOTE ADULT - SUBJECTIVE AND OBJECTIVE BOX
Subjective  Seen and examined,   Reports no acute medical issues  Some discomfort with the binder covering PEG area, felt better with some adjusment  Slept well  Tolerating oral diet, restricted    PEG in situ, not in use (will confirm last time PEG was used and liaise with GI)    Therapy--Engaing and motivated  Orthostatic hypotension noted'    IDT details form yesterday, d/w patient    Agreed to c/w TEDs  will add low dose midotrine  Abd binder to be adjusted to comfortable position for paitent    Vital Signs Last 24 Hrs  T(C): 37.1 (13 Apr 2023 21:40), Max: 37.1 (13 Apr 2023 21:40)  T(F): 98.7 (13 Apr 2023 21:40), Max: 98.7 (13 Apr 2023 21:40)  HR: 96 (13 Apr 2023 21:40) (96 - 96)  BP: 112/73 (13 Apr 2023 21:40) (112/73 - 112/73)  RR: 16 (13 Apr 2023 21:40) (16 - 16)  SpO2: 95% (13 Apr 2023 21:40) (95% - 95%)  O2 Parameters below as of 13 Apr 2023 21:40  Patient On (Oxygen Delivery Method): room air        EXAM  Gen - Comfortable  HEENT - NCAT, EOMI, MMM, right  eye vision loss   Neck - Supple, No limited ROM  Pulm -Slightly decreased air entry Rt lower lobe  Cardiovascular - RRR, S1S2  Chest - good chest expansion, good respiratory effort  Abdomen - Soft, NT/ND, +BS, + PEG   Extremities - No Cyanosis, no clubbing, no edema, no calf tenderness      Neuro-  Good recall, dysarthric  Cranial Nerves - Left facial weakness, left eye vision loss, absent left shoulder shrug   Right  eye vision loss Rt eye  Tongue deviation to left, Flat left nasolabial fold     Motor -  Left hemiparesis                    LEFT    UE - ShAB 0/5, EF 0/5, EE 0/5,  0/5                    RIGHT UE - ShAB 5/5, EF 5/5, EE 5/5,   5/5                    LEFT    LE -                     RIGHT LE - HF 5/5, KE 5/5, DF 5/5, PF 5/5        Sensory - Intact to LT     Reflexes - DTR Intact     Coordination - FTN/HTS  impaired to left side     Tone - normal  Psychiatric - Mood stable, Affect WNL  Skin:  all skin intact, PEG in situ     RECENT LABS/IMAGING                        11.6   7.91  )-----------( 426      ( 13 Apr 2023 06:13 )             34.9     04-13    141  |  105  |  10  ----------------------------<  142<H>  3.9   |  27  |  0.51    Ca    8.7      13 Apr 2023 06:13    TPro  6.6  /  Alb  2.6<L>  /  TBili  0.6  /  DBili  x   /  AST  33  /  ALT  81<H>  /  AlkPhos  47  04-13    MEDICATIONS  (STANDING):  AQUAPHOR (petrolatum Ointment) 1 Application(s) Topical daily  artificial  tears Solution 1 Drop(s) Both EYES two times a day  aspirin  chewable 81 milliGRAM(s) Oral daily  atorvastatin 80 milliGRAM(s) Oral at bedtime  doxazosin 4 milliGRAM(s) Oral at bedtime  enoxaparin Injectable 40 milliGRAM(s) SubCutaneous every 24 hours  lactulose Syrup 20 Gram(s) Oral two times a day  midodrine. 2.5 milliGRAM(s) Oral <User Schedule>  pantoprazole    Tablet 40 milliGRAM(s) Oral before breakfast  petrolatum Ophthalmic Ointment 1 Application(s) Right EYE every 12 hours  polyethylene glycol 3350 17 Gram(s) Oral two times a day  saline laxative (FLEET) Rectal Enema 1 Enema Rectal <User Schedule>  senna Syrup 10 milliLiter(s) Oral at bedtime  ticagrelor 90 milliGRAM(s) Oral every 12 hours    MEDICATIONS  (PRN):  melatonin 6 milliGRAM(s) Oral at bedtime PRN Insomnia    < from: Xray Abdomen 1 View PORTABLE -Routine (Xray Abdomen 1 View PORTABLE -Routine .) (04.13.23 @ 10:57) >    PROCEDURE DATE:  04/13/2023          INTERPRETATION:  Supine abdominal film.    Consider has constipation.    This mildly increased fecal content in the colon but no sign of   obstruction.    There is a G-tube in the stomach. Stomach mildly distended with air.    IMPRESSION: There is a constipation picture. Mild stomach distention with   air.    < end of copied text >

## 2023-04-14 NOTE — PROGRESS NOTE ADULT - ASSESSMENT
Assessment/Plan:  CONG AGUAYO is a 58 year old male with PMH of HTN, and DM; who presented as a transfer from [facility] to [facility] for stroke on 3/29, where he was found to have a R ICA occlusion and he underwent a TICI 2b thromectomy of R ICA and R MCA with R ICA stent placement. Extubated on 4/7. His brain MRI was significant for improved midline shift and small frontal parietal SAH. His hospital course was complicated by aspiration PNA (s/p Zosyn); and dysphagia (requiring PEG - placed 4/6). Patient now admitted for a multidisciplinary rehab program. 04-11-23 @ 13:54      *[place] oral hydration     * Orthostatic hypotension--add low dose midotrine  * Will get orthotics referral with some functional improvement   * Confirm last date when PEG was uses and will liaise with GI   * Constipation noted on Xray and clinically--bowel regimen revised     Rehab Management/MEDICAL MANAGEMENT     #Functional deficits s/p CVA  -Continue  Comprehensive Rehab Program of PT/OT/SLP  - 3 hours a day, 5 days a week  - P&O as needed   - R ICA occlusion, s/p TICI 2b thrombectomy of R ICA and R MCA with R ICA stent placement  - ASA & Brilinta  - Outpt Neurology follow up- Dr. Reid  - Outpt IR follow up - Dr. Allen  --orthotics referral with some functional improvement  Left sided weakness    #dysphagia/aspiration PNA  - S/p Zosyn  - PEG tube  - Repeat CT chest in 1 month (~5/8)  # Decreased air entry R lower lobe 4/14---Incentive spirometry ordered, will get CXR if persistent or clinically symptomatic     #HTN  - Lisinopril--D/CARSON 4/13  - Doxazosin  - Outpt Cardiology follow up - Dr. Song    #  Orthostatic hypotension--midodrine 2.5mg bid commenced      #HLD  -Atorvastatin    #DM  - A1c: 7.3  - ISS  - FS ACHS    #Dry eye  - Occular lubricant ointment  - Artificial tears    #Sleep  - Melatonin 6mg nightly PRN     #Skin  - Skin on admission: intact  - Pressure injury/Skin: OOB to Chair, PT/OT     #Pain Mgmt   - Tylenol PRN    #GI/Bowel Mgmt   - Pantoprazole  - Continent c/w Senna, Miralax   --add lactulose, will give suppository or enema if no BM by end of the day 4/14    #/Bladder Mgmt --Voiding appropriately    #FEN   - PEG  - Diet - Easy to Chew, halal  - Dysphagia  SLP - evaluation and treatment    #Precautions / PROPHYLAXIS:   - Falls  - ortho: Weight bearing status: WBAT   - Lungs: Aspiration, Incentive Spirometer   - DVT: Lovenox  ---------------------------------------------------------------  FOLLOW UP/OUTPATIENT:    Bev Song (MD)  Cardiology; Internal Medicine  [address] [facility] [facility], [place] [id number]  Phone: [phone]  Fax: [phone]    Ivan Reid (MD; PhD)  Neurology; Vascular Neurology  [address] [facility] [facility] [address]  [place] [address]  Phone: [phone]  Fax: [phone]  Follow Up Time:     Primary doctor,   Phone: (   )    -  Fax: (   )    -  Follow Up Time:     Sanchez Allen)  Neurology; Vascular Neurology  [address] [place] [address]  Phone: [phone]  Fax: [phone]  Follow Up Time:     Lily Moon (DO)  Gastroenterology  [address] [facility], [address]  [place] [address]  Phone: [phone]  Fax: [phone]  Follow Up Time:  ---------------------------------------------------------------     Assessment/Plan:  CONG AGUAYO is a 58 year old male with PMH of HTN, and DM; who presented as a transfer from [facility] to [facility] for stroke on 3/29, where he was found to have a R ICA occlusion and he underwent a TICI 2b thromectomy of R ICA and R MCA with R ICA stent placement. Extubated on 4/7. His brain MRI was significant for improved midline shift and small frontal parietal SAH. His hospital course was complicated by aspiration PNA (s/p Zosyn); and dysphagia (requiring PEG - placed 4/6). Patient now admitted for a multidisciplinary rehab program. 04-11-23 @ 13:54      *[place] oral hydration     * Orthostatic hypotension--add low dose midotrine  * Will get orthotics referral with some functional improvement   * Confirm last date when PEG was uses and will liaise with GI   * Constipation noted on Xray and clinically--bowel regimen revised     Rehab Management/MEDICAL MANAGEMENT     #Functional deficits s/p CVA  -Continue  Comprehensive Rehab Program of PT/OT/SLP  - 3 hours a day, 5 days a week  - P&O as needed   - R ICA occlusion, s/p TICI 2b thrombectomy of R ICA and R MCA with R ICA stent placement  - ASA & Brilinta  - Outpt Neurology follow up- Dr. Reid  - Outpt IR follow up - Dr. Allen  --orthotics referral with some functional improvement  Left sided weakness    #dysphagia/aspiration PNA  - S/p Zosyn  - PEG tube  - Repeat CT chest in 1 month (~5/8)  # Decreased air entry R lower lobe 4/14---Incentive spirometry ordered, will get CXR if persistent or clinically symptomatic     #HTN  - Lisinopril--D/CARSON 4/13  - Doxazosin  - Outpt Cardiology follow up - Dr. Song    #  Orthostatic hypotension--midodrine 2.5mg bid commenced      #HLD  -Atorvastatin    #DM  - A1c: 7.3  - ISS  - FS ACHS    #Dry eye  - Occular lubricant ointment  - Artificial tears    #Sleep  - Melatonin 6mg nightly PRN     #Skin  - Skin on admission: intact  - Pressure injury/Skin: OOB to Chair, PT/OT     #Pain Mgmt   - Tylenol PRN    #GI/Bowel Mgmt   - Pantoprazole  - Continent c/w Senna, Miralax   --add lactulose, will give suppository or enema if no BM by end of the day 4/14    #/Bladder Mgmt --Voiding appropriately    #FEN   - PEG  - Diet - Easy to Chew, halal  - Dysphagia  SLP - evaluation and treatment    #Precautions / PROPHYLAXIS:   - Falls  - ortho: Weight bearing status: WBAT   - Lungs: Aspiration, Incentive Spirometer   - DVT: Lovenox    IDT conference on 4/13  TDD: 4/27 to home vs [facility]  Barriers: Distracted, tangentile, perseverative, requires cueing, R eye visual deficit.   Social Work: Lives in a basement apartment with spouse and 2 sons, 12 MEI. Patient is main source of income for family. Spouse does not work.  OT: Min A for grooming. Max A for UBD, total A for LBD. Mod A for transfers.  PT: Mod x2pr A for transfers. Ambulated 5 ft on parallel bars with mod x2 pr A. Stairs not yet assessed.   SLP: Easy to chew diet requiring liquid washes after feeding. Easily distracted, tangentile, perseverative  ---------------------------------------------------------------  FOLLOW UP/OUTPATIENT:    Bev Song)  Cardiology; Internal Medicine  [address] [facility], [address]  [place], [place] [address]  Phone: [phone]  Fax: [phone]    Ivan Reid; PhD)  Neurology; Vascular Neurology  [address] [facility]  [place] [address]  Phone: [phone]  Fax: [phone]  Follow Up Time:     Primary doctor,   Phone: (   )    -  Fax: (   )    -  Follow Up Time:     Sanchez Allen)  Neurology; Vascular Neurology  [address] [place] [address]  Phone: [phone]  Fax: [phone]  Follow Up Time:     Lily Moon (DO)  Gastroenterology  [address] [facility] [facility]  [place] [address]  Phone: [phone]  Fax: [phone]  Follow Up Time:  ---------------------------------------------------------------

## 2023-04-14 NOTE — PROGRESS NOTE ADULT - SUBJECTIVE AND OBJECTIVE BOX
Patient is a 58y old  Male who presents with a chief complaint of Functional deficits s/p CVA (13 Apr 2023 12:22)      Patient seen and examined at bedside.  Denies chest pain, dyspnea, abd pain.    ALLERGIES:  No Known Allergies    MEDICATIONS  (STANDING):  AQUAPHOR (petrolatum Ointment) 1 Application(s) Topical daily  artificial  tears Solution 1 Drop(s) Both EYES two times a day  aspirin  chewable 81 milliGRAM(s) Oral daily  atorvastatin 80 milliGRAM(s) Oral at bedtime  doxazosin 4 milliGRAM(s) Oral at bedtime  enoxaparin Injectable 40 milliGRAM(s) SubCutaneous every 24 hours  lactulose Syrup 20 Gram(s) Oral two times a day  pantoprazole    Tablet 40 milliGRAM(s) Oral before breakfast  petrolatum Ophthalmic Ointment 1 Application(s) Right EYE every 12 hours  polyethylene glycol 3350 17 Gram(s) Oral two times a day  senna Syrup 10 milliLiter(s) Oral at bedtime  ticagrelor 90 milliGRAM(s) Oral every 12 hours    MEDICATIONS  (PRN):  melatonin 6 milliGRAM(s) Oral at bedtime PRN Insomnia    Vital Signs Last 24 Hrs  T(F): 98.7 (13 Apr 2023 21:40), Max: 98.7 (13 Apr 2023 21:40)  HR: 96 (13 Apr 2023 21:40) (96 - 96)  BP: 112/73 (13 Apr 2023 21:40) (112/73 - 112/73)  RR: 16 (13 Apr 2023 21:40) (16 - 16)  SpO2: 95% (13 Apr 2023 21:40) (95% - 95%)  I&O's Summary    BMI (kg/m2): 27.4 (04-11-23 @ 19:58)  PHYSICAL EXAM:  General: NAD, A/O x 3  ENT: MMM  Neck: Supple, No JVD  Lungs: Clear to auscultation bilaterally  Cardio: RRR, S1/S2, No murmurs  Abdomen: Soft, Nontender, Nondistended; Bowel sounds present  Extremities: No calf tenderness, No pitting edema    LABS:                        11.6   7.91  )-----------( 426      ( 13 Apr 2023 06:13 )             34.9       04-13    141  |  105  |  10  ----------------------------<  142  3.9   |  27  |  0.51    Ca    8.7      13 Apr 2023 06:13    TPro  6.6  /  Alb  2.6  /  TBili  0.6  /  DBili  x   /  AST  33  /  ALT  81  /  AlkPhos  47  04-13 03-29 Chol 270 mg/dL LDL -- HDL 58 mg/dL Trig 83 mg/dL                      COVID-19 PCR: NotDetec (04-12-23 @ 05:00)  COVID-19 PCR: NotDetec (04-11-23 @ 09:52)  COVID-19 PCR: NotDetec (04-05-23 @ 17:15)  COVID-19 PCR: NotDetec (03-29-23 @ 10:00)      RADIOLOGY & ADDITIONAL TESTS:    Care Discussed with Consultants/Other Providers:

## 2023-04-15 PROCEDURE — 99232 SBSQ HOSP IP/OBS MODERATE 35: CPT

## 2023-04-15 RX ADMIN — Medication 1 APPLICATION(S): at 11:32

## 2023-04-15 RX ADMIN — POLYETHYLENE GLYCOL 3350 17 GRAM(S): 17 POWDER, FOR SOLUTION ORAL at 17:31

## 2023-04-15 RX ADMIN — PANTOPRAZOLE SODIUM 40 MILLIGRAM(S): 20 TABLET, DELAYED RELEASE ORAL at 05:44

## 2023-04-15 RX ADMIN — Medication 1 APPLICATION(S): at 17:32

## 2023-04-15 RX ADMIN — ATORVASTATIN CALCIUM 80 MILLIGRAM(S): 80 TABLET, FILM COATED ORAL at 20:43

## 2023-04-15 RX ADMIN — TICAGRELOR 90 MILLIGRAM(S): 90 TABLET ORAL at 05:44

## 2023-04-15 RX ADMIN — ENOXAPARIN SODIUM 40 MILLIGRAM(S): 100 INJECTION SUBCUTANEOUS at 17:32

## 2023-04-15 RX ADMIN — Medication 4 MILLIGRAM(S): at 20:43

## 2023-04-15 RX ADMIN — Medication 81 MILLIGRAM(S): at 11:32

## 2023-04-15 RX ADMIN — Medication 1 DROP(S): at 05:44

## 2023-04-15 RX ADMIN — Medication 1 DROP(S): at 17:31

## 2023-04-15 RX ADMIN — MIDODRINE HYDROCHLORIDE 2.5 MILLIGRAM(S): 2.5 TABLET ORAL at 13:43

## 2023-04-15 RX ADMIN — LACTULOSE 20 GRAM(S): 10 SOLUTION ORAL at 17:32

## 2023-04-15 RX ADMIN — MIDODRINE HYDROCHLORIDE 2.5 MILLIGRAM(S): 2.5 TABLET ORAL at 05:44

## 2023-04-15 RX ADMIN — TICAGRELOR 90 MILLIGRAM(S): 90 TABLET ORAL at 17:31

## 2023-04-15 NOTE — PROGRESS NOTE ADULT - ASSESSMENT
58M PMH HTN, DM2 who presented as a transfer from [facility] to [facility] for stroke on 3/29, s/p TICI 2b thrombectomy of the R ICA and R MCA, with R ICA stent placement.  Postoperative CT head demonstrated new MLS with some hemorrhagic conversion.    Stroke s/p thrombectomy of Right ICA and Right MCA and right ICA stent placement  Left hemiparesis  - PT/OT/ST per PMR  - cont asa/brillinta, statin    Dysphagia with PEG tube  - tube feeds per nutrition  - speech therapy    Hypertension - normotensive currently  - lisinopril     DM2  - HbA1C 7.3%  - sugars at goal, will consider addition of metformin if patient amenable and labs remain stable     DVT PPx  - lovenox

## 2023-04-15 NOTE — PROGRESS NOTE ADULT - ASSESSMENT
Imp: Patient with diagnosis of   debility due to CVA     admitted for comprehensive acute rehabilitation.    Plan:  - Continue PT/OT/SLP  - DVT prophylaxis  - Skin- Turn q2h, check skin daily  - Continue current medications; patient medically stable.   -Active issues-   - Patient is stable to continue current rehabilitation program.

## 2023-04-15 NOTE — PROGRESS NOTE ADULT - SUBJECTIVE AND OBJECTIVE BOX
Cc: Gait dysfunction    HPI: Patient with no new medical issues overnight.  Pain controlled, no chest pain, no N/V, no Fevers/Chills. No other new ROS  Has been tolerating rehabilitation program.    AQUAPHOR (petrolatum Ointment) 1 Application(s) Topical daily  artificial  tears Solution 1 Drop(s) Both EYES two times a day  aspirin  chewable 81 milliGRAM(s) Oral daily  atorvastatin 80 milliGRAM(s) Oral at bedtime  doxazosin 4 milliGRAM(s) Oral at bedtime  enoxaparin Injectable 40 milliGRAM(s) SubCutaneous every 24 hours  lactulose Syrup 20 Gram(s) Oral two times a day  melatonin 6 milliGRAM(s) Oral at bedtime PRN  midodrine. 2.5 milliGRAM(s) Oral <User Schedule>  pantoprazole    Tablet 40 milliGRAM(s) Oral before breakfast  petrolatum Ophthalmic Ointment 1 Application(s) Right EYE every 12 hours  polyethylene glycol 3350 17 Gram(s) Oral two times a day  senna Syrup 10 milliLiter(s) Oral at bedtime  ticagrelor 90 milliGRAM(s) Oral every 12 hours      T(C): 36.3 (04-15-23 @ 08:08), Max: 36.7 (04-14-23 @ 20:59)  HR: 81 (04-15-23 @ 08:08) (81 - 97)  BP: 122/83 (04-15-23 @ 08:08) (117/72 - 122/83)  RR: 15 (04-15-23 @ 08:08) (15 - 15)  SpO2: 93% (04-15-23 @ 08:08) (93% - 94%)    In NAD  HEENT- EOMI  Heart- RRR, S1S2  Lungs- CTA bl.  Abd- + BS, NT  Ext- No calf pain  Neuro- Exam unchanged

## 2023-04-15 NOTE — PROGRESS NOTE ADULT - SUBJECTIVE AND OBJECTIVE BOX
Patient is a 58y old  Male who presents with a chief complaint of Functional deficits s/p CVA (14 Apr 2023 11:20)      Patient seen and examined at bedside.  Denies chest pain, dyspnea, abd pain.    ALLERGIES:  No Known Allergies    MEDICATIONS  (STANDING):  AQUAPHOR (petrolatum Ointment) 1 Application(s) Topical daily  artificial  tears Solution 1 Drop(s) Both EYES two times a day  aspirin  chewable 81 milliGRAM(s) Oral daily  atorvastatin 80 milliGRAM(s) Oral at bedtime  doxazosin 4 milliGRAM(s) Oral at bedtime  enoxaparin Injectable 40 milliGRAM(s) SubCutaneous every 24 hours  lactulose Syrup 20 Gram(s) Oral two times a day  midodrine. 2.5 milliGRAM(s) Oral <User Schedule>  pantoprazole    Tablet 40 milliGRAM(s) Oral before breakfast  petrolatum Ophthalmic Ointment 1 Application(s) Right EYE every 12 hours  polyethylene glycol 3350 17 Gram(s) Oral two times a day  senna Syrup 10 milliLiter(s) Oral at bedtime  ticagrelor 90 milliGRAM(s) Oral every 12 hours    MEDICATIONS  (PRN):  melatonin 6 milliGRAM(s) Oral at bedtime PRN Insomnia    Vital Signs Last 24 Hrs  T(F): 97.4 (15 Apr 2023 08:08), Max: 98.1 (14 Apr 2023 09:56)  HR: 81 (15 Apr 2023 08:08) (81 - 97)  BP: 122/83 (15 Apr 2023 08:08) (103/70 - 122/83)  RR: 15 (15 Apr 2023 08:08) (15 - 16)  SpO2: 93% (15 Apr 2023 08:08) (93% - 95%)  I&O's Summary    BMI (kg/m2): 27.4 (04-11-23 @ 19:58)  PHYSICAL EXAM:  General: NAD, A/O x 3  ENT: MMM  Neck: Supple, No JVD  Lungs: Clear to auscultation bilaterally  Cardio: RRR, S1/S2, No murmurs  Abdomen: Soft, Nontender, Nondistended; Bowel sounds present  Extremities: No calf tenderness, No pitting edema    LABS:                        11.6   7.91  )-----------( 426      ( 13 Apr 2023 06:13 )             34.9       04-13    141  |  105  |  10  ----------------------------<  142  3.9   |  27  |  0.51    Ca    8.7      13 Apr 2023 06:13    TPro  6.6  /  Alb  2.6  /  TBili  0.6  /  DBili  x   /  AST  33  /  ALT  81  /  AlkPhos  47  04-13 03-29 Chol 270 mg/dL LDL -- HDL 58 mg/dL Trig 83 mg/dL                      COVID-19 PCR: NotDetec (04-12-23 @ 05:00)  COVID-19 PCR: NotDetec (04-11-23 @ 09:52)  COVID-19 PCR: NotDetec (04-05-23 @ 17:15)  COVID-19 PCR: NotDetec (03-29-23 @ 10:00)      RADIOLOGY & ADDITIONAL TESTS:    Care Discussed with Consultants/Other Providers:

## 2023-04-16 PROCEDURE — 99232 SBSQ HOSP IP/OBS MODERATE 35: CPT

## 2023-04-16 RX ORDER — GABAPENTIN 400 MG/1
100 CAPSULE ORAL AT BEDTIME
Refills: 0 | Status: COMPLETED | OUTPATIENT
Start: 2023-04-16 | End: 2023-04-18

## 2023-04-16 RX ORDER — BENZOCAINE AND MENTHOL 5; 1 G/100ML; G/100ML
1 LIQUID ORAL
Refills: 0 | Status: DISCONTINUED | OUTPATIENT
Start: 2023-04-16 | End: 2023-05-05

## 2023-04-16 RX ADMIN — Medication 1 APPLICATION(S): at 17:20

## 2023-04-16 RX ADMIN — Medication 1 APPLICATION(S): at 05:35

## 2023-04-16 RX ADMIN — MIDODRINE HYDROCHLORIDE 2.5 MILLIGRAM(S): 2.5 TABLET ORAL at 13:29

## 2023-04-16 RX ADMIN — ATORVASTATIN CALCIUM 80 MILLIGRAM(S): 80 TABLET, FILM COATED ORAL at 22:01

## 2023-04-16 RX ADMIN — POLYETHYLENE GLYCOL 3350 17 GRAM(S): 17 POWDER, FOR SOLUTION ORAL at 05:32

## 2023-04-16 RX ADMIN — Medication 81 MILLIGRAM(S): at 11:53

## 2023-04-16 RX ADMIN — GABAPENTIN 100 MILLIGRAM(S): 400 CAPSULE ORAL at 22:01

## 2023-04-16 RX ADMIN — Medication 1 DROP(S): at 05:33

## 2023-04-16 RX ADMIN — ENOXAPARIN SODIUM 40 MILLIGRAM(S): 100 INJECTION SUBCUTANEOUS at 17:20

## 2023-04-16 RX ADMIN — Medication 1 APPLICATION(S): at 11:52

## 2023-04-16 RX ADMIN — PANTOPRAZOLE SODIUM 40 MILLIGRAM(S): 20 TABLET, DELAYED RELEASE ORAL at 05:31

## 2023-04-16 RX ADMIN — Medication 1 DROP(S): at 17:19

## 2023-04-16 RX ADMIN — BENZOCAINE AND MENTHOL 1 LOZENGE: 5; 1 LIQUID ORAL at 09:39

## 2023-04-16 RX ADMIN — TICAGRELOR 90 MILLIGRAM(S): 90 TABLET ORAL at 17:19

## 2023-04-16 RX ADMIN — TICAGRELOR 90 MILLIGRAM(S): 90 TABLET ORAL at 05:31

## 2023-04-16 RX ADMIN — LACTULOSE 20 GRAM(S): 10 SOLUTION ORAL at 17:19

## 2023-04-16 RX ADMIN — SENNA PLUS 10 MILLILITER(S): 8.6 TABLET ORAL at 22:01

## 2023-04-16 RX ADMIN — MIDODRINE HYDROCHLORIDE 2.5 MILLIGRAM(S): 2.5 TABLET ORAL at 05:31

## 2023-04-16 RX ADMIN — Medication 4 MILLIGRAM(S): at 22:01

## 2023-04-16 NOTE — PROGRESS NOTE ADULT - ASSESSMENT
58M PMH HTN, DM2 who presented as a transfer from [facility] to [facility] for stroke on 3/29, s/p TICI 2b thrombectomy of the R ICA and R MCA, with R ICA stent placement.  Postoperative CT head demonstrated new MLS with some hemorrhagic conversion.    Stroke s/p thrombectomy of Right ICA and Right MCA and right ICA stent placement  Left hemiparesis  - PT/OT/ST per PMR  - cont asa/brillinta, statin    Dysphagia with PEG tube  - tube feeds per nutrition  - speech therapy    Hypertension - normotensive currently  - lisinopril     DM2  - HbA1C 7.3%  - sugars at goal, will consider addition of metformin if patient amenable and labs remain stable     DVT PPx  - lovenox

## 2023-04-16 NOTE — PROGRESS NOTE ADULT - SUBJECTIVE AND OBJECTIVE BOX
Patient is a 58y old  Male who presents with a chief complaint of Functional deficits s/p CVA (15 Apr 2023 10:17)      Patient seen and examined at bedside.  Denies chest pain, dyspnea, abd pain.    ALLERGIES:  No Known Allergies    MEDICATIONS  (STANDING):  AQUAPHOR (petrolatum Ointment) 1 Application(s) Topical daily  artificial  tears Solution 1 Drop(s) Both EYES two times a day  aspirin  chewable 81 milliGRAM(s) Oral daily  atorvastatin 80 milliGRAM(s) Oral at bedtime  doxazosin 4 milliGRAM(s) Oral at bedtime  enoxaparin Injectable 40 milliGRAM(s) SubCutaneous every 24 hours  lactulose Syrup 20 Gram(s) Oral two times a day  midodrine. 2.5 milliGRAM(s) Oral <User Schedule>  pantoprazole    Tablet 40 milliGRAM(s) Oral before breakfast  petrolatum Ophthalmic Ointment 1 Application(s) Right EYE every 12 hours  polyethylene glycol 3350 17 Gram(s) Oral two times a day  senna Syrup 10 milliLiter(s) Oral at bedtime  ticagrelor 90 milliGRAM(s) Oral every 12 hours    MEDICATIONS  (PRN):  benzocaine/menthol Lozenge 1 Lozenge Oral two times a day PRN Sore Throat  melatonin 6 milliGRAM(s) Oral at bedtime PRN Insomnia    Vital Signs Last 24 Hrs  T(F): 97.3 (16 Apr 2023 07:51), Max: 97.5 (15 Apr 2023 20:37)  HR: 81 (16 Apr 2023 07:51) (81 - 98)  BP: 129/83 (16 Apr 2023 07:51) (110/64 - 129/83)  RR: 15 (16 Apr 2023 07:51) (15 - 15)  SpO2: 97% (16 Apr 2023 07:51) (95% - 97%)  I&O's Summary    BMI (kg/m2): 27.4 (04-11-23 @ 19:58)  PHYSICAL EXAM:  General: NAD, A/O x 3  ENT: MMM  Neck: Supple, No JVD  Lungs: Clear to auscultation bilaterally  Cardio: RRR, S1/S2, No murmurs  Abdomen: Soft, Nontender, Nondistended; Bowel sounds present  Extremities: No calf tenderness, No pitting edema    LABS:                        03-29 Chol 270 mg/dL LDL -- HDL 58 mg/dL Trig 83 mg/dL                      COVID-19 PCR: NotDetec (04-12-23 @ 05:00)  COVID-19 PCR: NotDetec (04-11-23 @ 09:52)  COVID-19 PCR: NotDetec (04-05-23 @ 17:15)  COVID-19 PCR: NotDetec (03-29-23 @ 10:00)      RADIOLOGY & ADDITIONAL TESTS:    Care Discussed with Consultants/Other Providers:

## 2023-04-16 NOTE — PROGRESS NOTE ADULT - SUBJECTIVE AND OBJECTIVE BOX
Cc: Gait dysfunction    HPI:   : Ella; [id number]  patient with pruritus of the right upper > lower limb  patient reports sparse left upper and lower limb soreness  patient continues to have limited vision in the right eye consistent with initial presentation    Pain controlled, no chest pain, no N/V, no Fevers/Chills. No other new ROS  Has been tolerating rehabilitation program.    AQUAPHOR (petrolatum Ointment) 1 Application(s) Topical daily  artificial  tears Solution 1 Drop(s) Both EYES two times a day  aspirin  chewable 81 milliGRAM(s) Oral daily  atorvastatin 80 milliGRAM(s) Oral at bedtime  benzocaine/menthol Lozenge 1 Lozenge Oral two times a day PRN  doxazosin 4 milliGRAM(s) Oral at bedtime  enoxaparin Injectable 40 milliGRAM(s) SubCutaneous every 24 hours  lactulose Syrup 20 Gram(s) Oral two times a day  melatonin 6 milliGRAM(s) Oral at bedtime PRN  midodrine. 2.5 milliGRAM(s) Oral <User Schedule>  pantoprazole    Tablet 40 milliGRAM(s) Oral before breakfast  petrolatum Ophthalmic Ointment 1 Application(s) Right EYE every 12 hours  polyethylene glycol 3350 17 Gram(s) Oral two times a day  senna Syrup 10 milliLiter(s) Oral at bedtime  ticagrelor 90 milliGRAM(s) Oral every 12 hours      T(C): 36.3 (04-16-23 @ 07:51), Max: 36.4 (04-15-23 @ 20:37)  HR: 81 (04-16-23 @ 07:51) (81 - 98)  BP: 129/83 (04-16-23 @ 07:51) (110/64 - 129/83)  RR: 15 (04-16-23 @ 07:51) (15 - 15)  SpO2: 97% (04-16-23 @ 07:51) (95% - 97%)    In NAD  HEENT- EOMI  Heart- RRR, S1S2  Lungs- CTA bl.  Abd- + BS, NT  Ext- No calf pain  Neuro- Exam unchanged          Imp: Patient with diagnosis of     CVA     admitted for comprehensive acute rehabilitation.    Plan:  - Continue PT/OT/SLP  - DVT prophylaxis  - Skin- Turn q2h, check skin daily  - Continue current medications; patient medically stable.   -Active issues-   - Patient is stable to continue current rehabilitation program.   - gabapentin 100mg started bedtime

## 2023-04-17 LAB
ALBUMIN SERPL ELPH-MCNC: 2.7 G/DL — LOW (ref 3.3–5)
ALP SERPL-CCNC: 40 U/L — SIGNIFICANT CHANGE UP (ref 40–120)
ALT FLD-CCNC: 42 U/L — SIGNIFICANT CHANGE UP (ref 10–45)
ANION GAP SERPL CALC-SCNC: 7 MMOL/L — SIGNIFICANT CHANGE UP (ref 5–17)
AST SERPL-CCNC: 17 U/L — SIGNIFICANT CHANGE UP (ref 10–40)
BASOPHILS # BLD AUTO: 0.04 K/UL — SIGNIFICANT CHANGE UP (ref 0–0.2)
BASOPHILS NFR BLD AUTO: 0.6 % — SIGNIFICANT CHANGE UP (ref 0–2)
BILIRUB SERPL-MCNC: 0.5 MG/DL — SIGNIFICANT CHANGE UP (ref 0.2–1.2)
BUN SERPL-MCNC: 10 MG/DL — SIGNIFICANT CHANGE UP (ref 7–23)
CALCIUM SERPL-MCNC: 8.9 MG/DL — SIGNIFICANT CHANGE UP (ref 8.4–10.5)
CHLORIDE SERPL-SCNC: 106 MMOL/L — SIGNIFICANT CHANGE UP (ref 96–108)
CO2 SERPL-SCNC: 30 MMOL/L — SIGNIFICANT CHANGE UP (ref 22–31)
CREAT SERPL-MCNC: 0.7 MG/DL — SIGNIFICANT CHANGE UP (ref 0.5–1.3)
EGFR: 106 ML/MIN/1.73M2 — SIGNIFICANT CHANGE UP
EOSINOPHIL # BLD AUTO: 0.28 K/UL — SIGNIFICANT CHANGE UP (ref 0–0.5)
EOSINOPHIL NFR BLD AUTO: 4.4 % — SIGNIFICANT CHANGE UP (ref 0–6)
GLUCOSE SERPL-MCNC: 156 MG/DL — HIGH (ref 70–99)
HCT VFR BLD CALC: 33.6 % — LOW (ref 39–50)
HGB BLD-MCNC: 11.1 G/DL — LOW (ref 13–17)
IMM GRANULOCYTES NFR BLD AUTO: 0.2 % — SIGNIFICANT CHANGE UP (ref 0–0.9)
LYMPHOCYTES # BLD AUTO: 1.53 K/UL — SIGNIFICANT CHANGE UP (ref 1–3.3)
LYMPHOCYTES # BLD AUTO: 24.3 % — SIGNIFICANT CHANGE UP (ref 13–44)
MCHC RBC-ENTMCNC: 27.3 PG — SIGNIFICANT CHANGE UP (ref 27–34)
MCHC RBC-ENTMCNC: 33 GM/DL — SIGNIFICANT CHANGE UP (ref 32–36)
MCV RBC AUTO: 82.6 FL — SIGNIFICANT CHANGE UP (ref 80–100)
MONOCYTES # BLD AUTO: 0.48 K/UL — SIGNIFICANT CHANGE UP (ref 0–0.9)
MONOCYTES NFR BLD AUTO: 7.6 % — SIGNIFICANT CHANGE UP (ref 2–14)
NEUTROPHILS # BLD AUTO: 3.96 K/UL — SIGNIFICANT CHANGE UP (ref 1.8–7.4)
NEUTROPHILS NFR BLD AUTO: 62.9 % — SIGNIFICANT CHANGE UP (ref 43–77)
NRBC # BLD: 0 /100 WBCS — SIGNIFICANT CHANGE UP (ref 0–0)
PLATELET # BLD AUTO: 422 K/UL — HIGH (ref 150–400)
POTASSIUM SERPL-MCNC: 3.7 MMOL/L — SIGNIFICANT CHANGE UP (ref 3.5–5.3)
POTASSIUM SERPL-SCNC: 3.7 MMOL/L — SIGNIFICANT CHANGE UP (ref 3.5–5.3)
PROT SERPL-MCNC: 6.7 G/DL — SIGNIFICANT CHANGE UP (ref 6–8.3)
RBC # BLD: 4.07 M/UL — LOW (ref 4.2–5.8)
RBC # FLD: 13.4 % — SIGNIFICANT CHANGE UP (ref 10.3–14.5)
SODIUM SERPL-SCNC: 143 MMOL/L — SIGNIFICANT CHANGE UP (ref 135–145)
WBC # BLD: 6.3 K/UL — SIGNIFICANT CHANGE UP (ref 3.8–10.5)
WBC # FLD AUTO: 6.3 K/UL — SIGNIFICANT CHANGE UP (ref 3.8–10.5)

## 2023-04-17 PROCEDURE — 99233 SBSQ HOSP IP/OBS HIGH 50: CPT

## 2023-04-17 RX ORDER — MIDODRINE HYDROCHLORIDE 2.5 MG/1
5 TABLET ORAL
Refills: 0 | Status: DISCONTINUED | OUTPATIENT
Start: 2023-04-17 | End: 2023-04-24

## 2023-04-17 RX ADMIN — Medication 1 DROP(S): at 17:38

## 2023-04-17 RX ADMIN — Medication 1 APPLICATION(S): at 11:37

## 2023-04-17 RX ADMIN — LACTULOSE 20 GRAM(S): 10 SOLUTION ORAL at 06:51

## 2023-04-17 RX ADMIN — Medication 81 MILLIGRAM(S): at 11:38

## 2023-04-17 RX ADMIN — PANTOPRAZOLE SODIUM 40 MILLIGRAM(S): 20 TABLET, DELAYED RELEASE ORAL at 06:50

## 2023-04-17 RX ADMIN — ATORVASTATIN CALCIUM 80 MILLIGRAM(S): 80 TABLET, FILM COATED ORAL at 21:34

## 2023-04-17 RX ADMIN — TICAGRELOR 90 MILLIGRAM(S): 90 TABLET ORAL at 17:38

## 2023-04-17 RX ADMIN — TICAGRELOR 90 MILLIGRAM(S): 90 TABLET ORAL at 06:50

## 2023-04-17 RX ADMIN — GABAPENTIN 100 MILLIGRAM(S): 400 CAPSULE ORAL at 21:34

## 2023-04-17 RX ADMIN — ENOXAPARIN SODIUM 40 MILLIGRAM(S): 100 INJECTION SUBCUTANEOUS at 17:38

## 2023-04-17 RX ADMIN — MIDODRINE HYDROCHLORIDE 5 MILLIGRAM(S): 2.5 TABLET ORAL at 14:26

## 2023-04-17 RX ADMIN — MIDODRINE HYDROCHLORIDE 2.5 MILLIGRAM(S): 2.5 TABLET ORAL at 06:50

## 2023-04-17 RX ADMIN — POLYETHYLENE GLYCOL 3350 17 GRAM(S): 17 POWDER, FOR SOLUTION ORAL at 06:51

## 2023-04-17 RX ADMIN — Medication 4 MILLIGRAM(S): at 21:34

## 2023-04-17 RX ADMIN — Medication 1 DROP(S): at 06:50

## 2023-04-17 NOTE — CHART NOTE - NSCHARTNOTEFT_GEN_A_CORE
Nutrition Follow Up Note  Source: Medical Record [X] Patient [X] Family [ ]         Diet: Easy to chew, Halal, Ensure Plus High Protein (provides 350 kcal, 20 g protein/serving) BID  Pt tolerating diet with variable PO intake, eating % of meals per nursing flow sheets. Pt reports fair appetite. Family brings outside food/snacks. Discussed adequate PO intake and the importance of nutrition to assist in goals of rehab. Pt reports good acceptance of Ensure Plus High Protein (provides 350 kcal, 20 g protein/serving), drank ~75% of supplement at breakfast. No digestive issues reported.    Enteral/Parenteral Nutrition: N/A    Current Weight: 175 lbs (4/11)    Pertinent Medications: MEDICATIONS  (STANDING):  AQUAPHOR (petrolatum Ointment) 1 Application(s) Topical daily  artificial  tears Solution 1 Drop(s) Both EYES two times a day  aspirin  chewable 81 milliGRAM(s) Oral daily  atorvastatin 80 milliGRAM(s) Oral at bedtime  doxazosin 4 milliGRAM(s) Oral at bedtime  enoxaparin Injectable 40 milliGRAM(s) SubCutaneous every 24 hours  gabapentin 100 milliGRAM(s) Oral at bedtime  midodrine. 5 milliGRAM(s) Oral <User Schedule>  pantoprazole    Tablet 40 milliGRAM(s) Oral before breakfast  petrolatum Ophthalmic Ointment 1 Application(s) Right EYE every 12 hours  polyethylene glycol 3350 17 Gram(s) Oral two times a day  senna Syrup 10 milliLiter(s) Oral at bedtime  ticagrelor 90 milliGRAM(s) Oral every 12 hours    MEDICATIONS  (PRN):  benzocaine/menthol Lozenge 1 Lozenge Oral two times a day PRN Sore Throat  melatonin 6 milliGRAM(s) Oral at bedtime PRN Insomnia      Pertinent Labs:  04-17 Na143 mmol/L Glu 156 mg/dL<H> K+ 3.7 mmol/L Cr  0.70 mg/dL BUN 10 mg/dL 04-17 Alb 2.7 g/dL<L> 03-29 Chol 270 mg/dL<H> LDL --    HDL 58 mg/dL Trig 83 mg/dL        Skin: surgical incision per nursing flow sheets     Edema: No edema per nursing flow sheets     Last BM: on 4/15 Per nursing flowsheets     Estimated Needs:   [X] No Change since Previous Assessment  [ ] Recalculated:     Previous Nutrition Diagnosis:   Severe malnutrition, acute    Nutrition Diagnosis is [X] Ongoing  - addressed with Ensure Plus High Protein (provides 350 kcal, 20 g protein/serving)      New Nutrition Diagnosis: [X] Not Applicable  [ ] Inadequate Protein Energy Intake   [ ] Inadequate Oral Intake   [ ] Excessive Energy Intake   [ ] Increased Nutrient Needs   [ ] Obesity   [ ] Altered GI Function   [ ] Unintended Weight Loss   [ ] Food & Nutrition Related Knowledge Deficit  [ ] Limited Adherence to nutrition related recommendations   [ ] Malnutrition      Interventions:   1. Recommend continuing with current plan of care  2. Encourage PO intake    Monitoring & Evaluation:   [X] Weights   [X] PO Intake   [X] Follow Up (Per Protocol)  [X] Tolerance to Diet Prescription   [X] Other: Labs    RD Remains Available.  Preeti Jolly RD

## 2023-04-17 NOTE — PROGRESS NOTE ADULT - ASSESSMENT
Assessment/Plan:  CONG AGUAYO is a 58 year old male with PMH of HTN, and DM; who presented as a transfer from [facility] to [facility] for stroke on 3/29, where he was found to have a R ICA occlusion and he underwent a TICI 2b thromectomy of R ICA and R MCA with R ICA stent placement. Extubated on 4/7. His brain MRI was significant for improved midline shift and small frontal parietal SAH. His hospital course was complicated by aspiration PNA (s/p Zosyn); and dysphagia (requiring PEG - placed 4/6). Patient now admitted for a multidisciplinary rehab program. 04-11-23 @ 13:54     * Orthostatic hypotension--increased dose  of midodrine  * Await orthotics review for left sided weakness  *  Will confirm last date when PEG was uses and will liaise with GI       Rehab Management/MEDICAL MANAGEMENT     #Functional deficits s/p CVA  -Continue  Comprehensive Rehab Program of PT/OT/SLP  - 3 hours a day, 5 days a week  - P&O as needed   - R ICA occlusion, s/p TICI 2b thrombectomy of R ICA and R MCA with R ICA stent placement  - ASA & Brilinta  - Outpt Neurology follow up- Dr. Reid  - Outpt IR follow up - Dr. Allen  --orthotics referral with some functional improvement  Left sided weakness, await review     #dysphagia/aspiration PNA  - S/p Zosyn  - PEG tube  - Repeat CT chest in 1 month (~5/8)  --Decreased air entry R lower lobe 4/14---Incentive spirometry ordered, will get CXR if persistent or clinically symptomatic     #HTN  - Lisinopril--D/CARSON 4/13  - Doxazosin  - Outpt Cardiology follow up - Dr. Song    # Orthostatic hypotension--midodrine 2.5mg bid, increased to 5mg bid 4/17    #HLD  -Atorvastatin    #DM  - A1c: 7.3  - ISS  - FS ACHS    #Dry eye  - Occular lubricant ointment  - Artificial tears    #Sleep  - Melatonin 6mg nightly PRN     #Skin  - Skin-: intact, PEG in situ   - Pressure injury/Skin: OOB to Chair, PT/OT     #Pain Mgmt   - Tylenol PRN    #GI/Bowel Mgmt   - Pantoprazole  - Continent c/w Senna, Miralax   -lactulose    #/Bladder Mgmt --Voiding appropriately    #FEN   - PEG  - Diet - Easy to Chew, halal  - Dysphagia  SLP - evaluation and treatment    #Precautions / PROPHYLAXIS:   - Falls  - ortho: Weight bearing status: WBAT   - Lungs: Aspiration, Incentive Spirometer   - DVT: Lovenox    IDT conference on 4/13  TDD: 4/27 to home vs [facility]AN  Barriers: Distracted, tangentile, perseverative, requires cueing, R eye visual deficit.   Social Work: Lives in a basement apartment with spouse and 2 sons, 12 MEI. Patient is main source of income for family. Spouse does not work.  OT: Min A for grooming. Max A for UBD, total A for LBD. Mod A for transfers.  PT: Mod x2pr A for transfers. Ambulated 5 ft on parallel bars with mod x2 pr A. Stairs not yet assessed.   SLP: Easy to chew diet requiring liquid washes after feeding. Easily distracted, tangentile, perseverative  ---------------------------------------------------------------  FOLLOW UP/OUTPATIENT:    Bev Song)  Cardiology; Internal Medicine  [address] [facility] [address]  [place] [id number]  Phone: [phone]  Fax: [phone]    Ivan Reid; PhD)  Neurology; Vascular Neurology  [address], [address]  [place] [address]  Phone: [phone]  Fax: [phone]  Follow Up Time:     Primary doctor,   Phone: (   )    -  Fax: (   )    -  Follow Up Time:     Sanchez Allen)  Neurology; Vascular Neurology  [address] [place] [address]  Phone: [phone]  Fax: [phone]  Follow Up Time:     Lily Moon (DO)  Gastroenterology  [address], [address]  [place] [address]  Phone: [phone]  Fax: [phone]  Follow Up Time:  ---------------------------------------------------------------    Non critical care   30 mins spent, chart review, patient exam, review of labs, discussion with patient, liaison with other providers including orthotists, observation in therapy  > 50 % spent in care co ordination

## 2023-04-17 NOTE — PROGRESS NOTE ADULT - SUBJECTIVE AND OBJECTIVE BOX
Subjective  Seen and examined,  with [employer] Phone language translation   Reports no acute medical symptoms  He is tolerating Gabapentin, commenced few days prior to LE neuropathy  PEG in situ, not in use   Tolerating oral feeds    Therapy--Engaing and motivated  Tolerated therapy today    Orthostatic hypotension still present, although improving    Vital Signs Last 24 Hrs  T(C): 36.7 (17 Apr 2023 08:53), Max: 36.7 (16 Apr 2023 19:38)  T(F): 98.1 (17 Apr 2023 08:53), Max: 98.1 (17 Apr 2023 08:53)  HR: 99 (17 Apr 2023 09:55) (92 - 99)  BP: 115/73 (17 Apr 2023 09:55) (101/65 - 115/73)  RR: 16 (17 Apr 2023 09:55) (16 - 16)  SpO2: 96% (17 Apr 2023 09:55) (95% - 96%)  O2 Parameters below as of 17 Apr 2023 09:55  Patient On (Oxygen Delivery Method): room air    EXAM  Gen - Comfortable  HEENT - , right  eye vision loss   Neck - Supple, No limited ROM  Pulm -\Vesicular sounds  Cardiovascular - RRR, S1S2  Chest - good chest expansion, good respiratory effort  Abdomen - Soft, NT/ND, +BS, + PEG   Extremities - No Cyanosis, no clubbing, no edema, no calf tenderness      Neuro-  Speech  dysarthric  Cranial Nerves - Left facial weakness, left eye vision loss, absent left shoulder shrug   Right  eye vision loss Rt eye  Tongue deviation to left, Flat left nasolabial fold     Motor -  Left hemiparesis                    LEFT    UE - ShAB 0/5, EF 0/5, EE 0/5,  0/5                    RIGHT UE - ShAB 5/5, EF 5/5, EE 5/5,   5/5                    LEFT    LE - 2/5                    RIGHT LE - HF 5/5, KE 5/5, DF 5/5, PF 5/5        Sensory - Intact to LT     Reflexes - DTR Intact     Coordination - FTN/HTS  impaired to left side     Tone - normal  Psychiatric - Mood stable, Affect WNL  Skin:  all skin intact, PEG in situ     RECENT LABS/IMAGING                        11.1   6.30  )-----------( 422      ( 17 Apr 2023 06:10 )             33.6     04-17    143  |  106  |  10  ----------------------------<  156<H>  3.7   |  30  |  0.70    Ca    8.9      17 Apr 2023 06:10    TPro  6.7  /  Alb  2.7<L>  /  TBili  0.5  /  DBili  x   /  AST  17  /  ALT  42  /  AlkPhos  40  04-17    MEDICATIONS  (STANDING):  AQUAPHOR (petrolatum Ointment) 1 Application(s) Topical daily  artificial  tears Solution 1 Drop(s) Both EYES two times a day  aspirin  chewable 81 milliGRAM(s) Oral daily  atorvastatin 80 milliGRAM(s) Oral at bedtime  doxazosin 4 milliGRAM(s) Oral at bedtime  enoxaparin Injectable 40 milliGRAM(s) SubCutaneous every 24 hours  gabapentin 100 milliGRAM(s) Oral at bedtime  midodrine. 5 milliGRAM(s) Oral <User Schedule>  pantoprazole    Tablet 40 milliGRAM(s) Oral before breakfast  petrolatum Ophthalmic Ointment 1 Application(s) Right EYE every 12 hours  polyethylene glycol 3350 17 Gram(s) Oral two times a day  senna Syrup 10 milliLiter(s) Oral at bedtime  ticagrelor 90 milliGRAM(s) Oral every 12 hours    MEDICATIONS  (PRN):  benzocaine/menthol Lozenge 1 Lozenge Oral two times a day PRN Sore Throat  melatonin 6 milliGRAM(s) Oral at bedtime PRN Insomnia

## 2023-04-18 PROCEDURE — 99232 SBSQ HOSP IP/OBS MODERATE 35: CPT

## 2023-04-18 RX ORDER — LANOLIN ALCOHOL/MO/W.PET/CERES
6 CREAM (GRAM) TOPICAL AT BEDTIME
Refills: 0 | Status: DISCONTINUED | OUTPATIENT
Start: 2023-04-18 | End: 2023-05-05

## 2023-04-18 RX ORDER — ACETAMINOPHEN 500 MG
650 TABLET ORAL EVERY 6 HOURS
Refills: 0 | Status: DISCONTINUED | OUTPATIENT
Start: 2023-04-18 | End: 2023-05-05

## 2023-04-18 RX ADMIN — TICAGRELOR 90 MILLIGRAM(S): 90 TABLET ORAL at 17:20

## 2023-04-18 RX ADMIN — Medication 1 DROP(S): at 17:20

## 2023-04-18 RX ADMIN — Medication 1 APPLICATION(S): at 17:20

## 2023-04-18 RX ADMIN — Medication 4 MILLIGRAM(S): at 21:24

## 2023-04-18 RX ADMIN — Medication 81 MILLIGRAM(S): at 11:40

## 2023-04-18 RX ADMIN — ENOXAPARIN SODIUM 40 MILLIGRAM(S): 100 INJECTION SUBCUTANEOUS at 17:19

## 2023-04-18 RX ADMIN — MIDODRINE HYDROCHLORIDE 5 MILLIGRAM(S): 2.5 TABLET ORAL at 13:06

## 2023-04-18 RX ADMIN — PANTOPRAZOLE SODIUM 40 MILLIGRAM(S): 20 TABLET, DELAYED RELEASE ORAL at 05:54

## 2023-04-18 RX ADMIN — Medication 6 MILLIGRAM(S): at 21:23

## 2023-04-18 RX ADMIN — ATORVASTATIN CALCIUM 80 MILLIGRAM(S): 80 TABLET, FILM COATED ORAL at 21:24

## 2023-04-18 RX ADMIN — Medication 1 APPLICATION(S): at 11:40

## 2023-04-18 RX ADMIN — BENZOCAINE AND MENTHOL 1 LOZENGE: 5; 1 LIQUID ORAL at 07:51

## 2023-04-18 RX ADMIN — GABAPENTIN 100 MILLIGRAM(S): 400 CAPSULE ORAL at 21:23

## 2023-04-18 NOTE — PROGRESS NOTE ADULT - SUBJECTIVE AND OBJECTIVE BOX
Subjective  Seen and examined,  with [employer] Phone language translation,  [phone]  Reports intermittent insomnia, Melatonin has been prn, agreed to have it standing  Otherwise, happy with his engagement in therapy  No sedation with gabapentin  Reports that PEG was last used for feeding while in acute care. never used since acute rehab admission  Tolerating oral feeds    Therapy--Engaging, improvement of Left side motor function noted    Vital Signs Last 24 Hrs  T(C): 37 (18 Apr 2023 07:47), Max: 37 (18 Apr 2023 07:47)  T(F): 98.6 (18 Apr 2023 07:47), Max: 98.6 (18 Apr 2023 07:47)  HR: 87 (18 Apr 2023 07:47) (87 - 98)  BP: 133/82 (18 Apr 2023 07:47) (112/70 - 133/82)  RR: 16 (18 Apr 2023 07:47) (16 - 16)  SpO2: 96% (18 Apr 2023 07:47) (96% - 96%)  O2 Parameters below as of 18 Apr 2023 07:47  Patient On (Oxygen Delivery Method): room air      EXAM  Gen - Comfortable, no acute distress  HEENT - , right  eye vision loss   Neck - Supple, No limited ROM  Pulm -clear   Cardiovascular - RRR, S1S2  Chest - good chest expansion, good respiratory effort  Abdomen - Soft, NT/ND, +BS, + PEG   Extremities - No Cyanosis, no clubbing, no edema, no calf tenderness    Neuro-  Speech  dysarthric  Cranial Nerves - Left facial weakness, left eye vision loss, absent left shoulder shrug   Right  eye vision loss Rt eye  Tongue deviation to left, Flat left nasolabial fold     Motor -  Left hemiparesis                    LEFT    UE - ShAB 1+/5                    RIGHT UE - ShAB 5/5, EF 5/5, EE 5/5,   5/5                    LEFT    LE - 2/5                    RIGHT LE - HF 5/5, KE 5/5, DF 5/5, PF 5/5        Sensory - Intact to LT     Reflexes - DTR Intact     Coordination - FTN/HTS  impaired to left side     Tone - normal  Psychiatric - Mood stable, Affect WNL  Skin:  all skin intact, PEG in situ     RECENT LABS/IMAGING                        11.1   6.30  )-----------( 422      ( 17 Apr 2023 06:10 )             33.6     04-17    143  |  106  |  10  ----------------------------<  156<H>  3.7   |  30  |  0.70    Ca    8.9      17 Apr 2023 06:10    TPro  6.7  /  Alb  2.7<L>  /  TBili  0.5  /  DBili  x   /  AST  17  /  ALT  42  /  AlkPhos  40  04-17    MEDICATIONS  (STANDING):  AQUAPHOR (petrolatum Ointment) 1 Application(s) Topical daily  artificial  tears Solution 1 Drop(s) Both EYES two times a day  aspirin  chewable 81 milliGRAM(s) Oral daily  atorvastatin 80 milliGRAM(s) Oral at bedtime  doxazosin 4 milliGRAM(s) Oral at bedtime  enoxaparin Injectable 40 milliGRAM(s) SubCutaneous every 24 hours  gabapentin 100 milliGRAM(s) Oral at bedtime  melatonin 6 milliGRAM(s) Oral at bedtime  midodrine. 5 milliGRAM(s) Oral <User Schedule>  pantoprazole    Tablet 40 milliGRAM(s) Oral before breakfast  petrolatum Ophthalmic Ointment 1 Application(s) Right EYE every 12 hours  polyethylene glycol 3350 17 Gram(s) Oral two times a day  senna Syrup 10 milliLiter(s) Oral at bedtime  ticagrelor 90 milliGRAM(s) Oral every 12 hours    MEDICATIONS  (PRN):  acetaminophen     Tablet .. 650 milliGRAM(s) Oral every 6 hours PRN Mild Pain (1 - 3)  benzocaine/menthol Lozenge 1 Lozenge Oral two times a day PRN Sore Throat

## 2023-04-18 NOTE — PROGRESS NOTE ADULT - ASSESSMENT
Assessment/Plan:  CONG AGUAYO is a 58 year old male with PMH of HTN, and DM; who presented as a transfer from [facility] to [facility] for stroke on 3/29, where he was found to have a R ICA occlusion and he underwent a TICI 2b thromectomy of R ICA and R MCA with R ICA stent placement. Extubated on 4/7. His brain MRI was significant for improved midline shift and small frontal parietal SAH. His hospital course was complicated by aspiration PNA (s/p Zosyn); and dysphagia (requiring PEG - placed 4/6). Patient now admitted for a multidisciplinary rehab program. 04-11-23 @ 13:54     * Orthostatic hypotension--improving c/w midodrine  * Await orthotics review for left sided weakness  *  Melatonin nightly standing    Rehab Management/MEDICAL MANAGEMENT     #Functional deficits s/p CVA  -Continue  Comprehensive Rehab Program of PT/OT/SLP  - 3 hours a day, 5 days a week  - P&O as needed   - R ICA occlusion, s/p TICI 2b thrombectomy of R ICA and R MCA with R ICA stent placement  - ASA & Brilinta  - Outpt Neurology follow up- Dr. Reid  - Outpt IR follow up - Dr. Aleln  --orthotics referral with some functional improvement  Left sided weakness, await review     #dysphagia/aspiration PNA  - S/p Zosyn  - PEG tube  - Repeat CT chest in 1 month (~5/8)  --Decreased air entry R lower lobe 4/14---Incentive spirometry ordered, will get CXR if persistent or clinically symptomatic     #HTN  - Lisinopril--D/CARSON 4/13  - Doxazosin  - Outpt Cardiology follow up - Dr. Song    # Orthostatic hypotension--midodrine 2.5mg bid, increased to 5mg bid 4/17    #HLD  -Atorvastatin    #DM  - A1c: 7.3  - ISS  - FS ACHS    #Dry eye  - Occular lubricant ointment  - Artificial tears    #Sleep  - Melatonin 6mg nightly PRN, switched to standing 4/18    #Skin  - Skin-: intact, PEG in situ   - Pressure injury/Skin: OOB to Chair, PT/OT     #Pain Mgmt   - Tylenol PRN    #GI/Bowel Mgmt   - Pantoprazole  - Continent c/w Senna, Miralax   -lactulose    #/Bladder Mgmt --Voiding appropriately    #FEN   - PEG--Last used for feeding while in acute care, not in use since acute rehab treatment   - Diet - Easy to Chew, halal  - Dysphagia  SLP - evaluation and treatment    #Precautions / PROPHYLAXIS:   - Falls  - ortho: Weight bearing status: WBAT   - Lungs: Aspiration, Incentive Spirometer   - DVT: Lovenox    IDT conference on 4/13  TDD: 4/27 to home vs AMAN  Barriers: Distracted, tangentile, perseverative, requires cueing, R eye visual deficit.   Social Work: Lives in a basement apartment with spouse and 2 sons, 12 MEI. Patient is main source of income for family. Spouse does not work.  OT: Min A for grooming. Max A for UBD, total A for LBD. Mod A for transfers.  PT: Mod x2pr A for transfers. Ambulated 5 ft on parallel bars with mod x2 pr A. Stairs not yet assessed.   SLP: Easy to chew diet requiring liquid washes after feeding. Easily distracted, tangentile, perseverative  ---------------------------------------------------------------  FOLLOW UP/OUTPATIENT:    Bev Song)  Cardiology; Internal Medicine  [address] [facility], [facility] [address]  [place], [place] [id number]  Phone: [phone]  Fax: [phone]    Ivan Reid; PhD)  Neurology; Vascular Neurology  [address] [facility], [address]  [place] [address]  Phone: [phone]  Fax: [phone]  Follow Up Time:     Primary doctor,   Phone: (   )    -  Fax: (   )    -  Follow Up Time:     Sanchez Allen)  Neurology; Vascular Neurology  [address] [place] [address]  Phone: [phone]  Fax: [phone]  Follow Up Time:     Lily Moon (DO)  Gastroenterology  [address] [facility], [address]  [place] [address]  Phone: [phone]  Fax: [phone]  Follow Up Time:  ---------------------------------------------------------------

## 2023-04-19 PROCEDURE — 70450 CT HEAD/BRAIN W/O DYE: CPT | Mod: 26

## 2023-04-19 PROCEDURE — 99232 SBSQ HOSP IP/OBS MODERATE 35: CPT

## 2023-04-19 RX ADMIN — MIDODRINE HYDROCHLORIDE 5 MILLIGRAM(S): 2.5 TABLET ORAL at 13:12

## 2023-04-19 RX ADMIN — Medication 1 APPLICATION(S): at 17:19

## 2023-04-19 RX ADMIN — PANTOPRAZOLE SODIUM 40 MILLIGRAM(S): 20 TABLET, DELAYED RELEASE ORAL at 06:40

## 2023-04-19 RX ADMIN — Medication 1 DROP(S): at 06:39

## 2023-04-19 RX ADMIN — Medication 1 DROP(S): at 17:19

## 2023-04-19 RX ADMIN — TICAGRELOR 90 MILLIGRAM(S): 90 TABLET ORAL at 06:44

## 2023-04-19 RX ADMIN — MIDODRINE HYDROCHLORIDE 5 MILLIGRAM(S): 2.5 TABLET ORAL at 06:41

## 2023-04-19 RX ADMIN — BENZOCAINE AND MENTHOL 1 LOZENGE: 5; 1 LIQUID ORAL at 06:44

## 2023-04-19 RX ADMIN — Medication 1 APPLICATION(S): at 12:35

## 2023-04-19 RX ADMIN — TICAGRELOR 90 MILLIGRAM(S): 90 TABLET ORAL at 17:19

## 2023-04-19 RX ADMIN — Medication 6 MILLIGRAM(S): at 21:17

## 2023-04-19 RX ADMIN — Medication 1 APPLICATION(S): at 06:50

## 2023-04-19 RX ADMIN — Medication 81 MILLIGRAM(S): at 12:35

## 2023-04-19 RX ADMIN — ENOXAPARIN SODIUM 40 MILLIGRAM(S): 100 INJECTION SUBCUTANEOUS at 17:19

## 2023-04-19 RX ADMIN — BENZOCAINE AND MENTHOL 1 LOZENGE: 5; 1 LIQUID ORAL at 17:22

## 2023-04-19 RX ADMIN — Medication 4 MILLIGRAM(S): at 21:17

## 2023-04-19 RX ADMIN — ATORVASTATIN CALCIUM 80 MILLIGRAM(S): 80 TABLET, FILM COATED ORAL at 21:18

## 2023-04-19 NOTE — CHART NOTE - NSCHARTNOTEFT_GEN_A_CORE
Seen and examined on request by nursing staff for a fall episode    Seen with [employer] translation ID [id number], therapist present towards the end of this review  Patient reports to nursing staff that he fell from bed, unwitnessed, event occurred around 10.40 am    On review, patient was sitting up in bed, reports that he was attempting transfer from bed to his wheel chair, when he fell off from bed, landed on the floor with his right hand and leg.  Reports that he returned to bed afterwards.  He reports no head strike, LOC, seizure, head ache or palpitation  He reports no acute pain this time     Vitals--/76 rr 16  and oxy sat 96% on R/A  Chest--clear  Abd--soft non tender   Ext--no edema, no erythema or ext injury    Neuro  AAO X 3  Rt eye visual loss,   Left hemiparesis, facial drop, unchanged from review earlier today  MMT Rt side 5/5, Left arm 1+/5 shoulder and , with some improvement of hand  2/5, Left leg 3/5  ROM Right arm and leg normal    Impression: Fall episode due multifactorial reasons--Patient's non compliance with fall prevention techniques, not calling for assistance and improper DME use, weakness and poor judgement  No acute neurological signs or external injury  Mild tachycardia is attributable to anxiety, otherwise patient is asymptomatic    Discussed need for adherence to fall prevention as stated  Encouraged to ring the call bell for assistance as needed  Informed that he needs clearance from therapists before he can attempt transfers without assistance or supervision  Discussed plan of care with nursing     Continue PT/OT as tolerated, in the absence of any acute clinical signs  Stat CT Brain ordered to rule out any acute intracranial lesion  (Hx of stroke with left sided weakness)   Hospitalist informed  Family will be notified

## 2023-04-19 NOTE — PROGRESS NOTE ADULT - SUBJECTIVE AND OBJECTIVE BOX
Subjective  Seen and examined,   No medical complaint, he was happy with communication in English at this time  He reports some improvement of sleep    Reports intermittent insomnia, Melatonin has been prn, agreed to have it standing  Otherwise, happy with his engagement in therapy  No sedation with gabapentin  Reports that PEG was last used for feeding while in acute care. never used since acute rehab admission  Tolerating oral feeds    Therapy--Engaging, improvement of Left side motor function noted    Vital Signs Last 24 Hrs  T(C): 37 (18 Apr 2023 07:47), Max: 37 (18 Apr 2023 07:47)  T(F): 98.6 (18 Apr 2023 07:47), Max: 98.6 (18 Apr 2023 07:47)  HR: 87 (18 Apr 2023 07:47) (87 - 98)  BP: 133/82 (18 Apr 2023 07:47) (112/70 - 133/82)  RR: 16 (18 Apr 2023 07:47) (16 - 16)  SpO2: 96% (18 Apr 2023 07:47) (96% - 96%)  O2 Parameters below as of 18 Apr 2023 07:47  Patient On (Oxygen Delivery Method): room air      EXAM  Gen - Comfortable, no acute distress  HEENT - , right  eye vision loss   Neck - Supple, No limited ROM  Pulm -clear   Cardiovascular - RRR, S1S2  Chest - good chest expansion, good respiratory effort  Abdomen - Soft, NT/ND, +BS, + PEG   Extremities - No Cyanosis, no clubbing, no edema, no calf tenderness    Neuro-  Speech  dysarthric  Cranial Nerves - Left facial weakness, left eye vision loss, absent left shoulder shrug   Right  eye vision loss Rt eye  Tongue deviation to left, Flat left nasolabial fold     Motor -  Left hemiparesis                    LEFT    UE - ShAB 1+/5                    RIGHT UE - ShAB 5/5, EF 5/5, EE 5/5,   5/5                    LEFT    LE - 2/5                    RIGHT LE - HF 5/5, KE 5/5, DF 5/5, PF 5/5        Sensory - Intact to LT     Reflexes - DTR Intact     Coordination - FTN/HTS  impaired to left side     Tone - normal  Psychiatric - Mood stable, Affect WNL  Skin:  all skin intact, PEG in situ     RECENT LABS/IMAGING                        11.1   6.30  )-----------( 422      ( 17 Apr 2023 06:10 )             33.6     04-17    143  |  106  |  10  ----------------------------<  156<H>  3.7   |  30  |  0.70    Ca    8.9      17 Apr 2023 06:10    TPro  6.7  /  Alb  2.7<L>  /  TBili  0.5  /  DBili  x   /  AST  17  /  ALT  42  /  AlkPhos  40  04-17    MEDICATIONS  (STANDING):  AQUAPHOR (petrolatum Ointment) 1 Application(s) Topical daily  artificial  tears Solution 1 Drop(s) Both EYES two times a day  aspirin  chewable 81 milliGRAM(s) Oral daily  atorvastatin 80 milliGRAM(s) Oral at bedtime  doxazosin 4 milliGRAM(s) Oral at bedtime  enoxaparin Injectable 40 milliGRAM(s) SubCutaneous every 24 hours  gabapentin 100 milliGRAM(s) Oral at bedtime  melatonin 6 milliGRAM(s) Oral at bedtime  midodrine. 5 milliGRAM(s) Oral <User Schedule>  pantoprazole    Tablet 40 milliGRAM(s) Oral before breakfast  petrolatum Ophthalmic Ointment 1 Application(s) Right EYE every 12 hours  polyethylene glycol 3350 17 Gram(s) Oral two times a day  senna Syrup 10 milliLiter(s) Oral at bedtime  ticagrelor 90 milliGRAM(s) Oral every 12 hours    MEDICATIONS  (PRN):  acetaminophen     Tablet .. 650 milliGRAM(s) Oral every 6 hours PRN Mild Pain (1 - 3)  benzocaine/menthol Lozenge 1 Lozenge Oral two times a day PRN Sore Throat         Subjective  Seen and examined,  No medical complaint,   He reports some improvement of sleep with melatonin  No sedation  Tolerating oral feeds    Therapy--Engaging, improvement of Left side motor function noted    ROS--No head ache or abd pain, no N/V   LBM 4/19  Vital Signs Last 24 Hrs  T(C): 36.7 (19 Apr 2023 08:04), Max: 36.7 (18 Apr 2023 21:00)  T(F): 98 (19 Apr 2023 08:04), Max: 98.1 (18 Apr 2023 21:00)  HR: 92 (19 Apr 2023 08:04) (87 - 92)  BP: 133/89 (19 Apr 2023 08:04) (133/89 - 146/81)  RR: 16 (19 Apr 2023 08:04) (16 - 16)  SpO2: 95% (19 Apr 2023 08:04) (95% - 95%)  O2 Parameters below as of 19 Apr 2023 08:04  Patient On (Oxygen Delivery Method): room air        EXAM  Gen - Comfortable, no acute distress  HEENT - , right  eye vision loss   Neck - Supple, No limited ROM  Pulm -clear   Cardiovascular - RRR, S1S2  Chest - good chest expansion, good respiratory effort  Abdomen - Soft, non tender  +BS, + PEG   Extremities - No Cyanosis, no clubbing, no edema, no calf tenderness    Neuro-  Speech  dysarthric  Cranial Nerves - Left facial weakness, left eye vision loss, absent left shoulder shrug   Right  eye vision loss Rt eye  Tongue deviation to left, Flat left nasolabial fold     Motor -  Left hemiparesis                    LEFT    UE - ShAB 1+/5, improvement with hand                     RIGHT UE - ShAB 5/5, EF 5/5, EE 5/5,   5/5                    LEFT    LE - 2/5                    RIGHT LE - HF 5/5, KE 5/5, DF 5/5, PF 5/5        Sensory - Intact to LT     Reflexes - DTR Intact     Coordination - FTN/HTS  impaired to left side     Tone - normal  Psychiatric - Mood stable, Affect WNL  Skin:  all skin intact, PEG in situ, not in use    RECENT LABS/IMAGING                        11.1   6.30  )-----------( 422      ( 17 Apr 2023 06:10 )             33.6     04-17    143  |  106  |  10  ----------------------------<  156<H>  3.7   |  30  |  0.70    Ca    8.9      17 Apr 2023 06:10    TPro  6.7  /  Alb  2.7<L>  /  TBili  0.5  /  DBili  x   /  AST  17  /  ALT  42  /  AlkPhos  40  04-17    MEDICATIONS  (STANDING):  AQUAPHOR (petrolatum Ointment) 1 Application(s) Topical daily  artificial  tears Solution 1 Drop(s) Both EYES two times a day  aspirin  chewable 81 milliGRAM(s) Oral daily  atorvastatin 80 milliGRAM(s) Oral at bedtime  doxazosin 4 milliGRAM(s) Oral at bedtime  enoxaparin Injectable 40 milliGRAM(s) SubCutaneous every 24 hours  melatonin 6 milliGRAM(s) Oral at bedtime  midodrine. 5 milliGRAM(s) Oral <User Schedule>  pantoprazole    Tablet 40 milliGRAM(s) Oral before breakfast  petrolatum Ophthalmic Ointment 1 Application(s) Right EYE every 12 hours  polyethylene glycol 3350 17 Gram(s) Oral two times a day  senna Syrup 10 milliLiter(s) Oral at bedtime  ticagrelor 90 milliGRAM(s) Oral every 12 hours    MEDICATIONS  (PRN):  acetaminophen     Tablet .. 650 milliGRAM(s) Oral every 6 hours PRN Mild Pain (1 - 3)  benzocaine/menthol Lozenge 1 Lozenge Oral two times a day PRN Sore Throat

## 2023-04-19 NOTE — PROGRESS NOTE ADULT - ASSESSMENT
Assessment/Plan:  CONG AGUAYO is a 58 year old male with PMH of HTN, and DM; who presented as a transfer from [facility] to [facility] for stroke on 3/29, where he was found to have a R ICA occlusion and he underwent a TICI 2b thromectomy of R ICA and R MCA with R ICA stent placement. Extubated on 4/7. His brain MRI was significant for improved midline shift and small frontal parietal SAH. His hospital course was complicated by aspiration PNA (s/p Zosyn); and dysphagia (requiring PEG - placed 4/6). Patient now admitted for a multidisciplinary rehab program. 04-11-23 @ 13:54     * Orthostatic hypotension--improving c/w midodrine  * Await orthotics review for left sided weakness  *  Melatonin nightly standing    Rehab Management/MEDICAL MANAGEMENT     #Functional deficits s/p CVA  -Continue  Comprehensive Rehab Program of PT/OT/SLP  - 3 hours a day, 5 days a week  - P&O as needed   - R ICA occlusion, s/p TICI 2b thrombectomy of R ICA and R MCA with R ICA stent placement  - ASA & Brilinta  - Outpt Neurology follow up- Dr. Reid  - Outpt IR follow up - Dr. Allen  --orthotics referral with some functional improvement  Left sided weakness, await review     #dysphagia/aspiration PNA  - S/p Zosyn  - PEG tube  - Repeat CT chest in 1 month (~5/8)  --Decreased air entry R lower lobe 4/14---Incentive spirometry ordered, will get CXR if persistent or clinically symptomatic     #HTN  - Lisinopril--D/CARSON 4/13  - Doxazosin  - Outpt Cardiology follow up - Dr. Song    # Orthostatic hypotension--midodrine 2.5mg bid, increased to 5mg bid 4/17    #HLD  -Atorvastatin    #DM  - A1c: 7.3  - ISS  - FS ACHS    #Dry eye  - Occular lubricant ointment  - Artificial tears    #Sleep  - Melatonin 6mg nightly PRN, switched to standing 4/18    #Skin  - Skin-: intact, PEG in situ   - Pressure injury/Skin: OOB to Chair, PT/OT     #Pain Mgmt   - Tylenol PRN    #GI/Bowel Mgmt   - Pantoprazole  - Continent c/w Senna, Miralax   -lactulose    #/Bladder Mgmt --Voiding appropriately    #FEN   - PEG--Last used for feeding while in acute care, not in use since acute rehab treatment   - Diet - Easy to Chew, halal  - Dysphagia  SLP - evaluation and treatment    #Precautions / PROPHYLAXIS:   - Falls  - ortho: Weight bearing status: WBAT   - Lungs: Aspiration, Incentive Spirometer   - DVT: Lovenox    IDT conference on 4/13  TDD: 4/27 to home vs AMAN  Barriers: Distracted, tangentile, perseverative, requires cueing, R eye visual deficit.   Social Work: Lives in a basement apartment with spouse and 2 sons, 12 MEI. Patient is main source of income for family. Spouse does not work.  OT: Min A for grooming. Max A for UBD, total A for LBD. Mod A for transfers.  PT: Mod x2pr A for transfers. Ambulated 5 ft on parallel bars with mod x2 pr A. Stairs not yet assessed.   SLP: Easy to chew diet requiring liquid washes after feeding. Easily distracted, tangentile, perseverative  ---------------------------------------------------------------  FOLLOW UP/OUTPATIENT:    Bev Sogn)  Cardiology; Internal Medicine  [address] [facility], [facility] [address]  [place], [place] [id number]  Phone: [phone]  Fax: [phone]    Ivan Reid; PhD)  Neurology; Vascular Neurology  [address] [facility], [address]  [place] [address]  Phone: [phone]  Fax: [phone]  Follow Up Time:     Primary doctor,   Phone: (   )    -  Fax: (   )    -  Follow Up Time:     Sanchez Allen)  Neurology; Vascular Neurology  [address] [place] [address]  Phone: [phone]  Fax: [phone]  Follow Up Time:     Lily Moon (DO)  Gastroenterology  [address] [facility], [address]  [place] [address]  Phone: [phone]  Fax: [phone]  Follow Up Time:  ---------------------------------------------------------------     Assessment/Plan:  CONG AGUAYO is a 58 year old male with PMH of HTN, and DM; who presented as a transfer from [facility] to [facility] for stroke on 3/29, where he was found to have a R ICA occlusion and he underwent a TICI 2b thromectomy of R ICA and R MCA with R ICA stent placement. Extubated on 4/7. His brain MRI was significant for improved midline shift and small frontal parietal SAH. His hospital course was complicated by aspiration PNA (s/p Zosyn); and dysphagia (requiring PEG - placed 4/6). Patient now admitted for a multidisciplinary rehab program. 04-11-23 @ 13:54    * Await orthotics review for left sided weakness  * Gradual but sustained functional improvement    Rehab Management/MEDICAL MANAGEMENT     #Functional deficits s/p CVA  -Continue  Comprehensive Rehab Program of PT/OT/SLP  - 3 hours a day, 5 days a week  - P&O as needed   - R ICA occlusion, s/p TICI 2b thrombectomy of R ICA and R MCA with R ICA stent placement  - ASA & Brilinta  - Outpt Neurology follow up- Dr. Reid  - Outpt IR follow up - Dr. Allen  --orthotics referral with some functional improvement  Left sided weakness, await review     #Dysphagia/aspiration PNA  - S/p Zosyn  - PEG tube last prior to Acute rehab admission  - Repeat CT chest in 1 month (~5/8)      #HTN  - Lisinopril--D/CARSON 4/13  - Doxazosin  - Outpt Cardiology follow up - Dr. Song    # Orthostatic hypotension--midodrine 2.5mg bid, increased to 5mg bid 4/17, bp stable    #HLD  -Atorvastatin    #DM  - A1c: 7.3  - ISS  - FS ACHS    #Dry eye  - Occular lubricant ointment  - Artificial tears    #Sleep  - Melatonin 6mg nightly PRN, switched to standing 4/18, sleep improved    #Skin  - Skin-: intact, PEG in situ   - Pressure injury/Skin: OOB to Chair, PT/OT     #Pain Mgmt   - Tylenol PRN    #GI/Bowel Mgmt   - Pantoprazole  - Continent c/w Senna, Miralax   -lactulose    #/Bladder Mgmt --Voiding appropriately    #FEN   - PEG--Last used for feeding while in acute care, not in use since acute rehab treatment   - Diet - Easy to Chew, halal  - Dysphagia  SLP - evaluation and treatment    #Precautions / PROPHYLAXIS:   - Falls  - ortho: Weight bearing status: WBAT   - Lungs: Aspiration, Incentive Spirometer   - DVT: Lovenox    IDT conference on 4/13  TDD: 4/27 to home vs AMAN  Barriers: Distracted, tangentile, perseverative, requires cueing, R eye visual deficit.   Social Work: Lives in a basement apartment with spouse and 2 sons, 12 MEI. Patient is main source of income for family. Spouse does not work.  OT: Min A for grooming. Max A for UBD, total A for LBD. Mod A for transfers.  PT: Mod x2pr A for transfers. Ambulated 5 ft on parallel bars with mod x2 pr A. Stairs not yet assessed.   SLP: Easy to chew diet requiring liquid washes after feeding. Easily distracted, tangentile, perseverative  ---------------------------------------------------------------  FOLLOW UP/OUTPATIENT:    Bev Song)  Cardiology; Internal Medicine  [address] [facility], [address]  [place], [place] [id number]  Phone: [phone]  Fax: [phone]    Ivan Reid; PhD)  Neurology; Vascular Neurology  [address] [facility] [facility] [address]  [place] [address]  Phone: [phone]  Fax: [phone]  Follow Up Time:     Primary doctor,   Phone: (   )    -  Fax: (   )    -  Follow Up Time:     Sanchez Allen)  Neurology; Vascular Neurology  [address] [place] [address]  Phone: [phone]  Fax: [phone]  Follow Up Time:     Lily Moon (DO)  Gastroenterology  [address] [facility], [address]  [place] [address]  Phone: [phone]  Fax: [phone]  Follow Up Time:  ---------------------------------------------------------------

## 2023-04-19 NOTE — PROGRESS NOTE ADULT - SUBJECTIVE AND OBJECTIVE BOX
Patient is a 58y old  Male who presents with a chief complaint of Functional deficits s/p CVA       Patient seen and examined at bedside.  c/o abdominal pain and discomfort at the peg site. no n/v, noted temp 100.04f and tachycardia to 114 with elevated wbc   Denies sob,  dyspnea, abd pain.    Vital Signs Last 24 Hrs  T(C): 37.1 (2023 08:50), Max: 37.8 (2023 07:42)  T(F): 98.7 (2023 08:50), Max: 100.1 (2023 07:42)  HR: 120 (2023 08:50) (117 - 123)  BP: 104/64 (2023 08:50) (104/64 - 129/78)  BP(mean): --  RR: 16 (2023 08:50) (16 - 16)  SpO2: 95% (2023 08:50) (94% - 97%)    Parameters below as of 2023 08:50  Patient On (Oxygen Delivery Method): room air          GENERAL- NAD  EAR/NOSE/MOUTH/THROAT - MMM  EYES- FIOR, conjunctiva and Sclera clear  NECK- supple  RESPIRATORY-  clear to auscultation bilaterally, non laboured breathing  CARDIOVASCULAR - SIS2, RRR  GI - soft NT BS present  EXTREMITIES- no pedal edema  NEUROLOGY- left sided weakness, facial droop  PSYCHIATRY- AAO X 3                  12.3                 139  | 28   | 10           14.42 >-----------< 376     ------------------------< 201                   38.2                 3.9  | 101  | 0.75                                         Ca 8.9   Mg x     Ph x      Urinalysis Basic - ( 2023 08:00 )    Color: Yellow / Appearance: very cloudy / S.015 / pH: x  Gluc: x / Ketone: Negative  / Bili: Negative / Urobili: Negative   Blood: x / Protein: 100 / Nitrite: Negative   Leuk Esterase: Moderate / RBC: >50 /HPF / WBC >50 /HPF   Sq Epi: x / Non Sq Epi: x / Bacteria: Moderate /HPF          CAPILLARY BLOOD GLUCOSE          Labs reviewed:     CXR personally reviewed: napd< from: Xray Chest 1 View-PORTABLE IMMEDIATE (Xray Chest 1 View-PORTABLE IMMEDIATE .) (23 @ 07:38) >    IMPRESSION: Improved right base atelectasis.    Present studies show dilated small bowel loops in the leftmid abdomen   without evidence of obstruction.    < end of copied text >      ECG reviewed and interpreted: sinus at 114    MEDICATIONS  (STANDING):  AQUAPHOR (petrolatum Ointment) 1 Application(s) Topical daily  artificial  tears Solution 1 Drop(s) Both EYES two times a day  aspirin  chewable 81 milliGRAM(s) Oral daily  atorvastatin 80 milliGRAM(s) Oral at bedtime  doxazosin 4 milliGRAM(s) Oral at bedtime  enoxaparin Injectable 40 milliGRAM(s) SubCutaneous every 24 hours  melatonin 6 milliGRAM(s) Oral at bedtime  midodrine. 5 milliGRAM(s) Oral <User Schedule>  pantoprazole    Tablet 40 milliGRAM(s) Oral before breakfast  petrolatum Ophthalmic Ointment 1 Application(s) Right EYE every 12 hours  piperacillin/tazobactam IVPB.- 3.375 Gram(s) IV Intermittent once  piperacillin/tazobactam IVPB.. 3.375 Gram(s) IV Intermittent every 8 hours  polyethylene glycol 3350 17 Gram(s) Oral two times a day  senna Syrup 10 milliLiter(s) Oral at bedtime  sodium chloride 0.9%. 1000 milliLiter(s) (100 mL/Hr) IV Continuous <Continuous>  ticagrelor 90 milliGRAM(s) Oral every 12 hours    MEDICATIONS  (PRN):  acetaminophen     Tablet .. 650 milliGRAM(s) Oral every 6 hours PRN Mild Pain (1 - 3)  benzocaine/menthol Lozenge 1 Lozenge Oral two times a day PRN Sore Throat   Patient is a 58y old  Male who presents with a chief complaint of Functional deficits s/p CVA       Patient seen and examined at bedside.  c/o abdominal pain and discomfort at the peg site.   Denies sob,  dyspnea, abd pain.    Vital Signs Last 24 Hrs  T(C): 36.7 (19 Apr 2023 08:04), Max: 36.7 (18 Apr 2023 21:00)  T(F): 98 (19 Apr 2023 08:04), Max: 98.1 (18 Apr 2023 21:00)  HR: 92 (19 Apr 2023 08:04) (87 - 92)  BP: 133/89 (19 Apr 2023 08:04) (133/89 - 146/81)  RR: 16 (19 Apr 2023 08:04) (16 - 16)  SpO2: 95% (19 Apr 2023 08:04) (95% - 95%)  O2 Parameters below as of 19 Apr 2023 08:04  Patient On (Oxygen Delivery Method): room       GENERAL- NAD  EAR/NOSE/MOUTH/THROAT - MMM  EYES- FIOR, conjunctiva and Sclera clear  NECK- supple  RESPIRATORY-  clear to auscultation bilaterally, non laboured breathing  CARDIOVASCULAR - SIS2, RRR  GI - soft NT BS present  EXTREMITIES- no pedal edema  NEUROLOGY- left sided weakness, facial droop  PSYCHIATRY- AAO X 3               11.1   6.30  )-----------( 422      ( 17 Apr 2023 06:10 )             33.6     04-17    143  |  106  |  10  ----------------------------<  156<H>  3.7   |  30  |  0.70    Ca    8.9      17 Apr 2023 06:10    TPro  6.7  /  Alb  2.7<L>  /  TBili  0.5  /  DBili  x   /  AST  17  /  ALT  42  /  AlkPhos  40  04-17    MEDICATIONS  (STANDING):  AQUAPHOR (petrolatum Ointment) 1 Application(s) Topical daily  artificial  tears Solution 1 Drop(s) Both EYES two times a day  aspirin  chewable 81 milliGRAM(s) Oral daily  atorvastatin 80 milliGRAM(s) Oral at bedtime  doxazosin 4 milliGRAM(s) Oral at bedtime  enoxaparin Injectable 40 milliGRAM(s) SubCutaneous every 24 hours  melatonin 6 milliGRAM(s) Oral at bedtime  midodrine. 5 milliGRAM(s) Oral <User Schedule>  pantoprazole    Tablet 40 milliGRAM(s) Oral before breakfast  petrolatum Ophthalmic Ointment 1 Application(s) Right EYE every 12 hours  polyethylene glycol 3350 17 Gram(s) Oral two times a day  senna Syrup 10 milliLiter(s) Oral at bedtime  ticagrelor 90 milliGRAM(s) Oral every 12 hours    MEDICATIONS  (PRN):  acetaminophen     Tablet .. 650 milliGRAM(s) Oral every 6 hours PRN Mild Pain (1 - 3)  benzocaine/menthol Lozenge 1 Lozenge Oral two times a day PRN Sore Throat

## 2023-04-20 LAB
ALBUMIN SERPL ELPH-MCNC: 2.9 G/DL — LOW (ref 3.3–5)
ALP SERPL-CCNC: 43 U/L — SIGNIFICANT CHANGE UP (ref 40–120)
ALT FLD-CCNC: 37 U/L — SIGNIFICANT CHANGE UP (ref 10–45)
ANION GAP SERPL CALC-SCNC: 10 MMOL/L — SIGNIFICANT CHANGE UP (ref 5–17)
APPEARANCE UR: ABNORMAL
AST SERPL-CCNC: 14 U/L — SIGNIFICANT CHANGE UP (ref 10–40)
BASOPHILS # BLD AUTO: 0.07 K/UL — SIGNIFICANT CHANGE UP (ref 0–0.2)
BASOPHILS NFR BLD AUTO: 0.5 % — SIGNIFICANT CHANGE UP (ref 0–2)
BILIRUB SERPL-MCNC: 0.6 MG/DL — SIGNIFICANT CHANGE UP (ref 0.2–1.2)
BILIRUB UR-MCNC: NEGATIVE — SIGNIFICANT CHANGE UP
BUN SERPL-MCNC: 10 MG/DL — SIGNIFICANT CHANGE UP (ref 7–23)
CALCIUM SERPL-MCNC: 8.9 MG/DL — SIGNIFICANT CHANGE UP (ref 8.4–10.5)
CHLORIDE SERPL-SCNC: 101 MMOL/L — SIGNIFICANT CHANGE UP (ref 96–108)
CO2 SERPL-SCNC: 28 MMOL/L — SIGNIFICANT CHANGE UP (ref 22–31)
COLOR SPEC: YELLOW — SIGNIFICANT CHANGE UP
CREAT SERPL-MCNC: 0.75 MG/DL — SIGNIFICANT CHANGE UP (ref 0.5–1.3)
DIFF PNL FLD: ABNORMAL
EGFR: 103 ML/MIN/1.73M2 — SIGNIFICANT CHANGE UP
EOSINOPHIL # BLD AUTO: 0.09 K/UL — SIGNIFICANT CHANGE UP (ref 0–0.5)
EOSINOPHIL NFR BLD AUTO: 0.6 % — SIGNIFICANT CHANGE UP (ref 0–6)
GLUCOSE SERPL-MCNC: 201 MG/DL — HIGH (ref 70–99)
GLUCOSE UR QL: NEGATIVE — SIGNIFICANT CHANGE UP
HCT VFR BLD CALC: 38.2 % — LOW (ref 39–50)
HGB BLD-MCNC: 12.3 G/DL — LOW (ref 13–17)
IMM GRANULOCYTES NFR BLD AUTO: 0.7 % — SIGNIFICANT CHANGE UP (ref 0–0.9)
KETONES UR-MCNC: NEGATIVE — SIGNIFICANT CHANGE UP
LACTATE SERPL-SCNC: 1.9 MMOL/L — SIGNIFICANT CHANGE UP (ref 0.7–2)
LEUKOCYTE ESTERASE UR-ACNC: ABNORMAL
LYMPHOCYTES # BLD AUTO: 1.32 K/UL — SIGNIFICANT CHANGE UP (ref 1–3.3)
LYMPHOCYTES # BLD AUTO: 9.2 % — LOW (ref 13–44)
MCHC RBC-ENTMCNC: 26.7 PG — LOW (ref 27–34)
MCHC RBC-ENTMCNC: 32.2 GM/DL — SIGNIFICANT CHANGE UP (ref 32–36)
MCV RBC AUTO: 83 FL — SIGNIFICANT CHANGE UP (ref 80–100)
MONOCYTES # BLD AUTO: 0.76 K/UL — SIGNIFICANT CHANGE UP (ref 0–0.9)
MONOCYTES NFR BLD AUTO: 5.3 % — SIGNIFICANT CHANGE UP (ref 2–14)
NEUTROPHILS # BLD AUTO: 12.08 K/UL — HIGH (ref 1.8–7.4)
NEUTROPHILS NFR BLD AUTO: 83.7 % — HIGH (ref 43–77)
NITRITE UR-MCNC: NEGATIVE — SIGNIFICANT CHANGE UP
NRBC # BLD: 0 /100 WBCS — SIGNIFICANT CHANGE UP (ref 0–0)
PH UR: 6.5 — SIGNIFICANT CHANGE UP (ref 5–8)
PLATELET # BLD AUTO: 376 K/UL — SIGNIFICANT CHANGE UP (ref 150–400)
POTASSIUM SERPL-MCNC: 3.9 MMOL/L — SIGNIFICANT CHANGE UP (ref 3.5–5.3)
POTASSIUM SERPL-SCNC: 3.9 MMOL/L — SIGNIFICANT CHANGE UP (ref 3.5–5.3)
PROCALCITONIN SERPL-MCNC: 0.04 NG/ML — SIGNIFICANT CHANGE UP
PROT SERPL-MCNC: 7.1 G/DL — SIGNIFICANT CHANGE UP (ref 6–8.3)
PROT UR-MCNC: 100
RAPID RVP RESULT: SIGNIFICANT CHANGE UP
RBC # BLD: 4.6 M/UL — SIGNIFICANT CHANGE UP (ref 4.2–5.8)
RBC # FLD: 14 % — SIGNIFICANT CHANGE UP (ref 10.3–14.5)
SARS-COV-2 RNA SPEC QL NAA+PROBE: SIGNIFICANT CHANGE UP
SODIUM SERPL-SCNC: 139 MMOL/L — SIGNIFICANT CHANGE UP (ref 135–145)
SP GR SPEC: 1.01 — SIGNIFICANT CHANGE UP (ref 1.01–1.02)
UROBILINOGEN FLD QL: NEGATIVE — SIGNIFICANT CHANGE UP
WBC # BLD: 14.42 K/UL — HIGH (ref 3.8–10.5)
WBC # FLD AUTO: 14.42 K/UL — HIGH (ref 3.8–10.5)

## 2023-04-20 PROCEDURE — 74018 RADEX ABDOMEN 1 VIEW: CPT | Mod: 26

## 2023-04-20 PROCEDURE — 93010 ELECTROCARDIOGRAM REPORT: CPT

## 2023-04-20 PROCEDURE — 99232 SBSQ HOSP IP/OBS MODERATE 35: CPT

## 2023-04-20 PROCEDURE — 99233 SBSQ HOSP IP/OBS HIGH 50: CPT

## 2023-04-20 PROCEDURE — 71045 X-RAY EXAM CHEST 1 VIEW: CPT | Mod: 26

## 2023-04-20 RX ORDER — PIPERACILLIN AND TAZOBACTAM 4; .5 G/20ML; G/20ML
3.38 INJECTION, POWDER, LYOPHILIZED, FOR SOLUTION INTRAVENOUS EVERY 8 HOURS
Refills: 0 | Status: COMPLETED | OUTPATIENT
Start: 2023-04-20 | End: 2023-04-23

## 2023-04-20 RX ORDER — SODIUM CHLORIDE 9 MG/ML
2500 INJECTION INTRAMUSCULAR; INTRAVENOUS; SUBCUTANEOUS ONCE
Refills: 0 | Status: COMPLETED | OUTPATIENT
Start: 2023-04-20 | End: 2023-04-20

## 2023-04-20 RX ORDER — PIPERACILLIN AND TAZOBACTAM 4; .5 G/20ML; G/20ML
3.38 INJECTION, POWDER, LYOPHILIZED, FOR SOLUTION INTRAVENOUS ONCE
Refills: 0 | Status: COMPLETED | OUTPATIENT
Start: 2023-04-20 | End: 2023-04-20

## 2023-04-20 RX ORDER — SODIUM CHLORIDE 9 MG/ML
1000 INJECTION INTRAMUSCULAR; INTRAVENOUS; SUBCUTANEOUS
Refills: 0 | Status: DISCONTINUED | OUTPATIENT
Start: 2023-04-20 | End: 2023-04-20

## 2023-04-20 RX ADMIN — BENZOCAINE AND MENTHOL 1 LOZENGE: 5; 1 LIQUID ORAL at 21:36

## 2023-04-20 RX ADMIN — Medication 650 MILLIGRAM(S): at 09:47

## 2023-04-20 RX ADMIN — Medication 650 MILLIGRAM(S): at 07:47

## 2023-04-20 RX ADMIN — ATORVASTATIN CALCIUM 80 MILLIGRAM(S): 80 TABLET, FILM COATED ORAL at 21:36

## 2023-04-20 RX ADMIN — PIPERACILLIN AND TAZOBACTAM 25 GRAM(S): 4; .5 INJECTION, POWDER, LYOPHILIZED, FOR SOLUTION INTRAVENOUS at 21:38

## 2023-04-20 RX ADMIN — PIPERACILLIN AND TAZOBACTAM 25 GRAM(S): 4; .5 INJECTION, POWDER, LYOPHILIZED, FOR SOLUTION INTRAVENOUS at 13:37

## 2023-04-20 RX ADMIN — MIDODRINE HYDROCHLORIDE 5 MILLIGRAM(S): 2.5 TABLET ORAL at 09:33

## 2023-04-20 RX ADMIN — Medication 81 MILLIGRAM(S): at 12:29

## 2023-04-20 RX ADMIN — Medication 1 APPLICATION(S): at 17:24

## 2023-04-20 RX ADMIN — PIPERACILLIN AND TAZOBACTAM 200 GRAM(S): 4; .5 INJECTION, POWDER, LYOPHILIZED, FOR SOLUTION INTRAVENOUS at 09:55

## 2023-04-20 RX ADMIN — Medication 1 APPLICATION(S): at 07:50

## 2023-04-20 RX ADMIN — ENOXAPARIN SODIUM 40 MILLIGRAM(S): 100 INJECTION SUBCUTANEOUS at 17:23

## 2023-04-20 RX ADMIN — Medication 1 DROP(S): at 07:48

## 2023-04-20 RX ADMIN — MIDODRINE HYDROCHLORIDE 5 MILLIGRAM(S): 2.5 TABLET ORAL at 13:39

## 2023-04-20 RX ADMIN — TICAGRELOR 90 MILLIGRAM(S): 90 TABLET ORAL at 17:23

## 2023-04-20 RX ADMIN — POLYETHYLENE GLYCOL 3350 17 GRAM(S): 17 POWDER, FOR SOLUTION ORAL at 17:22

## 2023-04-20 RX ADMIN — Medication 6 MILLIGRAM(S): at 21:36

## 2023-04-20 RX ADMIN — SODIUM CHLORIDE 2500 MILLILITER(S): 9 INJECTION INTRAMUSCULAR; INTRAVENOUS; SUBCUTANEOUS at 12:33

## 2023-04-20 RX ADMIN — PANTOPRAZOLE SODIUM 40 MILLIGRAM(S): 20 TABLET, DELAYED RELEASE ORAL at 07:49

## 2023-04-20 RX ADMIN — TICAGRELOR 90 MILLIGRAM(S): 90 TABLET ORAL at 07:49

## 2023-04-20 RX ADMIN — SENNA PLUS 10 MILLILITER(S): 8.6 TABLET ORAL at 21:35

## 2023-04-20 RX ADMIN — SODIUM CHLORIDE 100 MILLILITER(S): 9 INJECTION INTRAMUSCULAR; INTRAVENOUS; SUBCUTANEOUS at 08:36

## 2023-04-20 RX ADMIN — Medication 4 MILLIGRAM(S): at 21:36

## 2023-04-20 NOTE — PROGRESS NOTE ADULT - SUBJECTIVE AND OBJECTIVE BOX
Subjective  Seen and examined,  Had eventful night with pyrexia, and tachycardia  UA with significant wbc  Tolerating oral feeds, eat all her meal this breakfast    Therapy--Tired and fatigue, will hold therapy today    ROS--No head ache or abd pain, no N/V   LBM     Commenced on IV antibiotics for possible UTI  Also commenced IVF  urine cx pending     Vital Signs Last 24 Hrs  T(C): 37.1 (2023 08:50), Max: 37.8 (2023 07:42)  T(F): 98.7 (2023 08:50), Max: 100.1 (2023 07:42)  HR: 120 (2023 08:50) (106 - 123)  BP: 104/64 (2023 08:50) (104/64 - 129/78)  RR: 16 (2023 08:50) (16 - 16)  SpO2: 95% (2023 08:50) (94% - 97%)  O2 Parameters below as of 2023 08:50  Patient On (Oxygen Delivery Method): room air        EXAM  Gen - Tired, in bed  HEENT - , right  eye vision loss   Neck - Supple, No limited ROM  Pulm -clear   Cardiovascular - RRR, S1S2  Chest - good chest expansion, good respiratory effort  Abdomen - Soft, non tender  +BS, + PEG   Extremities - No Cyanosis, no clubbing, no edema, no calf tenderness    Neuro-  Alert and oriented, but fatigued  Speech  dysarthric  Cranial Nerves - Left facial weakness, left eye vision loss, absent left shoulder shrug   Right  eye vision loss Rt eye  Tongue deviation to left, Flat left nasolabial fold     Motor -  Left hemiparesis                    LEFT    UE - ShAB 1+/5, improvement with hand                     RIGHT UE - ShAB 5/5, EF 5/5, EE 5/5,   5/5                    LEFT    LE - 2/5                    RIGHT LE - HF 5/5, KE 5/5, DF 5/5, PF 5/5        Sensory - Intact to LT     Reflexes - DTR Intact     Coordination - FTN/HTS  impaired to left side     Tone - normal  Psychiatric - Mood stable, Affect WNL  Skin:  all skin intact, PEG in situ, not in use    1RECENT LABS/IMAGING                        12.3   14.42 )-----------( 376      ( 2023 06:23 )             38.2     04-20    139  |  101  |  10  ----------------------------<  201<H>  3.9   |  28  |  0.75    Ca    8.9      2023 06:23    TPro  7.1  /  Alb  2.9<L>  /  TBili  0.6  /  DBili  x   /  AST  14  /  ALT  37  /  AlkPhos  43  04-20      Urinalysis Basic - ( 2023 08:00 )    Color: Yellow / Appearance: very cloudy / S.015 / pH: x  Gluc: x / Ketone: Negative  / Bili: Negative / Urobili: Negative   Blood: x / Protein: 100 / Nitrite: Negative   Leuk Esterase: Moderate / RBC: >50 /HPF / WBC >50 /HPF   Sq Epi: x / Non Sq Epi: x / Bacteria: Moderate /HPF    MEDICATIONS  (STANDING):  AQUAPHOR (petrolatum Ointment) 1 Application(s) Topical daily  artificial  tears Solution 1 Drop(s) Both EYES two times a day  aspirin  chewable 81 milliGRAM(s) Oral daily  atorvastatin 80 milliGRAM(s) Oral at bedtime  doxazosin 4 milliGRAM(s) Oral at bedtime  enoxaparin Injectable 40 milliGRAM(s) SubCutaneous every 24 hours  melatonin 6 milliGRAM(s) Oral at bedtime  midodrine. 5 milliGRAM(s) Oral <User Schedule>  pantoprazole    Tablet 40 milliGRAM(s) Oral before breakfast  petrolatum Ophthalmic Ointment 1 Application(s) Right EYE every 12 hours  piperacillin/tazobactam IVPB.- 3.375 Gram(s) IV Intermittent once  piperacillin/tazobactam IVPB.. 3.375 Gram(s) IV Intermittent every 8 hours  polyethylene glycol 3350 17 Gram(s) Oral two times a day  senna Syrup 10 milliLiter(s) Oral at bedtime  sodium chloride 0.9%. 1000 milliLiter(s) (100 mL/Hr) IV Continuous <Continuous>  ticagrelor 90 milliGRAM(s) Oral every 12 hours    MEDICATIONS  (PRN):  acetaminophen     Tablet .. 650 milliGRAM(s) Oral every 6 hours PRN Mild Pain (1 - 3)  benzocaine/menthol Lozenge 1 Lozenge Oral two times a day PRN Sore Throat

## 2023-04-20 NOTE — PROGRESS NOTE ADULT - SUBJECTIVE AND OBJECTIVE BOX
58y old  Male who presents with a chief complaint of Functional deficits s/p CVA       Patient seen and examined at bedside.  c/o abdominal pain and discomfort at the peg site. no n/v, noted temp 100.04f and tachycardia to 114 with elevated wbc   Denies sob,  dyspnea, abd pain.    Vital Signs Last 24 Hrs  T(C): 37.1 (2023 08:50), Max: 37.8 (2023 07:42)  T(F): 98.7 (2023 08:50), Max: 100.1 (2023 07:42)  HR: 120 (2023 08:50) (117 - 123)  BP: 104/64 (2023 08:50) (104/64 - 129/78)  BP(mean): --  RR: 16 (2023 08:50) (16 - 16)  SpO2: 95% (2023 08:50) (94% - 97%)    Parameters below as of 2023 08:50  Patient On (Oxygen Delivery Method): room air          GENERAL- NAD  EAR/NOSE/MOUTH/THROAT - MMM  EYES- FIOR, conjunctiva and Sclera clear  NECK- supple  RESPIRATORY-  clear to auscultation bilaterally, non laboured breathing  CARDIOVASCULAR - SIS2, RRR  GI - soft NT BS present  EXTREMITIES- no pedal edema  NEUROLOGY- left sided weakness, facial droop  PSYCHIATRY- AAO X 3                  12.3                 139  | 28   | 10           14.42 >-----------< 376     ------------------------< 201                   38.2                 3.9  | 101  | 0.75                                         Ca 8.9   Mg x     Ph x      Urinalysis Basic - ( 2023 08:00 )    Color: Yellow / Appearance: very cloudy / S.015 / pH: x  Gluc: x / Ketone: Negative  / Bili: Negative / Urobili: Negative   Blood: x / Protein: 100 / Nitrite: Negative   Leuk Esterase: Moderate / RBC: >50 /HPF / WBC >50 /HPF   Sq Epi: x / Non Sq Epi: x / Bacteria: Moderate /HPF          CAPILLARY BLOOD GLUCOSE          Labs reviewed:     CXR personally reviewed: napd< from: Xray Chest 1 View-PORTABLE IMMEDIATE (Xray Chest 1 View-PORTABLE IMMEDIATE .) (23 @ 07:38) >    IMPRESSION: Improved right base atelectasis.    Present studies show dilated small bowel loops in the leftmid abdomen   without evidence of obstruction.    < end of copied text >      ECG reviewed and interpreted: sinus at 114    MEDICATIONS  (STANDING):  AQUAPHOR (petrolatum Ointment) 1 Application(s) Topical daily  artificial  tears Solution 1 Drop(s) Both EYES two times a day  aspirin  chewable 81 milliGRAM(s) Oral daily  atorvastatin 80 milliGRAM(s) Oral at bedtime  doxazosin 4 milliGRAM(s) Oral at bedtime  enoxaparin Injectable 40 milliGRAM(s) SubCutaneous every 24 hours  melatonin 6 milliGRAM(s) Oral at bedtime  midodrine. 5 milliGRAM(s) Oral <User Schedule>  pantoprazole    Tablet 40 milliGRAM(s) Oral before breakfast  petrolatum Ophthalmic Ointment 1 Application(s) Right EYE every 12 hours  piperacillin/tazobactam IVPB.- 3.375 Gram(s) IV Intermittent once  piperacillin/tazobactam IVPB.. 3.375 Gram(s) IV Intermittent every 8 hours  polyethylene glycol 3350 17 Gram(s) Oral two times a day  senna Syrup 10 milliLiter(s) Oral at bedtime  sodium chloride 0.9%. 1000 milliLiter(s) (100 mL/Hr) IV Continuous <Continuous>  ticagrelor 90 milliGRAM(s) Oral every 12 hours    MEDICATIONS  (PRN):  acetaminophen     Tablet .. 650 milliGRAM(s) Oral every 6 hours PRN Mild Pain (1 - 3)  benzocaine/menthol Lozenge 1 Lozenge Oral two times a day PRN Sore Throat

## 2023-04-20 NOTE — PROGRESS NOTE ADULT - ASSESSMENT
58M PMH HTN, DM2 who presented as a transfer from [facility] to [facility] for stroke on 3/29, s/p TICI 2b thrombectomy of the R ICA and R MCA, with R ICA stent placement.  Postoperative CT head demonstrated new MLS with some hemorrhagic conversion now admitted for rehab- pt/ot/dvt ppx    sepsis- leukocytosis, tachycardia, low grade temp.- uti vs viral  BC X2  UA NOTED- SEND CULTURES  CHECK RVP  start empiric zosyn pendign cultures  lactate normal  iv fluids   no resp symptoms  cxr- no acute changes noted      Stroke s/p thrombectomy of Right ICA and Right MCA and right ICA stent placement  Left hemiparesis  - cont asa/brillinta, statin    Dysphagia with PEG tube  - tube feeds per nutrition  - speech therapy    Hypertension   - off meds, BP lower side     DM2  - HbA1C 7.3%  - diet controlled  - little meds- ? homeopathic medication    DVT PPx  - Lovenox    will follow  d/w dr. jenkins  time spent 51 min

## 2023-04-20 NOTE — PROGRESS NOTE ADULT - ASSESSMENT
58M PMH HTN, DM2 who presented as a transfer from [facility] to [facility] for stroke on 3/29, s/p TICI 2b thrombectomy of the R ICA and R MCA, with R ICA stent placement.  Postoperative CT head demonstrated new MLS with some hemorrhagic conversion.    Sepsis work up  -tachycardia, leukocytosis  -UA/UC, blood cultures, lactate  -Pending CXR results   -Start zosyn     Stroke s/p thrombectomy of Right ICA and Right MCA and right ICA stent placement  Left hemiparesis  - PT/OT/ST per PMR  - cont asa/brillinta, statin    Dysphagia with PEG tube  -patient tolerating easy to chew diet eats % of meal, no coughing while eating, speech clear, no drooling noted   -PEG tube with water flushes, no nutrition or meds per tube    Hypertension - normotensive currently  - lisinopril     DM2  - HbA1C 7.3%  - sugars at goal, will consider addition of metformin if patient amenable and labs remain stable     DVT PPx  - lovenox  GI PPx  -pantoprazole    Plan d/w Dr. Manuel

## 2023-04-20 NOTE — PROGRESS NOTE ADULT - ASSESSMENT
Assessment/Plan:  CONG AGUAYO is a 58 year old male with PMH of HTN, and DM; who presented as a transfer from [facility] to [facility] for stroke on 3/29, where he was found to have a R ICA occlusion and he underwent a TICI 2b thromectomy of R ICA and R MCA with R ICA stent placement. Extubated on 4/7. His brain MRI was significant for improved midline shift and small frontal parietal SAH. His hospital course was complicated by aspiration PNA (s/p Zosyn); and dysphagia (requiring PEG - placed 4/6). Patient now admitted for a multidisciplinary rehab program. 04-11-23 @ 13:54    * Await orthotics review for left sided weakness   * UTI with dehydration--continue IVF and IV antibiotics f/u infection work up and resp viral panel  * Hold therapy today     Rehab Management/MEDICAL MANAGEMENT     #Functional deficits s/p CVA  -Continue  Comprehensive Rehab Program of PT/OT/SLP  - 3 hours a day, 5 days a week  - P&O as needed   - R ICA occlusion, s/p TICI 2b thrombectomy of R ICA and R MCA with R ICA stent placement  - ASA & Brilinta  - Outpt Neurology follow up- Dr. Reid  - Outpt IR follow up - Dr. Allen  --orthotics referral with some functional improvement  Left sided weakness, await review     #Dysphagia/aspiration PNA  - S/p Zosyn  - PEG tube last prior to Acute rehab admission  - Repeat CT chest in 1 month (~5/8)    * UTI Raised wbc, with dehydration 4/20 --continue IVF and IV antibiotics f/u infection work up and resp viral panel    #HTN  - Lisinopril--D/CARSON 4/13  - Doxazosin  - Outpt Cardiology follow up - Dr. Song    # Orthostatic hypotension--midodrine 2.5mg bid, increased to 5mg bid 4/17, bp stable    #HLD  -Atorvastatin    #DM  - A1c: 7.3  - ISS  - FS ACHS    #Dry eye  - Occular lubricant ointment  - Artificial tears    #Sleep  - Melatonin 6mg nightly PRN, switched to standing 4/18, sleep improved    #Skin  - Skin-: intact, PEG in situ   - Pressure injury/Skin: OOB to Chair, PT/OT     #Pain Mgmt   - Tylenol PRN    #GI/Bowel Mgmt   - Pantoprazole  - Continent c/w Senna, Miralax   -lactulose    #/Bladder Mgmt --Voiding appropriately    #FEN   - PEG--Last used for feeding while in acute care, not in use since acute rehab treatment   - Diet - Easy to Chew, halal  - Dysphagia  SLP - evaluation and treatment    #Precautions / PROPHYLAXIS:   - Falls  - ortho: Weight bearing status: WBAT   - Lungs: Aspiration, Incentive Spirometer   - DVT: Lovenox    IDT conference on 4/13  TDD: 4/27 to home vs AMAN  Barriers: Distracted, tangentile, perseverative, requires cueing, R eye visual deficit.   Social Work: Lives in a basement apartment with spouse and 2 sons, 12 MEI. Patient is main source of income for family. Spouse does not work.  OT: Min A for grooming. Max A for UBD, total A for LBD. Mod A for transfers.  PT: Mod x2pr A for transfers. Ambulated 5 ft on parallel bars with mod x2 pr A. Stairs not yet assessed.   SLP: Easy to chew diet requiring liquid washes after feeding. Easily distracted, tangentile, perseverative  ---------------------------------------------------------------  FOLLOW UP/OUTPATIENT:    Bev Song)  Cardiology; Internal Medicine  [address], [address]  [place], [place] [address]  Phone: [phone]  Fax: [phone]    Ivan Reid; PhD)  Neurology; Vascular Neurology  [address], [address]  [place] [address]  Phone: [phone]  Fax: [phone]  Follow Up Time:     Primary doctor,   Phone: (   )    -  Fax: (   )    -  Follow Up Time:     Sanchez Allen)  Neurology; Vascular Neurology  [address] [place] [address]  Phone: [phone]  Fax: [phone]  Follow Up Time:     Lily Moon (DO)  Gastroenterology  [address], [place] [address]  Phone: [phone]  Fax: [phone]  Follow Up Time:  ---------------------------------------------------------------       Assessment/Plan:  CONG AGUAYO is a 58 year old male with PMH of HTN, and DM; who presented as a transfer from [facility] to [facility] for stroke on 3/29, where he was found to have a R ICA occlusion and he underwent a TICI 2b thromectomy of R ICA and R MCA with R ICA stent placement. Extubated on 4/7. His brain MRI was significant for improved midline shift and small frontal parietal SAH. His hospital course was complicated by aspiration PNA (s/p Zosyn); and dysphagia (requiring PEG - placed 4/6). Patient now admitted for a multidisciplinary rehab program. 04-11-23 @ 13:54    * Await orthotics review for left sided weakness   * UTI with dehydration--continue IVF and IV antibiotics f/u infection work up and resp viral panel  * Hold therapy today     Rehab Management/MEDICAL MANAGEMENT     #Functional deficits s/p CVA  -Continue  Comprehensive Rehab Program of PT/OT/SLP  - 3 hours a day, 5 days a week  - P&O as needed   - R ICA occlusion, s/p TICI 2b thrombectomy of R ICA and R MCA with R ICA stent placement  - ASA & Brilinta  - Outpt Neurology follow up- Dr. Reid  - Outpt IR follow up - Dr. Allen  --orthotics referral with some functional improvement  Left sided weakness, await review     #Dysphagia/aspiration PNA  - S/p Zosyn  - PEG tube last prior to Acute rehab admission  - Repeat CT chest in 1 month (~5/8)    * UTI Raised wbc, with dehydration 4/20 --continue IVF and IV antibiotics f/u infection work up and resp viral panel    #HTN  - Lisinopril--D/CARSON 4/13  - Doxazosin  - Outpt Cardiology follow up - Dr. Song    # Orthostatic hypotension--midodrine 2.5mg bid, increased to 5mg bid 4/17, bp stable    #HLD  -Atorvastatin    #DM  - A1c: 7.3  - ISS  - FS ACHS    #Dry eye  - Occular lubricant ointment  - Artificial tears    #Sleep  - Melatonin 6mg nightly PRN, switched to standing 4/18, sleep improved    #Skin  - Skin-: intact, PEG in situ   - Pressure injury/Skin: OOB to Chair, PT/OT     #Pain Mgmt   - Tylenol PRN    #GI/Bowel Mgmt   - Pantoprazole  - Continent c/w Senna, Miralax   -lactulose    #/Bladder Mgmt --Voiding appropriately    #FEN   - PEG--Last used for feeding while in acute care, not in use since acute rehab treatment   - Diet - Easy to Chew, halal  - Dysphagia  SLP - evaluation and treatment    #Precautions / PROPHYLAXIS:   - Falls  - ortho: Weight bearing status: WBAT   - Lungs: Aspiration, Incentive Spirometer   - DVT: Lovenox  ---------------------------------------------------------------  IDT conference on 4/20  TDD: 4/27 to home vs AMAN  Barriers: Distracted, tangentile, perseverative, requires cueing, R eye visual deficit. Poor insight, safety and memory.   Social Work: Lives in a basement apartment with spouse and 2 sons, 12 MEI. Patient is main source of income for family. Spouse does not work.  OT: Max A for UBD/LBD. Min-mod A for transfers.  PT: Mod A for transfers. Ambulated 60 ft with RW with min A and WCF.   SLP: Easy to chew diet. Easily distracted, tangentile, perseverative. Poor insight, safety and memory.   ---------------------------------------------------------------  FOLLOW UP/OUTPATIENT:    Bev Song)  Cardiology; Internal Medicine  [address], [address]  [place], [place] [id number]  Phone: [phone]  Fax: [phone]    Ivan Reid; PhD)  Neurology; Vascular Neurology  [address], [address]  [place] [address]  Phone: [phone]  Fax: [phone]  Follow Up Time:     Primary doctor,   Phone: (   )    -  Fax: (   )    -  Follow Up Time:     Sanchez Allen)  Neurology; Vascular Neurology  [address] [place] [address]  Phone: [phone]  Fax: [phone]  Follow Up Time:     Lily Moon ()  Gastroenterology  [address], [address]  [place] [address]  Phone: [phone]  Fax: [phone]  Follow Up Time:  ---------------------------------------------------------------

## 2023-04-20 NOTE — PROVIDER CONTACT NOTE (OTHER) - ASSESSMENT
First set of vital signs at 21:00: /78, , Temp 98.8 and 02 stat 95    Second set of vital signs at 2:00: /76, , O2 stat 94    Pt asymptotic

## 2023-04-20 NOTE — PROGRESS NOTE ADULT - SUBJECTIVE AND OBJECTIVE BOX
History of Present Illness:  Patient is a 58y old  Male who presents with a chief complaint of Functional deficits s/p CVA (2023 10:22)    Pt seen and assessed at bedside. Febrile, Tachycardic , reports CP but points to PEG tube site, site with serous fluid, soft around site, tender to palpation     T(C): 37.1 (23 @ 08:50), Max: 37.8 (23 @ 07:42)  HR: 120 (23 @ 08:50) (106 - 123)  BP: 104/64 (23 @ 08:50) (104/64 - 129/78)  RR: 16 (23 @ 08:50) (16 - 16)  SpO2: 95% (23 @ 08:50) (94% - 97%)  Wt(kg): --Vital Signs Last 24 Hrs  T(C): 37.1 (2023 08:50), Max: 37.8 (2023 07:42)  T(F): 98.7 (2023 08:50), Max: 100.1 (2023 07:42)  HR: 120 (2023 08:50) (106 - 123)  BP: 104/64 (2023 08:50) (104/64 - 129/78)  BP(mean): --  RR: 16 (2023 08:50) (16 - 16)  SpO2: 95% (2023 08:50) (94% - 97%)    Parameters below as of 2023 08:50  Patient On (Oxygen Delivery Method): room air    General: NAD, A/O x 3  ENT: MMM  Neck: Supple, No JVD  Lungs: Clear to auscultation bilaterally, diminished bases, no cough after eating  Cardio: RRR, S1/S2, No murmurs  Abdomen: Soft, tender near PEG site, Nondistended; Bowel sounds present BM   Extremities: No calf tenderness, No pitting vance  Neuro: Left sided wekaness    LABS:                        12.3   14.42 )-----------( 376      ( 2023 06:23 )             38.2     -    139  |  101  |  10  ----------------------------<  201  3.9   |  28  |  0.75    Ca    8.9      2023 06:23    TPro  7.1  /  Alb  2.9  /  TBili  0.6  /  DBili  x   /  AST  14  /  ALT  37  /  AlkPhos  43        03 Chol 270 mg/dL LDL -- HDL 58 mg/dL Trig 83 mg/dL      Urinalysis Basic - ( 2023 08:00 )    Color: Yellow / Appearance: very cloudy / S.015 / pH: x  Gluc: x / Ketone: Negative  / Bili: Negative / Urobili: Negative   Blood: x / Protein: 100 / Nitrite: Negative   Leuk Esterase: Moderate / RBC: >50 /HPF / WBC >50 /HPF   Sq Epi: x / Non Sq Epi: x / Bacteria: Moderate /HPF        COVID-19 PCR: NotDetec (23 @ 05:00)  COVID-19 PCR: NotDetec (23 @ 09:52)  COVID-19 PCR: NotDetec (23 @ 17:15)  COVID-19 PCR: NotDetec (23 @ 10:00)      RADIOLOGY & ADDITIONAL TESTS:        ALLERGIES:  No Known Allergies    MEDICATIONS  (STANDING):  AQUAPHOR (petrolatum Ointment) 1 Application(s) Topical daily  artificial  tears Solution 1 Drop(s) Both EYES two times a day  aspirin  chewable 81 milliGRAM(s) Oral daily  atorvastatin 80 milliGRAM(s) Oral at bedtime  doxazosin 4 milliGRAM(s) Oral at bedtime  enoxaparin Injectable 40 milliGRAM(s) SubCutaneous every 24 hours  melatonin 6 milliGRAM(s) Oral at bedtime  midodrine. 5 milliGRAM(s) Oral <User Schedule>  pantoprazole    Tablet 40 milliGRAM(s) Oral before breakfast  petrolatum Ophthalmic Ointment 1 Application(s) Right EYE every 12 hours  piperacillin/tazobactam IVPB. 3.375 Gram(s) IV Intermittent once  piperacillin/tazobactam IVPB.- 3.375 Gram(s) IV Intermittent once  piperacillin/tazobactam IVPB.. 3.375 Gram(s) IV Intermittent every 8 hours  polyethylene glycol 3350 17 Gram(s) Oral two times a day  senna Syrup 10 milliLiter(s) Oral at bedtime  sodium chloride 0.9%. 1000 milliLiter(s) (100 mL/Hr) IV Continuous <Continuous>  ticagrelor 90 milliGRAM(s) Oral every 12 hours    MEDICATIONS  (PRN):  acetaminophen     Tablet .. 650 milliGRAM(s) Oral every 6 hours PRN Mild Pain (1 - 3)  benzocaine/menthol Lozenge 1 Lozenge Oral two times a day PRN Sore Throat

## 2023-04-21 LAB
ANION GAP SERPL CALC-SCNC: 10 MMOL/L — SIGNIFICANT CHANGE UP (ref 5–17)
BASOPHILS # BLD AUTO: 0.05 K/UL — SIGNIFICANT CHANGE UP (ref 0–0.2)
BASOPHILS NFR BLD AUTO: 0.3 % — SIGNIFICANT CHANGE UP (ref 0–2)
BUN SERPL-MCNC: 11 MG/DL — SIGNIFICANT CHANGE UP (ref 7–23)
CALCIUM SERPL-MCNC: 9.1 MG/DL — SIGNIFICANT CHANGE UP (ref 8.4–10.5)
CHLORIDE SERPL-SCNC: 100 MMOL/L — SIGNIFICANT CHANGE UP (ref 96–108)
CO2 SERPL-SCNC: 27 MMOL/L — SIGNIFICANT CHANGE UP (ref 22–31)
CREAT SERPL-MCNC: 0.73 MG/DL — SIGNIFICANT CHANGE UP (ref 0.5–1.3)
EGFR: 104 ML/MIN/1.73M2 — SIGNIFICANT CHANGE UP
EOSINOPHIL # BLD AUTO: 0.08 K/UL — SIGNIFICANT CHANGE UP (ref 0–0.5)
EOSINOPHIL NFR BLD AUTO: 0.5 % — SIGNIFICANT CHANGE UP (ref 0–6)
GLUCOSE SERPL-MCNC: 251 MG/DL — HIGH (ref 70–99)
HCT VFR BLD CALC: 36.4 % — LOW (ref 39–50)
HGB BLD-MCNC: 11.8 G/DL — LOW (ref 13–17)
IMM GRANULOCYTES NFR BLD AUTO: 0.7 % — SIGNIFICANT CHANGE UP (ref 0–0.9)
LYMPHOCYTES # BLD AUTO: 1.26 K/UL — SIGNIFICANT CHANGE UP (ref 1–3.3)
LYMPHOCYTES # BLD AUTO: 7.5 % — LOW (ref 13–44)
MAGNESIUM SERPL-MCNC: 2 MG/DL — SIGNIFICANT CHANGE UP (ref 1.6–2.6)
MCHC RBC-ENTMCNC: 26.8 PG — LOW (ref 27–34)
MCHC RBC-ENTMCNC: 32.4 GM/DL — SIGNIFICANT CHANGE UP (ref 32–36)
MCV RBC AUTO: 82.7 FL — SIGNIFICANT CHANGE UP (ref 80–100)
MONOCYTES # BLD AUTO: 1.07 K/UL — HIGH (ref 0–0.9)
MONOCYTES NFR BLD AUTO: 6.4 % — SIGNIFICANT CHANGE UP (ref 2–14)
NEUTROPHILS # BLD AUTO: 14.2 K/UL — HIGH (ref 1.8–7.4)
NEUTROPHILS NFR BLD AUTO: 84.6 % — HIGH (ref 43–77)
NRBC # BLD: 0 /100 WBCS — SIGNIFICANT CHANGE UP (ref 0–0)
PHOSPHATE SERPL-MCNC: 3.9 MG/DL — SIGNIFICANT CHANGE UP (ref 2.5–4.5)
PLATELET # BLD AUTO: 343 K/UL — SIGNIFICANT CHANGE UP (ref 150–400)
POTASSIUM SERPL-MCNC: 3.5 MMOL/L — SIGNIFICANT CHANGE UP (ref 3.5–5.3)
POTASSIUM SERPL-SCNC: 3.5 MMOL/L — SIGNIFICANT CHANGE UP (ref 3.5–5.3)
RBC # BLD: 4.4 M/UL — SIGNIFICANT CHANGE UP (ref 4.2–5.8)
RBC # FLD: 14.1 % — SIGNIFICANT CHANGE UP (ref 10.3–14.5)
SODIUM SERPL-SCNC: 137 MMOL/L — SIGNIFICANT CHANGE UP (ref 135–145)
WBC # BLD: 16.77 K/UL — HIGH (ref 3.8–10.5)
WBC # FLD AUTO: 16.77 K/UL — HIGH (ref 3.8–10.5)

## 2023-04-21 PROCEDURE — 99418 PROLNG IP/OBS E/M EA 15 MIN: CPT

## 2023-04-21 PROCEDURE — 99233 SBSQ HOSP IP/OBS HIGH 50: CPT

## 2023-04-21 PROCEDURE — 99232 SBSQ HOSP IP/OBS MODERATE 35: CPT

## 2023-04-21 RX ORDER — VANCOMYCIN HCL 1 G
750 VIAL (EA) INTRAVENOUS ONCE
Refills: 0 | Status: COMPLETED | OUTPATIENT
Start: 2023-04-21 | End: 2023-04-21

## 2023-04-21 RX ORDER — PHENAZOPYRIDINE HCL 100 MG
200 TABLET ORAL
Refills: 0 | Status: COMPLETED | OUTPATIENT
Start: 2023-04-21 | End: 2023-04-24

## 2023-04-21 RX ORDER — AMIKACIN SULFATE 250 MG/ML
800 INJECTION, SOLUTION INTRAMUSCULAR; INTRAVENOUS ONCE
Refills: 0 | Status: COMPLETED | OUTPATIENT
Start: 2023-04-21 | End: 2023-04-21

## 2023-04-21 RX ORDER — SODIUM CHLORIDE 9 MG/ML
1000 INJECTION INTRAMUSCULAR; INTRAVENOUS; SUBCUTANEOUS
Refills: 0 | Status: DISCONTINUED | OUTPATIENT
Start: 2023-04-21 | End: 2023-04-26

## 2023-04-21 RX ADMIN — MIDODRINE HYDROCHLORIDE 5 MILLIGRAM(S): 2.5 TABLET ORAL at 14:14

## 2023-04-21 RX ADMIN — ATORVASTATIN CALCIUM 80 MILLIGRAM(S): 80 TABLET, FILM COATED ORAL at 21:08

## 2023-04-21 RX ADMIN — PIPERACILLIN AND TAZOBACTAM 25 GRAM(S): 4; .5 INJECTION, POWDER, LYOPHILIZED, FOR SOLUTION INTRAVENOUS at 05:20

## 2023-04-21 RX ADMIN — AMIKACIN SULFATE 103.2 MILLIGRAM(S): 250 INJECTION, SOLUTION INTRAMUSCULAR; INTRAVENOUS at 21:54

## 2023-04-21 RX ADMIN — PIPERACILLIN AND TAZOBACTAM 25 GRAM(S): 4; .5 INJECTION, POWDER, LYOPHILIZED, FOR SOLUTION INTRAVENOUS at 14:13

## 2023-04-21 RX ADMIN — PIPERACILLIN AND TAZOBACTAM 25 GRAM(S): 4; .5 INJECTION, POWDER, LYOPHILIZED, FOR SOLUTION INTRAVENOUS at 21:07

## 2023-04-21 RX ADMIN — Medication 4 MILLIGRAM(S): at 21:08

## 2023-04-21 RX ADMIN — MIDODRINE HYDROCHLORIDE 5 MILLIGRAM(S): 2.5 TABLET ORAL at 05:22

## 2023-04-21 RX ADMIN — PANTOPRAZOLE SODIUM 40 MILLIGRAM(S): 20 TABLET, DELAYED RELEASE ORAL at 05:21

## 2023-04-21 RX ADMIN — TICAGRELOR 90 MILLIGRAM(S): 90 TABLET ORAL at 05:21

## 2023-04-21 RX ADMIN — Medication 200 MILLIGRAM(S): at 18:15

## 2023-04-21 RX ADMIN — Medication 81 MILLIGRAM(S): at 11:31

## 2023-04-21 RX ADMIN — Medication 6 MILLIGRAM(S): at 21:08

## 2023-04-21 RX ADMIN — TICAGRELOR 90 MILLIGRAM(S): 90 TABLET ORAL at 18:14

## 2023-04-21 RX ADMIN — POLYETHYLENE GLYCOL 3350 17 GRAM(S): 17 POWDER, FOR SOLUTION ORAL at 05:22

## 2023-04-21 RX ADMIN — Medication 250 MILLIGRAM(S): at 23:13

## 2023-04-21 RX ADMIN — ENOXAPARIN SODIUM 40 MILLIGRAM(S): 100 INJECTION SUBCUTANEOUS at 18:14

## 2023-04-21 RX ADMIN — SODIUM CHLORIDE 100 MILLILITER(S): 9 INJECTION INTRAMUSCULAR; INTRAVENOUS; SUBCUTANEOUS at 11:33

## 2023-04-21 NOTE — CHART NOTE - NSCHARTNOTEFT_GEN_A_CORE
Nutrition Follow Up Note  Hospital Course   (Per Electronic Medical Record)    Source:  Patient [X]  Medical Record [X]      Diet: Diet, Easy to Chew:   Halal  Supplement Feeding Modality:  Oral  Ensure Plus High Protein Cans or Servings Per Day:  1       Frequency:  Two Times a day (04-12-23 @ 14:52) [Active]    At this time patient w/ varied appetite/intake consuming % of meals. Patient reports consuming Ensure Plus High Protein Daily 8 oz (Provides 350 kcal, 20 grams of protein).  Reviewed preferences and menu alternatives; likes yogurt BID, sugar-free cranberry juice, Stevia and muffins, noted in Kardex. Encouraged patient to focus on high-protein, high-calorie foods during meals to provide energy to participate in rehab activities. Given Hx of DMII and recent elevated blood glucose patient agreeable to avoid concentrated sweets (Juice and sugar) but will maintain liberalized diet in order to optimize caloric intake, noted in Kardex. No issues w/ chewing and swallowing. Denies N/V/C/D, last BM 4/20 Per nursing flowsheets.     Enteral/Parenteral Nutrition: Not Applicable    Current Weight: 169lb on 4/18    Pertinent Medications: MEDICATIONS  (STANDING):  AQUAPHOR (petrolatum Ointment) 1 Application(s) Topical daily  artificial  tears Solution 1 Drop(s) Both EYES two times a day  aspirin  chewable 81 milliGRAM(s) Oral daily  atorvastatin 80 milliGRAM(s) Oral at bedtime  doxazosin 4 milliGRAM(s) Oral at bedtime  enoxaparin Injectable 40 milliGRAM(s) SubCutaneous every 24 hours  melatonin 6 milliGRAM(s) Oral at bedtime  midodrine. 5 milliGRAM(s) Oral <User Schedule>  pantoprazole    Tablet 40 milliGRAM(s) Oral before breakfast  petrolatum Ophthalmic Ointment 1 Application(s) Right EYE every 12 hours  phenazopyridine 200 milliGRAM(s) Oral <User Schedule>  piperacillin/tazobactam IVPB.. 3.375 Gram(s) IV Intermittent every 8 hours  polyethylene glycol 3350 17 Gram(s) Oral two times a day  senna Syrup 10 milliLiter(s) Oral at bedtime  sodium chloride 0.9%. 1000 milliLiter(s) (100 mL/Hr) IV Continuous <Continuous>  ticagrelor 90 milliGRAM(s) Oral every 12 hours    MEDICATIONS  (PRN):  acetaminophen     Tablet .. 650 milliGRAM(s) Oral every 6 hours PRN Mild Pain (1 - 3)  benzocaine/menthol Lozenge 1 Lozenge Oral two times a day PRN Sore Throat      Pertinent Labs:  04-20 Na139 mmol/L Glu 201 mg/dL<H> K+ 3.9 mmol/L Cr  0.75 mg/dL BUN 10 mg/dL 04-20 Alb 2.9 g/dL<L> 03-29 Chol 270 mg/dL<H> LDL --    HDL 58 mg/dL Trig 83 mg/dL    Skin: No pressure injury per nursing flowsheets    Edema: No edema noted per nursing flowsheet    Last Bowel Movement: on 4/20 Per nursing flowsheets     Estimated Needs:   [X] No Change Since Previous Assessment    Previous Nutrition Diagnosis:   Severe malnutrition, acute    Nutrition Diagnosis is [X] Ongoing  - addressed with Ensure Plus High Protein (provides 350 kcal, 20 g protein/serving)     New Nutrition Diagnosis: [X] Not Applicable    Interventions:   1. Encouraged avoidance of concentrated sweets but will maintain liberalized diet in order to optimize caloric intake.  2. Recommend Continue Nutrition Plan of Care.    Monitoring & Evaluation:   [X] Weights   [X] PO Intake   [X] Skin Integrity   [X] Follow Up (Per Protocol)  [X] Tolerance to Diet Prescription   [X] Other: Labs    Registered Dietitian/Nutritionist Remains Available.  Emily Van RD, MS, CDN    Phone# [phone]

## 2023-04-21 NOTE — PROGRESS NOTE ADULT - SUBJECTIVE AND OBJECTIVE BOX
blood work reviewed  wbc counts elevated to 16, from 14 yesterday  clinically patient is improved since yesterday, no fever and tachycardia has improved  tolerated PT today  BC x 2 negative so fat,  urine culture pending.  if no improvement wbc in am should have ID consult.  c/w zosyn- pending urine cultures.  c/w IV fluids  encourage po hydration    d/w Nicci rehab PA.    additional time spent 15 min

## 2023-04-21 NOTE — PROGRESS NOTE ADULT - SUBJECTIVE AND OBJECTIVE BOX
58y old  Male who presents with a chief complaint of Functional deficits s/p CVA     Patient seen and examined at bedside.  c/o burning pain on urination today, no further fevers, tachycardia has improved,    no pain, n/v, Denies sob,  dyspnea, abd pain.      Vital Signs Last 24 Hrs  T(C): 36.6 (2023 08:22), Max: 37.3 (2023 22:47)  T(F): 97.9 (2023 08:22), Max: 99.2 (2023 22:47)  HR: 107 (2023 08:22) (101 - 114)  BP: 103/69 (2023 08:22) (103/69 - 162/92)  BP(mean): --  RR: 16 (2023 08:22) (16 - 16)  SpO2: 96% (2023 08:22) (94% - 96%)    Parameters below as of 2023 08:22  Patient On (Oxygen Delivery Method): room air      GENERAL- NAD  EAR/NOSE/MOUTH/THROAT - MMM  EYES- FIOR, conjunctiva and Sclera clear  NECK- supple  RESPIRATORY-  clear to auscultation bilaterally, non laboured breathing  CARDIOVASCULAR - SIS2, RRR  GI - soft NT BS present  EXTREMITIES- no pedal edema  NEUROLOGY- left sided weakness, facial droop  PSYCHIATRY- AAO X 3                12.3                 139  | 28   | 10           14.42 >-----------< 376     ------------------------< 201                   38.2                 3.9  | 101  | 0.75                                         Ca 8.9   Mg x     Ph x      Urinalysis Basic - ( 2023 08:00 )    Color: Yellow / Appearance: very cloudy / S.015 / pH: x  Gluc: x / Ketone: Negative  / Bili: Negative / Urobili: Negative   Blood: x / Protein: 100 / Nitrite: Negative   Leuk Esterase: Moderate / RBC: >50 /HPF / WBC >50 /HPF   Sq Epi: x / Non Sq Epi: x / Bacteria: Moderate /HPF    MEDICATIONS  (STANDING):  AQUAPHOR (petrolatum Ointment) 1 Application(s) Topical daily  artificial  tears Solution 1 Drop(s) Both EYES two times a day  aspirin  chewable 81 milliGRAM(s) Oral daily  atorvastatin 80 milliGRAM(s) Oral at bedtime  doxazosin 4 milliGRAM(s) Oral at bedtime  enoxaparin Injectable 40 milliGRAM(s) SubCutaneous every 24 hours  melatonin 6 milliGRAM(s) Oral at bedtime  midodrine. 5 milliGRAM(s) Oral <User Schedule>  pantoprazole    Tablet 40 milliGRAM(s) Oral before breakfast  petrolatum Ophthalmic Ointment 1 Application(s) Right EYE every 12 hours  phenazopyridine 200 milliGRAM(s) Oral <User Schedule>  piperacillin/tazobactam IVPB.. 3.375 Gram(s) IV Intermittent every 8 hours  polyethylene glycol 3350 17 Gram(s) Oral two times a day  senna Syrup 10 milliLiter(s) Oral at bedtime  sodium chloride 0.9%. 1000 milliLiter(s) (100 mL/Hr) IV Continuous <Continuous>  ticagrelor 90 milliGRAM(s) Oral every 12 hours    MEDICATIONS  (PRN):  acetaminophen     Tablet .. 650 milliGRAM(s) Oral every 6 hours PRN Mild Pain (1 - 3)  benzocaine/menthol Lozenge 1 Lozenge Oral two times a day PRN Sore Throat

## 2023-04-21 NOTE — PROGRESS NOTE ADULT - ASSESSMENT
58M PMH HTN, DM2 who presented as a transfer from [facility] to [facility] for stroke on 3/29, s/p TICI 2b thrombectomy of the R ICA and R MCA, with R ICA stent placement.  Postoperative CT head demonstrated new MLS with some hemorrhagic conversion now admitted for rehab- pt/ot/dvt ppx    s/p sepsis 4/20/23, now resolved- c/o dysuria, likely due to uti   BC X2  UA NOTED- SEND CULTURES  RVP negative   started on empiric zosyn pending cultures 4/20/23- will continue for now  lactate normal  c/w iv fluids   no resp symptoms  cxr- no acute changes noted  pyridium added today      Stroke s/p thrombectomy of Right ICA and Right MCA and right ICA stent placement  Left hemiparesis  - cont asa/brillinta, statin    Dysphagia with PEG tube  - tube feeds per nutrition  - speech therapy    Hypertension   - off meds, BP lower side     DM2  - HbA1C 7.3%  - diet controlled  - little meds- ? homeopathic medication    DVT PPx  - Lovenox    will follow  d/w dr. jenkins  time spent 51 min

## 2023-04-21 NOTE — PROGRESS NOTE ADULT - SUBJECTIVE AND OBJECTIVE BOX
Subjective  Seen and examined,  He reports marked improvement, slept well  Reports dysuria when voiding  No further fever  Had normal BM yesterday  Tolerating oral feeds, eat all her meal this breakfast  Tolerating IV antibiotics for UTI treatment     Therapy--Will be engaging in therapy today    ROS--No head ache or abd pain, no N/V   LBM     Vital Signs Last 24 Hrs  T(C): 36.6 (2023 08:22), Max: 37.3 (2023 22:47)  T(F): 97.9 (2023 08:22), Max: 99.2 (2023 22:47)  HR: 107 (2023 08:22) (101 - 114)  BP: 103/69 (2023 08:22) (103/69 - 162/92)  RR: 16 (2023 08:22) (16 - 16)  SpO2: 96% (2023 08:22) (94% - 96%)  O2 Parameters below as of 2023 08:22  Patient On (Oxygen Delivery Method): room air    EXAM  Gen - Comfortable  HEENT - , right  eye vision loss   Neck - Supple, No limited ROM  Pulm -clear   Cardiovascular - RRR, S1S2  Chest - good chest expansion, good respiratory effort  Abdomen - Soft, non tender  +BS, + PEG   Extremities - No Cyanosis, no clubbing, no edema, no calf tenderness    Neuro-  Alert and oriented, but fatigued  Speech  dysarthric  Cranial Nerves - Left facial weakness, left eye vision loss, absent left shoulder shrug   Right  eye vision loss Rt eye  Tongue deviation to left, Flat left nasolabial fold     Motor -  Left hemiparesis                    LEFT    UE - ShAB 1+/5, improvement with hand                     RIGHT UE - ShAB 5/5, EF 5/5, EE 5/5,   5/5                    LEFT    LE - 2/5                    RIGHT LE - HF 5/5, KE 5/5, DF 5/5, PF 5/5        Sensory - Intact to LT     Reflexes - DTR Intact     Coordination - FTN/HTS  impaired to left side     Tone - normal  Psychiatric - Mood stable, Affect WNL  Skin:  all skin intact, PEG in situ, not in use    1RECENT LABS/IMAGING                        12.3   14.42 )-----------( 376      ( 2023 06:23 )             38.2     04-20    139  |  101  |  10  ----------------------------<  201<H>  3.9   |  28  |  0.75    Ca    8.9      2023 06:23    TPro  7.1  /  Alb  2.9<L>  /  TBili  0.6  /  DBili  x   /  AST  14  /  ALT  37  /  AlkPhos  43  04      Urinalysis Basic - ( 2023 08:00 )    Color: Yellow / Appearance: very cloudy / S.015 / pH: x  Gluc: x / Ketone: Negative  / Bili: Negative / Urobili: Negative   Blood: x / Protein: 100 / Nitrite: Negative   Leuk Esterase: Moderate / RBC: >50 /HPF / WBC >50 /HPF   Sq Epi: x / Non Sq Epi: x / Bacteria: Moderate /HPF    MEDICATIONS  (STANDING):  AQUAPHOR (petrolatum Ointment) 1 Application(s) Topical daily  artificial  tears Solution 1 Drop(s) Both EYES two times a day  aspirin  chewable 81 milliGRAM(s) Oral daily  atorvastatin 80 milliGRAM(s) Oral at bedtime  doxazosin 4 milliGRAM(s) Oral at bedtime  enoxaparin Injectable 40 milliGRAM(s) SubCutaneous every 24 hours  melatonin 6 milliGRAM(s) Oral at bedtime  midodrine. 5 milliGRAM(s) Oral <User Schedule>  pantoprazole    Tablet 40 milliGRAM(s) Oral before breakfast  petrolatum Ophthalmic Ointment 1 Application(s) Right EYE every 12 hours  phenazopyridine 200 milliGRAM(s) Oral <User Schedule>  piperacillin/tazobactam IVPB.. 3.375 Gram(s) IV Intermittent every 8 hours  polyethylene glycol 3350 17 Gram(s) Oral two times a day  senna Syrup 10 milliLiter(s) Oral at bedtime  sodium chloride 0.9%. 1000 milliLiter(s) (100 mL/Hr) IV Continuous <Continuous>  ticagrelor 90 milliGRAM(s) Oral every 12 hours    MEDICATIONS  (PRN):  acetaminophen     Tablet .. 650 milliGRAM(s) Oral every 6 hours PRN Mild Pain (1 - 3)  benzocaine/menthol Lozenge 1 Lozenge Oral two times a day PRN Sore Throat

## 2023-04-21 NOTE — PROGRESS NOTE ADULT - ASSESSMENT
Assessment/Plan:  CONG AGUAYO is a 58 year old male with PMH of HTN, and DM; who presented as a transfer from [facility] to [facility] for stroke on 3/29, where he was found to have a R ICA occlusion and he underwent a TICI 2b thromectomy of R ICA and R MCA with R ICA stent placement. Extubated on 4/7. His brain MRI was significant for improved midline shift and small frontal parietal SAH. His hospital course was complicated by aspiration PNA (s/p Zosyn); and dysphagia (requiring PEG - placed 4/6). Patient now admitted for a multidisciplinary rehab program. 04-11-23 @ 13:54    * Await orthotics review for left sided weakness   * UTI---continue IVF and IV antibiotics   and hospitalist f/u  * Recommence therapy   * Dysuria--commenced pyridium x 3-5 days    Rehab Management/MEDICAL MANAGEMENT     #Functional deficits s/p CVA  -Continue  Comprehensive Rehab Program of PT/OT/SLP  - 3 hours a day, 5 days a week  - P&O as needed   - R ICA occlusion, s/p TICI 2b thrombectomy of R ICA and R MCA with R ICA stent placement  - ASA & Brilinta  - Outpt Neurology follow up- Dr. Reid  - Outpt IR follow up - Dr. Allen  --orthotics referral with some functional improvement  Left sided weakness, await review     #Dysphagia/aspiration PNA  - S/p Zosyn  - PEG tube last prior to Acute rehab admission  - Repeat CT chest in 1 month (~5/8)    * UTI 4/20 --continue IVF and IV antibiotics   resp viral panel -ve  Dysuria--commenced pyridium x 3-5 days    #HTN  - Lisinopril--D/CARSON 4/13  - Doxazosin  - Outpt Cardiology follow up - Dr. Song    # Orthostatic hypotension--midodrine 2.5mg bid, increased to 5mg bid 4/17, bp stable    #HLD  -Atorvastatin    #DM  - A1c: 7.3  - ISS  - FS ACHS    #Dry eye  - Occular lubricant ointment  - Artificial tears    #Sleep  - Melatonin 6mg nightly PRN, switched to standing 4/18, sleep improved    #Skin  - Skin-: intact, PEG in situ   - Pressure injury/Skin: OOB to Chair, PT/OT     #Pain Mgmt   - Tylenol PRN    #GI/Bowel Mgmt   - Pantoprazole  - Continent c/w Senna, Miralax   -lactulose    #/Bladder Mgmt --Voiding appropriately    #FEN   - PEG--Last used for feeding while in acute care, not in use since acute rehab treatment   - Diet - Easy to Chew, halal  - Dysphagia  SLP - evaluation and treatment    #Precautions / PROPHYLAXIS:   - Falls  - ortho: Weight bearing status: WBAT   - Lungs: Aspiration, Incentive Spirometer   - DVT: Lovenox  ---------------------------------------------------------------  IDT conference on 4/20  TDD: 4/27 to home vs AMAN  Barriers: Distracted, tangentile, perseverative, requires cueing, R eye visual deficit. Poor insight, safety and memory.   Social Work: Lives in a basement apartment with spouse and 2 sons, 12 MEI. Patient is main source of income for family. Spouse does not work.  OT: Max A for UBD/LBD. Min-mod A for transfers.  PT: Mod A for transfers. Ambulated 60 ft with RW with min A and WCF.   SLP: Easy to chew diet. Easily distracted, tangentile, perseverative. Poor insight, safety and memory.   ---------------------------------------------------------------  FOLLOW UP/OUTPATIENT:    Bev Song)  Cardiology; Internal Medicine  [address], [address]  [place], [place] [id number]  Phone: [phone]  Fax: [phone]    Ivan Reid; PhD)  Neurology; Vascular Neurology  [address], [address]  [place] [address]  Phone: [phone]  Fax: [phone]  Follow Up Time:     Primary doctor,   Phone: (   )    -  Fax: (   )    -  Follow Up Time:     Sanchez Allen)  Neurology; Vascular Neurology  [address] [place] [address]  Phone: [phone]  Fax: [phone]  Follow Up Time:     Lily Moon (DO)  Gastroenterology  [address], [address]  [place] [address]  Phone: [phone]  Fax: [phone]  Follow Up Time:  ---------------------------------------------------------------

## 2023-04-22 LAB
ALBUMIN SERPL ELPH-MCNC: 2.5 G/DL — LOW (ref 3.3–5)
ALP SERPL-CCNC: 39 U/L — LOW (ref 40–120)
ALT FLD-CCNC: 32 U/L — SIGNIFICANT CHANGE UP (ref 10–45)
ANION GAP SERPL CALC-SCNC: 8 MMOL/L — SIGNIFICANT CHANGE UP (ref 5–17)
AST SERPL-CCNC: 11 U/L — SIGNIFICANT CHANGE UP (ref 10–40)
BILIRUB SERPL-MCNC: 0.8 MG/DL — SIGNIFICANT CHANGE UP (ref 0.2–1.2)
BUN SERPL-MCNC: 5 MG/DL — LOW (ref 7–23)
CALCIUM SERPL-MCNC: 8.8 MG/DL — SIGNIFICANT CHANGE UP (ref 8.4–10.5)
CHLORIDE SERPL-SCNC: 104 MMOL/L — SIGNIFICANT CHANGE UP (ref 96–108)
CO2 SERPL-SCNC: 29 MMOL/L — SIGNIFICANT CHANGE UP (ref 22–31)
CREAT SERPL-MCNC: 0.7 MG/DL — SIGNIFICANT CHANGE UP (ref 0.5–1.3)
EGFR: 106 ML/MIN/1.73M2 — SIGNIFICANT CHANGE UP
GLUCOSE SERPL-MCNC: 174 MG/DL — HIGH (ref 70–99)
HCT VFR BLD CALC: 33.5 % — LOW (ref 39–50)
HGB BLD-MCNC: 10.6 G/DL — LOW (ref 13–17)
MCHC RBC-ENTMCNC: 26.4 PG — LOW (ref 27–34)
MCHC RBC-ENTMCNC: 31.6 GM/DL — LOW (ref 32–36)
MCV RBC AUTO: 83.3 FL — SIGNIFICANT CHANGE UP (ref 80–100)
NRBC # BLD: 0 /100 WBCS — SIGNIFICANT CHANGE UP (ref 0–0)
PLATELET # BLD AUTO: 295 K/UL — SIGNIFICANT CHANGE UP (ref 150–400)
POTASSIUM SERPL-MCNC: 3.3 MMOL/L — LOW (ref 3.5–5.3)
POTASSIUM SERPL-SCNC: 3.3 MMOL/L — LOW (ref 3.5–5.3)
PROT SERPL-MCNC: 6.8 G/DL — SIGNIFICANT CHANGE UP (ref 6–8.3)
RBC # BLD: 4.02 M/UL — LOW (ref 4.2–5.8)
RBC # FLD: 13.9 % — SIGNIFICANT CHANGE UP (ref 10.3–14.5)
SODIUM SERPL-SCNC: 141 MMOL/L — SIGNIFICANT CHANGE UP (ref 135–145)
WBC # BLD: 12.08 K/UL — HIGH (ref 3.8–10.5)
WBC # FLD AUTO: 12.08 K/UL — HIGH (ref 3.8–10.5)

## 2023-04-22 PROCEDURE — 99232 SBSQ HOSP IP/OBS MODERATE 35: CPT

## 2023-04-22 PROCEDURE — 76770 US EXAM ABDO BACK WALL COMP: CPT | Mod: 26

## 2023-04-22 RX ADMIN — ATORVASTATIN CALCIUM 80 MILLIGRAM(S): 80 TABLET, FILM COATED ORAL at 21:05

## 2023-04-22 RX ADMIN — TICAGRELOR 90 MILLIGRAM(S): 90 TABLET ORAL at 06:08

## 2023-04-22 RX ADMIN — Medication 200 MILLIGRAM(S): at 06:08

## 2023-04-22 RX ADMIN — PANTOPRAZOLE SODIUM 40 MILLIGRAM(S): 20 TABLET, DELAYED RELEASE ORAL at 06:08

## 2023-04-22 RX ADMIN — PIPERACILLIN AND TAZOBACTAM 25 GRAM(S): 4; .5 INJECTION, POWDER, LYOPHILIZED, FOR SOLUTION INTRAVENOUS at 21:05

## 2023-04-22 RX ADMIN — POLYETHYLENE GLYCOL 3350 17 GRAM(S): 17 POWDER, FOR SOLUTION ORAL at 17:18

## 2023-04-22 RX ADMIN — Medication 200 MILLIGRAM(S): at 17:19

## 2023-04-22 RX ADMIN — Medication 1 APPLICATION(S): at 17:14

## 2023-04-22 RX ADMIN — Medication 1 DROP(S): at 17:20

## 2023-04-22 RX ADMIN — PIPERACILLIN AND TAZOBACTAM 25 GRAM(S): 4; .5 INJECTION, POWDER, LYOPHILIZED, FOR SOLUTION INTRAVENOUS at 06:08

## 2023-04-22 RX ADMIN — ENOXAPARIN SODIUM 40 MILLIGRAM(S): 100 INJECTION SUBCUTANEOUS at 17:18

## 2023-04-22 RX ADMIN — TICAGRELOR 90 MILLIGRAM(S): 90 TABLET ORAL at 17:18

## 2023-04-22 RX ADMIN — MIDODRINE HYDROCHLORIDE 5 MILLIGRAM(S): 2.5 TABLET ORAL at 14:13

## 2023-04-22 RX ADMIN — Medication 81 MILLIGRAM(S): at 14:12

## 2023-04-22 RX ADMIN — SODIUM CHLORIDE 100 MILLILITER(S): 9 INJECTION INTRAMUSCULAR; INTRAVENOUS; SUBCUTANEOUS at 10:49

## 2023-04-22 RX ADMIN — Medication 1 APPLICATION(S): at 17:21

## 2023-04-22 RX ADMIN — Medication 4 MILLIGRAM(S): at 21:05

## 2023-04-22 RX ADMIN — PIPERACILLIN AND TAZOBACTAM 25 GRAM(S): 4; .5 INJECTION, POWDER, LYOPHILIZED, FOR SOLUTION INTRAVENOUS at 14:12

## 2023-04-22 RX ADMIN — Medication 6 MILLIGRAM(S): at 21:05

## 2023-04-22 NOTE — PROGRESS NOTE ADULT - ASSESSMENT
From primary team notes:    Assessment/Plan:  CONG AGUAYO is a 58 year old male with PMH of HTN, and DM; who presented as a transfer from [facility] to [facility] for stroke on 3/29, where he was found to have a R ICA occlusion and he underwent a TICI 2b thromectomy of R ICA and R MCA with R ICA stent placement. Extubated on 4/7. His brain MRI was significant for improved midline shift and small frontal parietal SAH. His hospital course was complicated by aspiration PNA (s/p Zosyn); and dysphagia (requiring PEG - placed 4/6). Patient now admitted for a multidisciplinary rehab program. 04-11-23 @ 13:54    * Await orthotics review for left sided weakness   * UTI---continue IVF and IV antibiotics   and hospitalist f/u  * Recommence therapy   * Dysuria--commenced pyridium x 3-5 days    Rehab Management/MEDICAL MANAGEMENT     #Functional deficits s/p CVA  -Continue  Comprehensive Rehab Program of PT/OT/SLP  - 3 hours a day, 5 days a week  - P&O as needed   - R ICA occlusion, s/p TICI 2b thrombectomy of R ICA and R MCA with R ICA stent placement  - ASA & Brilinta  - Outpt Neurology follow up- Dr. Reid  - Outpt IR follow up - Dr. Allen  --orthotics referral with some functional improvement  Left sided weakness, await review     #Dysphagia/aspiration PNA  - S/p Zosyn  - PEG tube last prior to Acute rehab admission  - Repeat CT chest in 1 month (~5/8)    * UTI 4/20 --continue IVF and IV antibiotics   resp viral panel -ve  Dysuria--commenced pyridium x 3-5 days    #HTN  - Lisinopril--D/CARSON 4/13  - Doxazosin  - Outpt Cardiology follow up - Dr. Song    # Orthostatic hypotension--midodrine 2.5mg bid, increased to 5mg bid 4/17, bp stable    #HLD  -Atorvastatin    #DM  - A1c: 7.3  - ISS  - FS ACHS    #Dry eye  - Occular lubricant ointment  - Artificial tears    #Sleep  - Melatonin 6mg nightly PRN, switched to standing 4/18, sleep improved    #Skin  - Skin-: intact, PEG in situ   - Pressure injury/Skin: OOB to Chair, PT/OT     #Pain Mgmt   - Tylenol PRN    #GI/Bowel Mgmt   - Pantoprazole  - Continent c/w Senna, Miralax   -lactulose    #/Bladder Mgmt --Voiding appropriately    #FEN   - PEG--Last used for feeding while in acute care, not in use since acute rehab treatment   - Diet - Easy to Chew, halal  - Dysphagia  SLP - evaluation and treatment    #Precautions / PROPHYLAXIS:   - Falls  - ortho: Weight bearing status: WBAT   - Lungs: Aspiration, Incentive Spirometer   - DVT: Lovenox  ---------------------------------------------------------------  IDT conference on 4/20  TDD: 4/27 to home vs AMAN  Barriers: Distracted, tangentile, perseverative, requires cueing, R eye visual deficit. Poor insight, safety and memory.   Social Work: Lives in a basement apartment with spouse and 2 sons, 12 MEI. Patient is main source of income for family. Spouse does not work.  OT: Max A for UBD/LBD. Min-mod A for transfers.  PT: Mod A for transfers. Ambulated 60 ft with RW with min A and WCF.   SLP: Easy to chew diet. Easily distracted, tangentile, perseverative. Poor insight, safety and memory.   ---------------------------------------------------------------  FOLLOW UP/OUTPATIENT:    Bev Song)  Cardiology; Internal Medicine  [address] [facility], [address] [place] [id number]  Phone: [phone]  Fax: [phone]    Ivan Reid; PhD)  Neurology; Vascular Neurology  [address], [address]  [place] [address]  Phone: [phone]  Fax: [phone]  Follow Up Time:     Primary doctor,   Phone: (   )    -  Fax: (   )    -  Follow Up Time:     Sanchez Allen)  Neurology; Vascular Neurology  [address] [place] [address]  Phone: [phone]  Fax: [phone]  Follow Up Time:     Lily Moon (DO)  Gastroenterology  [address] [facility], [address]  [place] [address]  Phone: [phone]  Fax: [phone]  Follow Up Time:  ---------------------------------------------------------------

## 2023-04-22 NOTE — PROGRESS NOTE ADULT - ASSESSMENT
58M PMH HTN, DM2 who presented as a transfer from [facility] to [facility] for stroke on 3/29, s/p TICI 2b thrombectomy of the R ICA and R MCA, with R ICA stent placement.  Postoperative CT head demonstrated new MLS with some hemorrhagic conversion now admitted for rehab- pt/ot/dvt ppx    s/p sepsis 4/20/23, now resolved- c/o dysuria, likely due to uti   BC X2  UA NOTED- SEND CULTURES  RVP negative   started on empiric zosyn pending cultures 4/20/23- will continue for now  lactate normal  c/w iv fluids   no resp symptoms  cxr- no acute changes noted  pyridium added today  - Abx per ID    Stroke s/p thrombectomy of Right ICA and Right MCA and right ICA stent placement  Left hemiparesis  - cont asa/brillinta, statin    Dysphagia with PEG tube  - tube feeds per nutrition  - speech therapy    Hypertension   - off meds, BP lower side     DM2  - HbA1C 7.3%  - diet controlled  - home meds- ? homeopathic medication    DVT PPx  - Lovenox

## 2023-04-22 NOTE — PROGRESS NOTE ADULT - SUBJECTIVE AND OBJECTIVE BOX
CC: f/u for leukocytosis, fever, dysuria    Patient reports feeling better today, no fevers    REVIEW OF SYSTEMS: no fevers, no chills, no nausea, no vomiting, no abd pain, no resp symptoms  All other review of systems negative (Comprehensive ROS)    Antimicrobials Day #    piperacillin/tazobactam IVPB.. 3.375 Gram(s) IV Intermittent every 8 hours    Other Medications Reviewed    T(F): 97.9 (04-22-23 @ 10:19), Max: 98.3 (04-21-23 @ 19:57)  HR: 110 (04-22-23 @ 10:19)  BP: 127/78 (04-22-23 @ 10:19)  RR: 16 (04-22-23 @ 10:19)  SpO2: 94% (04-22-23 @ 10:19)    PHYSICAL EXAM:  General: alert, no acute distress  Eyes:  anicteric, no conjunctival injection, no discharge  Oropharynx: no lesions or injection 	  Neck: supple, without adenopathy  Lungs: clear to auscultation  Heart: regular rate and rhythm; no murmur, rubs or gallops  Abdomen: soft, nondistended, nontender, without mass or organomegaly  Skin: no lesions  Extremities: no clubbing, cyanosis, or edema  Neurologic: alert, oriented, R>L weakness    LAB RESULTS:                        10.6   12.08 )-----------( 295      ( 22 Apr 2023 07:00 )             33.5     04-22    141  |  104  |  5<L>  ----------------------------<  174<H>  3.3<L>   |  29  |  0.70    Ca    8.8      22 Apr 2023 07:00  Phos  3.9     04-21  Mg     2.0     04-21    TPro  6.8  /  Alb  2.5<L>  /  TBili  0.8  /  DBili  x   /  AST  11  /  ALT  32  /  AlkPhos  39<L>  04-22    LIVER FUNCTIONS - ( 22 Apr 2023 07:00 )  Alb: 2.5 g/dL / Pro: 6.8 g/dL / ALK PHOS: 39 U/L / ALT: 32 U/L / AST: 11 U/L / GGT: x               MICROBIOLOGY REVIEWED:  Culture - Urine (04.20.23 @ 08:45)   Specimen Source: Clean Catch Clean Catch (Midstream)  Culture Results:   >100,000 CFU/ml Escherichia coli  RADIOLOGY REVIEWED:    < from: US Kidney and Bladder (04.22.23 @ 12:57) >    IMPRESSION:  No renal mass, hydronephrosis or calculus is visualized sonographically.    Urinary bladder is partially collapsed, with a volume of 49 mL.    Enlarged prostate is partially visualized.  The hypertrophied median lobe   of the prostate indents the posterior bladder wall.  Lower urinary tract   symptoms (LUTS) should be excluded clinically.    A hypoechoic right adrenal nodule measures 1.1 x 1 x 1.1 cm    --- End of Report ---    < end of copied text >

## 2023-04-22 NOTE — PROGRESS NOTE ADULT - SUBJECTIVE AND OBJECTIVE BOX
Seen in AM; sitting comfortably in WC  Patient with no new medical issues today.  Pain controlled, no chest pain, no N/V, no Fevers/Chills. No other new ROS  Has been tolerating rehabilitation program.    acetaminophen     Tablet .. 650 milliGRAM(s) Oral every 6 hours PRN  AQUAPHOR (petrolatum Ointment) 1 Application(s) Topical daily  artificial  tears Solution 1 Drop(s) Both EYES two times a day  aspirin  chewable 81 milliGRAM(s) Oral daily  atorvastatin 80 milliGRAM(s) Oral at bedtime  benzocaine/menthol Lozenge 1 Lozenge Oral two times a day PRN  doxazosin 4 milliGRAM(s) Oral at bedtime  enoxaparin Injectable 40 milliGRAM(s) SubCutaneous every 24 hours  melatonin 6 milliGRAM(s) Oral at bedtime  midodrine. 5 milliGRAM(s) Oral <User Schedule>  pantoprazole    Tablet 40 milliGRAM(s) Oral before breakfast  petrolatum Ophthalmic Ointment 1 Application(s) Right EYE every 12 hours  phenazopyridine 200 milliGRAM(s) Oral <User Schedule>  piperacillin/tazobactam IVPB.. 3.375 Gram(s) IV Intermittent every 8 hours  polyethylene glycol 3350 17 Gram(s) Oral two times a day  senna Syrup 10 milliLiter(s) Oral at bedtime  sodium chloride 0.9%. 1000 milliLiter(s) IV Continuous <Continuous>  ticagrelor 90 milliGRAM(s) Oral every 12 hours      T(C): 36.6 (04-22-23 @ 10:19), Max: 36.8 (04-21-23 @ 19:57)  HR: 110 (04-22-23 @ 10:19) (105 - 110)  BP: 127/78 (04-22-23 @ 10:19) (127/78 - 132/84)  RR: 16 (04-22-23 @ 10:19) (15 - 16)  SpO2: 94% (04-22-23 @ 10:19) (94% - 96%)    In NAD  HEENT- EOMI  Heart- Well Perfused  Lungs- No resp distress, no use of accessory resp muscles  Neuro- Exam unchanged  Psych- Affect wnl          Imp: Patient with diagnosis of     Functional deficits s/p CVA     admitted for comprehensive acute rehabilitation.    Plan:  - Continue therapies  - DVT prophylaxis  - Skin- Turn q2h, check skin daily  - Continue current medications; patient medically stable.   - Patient is stable to continue current rehabilitation program.   - ID notes appreciated: "57 y/o man with dm, s/p large right mca stroke with hemorrhagic transformation 3/31  , s/p thrombectomy, peg, came here on 4/11. He can now eat. He has had low grade temp, leukocytosis, tachy and dysuria over the past 24 hours. He has been on zosyn and still feels unwell. It may be too early to consider this a zosyn failure, i do suspect a gu source of sepsis. He could have a zosyn resistant organism  Recommendations:  will continue zosyn but give a dose of amikacin in case he has an esbl or cre organism and a dose of vanco  f/u urine cx  monitor pvr  f/u blood cx"

## 2023-04-22 NOTE — PROGRESS NOTE ADULT - ASSESSMENT
59 y/o man with dm, s/p large right mca stroke with hemorrhagic transformation 3/31  , s/p thrombectomy, peg, came here on 4/11. He can now eat. He has had low grade temp, leukocytosis, tachy and dysuria over the past 24 hours. He has been on zosyn and still feels unwell. It may be too early to consider this a zosyn failure, i do suspect a gu source of sepsis. He could have a zosyn resistant organism  Recommendations:  will continue zosyn s/p amikacin in case he has an esbl or cre organism   f/u urine cx  monitor pvr  f/u blood cx

## 2023-04-22 NOTE — PROGRESS NOTE ADULT - SUBJECTIVE AND OBJECTIVE BOX
Patient is a 58y old  Male who presents with a chief complaint of Functional deficits s/p CVA (2023 10:54)      Patient seen and examined at bedside.  Denies chest pain, dyspnea, abd pain.    ALLERGIES:  No Known Allergies    MEDICATIONS  (STANDING):  AQUAPHOR (petrolatum Ointment) 1 Application(s) Topical daily  artificial  tears Solution 1 Drop(s) Both EYES two times a day  aspirin  chewable 81 milliGRAM(s) Oral daily  atorvastatin 80 milliGRAM(s) Oral at bedtime  doxazosin 4 milliGRAM(s) Oral at bedtime  enoxaparin Injectable 40 milliGRAM(s) SubCutaneous every 24 hours  melatonin 6 milliGRAM(s) Oral at bedtime  midodrine. 5 milliGRAM(s) Oral <User Schedule>  pantoprazole    Tablet 40 milliGRAM(s) Oral before breakfast  petrolatum Ophthalmic Ointment 1 Application(s) Right EYE every 12 hours  phenazopyridine 200 milliGRAM(s) Oral <User Schedule>  piperacillin/tazobactam IVPB.. 3.375 Gram(s) IV Intermittent every 8 hours  polyethylene glycol 3350 17 Gram(s) Oral two times a day  senna Syrup 10 milliLiter(s) Oral at bedtime  sodium chloride 0.9%. 1000 milliLiter(s) (100 mL/Hr) IV Continuous <Continuous>  ticagrelor 90 milliGRAM(s) Oral every 12 hours    MEDICATIONS  (PRN):  acetaminophen     Tablet .. 650 milliGRAM(s) Oral every 6 hours PRN Mild Pain (1 - 3)  benzocaine/menthol Lozenge 1 Lozenge Oral two times a day PRN Sore Throat    Vital Signs Last 24 Hrs  T(F): 97.9 (2023 10:19), Max: 98.3 (2023 19:57)  HR: 110 (2023 10:19) (105 - 110)  BP: 127/78 (2023 10:19) (127/78 - 132/84)  RR: 16 (2023 10:19) (15 - 16)  SpO2: 94% (2023 10:19) (94% - 96%)  I&O's Summary    2023 07:01  -  2023 07:00  --------------------------------------------------------  IN: 30 mL / OUT: 0 mL / NET: 30 mL        PHYSICAL EXAM:  General: NAD, A/O x 3  ENT: MMM  Neck: Supple, No JVD  Lungs: Clear to auscultation bilaterally  Cardio: RRR, S1/S2, No murmurs  Abdomen: Soft, Nontender, Nondistended; Bowel sounds present  Extremities: No calf tenderness, No pitting edema    LABS:                        10.6   12.08 )-----------( 295      ( 2023 07:00 )             33.5       04-    141  |  104  |  5   ----------------------------<  174  3.3   |  29  |  0.70    Ca    8.8      2023 07:00  Phos  3.9     -  Mg     2.0         TPro  6.8  /  Alb  2.5  /  TBili  0.8  /  DBili  x   /  AST  11  /  ALT  32  /  AlkPhos  39  -          Lactate, Blood: 1.9 mmol/L ( @ 09:40)        03- Chol 270 mg/dL LDL -- HDL 58 mg/dL Trig 83 mg/dL                  Urinalysis Basic - ( 2023 08:00 )    Color: Yellow / Appearance: very cloudy / S.015 / pH: x  Gluc: x / Ketone: Negative  / Bili: Negative / Urobili: Negative   Blood: x / Protein: 100 / Nitrite: Negative   Leuk Esterase: Moderate / RBC: >50 /HPF / WBC >50 /HPF   Sq Epi: x / Non Sq Epi: x / Bacteria: Moderate /HPF        Culture - Blood (collected 2023 09:40)  Source: .Blood Blood  Preliminary Report (2023 14:02):    No growth to date.    Culture - Blood (collected 2023 09:40)  Source: .Blood Blood  Preliminary Report (2023 14:02):    No growth to date.    Culture - Urine (collected 2023 08:45)  Source: Clean Catch Clean Catch (Midstream)  Preliminary Report (2023 09:17):    >100,000 CFU/ml Escherichia coli      COVID-19 PCR: NotDetec (23 @ 05:00)  COVID-19 PCR: NotDetec (23 @ 09:52)  COVID-19 PCR: NotDetec (23 @ 17:15)  COVID-19 PCR: NotDetec (23 @ 10:00)      RADIOLOGY & ADDITIONAL TESTS:    Care Discussed with Consultants/Other Providers:

## 2023-04-23 LAB
-  AMIKACIN: SIGNIFICANT CHANGE UP
-  AMOXICILLIN/CLAVULANIC ACID: SIGNIFICANT CHANGE UP
-  AMPICILLIN/SULBACTAM: SIGNIFICANT CHANGE UP
-  AMPICILLIN: SIGNIFICANT CHANGE UP
-  AZTREONAM: SIGNIFICANT CHANGE UP
-  CEFAZOLIN: SIGNIFICANT CHANGE UP
-  CEFEPIME: SIGNIFICANT CHANGE UP
-  CEFTRIAXONE: SIGNIFICANT CHANGE UP
-  CEFUROXIME: SIGNIFICANT CHANGE UP
-  CIPROFLOXACIN: SIGNIFICANT CHANGE UP
-  ERTAPENEM: SIGNIFICANT CHANGE UP
-  GENTAMICIN: SIGNIFICANT CHANGE UP
-  IMIPENEM: SIGNIFICANT CHANGE UP
-  LEVOFLOXACIN: SIGNIFICANT CHANGE UP
-  MEROPENEM: SIGNIFICANT CHANGE UP
-  NITROFURANTOIN: SIGNIFICANT CHANGE UP
-  PIPERACILLIN/TAZOBACTAM: SIGNIFICANT CHANGE UP
-  TOBRAMYCIN: SIGNIFICANT CHANGE UP
-  TRIMETHOPRIM/SULFAMETHOXAZOLE: SIGNIFICANT CHANGE UP
METHOD TYPE: SIGNIFICANT CHANGE UP

## 2023-04-23 PROCEDURE — 99232 SBSQ HOSP IP/OBS MODERATE 35: CPT

## 2023-04-23 RX ORDER — ERTAPENEM SODIUM 1 G/1
1000 INJECTION, POWDER, LYOPHILIZED, FOR SOLUTION INTRAMUSCULAR; INTRAVENOUS EVERY 24 HOURS
Refills: 0 | Status: COMPLETED | OUTPATIENT
Start: 2023-04-23 | End: 2023-04-25

## 2023-04-23 RX ADMIN — ATORVASTATIN CALCIUM 80 MILLIGRAM(S): 80 TABLET, FILM COATED ORAL at 22:18

## 2023-04-23 RX ADMIN — Medication 1 DROP(S): at 17:57

## 2023-04-23 RX ADMIN — SENNA PLUS 10 MILLILITER(S): 8.6 TABLET ORAL at 22:43

## 2023-04-23 RX ADMIN — Medication 1 DROP(S): at 06:06

## 2023-04-23 RX ADMIN — Medication 1 APPLICATION(S): at 06:13

## 2023-04-23 RX ADMIN — TICAGRELOR 90 MILLIGRAM(S): 90 TABLET ORAL at 17:57

## 2023-04-23 RX ADMIN — ENOXAPARIN SODIUM 40 MILLIGRAM(S): 100 INJECTION SUBCUTANEOUS at 17:57

## 2023-04-23 RX ADMIN — Medication 200 MILLIGRAM(S): at 17:56

## 2023-04-23 RX ADMIN — TICAGRELOR 90 MILLIGRAM(S): 90 TABLET ORAL at 06:05

## 2023-04-23 RX ADMIN — Medication 1 APPLICATION(S): at 17:57

## 2023-04-23 RX ADMIN — PIPERACILLIN AND TAZOBACTAM 25 GRAM(S): 4; .5 INJECTION, POWDER, LYOPHILIZED, FOR SOLUTION INTRAVENOUS at 13:55

## 2023-04-23 RX ADMIN — Medication 4 MILLIGRAM(S): at 22:18

## 2023-04-23 RX ADMIN — SODIUM CHLORIDE 100 MILLILITER(S): 9 INJECTION INTRAMUSCULAR; INTRAVENOUS; SUBCUTANEOUS at 14:07

## 2023-04-23 RX ADMIN — Medication 6 MILLIGRAM(S): at 22:17

## 2023-04-23 RX ADMIN — PIPERACILLIN AND TAZOBACTAM 25 GRAM(S): 4; .5 INJECTION, POWDER, LYOPHILIZED, FOR SOLUTION INTRAVENOUS at 06:04

## 2023-04-23 RX ADMIN — SODIUM CHLORIDE 100 MILLILITER(S): 9 INJECTION INTRAMUSCULAR; INTRAVENOUS; SUBCUTANEOUS at 22:44

## 2023-04-23 RX ADMIN — MIDODRINE HYDROCHLORIDE 5 MILLIGRAM(S): 2.5 TABLET ORAL at 06:06

## 2023-04-23 RX ADMIN — Medication 81 MILLIGRAM(S): at 13:55

## 2023-04-23 RX ADMIN — Medication 200 MILLIGRAM(S): at 06:05

## 2023-04-23 RX ADMIN — PANTOPRAZOLE SODIUM 40 MILLIGRAM(S): 20 TABLET, DELAYED RELEASE ORAL at 06:06

## 2023-04-23 RX ADMIN — ERTAPENEM SODIUM 120 MILLIGRAM(S): 1 INJECTION, POWDER, LYOPHILIZED, FOR SOLUTION INTRAMUSCULAR; INTRAVENOUS at 22:43

## 2023-04-23 RX ADMIN — MIDODRINE HYDROCHLORIDE 5 MILLIGRAM(S): 2.5 TABLET ORAL at 13:55

## 2023-04-23 RX ADMIN — Medication 1 APPLICATION(S): at 13:50

## 2023-04-23 NOTE — PROGRESS NOTE ADULT - ASSESSMENT
59 y/o man with dm, s/p large right mca stroke with hemorrhagic transformation 3/31  , s/p thrombectomy, peg, came here on 4/11. He can now eat. He has had low grade temp, leukocytosis, tachy and dysuria over the past 24 hours.   He has been on zosyn and still feels unwell.   possible  source .   Ucx with ESBL  Recommendations:  change ZOsyn to Ertapenem  monitor pvr  f/u blood cx

## 2023-04-23 NOTE — PROGRESS NOTE ADULT - SUBJECTIVE AND OBJECTIVE BOX
Patient is a 58y old  Male who presents with a chief complaint of Functional deficits s/p CVA (23 Apr 2023 10:01)      Patient seen and examined at bedside.  Denies chest pain, dyspnea, abd pain.    ALLERGIES:  No Known Allergies    MEDICATIONS  (STANDING):  AQUAPHOR (petrolatum Ointment) 1 Application(s) Topical daily  artificial  tears Solution 1 Drop(s) Both EYES two times a day  aspirin  chewable 81 milliGRAM(s) Oral daily  atorvastatin 80 milliGRAM(s) Oral at bedtime  doxazosin 4 milliGRAM(s) Oral at bedtime  enoxaparin Injectable 40 milliGRAM(s) SubCutaneous every 24 hours  melatonin 6 milliGRAM(s) Oral at bedtime  midodrine. 5 milliGRAM(s) Oral <User Schedule>  pantoprazole    Tablet 40 milliGRAM(s) Oral before breakfast  petrolatum Ophthalmic Ointment 1 Application(s) Right EYE every 12 hours  phenazopyridine 200 milliGRAM(s) Oral <User Schedule>  piperacillin/tazobactam IVPB.. 3.375 Gram(s) IV Intermittent every 8 hours  polyethylene glycol 3350 17 Gram(s) Oral two times a day  senna Syrup 10 milliLiter(s) Oral at bedtime  sodium chloride 0.9%. 1000 milliLiter(s) (100 mL/Hr) IV Continuous <Continuous>  ticagrelor 90 milliGRAM(s) Oral every 12 hours    MEDICATIONS  (PRN):  acetaminophen     Tablet .. 650 milliGRAM(s) Oral every 6 hours PRN Mild Pain (1 - 3)  benzocaine/menthol Lozenge 1 Lozenge Oral two times a day PRN Sore Throat    Vital Signs Last 24 Hrs  T(F): 98.1 (23 Apr 2023 09:49), Max: 98.1 (23 Apr 2023 09:49)  HR: 94 (23 Apr 2023 09:49) (78 - 94)  BP: 117/78 (23 Apr 2023 09:49) (117/78 - 159/90)  RR: 16 (23 Apr 2023 09:49) (15 - 16)  SpO2: 94% (23 Apr 2023 09:49) (94% - 95%)  I&O's Summary      PHYSICAL EXAM:  General: NAD, A/O x 3  ENT: MMM  Neck: Supple, No JVD  Lungs: Clear to auscultation bilaterally  Cardio: RRR, S1/S2, No murmurs  Abdomen: Soft, Nontender, Nondistended; Bowel sounds present  Extremities: No calf tenderness, No pitting edema    LABS:                        10.6   12.08 )-----------( 295      ( 22 Apr 2023 07:00 )             33.5       04-22    141  |  104  |  5   ----------------------------<  174  3.3   |  29  |  0.70    Ca    8.8      22 Apr 2023 07:00  Phos  3.9     04-21  Mg     2.0     04-21    TPro  6.8  /  Alb  2.5  /  TBili  0.8  /  DBili  x   /  AST  11  /  ALT  32  /  AlkPhos  39  04-22                03-29 Chol 270 mg/dL LDL -- HDL 58 mg/dL Trig 83 mg/dL                      Culture - Blood (collected 20 Apr 2023 09:40)  Source: .Blood Blood  Preliminary Report (21 Apr 2023 14:02):    No growth to date.    Culture - Blood (collected 20 Apr 2023 09:40)  Source: .Blood Blood  Preliminary Report (21 Apr 2023 14:02):    No growth to date.    Culture - Urine (collected 20 Apr 2023 08:45)  Source: Clean Catch Clean Catch (Midstream)  Preliminary Report (22 Apr 2023 09:17):    >100,000 CFU/ml Escherichia coli      COVID-19 PCR: NotDetec (04-12-23 @ 05:00)  COVID-19 PCR: NotDetec (04-11-23 @ 09:52)  COVID-19 PCR: NotDetec (04-05-23 @ 17:15)  COVID-19 PCR: NotDetec (03-29-23 @ 10:00)      RADIOLOGY & ADDITIONAL TESTS:    Care Discussed with Consultants/Other Providers:

## 2023-04-23 NOTE — PROGRESS NOTE ADULT - SUBJECTIVE AND OBJECTIVE BOX
CC: f/u for leukocytosis, fever, dysuria    Patient reports feeling better today, no fevers    REVIEW OF SYSTEMS: no fevers, no chills, no nausea, no vomiting, no abd pain, no resp symptoms  All other review of systems negative (Comprehensive ROS)    Antimicrobials Day #    piperacillin/tazobactam IVPB.. 3.375 Gram(s) IV Intermittent every 8 hours      Other Medications Reviewed  Vital Signs Last 24 Hrs  T(C): 36.7 (23 Apr 2023 09:49), Max: 36.7 (23 Apr 2023 09:49)  T(F): 98.1 (23 Apr 2023 09:49), Max: 98.1 (23 Apr 2023 09:49)  HR: 94 (23 Apr 2023 09:49) (78 - 94)  BP: 117/78 (23 Apr 2023 09:49) (117/78 - 159/90)  BP(mean): --  RR: 16 (23 Apr 2023 09:49) (15 - 16)  SpO2: 94% (23 Apr 2023 09:49) (94% - 95%)    Parameters below as of 23 Apr 2023 09:49  Patient On (Oxygen Delivery Method): room air      PHYSICAL EXAM:  General: alert, no acute distress  Eyes:  anicteric, no conjunctival injection, no discharge  Oropharynx: no lesions or injection 	  Neck: supple, without adenopathy  Lungs: clear to auscultation  Heart: regular rate and rhythm; no murmur, rubs or gallops  Abdomen: soft, nondistended, nontender, without mass or organomegaly  Skin: no lesions  Extremities: no clubbing, cyanosis, or edema  Neurologic: alert, oriented, R>L weakness    LAB RESULTS:                          10.6   12.08 )-----------( 295      ( 22 Apr 2023 07:00 )             33.5   04-22    141  |  104  |  5<L>  ----------------------------<  174<H>  3.3<L>   |  29  |  0.70    Ca    8.8      22 Apr 2023 07:00    TPro  6.8  /  Alb  2.5<L>  /  TBili  0.8  /  DBili  x   /  AST  11  /  ALT  32  /  AlkPhos  39<L>  04-22        MICROBIOLOGY REVIEWED:    Culture - Urine (04.20.23 @ 08:45)   - Amikacin: S <=16  - Amoxicillin/Clavulanic Acid: S <=8/4  - Ampicillin: R >16 These ampicillin results predict results for amoxicillin  - Ampicillin/Sulbactam: S 8/4 Enterobacter, Klebsiella aerogenes, Citrobacter, and Serratia may develop resistance during prolonged therapy (3-4 days)  - Aztreonam: R 16  - Cefazolin: R >16 For uncomplicated UTI with K. pneumoniae, E. coli, or P. mirablis: BRANDYN <=16 is sensitive and BRANDYN >=32 is resistant. This also predicts results for oral agents cefaclor, cefdinir, cefpodoxime, cefprozil, cefuroxime axetil, cephalexin and locarbef for uncomplicated UTI. Note that some isolates may be susceptible to these agents while testing resistant to cefazolin.  - Cefepime: R >16  - Ceftriaxone: R >32 Enterobacter, Klebsiella aerogenes, Citrobacter, and Serratia may develop resistance during prolonged therapy  - Cefuroxime: R >16  - Ciprofloxacin: R >2  - Ertapenem: S <=0.5  - Gentamicin: R >8  - Imipenem: S <=1  - Levofloxacin: R >4  - Meropenem: S <=1  - Nitrofurantoin: S <=32 Should not be used to treat pyelonephritis  - Piperacillin/Tazobactam: S <=8  - Tobramycin: R >8  - Trimethoprim/Sulfamethoxazole: R >2/38  Specimen Source: Clean Catch Clean Catch (Midstream)  Culture Results:   >100,000 CFU/ml Escherichia coli ESBL   10,000 - 49,000 CFU/mL Gram positive organisms  Organism Identification: Escherichia coli ESBL  Organism: Escherichia coli ESBL  Method Type: MICRADIOLOGY REVIEWED:    < from: US Kidney and Bladder (04.22.23 @ 12:57) >    IMPRESSION:  No renal mass, hydronephrosis or calculus is visualized sonographically.    Urinary bladder is partially collapsed, with a volume of 49 mL.    Enlarged prostate is partially visualized.  The hypertrophied median lobe   of the prostate indents the posterior bladder wall.  Lower urinary tract   symptoms (LUTS) should be excluded clinically.    A hypoechoic right adrenal nodule measures 1.1 x 1 x 1.1 cm    --- End of Report ---    < end of copied text >

## 2023-04-23 NOTE — PROGRESS NOTE ADULT - ASSESSMENT
58M PMH HTN, DM2 who presented as a transfer from [facility] to [facility] for stroke on 3/29, s/p TICI 2b thrombectomy of the R ICA and R MCA, with R ICA stent placement.  Postoperative CT head demonstrated new MLS with some hemorrhagic conversion now admitted for rehab- pt/ot/dvt ppx    s/p sepsis 4/20/23, now resolved- c/o dysuria, likely due to uti   BC X2  UA NOTED- SEND CULTURES  RVP negative   started on empiric zosyn pending cultures 4/20/23- will continue for now  s/p amikacin in case he has an esbl or cre organism  lactate normal  c/w iv fluids   no resp symptoms  cxr- no acute changes noted  pyridium added today  - Abx per ID    Stroke s/p thrombectomy of Right ICA and Right MCA and right ICA stent placement  Left hemiparesis  - cont asa/brillinta, statin    Dysphagia with PEG tube  - tube feeds per nutrition  - speech therapy    Hypertension   - off meds, BP lower side     DM2  - HbA1C 7.3%  - diet controlled  - home meds- ? homeopathic medication    DVT PPx  - Lovenox

## 2023-04-23 NOTE — PROGRESS NOTE ADULT - ASSESSMENT
From primary team notes:    Assessment/Plan:  CONG AGUAYO is a 58 year old male with PMH of HTN, and DM; who presented as a transfer from [facility] to [facility] for stroke on 3/29, where he was found to have a R ICA occlusion and he underwent a TICI 2b thromectomy of R ICA and R MCA with R ICA stent placement. Extubated on 4/7. His brain MRI was significant for improved midline shift and small frontal parietal SAH. His hospital course was complicated by aspiration PNA (s/p Zosyn); and dysphagia (requiring PEG - placed 4/6). Patient now admitted for a multidisciplinary rehab program. 04-11-23 @ 13:54    * Await orthotics review for left sided weakness   * UTI---continue IVF and IV antibiotics   and hospitalist f/u  * Recommence therapy   * Dysuria--commenced pyridium x 3-5 days    Rehab Management/MEDICAL MANAGEMENT     #Functional deficits s/p CVA  -Continue  Comprehensive Rehab Program of PT/OT/SLP  - 3 hours a day, 5 days a week  - P&O as needed   - R ICA occlusion, s/p TICI 2b thrombectomy of R ICA and R MCA with R ICA stent placement  - ASA & Brilinta  - Outpt Neurology follow up- Dr. Reid  - Outpt IR follow up - Dr. Allen  --orthotics referral with some functional improvement  Left sided weakness, await review     #Dysphagia/aspiration PNA  - S/p Zosyn  - PEG tube last prior to Acute rehab admission  - Repeat CT chest in 1 month (~5/8)    * UTI 4/20 --continue IVF and IV antibiotics   resp viral panel -ve  Dysuria--commenced pyridium x 3-5 days    #HTN  - Lisinopril--D/CARSON 4/13  - Doxazosin  - Outpt Cardiology follow up - Dr. Song    # Orthostatic hypotension--midodrine 2.5mg bid, increased to 5mg bid 4/17, bp stable    #HLD  -Atorvastatin    #DM  - A1c: 7.3  - ISS  - FS ACHS    #Dry eye  - Occular lubricant ointment  - Artificial tears    #Sleep  - Melatonin 6mg nightly PRN, switched to standing 4/18, sleep improved    #Skin  - Skin-: intact, PEG in situ   - Pressure injury/Skin: OOB to Chair, PT/OT     #Pain Mgmt   - Tylenol PRN    #GI/Bowel Mgmt   - Pantoprazole  - Continent c/w Senna, Miralax   -lactulose    #/Bladder Mgmt --Voiding appropriately    #FEN   - PEG--Last used for feeding while in acute care, not in use since acute rehab treatment   - Diet - Easy to Chew, halal  - Dysphagia  SLP - evaluation and treatment    #Precautions / PROPHYLAXIS:   - Falls  - ortho: Weight bearing status: WBAT   - Lungs: Aspiration, Incentive Spirometer   - DVT: Lovenox  ---------------------------------------------------------------  IDT conference on 4/20  TDD: 4/27 to home vs [facility]AN  Barriers: Distracted, tangentile, perseverative, requires cueing, R eye visual deficit. Poor insight, safety and memory.   Social Work: Lives in a basement apartment with spouse and 2 sons, 12 MEI. Patient is main source of income for family. Spouse does not work.  OT: Max A for UBD/LBD. Min-mod A for transfers.  PT: Mod A for transfers. Ambulated 60 ft with RW with min A and WCF.   SLP: Easy to chew diet. Easily distracted, tangentile, perseverative. Poor insight, safety and memory.   ---------------------------------------------------------------  FOLLOW UP/OUTPATIENT:    Bev Song)  Cardiology; Internal Medicine  [address] [facility], [address] [place] [id number]  Phone: [phone]  Fax: [phone]    Ivan Reid; PhD)  Neurology; Vascular Neurology  [address] [facility], [address]  [place] [address]  Phone: [phone]  Fax: [phone]  Follow Up Time:     Primary doctor,   Phone: (   )    -  Fax: (   )    -  Follow Up Time:     Sanchez Allen)  Neurology; Vascular Neurology  [address] [place] [address]  Phone: [phone]  Fax: [phone]  Follow Up Time:     Lily Moon (DO)  Gastroenterology  [address] [facility], [facility] [address]  [place] [address]  Phone: [phone]  Fax: [phone]  Follow Up Time:  ---------------------------------------------------------------

## 2023-04-23 NOTE — PROGRESS NOTE ADULT - SUBJECTIVE AND OBJECTIVE BOX
Seen in AM at bedside  Patient with no new medical issues today.  Pain controlled, no chest pain, no N/V, no Fevers/Chills. No other new ROS  Has been tolerating rehabilitation program.    acetaminophen     Tablet .. 650 milliGRAM(s) Oral every 6 hours PRN  AQUAPHOR (petrolatum Ointment) 1 Application(s) Topical daily  artificial  tears Solution 1 Drop(s) Both EYES two times a day  aspirin  chewable 81 milliGRAM(s) Oral daily  atorvastatin 80 milliGRAM(s) Oral at bedtime  benzocaine/menthol Lozenge 1 Lozenge Oral two times a day PRN  doxazosin 4 milliGRAM(s) Oral at bedtime  enoxaparin Injectable 40 milliGRAM(s) SubCutaneous every 24 hours  melatonin 6 milliGRAM(s) Oral at bedtime  midodrine. 5 milliGRAM(s) Oral <User Schedule>  pantoprazole    Tablet 40 milliGRAM(s) Oral before breakfast  petrolatum Ophthalmic Ointment 1 Application(s) Right EYE every 12 hours  phenazopyridine 200 milliGRAM(s) Oral <User Schedule>  piperacillin/tazobactam IVPB.. 3.375 Gram(s) IV Intermittent every 8 hours  polyethylene glycol 3350 17 Gram(s) Oral two times a day  senna Syrup 10 milliLiter(s) Oral at bedtime  sodium chloride 0.9%. 1000 milliLiter(s) IV Continuous <Continuous>  ticagrelor 90 milliGRAM(s) Oral every 12 hours    ICU Vital Signs Last 24 Hrs  T(C): 36.7 (23 Apr 2023 09:49), Max: 36.7 (23 Apr 2023 09:49)  T(F): 98.1 (23 Apr 2023 09:49), Max: 98.1 (23 Apr 2023 09:49)  HR: 94 (23 Apr 2023 09:49) (78 - 110)  BP: 117/78 (23 Apr 2023 09:49) (117/78 - 159/90)  RR: 16 (23 Apr 2023 09:49) (15 - 16)  SpO2: 94% (23 Apr 2023 09:49) (94% - 95%)    O2 Parameters below as of 23 Apr 2023 09:49  Patient On (Oxygen Delivery Method): room air                              10.6   12.08 )-----------( 295      ( 22 Apr 2023 07:00 )             33.5     04-22    141  |  104  |  5<L>  ----------------------------<  174<H>  3.3<L>   |  29  |  0.70    Ca    8.8      22 Apr 2023 07:00  Phos  3.9     04-21  Mg     2.0     04-21    TPro  6.8  /  Alb  2.5<L>  /  TBili  0.8  /  DBili  x   /  AST  11  /  ALT  32  /  AlkPhos  39<L>  04-22          In NAD  HEENT- EOMI  Heart- Well Perfused  Lungs- No resp distress, no use of accessory resp muscles  Neuro- Exam unchanged  Psych- Affect wnl          Imp: Patient with diagnosis of     Functional deficits s/p CVA     admitted for comprehensive acute rehabilitation.    Plan:  - Continue therapies  - DVT prophylaxis  - Skin- Turn q2h, check skin daily  - Continue current medications; patient medically stable.   - Patient is stable to continue current rehabilitation program.   - ID notes appreciated from 4/23/23:  "59 y/o man with dm, s/p large right mca stroke with hemorrhagic transformation 3/31  , s/p thrombectomy, peg, came here on 4/11. He can now eat. He has had low grade temp, leukocytosis, tachy and dysuria over the past 24 hours. He has been on zosyn and still feels unwell. It may be too early to consider this a zosyn failure, i do suspect a gu source of sepsis. He could have a zosyn resistant organism  Recommendations:  will continue zosyn s/p amikacin in case he has an esbl or cre organism   f/u urine cx  monitor pvr  f/u blood cx"

## 2023-04-24 LAB
ALBUMIN SERPL ELPH-MCNC: 2.6 G/DL — LOW (ref 3.3–5)
ALP SERPL-CCNC: 46 U/L — SIGNIFICANT CHANGE UP (ref 40–120)
ALT FLD-CCNC: 29 U/L — SIGNIFICANT CHANGE UP (ref 10–45)
ANION GAP SERPL CALC-SCNC: 12 MMOL/L — SIGNIFICANT CHANGE UP (ref 5–17)
AST SERPL-CCNC: 17 U/L — SIGNIFICANT CHANGE UP (ref 10–40)
BASOPHILS # BLD AUTO: 0.05 K/UL — SIGNIFICANT CHANGE UP (ref 0–0.2)
BASOPHILS NFR BLD AUTO: 1 % — SIGNIFICANT CHANGE UP (ref 0–2)
BILIRUB SERPL-MCNC: 0.7 MG/DL — SIGNIFICANT CHANGE UP (ref 0.2–1.2)
BUN SERPL-MCNC: 5 MG/DL — LOW (ref 7–23)
CALCIUM SERPL-MCNC: 9.3 MG/DL — SIGNIFICANT CHANGE UP (ref 8.4–10.5)
CHLORIDE SERPL-SCNC: 105 MMOL/L — SIGNIFICANT CHANGE UP (ref 96–108)
CO2 SERPL-SCNC: 26 MMOL/L — SIGNIFICANT CHANGE UP (ref 22–31)
CREAT SERPL-MCNC: 0.6 MG/DL — SIGNIFICANT CHANGE UP (ref 0.5–1.3)
EGFR: 111 ML/MIN/1.73M2 — SIGNIFICANT CHANGE UP
EOSINOPHIL # BLD AUTO: 0.23 K/UL — SIGNIFICANT CHANGE UP (ref 0–0.5)
EOSINOPHIL NFR BLD AUTO: 4.5 % — SIGNIFICANT CHANGE UP (ref 0–6)
GLUCOSE SERPL-MCNC: 142 MG/DL — HIGH (ref 70–99)
HCT VFR BLD CALC: 35.4 % — LOW (ref 39–50)
HGB BLD-MCNC: 11.3 G/DL — LOW (ref 13–17)
IMM GRANULOCYTES NFR BLD AUTO: 0.2 % — SIGNIFICANT CHANGE UP (ref 0–0.9)
LYMPHOCYTES # BLD AUTO: 1.64 K/UL — SIGNIFICANT CHANGE UP (ref 1–3.3)
LYMPHOCYTES # BLD AUTO: 32.4 % — SIGNIFICANT CHANGE UP (ref 13–44)
MCHC RBC-ENTMCNC: 26 PG — LOW (ref 27–34)
MCHC RBC-ENTMCNC: 31.9 GM/DL — LOW (ref 32–36)
MCV RBC AUTO: 81.4 FL — SIGNIFICANT CHANGE UP (ref 80–100)
MONOCYTES # BLD AUTO: 0.36 K/UL — SIGNIFICANT CHANGE UP (ref 0–0.9)
MONOCYTES NFR BLD AUTO: 7.1 % — SIGNIFICANT CHANGE UP (ref 2–14)
NEUTROPHILS # BLD AUTO: 2.77 K/UL — SIGNIFICANT CHANGE UP (ref 1.8–7.4)
NEUTROPHILS NFR BLD AUTO: 54.8 % — SIGNIFICANT CHANGE UP (ref 43–77)
NRBC # BLD: 0 /100 WBCS — SIGNIFICANT CHANGE UP (ref 0–0)
PLATELET # BLD AUTO: 334 K/UL — SIGNIFICANT CHANGE UP (ref 150–400)
POTASSIUM SERPL-MCNC: 3.1 MMOL/L — LOW (ref 3.5–5.3)
POTASSIUM SERPL-SCNC: 3.1 MMOL/L — LOW (ref 3.5–5.3)
PROT SERPL-MCNC: 7.2 G/DL — SIGNIFICANT CHANGE UP (ref 6–8.3)
RBC # BLD: 4.35 M/UL — SIGNIFICANT CHANGE UP (ref 4.2–5.8)
RBC # FLD: 13.8 % — SIGNIFICANT CHANGE UP (ref 10.3–14.5)
SODIUM SERPL-SCNC: 143 MMOL/L — SIGNIFICANT CHANGE UP (ref 135–145)
WBC # BLD: 5.06 K/UL — SIGNIFICANT CHANGE UP (ref 3.8–10.5)
WBC # FLD AUTO: 5.06 K/UL — SIGNIFICANT CHANGE UP (ref 3.8–10.5)

## 2023-04-24 PROCEDURE — 99233 SBSQ HOSP IP/OBS HIGH 50: CPT

## 2023-04-24 RX ORDER — MIDODRINE HYDROCHLORIDE 2.5 MG/1
5 TABLET ORAL
Refills: 0 | Status: DISCONTINUED | OUTPATIENT
Start: 2023-04-24 | End: 2023-04-26

## 2023-04-24 RX ORDER — DICLOFENAC SODIUM 30 MG/G
2 GEL TOPICAL
Refills: 0 | Status: DISCONTINUED | OUTPATIENT
Start: 2023-04-24 | End: 2023-05-05

## 2023-04-24 RX ORDER — POTASSIUM CHLORIDE 20 MEQ
40 PACKET (EA) ORAL EVERY 4 HOURS
Refills: 0 | Status: COMPLETED | OUTPATIENT
Start: 2023-04-24 | End: 2023-04-24

## 2023-04-24 RX ADMIN — POLYETHYLENE GLYCOL 3350 17 GRAM(S): 17 POWDER, FOR SOLUTION ORAL at 05:09

## 2023-04-24 RX ADMIN — PANTOPRAZOLE SODIUM 40 MILLIGRAM(S): 20 TABLET, DELAYED RELEASE ORAL at 08:00

## 2023-04-24 RX ADMIN — Medication 1 APPLICATION(S): at 13:17

## 2023-04-24 RX ADMIN — ENOXAPARIN SODIUM 40 MILLIGRAM(S): 100 INJECTION SUBCUTANEOUS at 18:52

## 2023-04-24 RX ADMIN — Medication 1 APPLICATION(S): at 05:55

## 2023-04-24 RX ADMIN — Medication 200 MILLIGRAM(S): at 05:09

## 2023-04-24 RX ADMIN — DICLOFENAC SODIUM 2 GRAM(S): 30 GEL TOPICAL at 20:57

## 2023-04-24 RX ADMIN — MIDODRINE HYDROCHLORIDE 5 MILLIGRAM(S): 2.5 TABLET ORAL at 13:21

## 2023-04-24 RX ADMIN — Medication 1 DROP(S): at 05:06

## 2023-04-24 RX ADMIN — Medication 40 MILLIEQUIVALENT(S): at 13:20

## 2023-04-24 RX ADMIN — Medication 6 MILLIGRAM(S): at 20:53

## 2023-04-24 RX ADMIN — ATORVASTATIN CALCIUM 80 MILLIGRAM(S): 80 TABLET, FILM COATED ORAL at 20:57

## 2023-04-24 RX ADMIN — Medication 4 MILLIGRAM(S): at 20:53

## 2023-04-24 RX ADMIN — ERTAPENEM SODIUM 120 MILLIGRAM(S): 1 INJECTION, POWDER, LYOPHILIZED, FOR SOLUTION INTRAMUSCULAR; INTRAVENOUS at 20:56

## 2023-04-24 RX ADMIN — Medication 40 MILLIEQUIVALENT(S): at 18:53

## 2023-04-24 RX ADMIN — Medication 81 MILLIGRAM(S): at 13:21

## 2023-04-24 RX ADMIN — TICAGRELOR 90 MILLIGRAM(S): 90 TABLET ORAL at 18:54

## 2023-04-24 RX ADMIN — TICAGRELOR 90 MILLIGRAM(S): 90 TABLET ORAL at 05:09

## 2023-04-24 NOTE — PROGRESS NOTE ADULT - ASSESSMENT
58M PMH HTN, DM2 who presented as a transfer from [facility] to [facility] for stroke on 3/29, s/p TICI 2b thrombectomy of the R ICA and R MCA, with R ICA stent placement.  Postoperative CT head demonstrated new MLS with some hemorrhagic conversion now admitted for rehab- pt/ot/dvt ppx    s/p sepsis 4/20/23, now resolved-  due to uti   treated with zosyn 4/20- 4/23 changed to ertapenem per ID 4/23- 4/26 for 3 days  urine culture notedCulture Results: >100,000 CFU/ml Escherichia coli ESBL  clinically improving  monitor    Stroke s/p thrombectomy of Right ICA and Right MCA and right ICA stent placement  - Left hemiparesis  - cont asa/brillinta, statin    Dysphagia with PEG tube  - tube feeds per nutrition  - speech therapy    Hypertension   - off meds, BP lower side     DM2  - HbA1C 7.3%  - diet controlled  - little meds- ? homeopathic medication    DVT PPx  - Lovenox    will follow  d/w dr. jenkins  time spent 51 min

## 2023-04-24 NOTE — PROGRESS NOTE ADULT - SUBJECTIVE AND OBJECTIVE BOX
58y old  Male who presents with a chief complaint of Functional deficits s/p CVA     Patient seen and examined at bedside.  appears well this am, alert sitting in bed, eating.   no pain, n/v, Denies sob,  dyspnea, abd pain, no dysuria    Vital Signs Last 24 Hrs  T(C): 36.1 (24 Apr 2023 09:13), Max: 37 (23 Apr 2023 20:13)  T(F): 97 (24 Apr 2023 09:13), Max: 98.6 (23 Apr 2023 20:13)  HR: 86 (24 Apr 2023 09:13) (78 - 86)  BP: 144/94 (24 Apr 2023 09:13) (144/94 - 154/91)  BP(mean): --  RR: 16 (24 Apr 2023 09:13) (16 - 18)  SpO2: 97% (24 Apr 2023 09:13) (97% - 97%)    Parameters below as of 24 Apr 2023 09:13  Patient On (Oxygen Delivery Method): room air      GENERAL- NAD  EAR/NOSE/MOUTH/THROAT - MMM  EYES- FIOR, conjunctiva and Sclera clear  NECK- supple  RESPIRATORY-  clear to auscultation bilaterally, non laboured breathing  CARDIOVASCULAR - SIS2, RRR  GI - soft NT BS present  EXTREMITIES- no pedal edema  NEUROLOGY- left sided weakness, facial droop  PSYCHIATRY- AAO X 3                11.3                 143  | 26   | 5            5.06  >-----------< 334     ------------------------< 142                   35.4                 3.1  | 105  | 0.60                                         Ca 9.3   Mg x     Ph x          MEDICATIONS  (STANDING):  AQUAPHOR (petrolatum Ointment) 1 Application(s) Topical daily  artificial  tears Solution 1 Drop(s) Both EYES two times a day  aspirin  chewable 81 milliGRAM(s) Oral daily  atorvastatin 80 milliGRAM(s) Oral at bedtime  doxazosin 4 milliGRAM(s) Oral at bedtime  enoxaparin Injectable 40 milliGRAM(s) SubCutaneous every 24 hours  ertapenem  IVPB 1000 milliGRAM(s) IV Intermittent every 24 hours  melatonin 6 milliGRAM(s) Oral at bedtime  midodrine. 5 milliGRAM(s) Oral <User Schedule>  pantoprazole    Tablet 40 milliGRAM(s) Oral before breakfast  petrolatum Ophthalmic Ointment 1 Application(s) Right EYE every 12 hours  polyethylene glycol 3350 17 Gram(s) Oral two times a day  potassium chloride    Tablet ER 40 milliEquivalent(s) Oral every 4 hours  senna Syrup 10 milliLiter(s) Oral at bedtime  sodium chloride 0.9%. 1000 milliLiter(s) (100 mL/Hr) IV Continuous <Continuous>  ticagrelor 90 milliGRAM(s) Oral every 12 hours    MEDICATIONS  (PRN):  acetaminophen     Tablet .. 650 milliGRAM(s) Oral every 6 hours PRN Mild Pain (1 - 3)  benzocaine/menthol Lozenge 1 Lozenge Oral two times a day PRN Sore Throat   58y old  Male who presents with a chief complaint of Functional deficits s/p CVA     Patient seen and examined at bedside.  appears well this am, alert sitting in bed, eating.   no pain, n/v, Denies sob,  dyspnea, abd pain, no dysuria    Vital Signs Last 24 Hrs  T(C): 36.1 (24 Apr 2023 09:13), Max: 37 (23 Apr 2023 20:13)  T(F): 97 (24 Apr 2023 09:13), Max: 98.6 (23 Apr 2023 20:13)  HR: 86 (24 Apr 2023 09:13) (78 - 86)  BP: 144/94 (24 Apr 2023 09:13) (144/94 - 154/91)  BP(mean): --  RR: 16 (24 Apr 2023 09:13) (16 - 18)  SpO2: 97% (24 Apr 2023 09:13) (97% - 97%)    Parameters below as of 24 Apr 2023 09:13  Patient On (Oxygen Delivery Method): room air      GENERAL- NAD  EAR/NOSE/MOUTH/THROAT - MMM  EYES- FIOR, conjunctiva and Sclera clear  NECK- supple  RESPIRATORY-  clear to auscultation bilaterally, non laboured breathing  CARDIOVASCULAR - SIS2, RRR  GI - soft NT BS present  EXTREMITIES- no pedal edema  NEUROLOGY- left sided weakness, facial droop  PSYCHIATRY- AAO X 3                11.3                 143  | 26   | 5            5.06  >-----------< 334     ------------------------< 142                   35.4                 3.1  | 105  | 0.60                                         Ca 9.3   Mg x     Ph x        Culture - Urine (04.20.23 @ 08:45)    -  Amikacin: S <=16   -  Amoxicillin/Clavulanic Acid: S <=8/4   -  Ampicillin: R >16 These ampicillin results predict results for amoxicillin   -  Ampicillin/Sulbactam: S 8/4 Enterobacter, Klebsiella aerogenes, Citrobacter, and Serratia may develop resistance during prolonged therapy (3-4 days)   -  Aztreonam: R 16   -  Cefazolin: R >16 For uncomplicated UTI with K. pneumoniae, E. coli, or P. mirablis: BRANDYN <=16 is sensitive and BRANDYN >=32 is resistant. This also predicts results for oral agents cefaclor, cefdinir, cefpodoxime, cefprozil, cefuroxime axetil, cephalexin and locarbef for uncomplicated UTI. Note that some isolates may be susceptible to these agents while testing resistant to cefazolin.   -  Cefepime: R >16   -  Ceftriaxone: R >32 Enterobacter, Klebsiella aerogenes, Citrobacter, and Serratia may develop resistance during prolonged therapy   -  Cefuroxime: R >16   -  Ciprofloxacin: R >2   -  Ertapenem: S <=0.5   -  Gentamicin: R >8   -  Imipenem: S <=1   -  Levofloxacin: R >4   -  Meropenem: S <=1   -  Nitrofurantoin: S <=32 Should not be used to treat pyelonephritis   -  Piperacillin/Tazobactam: S <=8   -  Tobramycin: R >8   -  Trimethoprim/Sulfamethoxazole: R >2/38   Specimen Source: Clean Catch Clean Catch (Midstream)   Culture Results:   >100,000 CFU/ml Escherichia coli ESBL  10,000 - 49,000 CFU/mL Gram positive organisms   Organism Identification: Escherichia coli ESBL   Organism: Escherichia coli ESBL   Method Type: BRANDYN      MEDICATIONS  (STANDING):  AQUAPHOR (petrolatum Ointment) 1 Application(s) Topical daily  artificial  tears Solution 1 Drop(s) Both EYES two times a day  aspirin  chewable 81 milliGRAM(s) Oral daily  atorvastatin 80 milliGRAM(s) Oral at bedtime  doxazosin 4 milliGRAM(s) Oral at bedtime  enoxaparin Injectable 40 milliGRAM(s) SubCutaneous every 24 hours  ertapenem  IVPB 1000 milliGRAM(s) IV Intermittent every 24 hours  melatonin 6 milliGRAM(s) Oral at bedtime  midodrine. 5 milliGRAM(s) Oral <User Schedule>  pantoprazole    Tablet 40 milliGRAM(s) Oral before breakfast  petrolatum Ophthalmic Ointment 1 Application(s) Right EYE every 12 hours  polyethylene glycol 3350 17 Gram(s) Oral two times a day  potassium chloride    Tablet ER 40 milliEquivalent(s) Oral every 4 hours  senna Syrup 10 milliLiter(s) Oral at bedtime  sodium chloride 0.9%. 1000 milliLiter(s) (100 mL/Hr) IV Continuous <Continuous>  ticagrelor 90 milliGRAM(s) Oral every 12 hours    MEDICATIONS  (PRN):  acetaminophen     Tablet .. 650 milliGRAM(s) Oral every 6 hours PRN Mild Pain (1 - 3)  benzocaine/menthol Lozenge 1 Lozenge Oral two times a day PRN Sore Throat

## 2023-04-24 NOTE — PROGRESS NOTE ADULT - SUBJECTIVE AND OBJECTIVE BOX
Subjective  Seen and examined, no acute distress  Reports last BM 3 days prior  Slept well  Tolerating oral feeds, eat all her meal this breakfast  Tolerating IV antibiotics for UTI treatment,   ID switched to entrapenem in line with sensitivity result    Therapy--Engaging in therapy     ROS--No head ache or abd pain, no N/V   LBM 4/21    CT showed adrenal nodule    ICU Vital Signs Last 24 Hrs  T(C): 36.1 (24 Apr 2023 09:13), Max: 37 (23 Apr 2023 20:13)  T(F): 97 (24 Apr 2023 09:13), Max: 98.6 (23 Apr 2023 20:13)  HR: 86 (24 Apr 2023 09:13) (78 - 86)  BP: 144/94 (24 Apr 2023 09:13) (144/94 - 154/91)  RR: 16 (24 Apr 2023 09:13) (16 - 18)  SpO2: 97% (24 Apr 2023 09:13) (97% - 97%)  O2 Parameters below as of 24 Apr 2023 09:13  Patient On (Oxygen Delivery Method): room air      EXAM  Gen - Comfortable, no acute distress  HEENT - , right  eye vision loss   Neck - Supple, No limited ROM  Pulm -clear   Cardiovascular - RRR, S1S2  Chest - good chest expansion, good respiratory effort  Abdomen - Soft, non tender  +BS, + PEG   Extremities - No Cyanosis, no clubbing, no edema, no calf tenderness    Neuro-  Alert and oriented, but fatigued  Speech  dysarthric, improving  Cranial Nerves - Left facial weakness, left eye vision loss, absent left shoulder shrug   Right  eye vision loss Rt eye  Tongue deviation to left, Flat left nasolabial fold     Motor -  Left hemiparesis                    LEFT    UE - ShAB 1+/5, improvement with hand                     RIGHT UE - ShAB 5/5, EF 5/5, EE 5/5,   5/5                    LEFT    LE - 2/5                    RIGHT LE - HF 5/5, KE 5/5, DF 5/5, PF 5/5        Sensory - Intact to LT     Reflexes - DTR Intact     Coordination - FTN/HTS  impaired to left side     Tone - normal  Psychiatric - Mood stable, Affect WNL  Skin:  all skin intact, PEG in situ, not in use    RECENT LABS/IMAGING                        11.3   5.06  )-----------( 334      ( 24 Apr 2023 06:12 )             35.4     04-24    143  |  105  |  5<L>  ----------------------------<  142<H>  3.1<L>   |  26  |  0.60    Ca    9.3      24 Apr 2023 06:12    TPro  7.2  /  Alb  2.6<L>  /  TBili  0.7  /  DBili  x   /  AST  17  /  ALT  29  /  AlkPhos  46  04-24      MEDICATIONS  (STANDING):  AQUAPHOR (petrolatum Ointment) 1 Application(s) Topical daily  artificial  tears Solution 1 Drop(s) Both EYES two times a day  aspirin  chewable 81 milliGRAM(s) Oral daily  atorvastatin 80 milliGRAM(s) Oral at bedtime  doxazosin 4 milliGRAM(s) Oral at bedtime  enoxaparin Injectable 40 milliGRAM(s) SubCutaneous every 24 hours  ertapenem  IVPB 1000 milliGRAM(s) IV Intermittent every 24 hours  melatonin 6 milliGRAM(s) Oral at bedtime  midodrine. 5 milliGRAM(s) Oral <User Schedule>  pantoprazole    Tablet 40 milliGRAM(s) Oral before breakfast  petrolatum Ophthalmic Ointment 1 Application(s) Right EYE every 12 hours  polyethylene glycol 3350 17 Gram(s) Oral two times a day  senna Syrup 10 milliLiter(s) Oral at bedtime  sodium chloride 0.9%. 1000 milliLiter(s) (100 mL/Hr) IV Continuous <Continuous>  ticagrelor 90 milliGRAM(s) Oral every 12 hours    MEDICATIONS  (PRN):  acetaminophen     Tablet .. 650 milliGRAM(s) Oral every 6 hours PRN Mild Pain (1 - 3)  benzocaine/menthol Lozenge 1 Lozenge Oral two times a day PRN Sore Throat           Subjective  Seen and examined, no acute distress  Reports last BM 3 days prior  Slept well  Tolerating oral feeds, eat all her meal this breakfast  Tolerating IV antibiotics for UTI treatment,   ID switched to entrapenem in line with sensitivity result    Therapy--Engaging in therapy     ROS--No head ache or abd pain, no N/V   LBM 4/21    CT showed adrenal nodule, will get out patient urology f/u    ICU Vital Signs Last 24 Hrs  T(C): 36.1 (24 Apr 2023 09:13), Max: 37 (23 Apr 2023 20:13)  T(F): 97 (24 Apr 2023 09:13), Max: 98.6 (23 Apr 2023 20:13)  HR: 86 (24 Apr 2023 09:13) (78 - 86)  BP: 144/94 (24 Apr 2023 09:13) (144/94 - 154/91)  RR: 16 (24 Apr 2023 09:13) (16 - 18)  SpO2: 97% (24 Apr 2023 09:13) (97% - 97%)  O2 Parameters below as of 24 Apr 2023 09:13  Patient On (Oxygen Delivery Method): room air      EXAM  Gen - Comfortable, no acute distress  HEENT - , right  eye vision loss   Neck - Supple, No limited ROM  Pulm -clear   Cardiovascular - RRR, S1S2  Chest - good chest expansion, good respiratory effort  Abdomen - Soft, non tender  +BS, + PEG   Extremities - No Cyanosis, no clubbing, no edema, no calf tenderness    Neuro-  Alert and oriented, but fatigued  Speech  dysarthric, improving  Cranial Nerves - Left facial weakness, left eye vision loss, absent left shoulder shrug   Right  eye vision loss Rt eye  Tongue deviation to left, Flat left nasolabial fold     Motor -  Left hemiparesis                    LEFT    UE - ShAB 1+/5, improvement with hand                     RIGHT UE - ShAB 5/5, EF 5/5, EE 5/5,   5/5                    LEFT    LE - 2/5                    RIGHT LE - HF 5/5, KE 5/5, DF 5/5, PF 5/5        Sensory - Intact to LT     Reflexes - DTR Intact     Coordination - FTN/HTS  impaired to left side     Tone - normal  Psychiatric - Mood stable, Affect WNL  Skin:  all skin intact, PEG in situ, not in use    RECENT LABS/IMAGING                        11.3   5.06  )-----------( 334      ( 24 Apr 2023 06:12 )             35.4     04-24    143  |  105  |  5<L>  ----------------------------<  142<H>  3.1<L>   |  26  |  0.60    Ca    9.3      24 Apr 2023 06:12    TPro  7.2  /  Alb  2.6<L>  /  TBili  0.7  /  DBili  x   /  AST  17  /  ALT  29  /  AlkPhos  46  04-24      MEDICATIONS  (STANDING):  AQUAPHOR (petrolatum Ointment) 1 Application(s) Topical daily  artificial  tears Solution 1 Drop(s) Both EYES two times a day  aspirin  chewable 81 milliGRAM(s) Oral daily  atorvastatin 80 milliGRAM(s) Oral at bedtime  doxazosin 4 milliGRAM(s) Oral at bedtime  enoxaparin Injectable 40 milliGRAM(s) SubCutaneous every 24 hours  ertapenem  IVPB 1000 milliGRAM(s) IV Intermittent every 24 hours  melatonin 6 milliGRAM(s) Oral at bedtime  midodrine. 5 milliGRAM(s) Oral <User Schedule>  pantoprazole    Tablet 40 milliGRAM(s) Oral before breakfast  petrolatum Ophthalmic Ointment 1 Application(s) Right EYE every 12 hours  polyethylene glycol 3350 17 Gram(s) Oral two times a day  senna Syrup 10 milliLiter(s) Oral at bedtime  sodium chloride 0.9%. 1000 milliLiter(s) (100 mL/Hr) IV Continuous <Continuous>  ticagrelor 90 milliGRAM(s) Oral every 12 hours    MEDICATIONS  (PRN):  acetaminophen     Tablet .. 650 milliGRAM(s) Oral every 6 hours PRN Mild Pain (1 - 3)  benzocaine/menthol Lozenge 1 Lozenge Oral two times a day PRN Sore Throat    < from: US Kidney and Bladder (04.22.23 @ 12:57) >    PROCEDURE DATE:  04/22/2023      INTERPRETATION:  CLINICAL INFORMATION: Urinary tract infection.  Evaluate   for urinary retention or renal stones.    COMPARISON: CT chest, abdomen pelvis from 04/08/2023.  Correlate    TECHNIQUE: Sonography of the kidneys and bladder.    FINDINGS:  Right kidney: 11.2 cm. No renal mass, hydronephrosis or calculi.    Left kidney: 12.0 cm. No renal mass, hydronephrosis or calculi.    Urinary bladder: Partially collapsed, with volume of 49 mL.  Ureteral   jets are visualized bilaterally.    Reproductive organs: Enlarged prostate is partially visualized.  Prostate   volume was not measured.  The hypertrophied median lobe of the prostate   indents the posterior bladder wall.    Additional findings:  A hypoechoic right adrenal nodule measures 1.1 x 1 x 1.1 cm    IMPRESSION:  No renal mass, hydronephrosis or calculus is visualized sonographically.    Urinary bladder is partially collapsed, with a volume of 49 mL.    Enlarged prostate is partially visualized.  The hypertrophied median lobe   of the prostate indents the posterior bladder wall.  Lower urinary tract   symptoms (LUTS) should be excluded clinically.    A hypoechoic right adrenal nodule measures 1.1 x 1 x 1.1 cm    < end of copied text >

## 2023-04-24 NOTE — PROGRESS NOTE ADULT - SUBJECTIVE AND OBJECTIVE BOX
CC: f/u for leukocytosis, fever, dysuria    Patient reports feeling better today, no fevers    REVIEW OF SYSTEMS: no fevers, no chills, no nausea, no vomiting, no abd pain, no resp symptoms  All other review of systems negative (Comprehensive ROS)    Antimicrobials Day #    ertapenem  IVPB 1000 milliGRAM(s) IV Intermittent every 24 hours        Vital Signs Last 24 Hrs  T(C): 36.1 (24 Apr 2023 09:13), Max: 37 (23 Apr 2023 20:13)  T(F): 97 (24 Apr 2023 09:13), Max: 98.6 (23 Apr 2023 20:13)  HR: 86 (24 Apr 2023 09:13) (78 - 86)  BP: 144/94 (24 Apr 2023 09:13) (144/94 - 154/91)  BP(mean): --  RR: 16 (24 Apr 2023 09:13) (16 - 18)  SpO2: 97% (24 Apr 2023 09:13) (97% - 97%)    Parameters below as of 24 Apr 2023 09:13  Patient On (Oxygen Delivery Method): room air          PHYSICAL EXAM:  General: alert, no acute distress  Eyes:  anicteric, no conjunctival injection, no discharge  Oropharynx: no lesions or injection 	  Neck: supple, without adenopathy  Lungs: clear to auscultation  Heart: regular rate and rhythm; no murmur, rubs or gallops  Abdomen: soft, nondistended, nontender, without mass or organomegaly  Skin: no lesions  Extremities: no clubbing, cyanosis, or edema  Neurologic: alert, oriented, R>L weakness    LAB RESULTS:                          11.3   5.06  )-----------( 334      ( 24 Apr 2023 06:12 )             35.4   04-24    143  |  105  |  5<L>  ----------------------------<  142<H>  3.1<L>   |  26  |  0.60    Ca    9.3      24 Apr 2023 06:12    TPro  7.2  /  Alb  2.6<L>  /  TBili  0.7  /  DBili  x   /  AST  17  /  ALT  29  /  AlkPhos  46  04-24          MICROBIOLOGY REVIEWED:    Culture - Urine (04.20.23 @ 08:45)   - Amikacin: S <=16  - Amoxicillin/Clavulanic Acid: S <=8/4  - Ampicillin: R >16 These ampicillin results predict results for amoxicillin  - Ampicillin/Sulbactam: S 8/4 Enterobacter, Klebsiella aerogenes, Citrobacter, and Serratia may develop resistance during prolonged therapy (3-4 days)  - Aztreonam: R 16  - Cefazolin: R >16 For uncomplicated UTI with K. pneumoniae, E. coli, or P. mirablis: BRANDYN <=16 is sensitive and BRANDYN >=32 is resistant. This also predicts results for oral agents cefaclor, cefdinir, cefpodoxime, cefprozil, cefuroxime axetil, cephalexin and locarbef for uncomplicated UTI. Note that some isolates may be susceptible to these agents while testing resistant to cefazolin.  - Cefepime: R >16  - Ceftriaxone: R >32 Enterobacter, Klebsiella aerogenes, Citrobacter, and Serratia may develop resistance during prolonged therapy  - Cefuroxime: R >16  - Ciprofloxacin: R >2  - Ertapenem: S <=0.5  - Gentamicin: R >8  - Imipenem: S <=1  - Levofloxacin: R >4  - Meropenem: S <=1  - Nitrofurantoin: S <=32 Should not be used to treat pyelonephritis  - Piperacillin/Tazobactam: S <=8  - Tobramycin: R >8  - Trimethoprim/Sulfamethoxazole: R >2/38  Specimen Source: Clean Catch Clean Catch (Midstream)  Culture Results:   >100,000 CFU/ml Escherichia coli ESBL   10,000 - 49,000 CFU/mL Gram positive organisms  Organism Identification: Escherichia coli ESBL  Organism: Escherichia coli ESBL  Method Type: MICRADIOLOGY REVIEWED:    < from: US Kidney and Bladder (04.22.23 @ 12:57) >    IMPRESSION:  No renal mass, hydronephrosis or calculus is visualized sonographically.    Urinary bladder is partially collapsed, with a volume of 49 mL.    Enlarged prostate is partially visualized.  The hypertrophied median lobe   of the prostate indents the posterior bladder wall.  Lower urinary tract   symptoms (LUTS) should be excluded clinically.    A hypoechoic right adrenal nodule measures 1.1 x 1 x 1.1 cm    --- End of Report ---    < end of copied text >

## 2023-04-24 NOTE — PROGRESS NOTE ADULT - ASSESSMENT
Assessment/Plan:  CONG AGUAYO is a 58 year old male with PMH of HTN, and DM; who presented as a transfer from [facility] to [facility] for stroke on 3/29, where he was found to have a R ICA occlusion and he underwent a TICI 2b thromectomy of R ICA and R MCA with R ICA stent placement. Extubated on 4/7. His brain MRI was significant for improved midline shift and small frontal parietal SAH. His hospital course was complicated by aspiration PNA (s/p Zosyn); and dysphagia (requiring PEG - placed 4/6). Patient now admitted for a multidisciplinary rehab program. 04-11-23 @ 13:54    * Await orthotics review for left sided weakness   * UTI---continue IV Abx as revised and ID f/u  * Small adrenal nodule--Urology referral for f/u post DC     Rehab Management/MEDICAL MANAGEMENT     #Functional deficits s/p CVA  -Continue  Comprehensive Rehab Program of PT/OT/SLP  - 3 hours a day, 5 days a week  - P&O as needed   - R ICA occlusion, s/p TICI 2b thrombectomy of R ICA and R MCA with R ICA stent placement  - ASA & Brilinta  - Outpt Neurology follow up- Dr. Reid  - Outpt IR follow up - Dr. Allen  --orthotics referral with some functional improvement  Left sided weakness, await review     #Dysphagia/aspiration PNA  - S/p Zosyn  - PEG tube last prior to Acute rehab admission  - Repeat CT chest in 1 month (~5/8)    * UTI (ESBL) 4/20 --on zosyn, switched to Entrapenem 4/23 continue ID f/u  resp viral panel -ve    * Incidental Small adrenal nodule--Urology referral for f/u post DC     #HTN  - Lisinopril--D/CARSON 4/13  - Doxazosin  - Outpt Cardiology follow up - Dr. Song    # Orthostatic hypotension--midodrine 2.5mg bid, increased to 5mg bid 4/17, bp stable    #HLD  -Atorvastatin    #DM  - A1c: 7.3  - ISS  - FS ACHS    #Dry eye  - Occular lubricant ointment  - Artificial tears    #Sleep  - Melatonin 6mg nightly PRN, switched to standing 4/18, sleep improved    #Skin  - Skin-: intact, PEG in situ   - Pressure injury/Skin: OOB to Chair, PT/OT     #Pain Mgmt   - Tylenol PRN    #GI/Bowel Mgmt   - Pantoprazole  - Continent c/w Senna, Miralax   -lactulose    #/Bladder Mgmt --Voiding appropriately    #FEN   - PEG--Last used for feeding while in acute care, not in use since acute rehab treatment   - Diet - Easy to Chew, halal  - Dysphagia  SLP - evaluation and treatment    #Precautions / PROPHYLAXIS:   - Falls  - ortho: Weight bearing status: WBAT   - Lungs: Aspiration, Incentive Spirometer   - DVT: Lovenox  ---------------------------------------------------------------  IDT conference on 4/20  TDD: 4/27 to home vs AMAN  Barriers: Distracted, tangentile, perseverative, requires cueing, R eye visual deficit. Poor insight, safety and memory.   Social Work: Lives in a basement apartment with spouse and 2 sons, 12 MEI. Patient is main source of income for family. Spouse does not work.  OT: Max A for UBD/LBD. Min-mod A for transfers.  PT: Mod A for transfers. Ambulated 60 ft with RW with min A and WCF.   SLP: Easy to chew diet. Easily distracted, tangentile, perseverative. Poor insight, safety and memory.   ---------------------------------------------------------------  FOLLOW UP/OUTPATIENT:    Bev Song)  Cardiology; Internal Medicine  [address], [address]  [place], [place] [id number]  Phone: [phone]  Fax: [phone]    Ivan Reid; PhD)  Neurology; Vascular Neurology  [address]  [place] [address]  Phone: [phone]  Fax: [phone]  Follow Up Time:     Primary doctor,   Phone: (   )    -  Fax: (   )    -  Follow Up Time:     Sanchez Allen)  Neurology; Vascular Neurology  [address] [place] [address]  Phone: [phone]  Fax: [phone]  Follow Up Time:     Lily Moon (DO)  Gastroenterology  [address], [address]  [place] [address]  Phone: [phone]  Fax: [phone]  Follow Up Time:    Urology f/u for   Small adrenal nodule--Urology referral for f/u post DC   ---------------------------------------------------------------       Assessment/Plan:  CONG AGUAYO is a 58 year old male with PMH of HTN, and DM; who presented as a transfer from [facility] to [facility] for stroke on 3/29, where he was found to have a R ICA occlusion and he underwent a TICI 2b thromectomy of R ICA and R MCA with R ICA stent placement. Extubated on 4/7. His brain MRI was significant for improved midline shift and small frontal parietal SAH. His hospital course was complicated by aspiration PNA (s/p Zosyn); and dysphagia (requiring PEG - placed 4/6). Patient now admitted for a multidisciplinary rehab program. 04-11-23 @ 13:54    * Await orthotics review for left sided weakness   * ID review and recs appreciated  * UTI---continue IV Abx as revised and ID f/u  * Small adrenal nodule--Urology referral for f/u post DC     Rehab Management/MEDICAL MANAGEMENT     #Functional deficits s/p CVA  -Continue  Comprehensive Rehab Program of PT/OT/SLP  - 3 hours a day, 5 days a week  - P&O as needed   - R ICA occlusion, s/p TICI 2b thrombectomy of R ICA and R MCA with R ICA stent placement  - ASA & Brilinta  - Outpt Neurology follow up- Dr. Reid  - Outpt IR follow up - Dr. Allen  --orthotics referral with some functional improvement  Left sided weakness, await review     #Dysphagia/aspiration PNA  - S/p Zosyn  - PEG tube last prior to Acute rehab admission  - Repeat CT chest in 1 month (~5/8)    * UTI (ESBL) 4/20 --on zosyn, switched to Entrapenem 4/23 continue ID f/u  resp viral panel -ve    * Incidental Small adrenal nodule--Urology referral for f/u post DC     #HTN  - Lisinopril--D/CARSON 4/13  - Doxazosin  - Outpt Cardiology follow up - Dr. Song    # Orthostatic hypotension--midodrine 2.5mg bid, increased to 5mg bid 4/17, bp stable    #HLD  -Atorvastatin    #DM  - A1c: 7.3  - ISS  - FS ACHS    #Dry eye  - Occular lubricant ointment  - Artificial tears    #Sleep  - Melatonin 6mg nightly PRN, switched to standing 4/18, sleep improved    #Skin  - Skin-: intact, PEG in situ   - Pressure injury/Skin: OOB to Chair, PT/OT     #Pain Mgmt   - Tylenol PRN    #GI/Bowel Mgmt   - Pantoprazole  - Continent c/w Senna, Miralax   -lactulose    #/Bladder Mgmt --Voiding appropriately    #FEN   - PEG--Last used for feeding while in acute care, not in use since acute rehab treatment   - Diet - Easy to Chew, halal  - Dysphagia  SLP - evaluation and treatment    #Precautions / PROPHYLAXIS:   - Falls  - ortho: Weight bearing status: WBAT   - Lungs: Aspiration, Incentive Spirometer   - DVT: Lovenox  ---------------------------------------------------------------  IDT conference on 4/20  TDD: 4/27 to home vs AMAN  Barriers: Distracted, tangentile, perseverative, requires cueing, R eye visual deficit. Poor insight, safety and memory.   Social Work: Lives in a basement apartment with spouse and 2 sons, 12 MEI. Patient is main source of income for family. Spouse does not work.  OT: Max A for UBD/LBD. Min-mod A for transfers.  PT: Mod A for transfers. Ambulated 60 ft with RW with min A and WCF.   SLP: Easy to chew diet. Easily distracted, tangentile, perseverative. Poor insight, safety and memory.   ---------------------------------------------------------------  FOLLOW UP/OUTPATIENT:    Bev Song)  Cardiology; Internal Medicine  [address] [facility], [address] [place] [id number]  Phone: [phone]  Fax: [phone]    Ivan Reid; PhD)  Neurology; Vascular Neurology  [address] [facility] [facility], [address]  [place] [address]  Phone: [phone]  Fax: [phone]  Follow Up Time:     Primary doctor,   Phone: (   )    -  Fax: (   )    -  Follow Up Time:     Sanchez Allen)  Neurology; Vascular Neurology  [address] [place] [address]  Phone: [phone]  Fax: [phone]  Follow Up Time:     Lily Moon (DO)  Gastroenterology  [address], [address]  [place] [address]  Phone: [phone]  Fax: [phone]  Follow Up Time:    Urology f/u for   Small adrenal nodule--Urology referral for f/u post DC   ---------------------------------------------------------------    Non critical care  Time base billing   >30 mins spent, chart/patient review, review and discussion of lab results/imaging, liaison with other providers  Discharge planning and liaison with family    Assessment/Plan:  CONG AGUAYO is a 58 year old male with PMH of HTN, and DM; who presented as a transfer from [facility] to [facility] for stroke on 3/29, where he was found to have a R ICA occlusion and he underwent a TICI 2b thromectomy of R ICA and R MCA with R ICA stent placement. Extubated on 4/7. His brain MRI was significant for improved midline shift and small frontal parietal SAH. His hospital course was complicated by aspiration PNA (s/p Zosyn); and dysphagia (requiring PEG - placed 4/6). Patient now admitted for a multidisciplinary rehab program. 04-11-23 @ 13:54    * Await orthotics review for left sided weakness   * ID review and recs appreciated  * UTI---continue IV Abx as revised and ID f/u  * Small adrenal nodule--Urology referral for f/u post DC     Rehab Management/MEDICAL MANAGEMENT     #Functional deficits s/p CVA  -Continue  Comprehensive Rehab Program of PT/OT/SLP  - 3 hours a day, 5 days a week  - P&O as needed   - R ICA occlusion, s/p TICI 2b thrombectomy of R ICA and R MCA with R ICA stent placement  - ASA & Brilinta  - Outpt Neurology follow up- Dr. Reid  - Outpt IR follow up - Dr. Allen  --orthotics referral with some functional improvement  Left sided weakness, await review     #Dysphagia/aspiration PNA  - S/p Zosyn  - PEG tube last prior to Acute rehab admission  - Repeat CT chest in 1 month (~5/8)    * UTI (ESBL) 4/20 --on zosyn, switched to Entrapenem 4/23 continue ID f/u  resp viral panel -ve    * Incidental Small adrenal nodule--Urology referral for f/u post DC     #HTN  - Lisinopril--D/CARSON 4/13  - Doxazosin  - Outpt Cardiology follow up - Dr. Song    # Orthostatic hypotension--midodrine 2.5mg bid, increased to 5mg bid 4/17, bp stable    #HLD  -Atorvastatin    #DM  - A1c: 7.3  - ISS  - FS ACHS    #Dry eye  - Occular lubricant ointment  - Artificial tears    #Sleep  - Melatonin 6mg nightly PRN, switched to standing 4/18, sleep improved    #Skin  - Skin-: intact, PEG in situ   - Pressure injury/Skin: OOB to Chair, PT/OT     #Pain Mgmt   - Tylenol PRN    #GI/Bowel Mgmt   - Pantoprazole  - Continent c/w Senna, Miralax   -lactulose    #/Bladder Mgmt --Voiding appropriately    #FEN   - PEG--Last used for feeding while in acute care, not in use since acute rehab treatment   - Diet - Easy to Chew, halal  - Dysphagia  SLP - evaluation and treatment    #Precautions / PROPHYLAXIS:   - Falls  - ortho: Weight bearing status: WBAT   - Lungs: Aspiration, Incentive Spirometer   - DVT: Lovenox  ---------------------------------------------------------------  4/24--Discussed with wife present at bed side after initial review. I explained functional and clinical update and AMAN placement for further therapy. She agreed tto same    IDT conference on 4/20  TDD: 4/27 to home vs AMAN  Barriers: Distracted, tangentile, perseverative, requires cueing, R eye visual deficit. Poor insight, safety and memory.   Social Work: Lives in a basement apartment with spouse and 2 sons, 12 MEI. Patient is main source of income for family. Spouse does not work.  OT: Max A for UBD/LBD. Min-mod A for transfers.  PT: Mod A for transfers. Ambulated 60 ft with RW with min A and WCF.   SLP: Easy to chew diet. Easily distracted, tangentile, perseverative. Poor insight, safety and memory.   ---------------------------------------------------------------  FOLLOW UP/OUTPATIENT:    Bev Song)  Cardiology; Internal Medicine  [address] [facility], [address]  [place], [place] [id number]  Phone: [phone]  Fax: [phone]    Ivan Reid (MD; PhD)  Neurology; Vascular Neurology  [address] [facility]  [place] [address]  Phone: [phone]  Fax: [phone]  Follow Up Time:     Primary doctor,   Phone: (   )    -  Fax: (   )    -  Follow Up Time:     Sanchez Allen)  Neurology; Vascular Neurology  [address] [place] [address]  Phone: [phone]  Fax: [phone]  Follow Up Time:     Lily Moon (DO)  Gastroenterology  [address] [facility], [address]  [place] [address]  Phone: [phone]  Fax: [phone]  Follow Up Time:    Urology f/u for   Small adrenal nodule--Urology referral for f/u post DC   ---------------------------------------------------------------    Non critical care  Time base billing   >30 mins spent, chart/patient review, review and discussion of lab results/imaging, liaison with other providers  Discharge planning and liaison with family

## 2023-04-24 NOTE — PROGRESS NOTE ADULT - ASSESSMENT
57 y/o man with dm, s/p large right mca stroke with hemorrhagic transformation 3/31  , s/p thrombectomy, peg, came here on 4/11. He can now eat. He has had low grade temp, leukocytosis, tachy and dysuria over the past 24 hours.   He has been on zosyn and still feels unwell.   Ucx with ESBL  Recommendations:  cont Ertapenem  monitor pvr  f/u blood cx  May need urology to eval for prostatitis/BPH  possibly extend abx course with Fosfomycin if blood cult finalized as negative

## 2023-04-25 ENCOUNTER — TRANSCRIPTION ENCOUNTER (OUTPATIENT)
Age: 58
End: 2023-04-25

## 2023-04-25 LAB
-  AMPICILLIN: SIGNIFICANT CHANGE UP
-  CIPROFLOXACIN: SIGNIFICANT CHANGE UP
-  LEVOFLOXACIN: SIGNIFICANT CHANGE UP
-  NITROFURANTOIN: SIGNIFICANT CHANGE UP
-  TETRACYCLINE: SIGNIFICANT CHANGE UP
-  VANCOMYCIN: SIGNIFICANT CHANGE UP
CULTURE RESULTS: SIGNIFICANT CHANGE UP
METHOD TYPE: SIGNIFICANT CHANGE UP
ORGANISM # SPEC MICROSCOPIC CNT: SIGNIFICANT CHANGE UP
SPECIMEN SOURCE: SIGNIFICANT CHANGE UP

## 2023-04-25 PROCEDURE — 99232 SBSQ HOSP IP/OBS MODERATE 35: CPT

## 2023-04-25 RX ORDER — GABAPENTIN 400 MG/1
1 CAPSULE ORAL
Qty: 0 | Refills: 0 | DISCHARGE
Start: 2023-04-25

## 2023-04-25 RX ORDER — LANOLIN ALCOHOL/MO/W.PET/CERES
2 CREAM (GRAM) TOPICAL
Qty: 0 | Refills: 0 | DISCHARGE
Start: 2023-04-25

## 2023-04-25 RX ORDER — DOXAZOSIN MESYLATE 4 MG
1 TABLET ORAL
Qty: 0 | Refills: 0 | DISCHARGE
Start: 2023-04-25

## 2023-04-25 RX ORDER — ATORVASTATIN CALCIUM 80 MG/1
1 TABLET, FILM COATED ORAL
Qty: 0 | Refills: 0 | DISCHARGE
Start: 2023-04-25

## 2023-04-25 RX ORDER — POLYETHYLENE GLYCOL 3350 17 G/17G
17 POWDER, FOR SOLUTION ORAL
Qty: 0 | Refills: 0 | DISCHARGE
Start: 2023-04-25

## 2023-04-25 RX ORDER — ACETAMINOPHEN 500 MG
2 TABLET ORAL
Qty: 0 | Refills: 0 | DISCHARGE
Start: 2023-04-25

## 2023-04-25 RX ORDER — GABAPENTIN 400 MG/1
100 CAPSULE ORAL AT BEDTIME
Refills: 0 | Status: DISCONTINUED | OUTPATIENT
Start: 2023-04-25 | End: 2023-04-27

## 2023-04-25 RX ORDER — PANTOPRAZOLE SODIUM 20 MG/1
1 TABLET, DELAYED RELEASE ORAL
Qty: 0 | Refills: 0 | DISCHARGE
Start: 2023-04-25

## 2023-04-25 RX ORDER — MIDODRINE HYDROCHLORIDE 2.5 MG/1
1 TABLET ORAL
Qty: 0 | Refills: 0 | DISCHARGE
Start: 2023-04-25

## 2023-04-25 RX ORDER — BENZOCAINE AND MENTHOL 5; 1 G/100ML; G/100ML
1 LIQUID ORAL
Qty: 0 | Refills: 0 | DISCHARGE
Start: 2023-04-25

## 2023-04-25 RX ORDER — PETROLATUM,WHITE
1 JELLY (GRAM) TOPICAL
Qty: 0 | Refills: 0 | DISCHARGE
Start: 2023-04-25

## 2023-04-25 RX ORDER — DICLOFENAC SODIUM 30 MG/G
1 GEL TOPICAL
Qty: 0 | Refills: 0 | DISCHARGE
Start: 2023-04-25

## 2023-04-25 RX ORDER — ERTAPENEM SODIUM 1 G/1
1000 INJECTION, POWDER, LYOPHILIZED, FOR SOLUTION INTRAMUSCULAR; INTRAVENOUS
Qty: 0 | Refills: 0 | DISCHARGE
Start: 2023-04-25

## 2023-04-25 RX ORDER — ENOXAPARIN SODIUM 100 MG/ML
40 INJECTION SUBCUTANEOUS
Qty: 0 | Refills: 0 | DISCHARGE
Start: 2023-04-25

## 2023-04-25 RX ORDER — TICAGRELOR 90 MG/1
1 TABLET ORAL
Qty: 0 | Refills: 0 | DISCHARGE
Start: 2023-04-25

## 2023-04-25 RX ORDER — SENNA PLUS 8.6 MG/1
10 TABLET ORAL
Qty: 0 | Refills: 0 | DISCHARGE
Start: 2023-04-25

## 2023-04-25 RX ORDER — ASPIRIN/CALCIUM CARB/MAGNESIUM 324 MG
1 TABLET ORAL
Qty: 0 | Refills: 0 | DISCHARGE
Start: 2023-04-25

## 2023-04-25 RX ADMIN — PANTOPRAZOLE SODIUM 40 MILLIGRAM(S): 20 TABLET, DELAYED RELEASE ORAL at 06:05

## 2023-04-25 RX ADMIN — GABAPENTIN 100 MILLIGRAM(S): 400 CAPSULE ORAL at 21:38

## 2023-04-25 RX ADMIN — MIDODRINE HYDROCHLORIDE 5 MILLIGRAM(S): 2.5 TABLET ORAL at 13:01

## 2023-04-25 RX ADMIN — Medication 81 MILLIGRAM(S): at 12:52

## 2023-04-25 RX ADMIN — Medication 6 MILLIGRAM(S): at 21:38

## 2023-04-25 RX ADMIN — Medication 4 MILLIGRAM(S): at 21:38

## 2023-04-25 RX ADMIN — ERTAPENEM SODIUM 120 MILLIGRAM(S): 1 INJECTION, POWDER, LYOPHILIZED, FOR SOLUTION INTRAMUSCULAR; INTRAVENOUS at 21:38

## 2023-04-25 RX ADMIN — TICAGRELOR 90 MILLIGRAM(S): 90 TABLET ORAL at 06:05

## 2023-04-25 RX ADMIN — Medication 650 MILLIGRAM(S): at 09:39

## 2023-04-25 RX ADMIN — ATORVASTATIN CALCIUM 80 MILLIGRAM(S): 80 TABLET, FILM COATED ORAL at 21:38

## 2023-04-25 RX ADMIN — SODIUM CHLORIDE 100 MILLILITER(S): 9 INJECTION INTRAMUSCULAR; INTRAVENOUS; SUBCUTANEOUS at 06:09

## 2023-04-25 RX ADMIN — TICAGRELOR 90 MILLIGRAM(S): 90 TABLET ORAL at 17:44

## 2023-04-25 RX ADMIN — Medication 650 MILLIGRAM(S): at 08:40

## 2023-04-25 RX ADMIN — MIDODRINE HYDROCHLORIDE 5 MILLIGRAM(S): 2.5 TABLET ORAL at 06:05

## 2023-04-25 RX ADMIN — ENOXAPARIN SODIUM 40 MILLIGRAM(S): 100 INJECTION SUBCUTANEOUS at 17:44

## 2023-04-25 NOTE — DISCHARGE NOTE PROVIDER - NSDCCPCAREPLAN_GEN_ALL_CORE_FT
PRINCIPAL DISCHARGE DIAGNOSIS  Diagnosis: CVA (cerebrovascular accident)  Assessment and Plan of Treatment: You were diagnosed with a stroke and sent to [facility] to help regain your functional status. Please follow up with Neurology for further management.      SECONDARY DISCHARGE DIAGNOSES  Diagnosis: Acute UTI  Assessment and Plan of Treatment: You complained of dysuria during your rehab stay. You were found to have a UTI and you were placed on IV ABX. Please continue your ABX course as prescribed in this packet. If your dysuria continues, please follow up with your PCP for further management.    Diagnosis: S/P percutaneous endoscopic gastrostomy (PEG) tube placement  Assessment and Plan of Treatment: A PEG tube was placed at the prior hospital. Please follow up with Dr. Moon to determine when your PEG tube can be removed upon discharge.

## 2023-04-25 NOTE — DISCHARGE NOTE PROVIDER - CARE PROVIDERS DIRECT ADDRESSES
,DirectAddress_Unknown,[email or web address]

## 2023-04-25 NOTE — PROGRESS NOTE ADULT - ASSESSMENT
59 y/o man with dm, s/p large right mca stroke with hemorrhagic transformation 3/31  , s/p thrombectomy, peg, came here on 4/11. He can now eat. He has had low grade temp, leukocytosis, tachy and dysuria .  He has received 3 days of zosyn for a zosyn sensitive ESBL E Coli and enterococcal UTI  His blood cultures have been negative  He has received 6 days of effective therapy.his leukocytosis has resolved.  Recommendations:  1. Continue ertapenem  2. Endpoint to be determined. We could extend treatment with either ertapenem or fosfomycin via oral route for additional time to cover for possible prostatic involvement.  3. No signs of uncontrolled infection but he still c/o  symptoms  4. Monitor for retention.

## 2023-04-25 NOTE — PROGRESS NOTE ADULT - SUBJECTIVE AND OBJECTIVE BOX
58y old  Male who presents with a chief complaint of Functional deficits s/p CVA     Patient seen and examined at bedside.  clinically appears well this am, alert sitting in bed, eating.   no pain, n/v, Denies sob,  dyspnea, abd pain, no dysuria    Vital Signs Last 24 Hrs  T(C): 37.1 (24 Apr 2023 23:37), Max: 37.1 (24 Apr 2023 23:37)  T(F): 98.7 (24 Apr 2023 23:37), Max: 98.7 (24 Apr 2023 23:37)  HR: 90 (25 Apr 2023 06:10) (84 - 90)  BP: 129/91 (25 Apr 2023 06:10) (129/91 - 147/90)  BP(mean): --  RR: 16 (24 Apr 2023 23:37) (16 - 16)  SpO2: 98% (24 Apr 2023 23:37) (98% - 98%)    Parameters below as of 24 Apr 2023 23:37  Patient On (Oxygen Delivery Method): room air      GENERAL- NAD  EAR/NOSE/MOUTH/THROAT - MMM  EYES- FIOR, conjunctiva and Sclera clear  NECK- supple  RESPIRATORY-  clear to auscultation bilaterally, non laboured breathing  CARDIOVASCULAR - SIS2, RRR  GI - soft NT BS present  EXTREMITIES- no pedal edema  NEUROLOGY- left sided weakness, facial droop  PSYCHIATRY- AAO X 3                  11.3                 143  | 26   | 5            5.06  >-----------< 334     ------------------------< 142                   35.4                 3.1  | 105  | 0.60                                         Ca 9.3   Mg x     Ph x        MEDICATIONS  (STANDING):  AQUAPHOR (petrolatum Ointment) 1 Application(s) Topical daily  artificial  tears Solution 1 Drop(s) Both EYES two times a day  aspirin  chewable 81 milliGRAM(s) Oral daily  atorvastatin 80 milliGRAM(s) Oral at bedtime  diclofenac sodium 1% Gel 2 Gram(s) Topical two times a day  doxazosin 4 milliGRAM(s) Oral at bedtime  enoxaparin Injectable 40 milliGRAM(s) SubCutaneous every 24 hours  ertapenem  IVPB 1000 milliGRAM(s) IV Intermittent every 24 hours  gabapentin 100 milliGRAM(s) Oral at bedtime  melatonin 6 milliGRAM(s) Oral at bedtime  midodrine. 5 milliGRAM(s) Oral <User Schedule>  pantoprazole    Tablet 40 milliGRAM(s) Oral before breakfast  petrolatum Ophthalmic Ointment 1 Application(s) Right EYE every 12 hours  polyethylene glycol 3350 17 Gram(s) Oral two times a day  senna Syrup 10 milliLiter(s) Oral at bedtime  sodium chloride 0.9%. 1000 milliLiter(s) (100 mL/Hr) IV Continuous <Continuous>  ticagrelor 90 milliGRAM(s) Oral every 12 hours    MEDICATIONS  (PRN):  acetaminophen     Tablet .. 650 milliGRAM(s) Oral every 6 hours PRN Mild Pain (1 - 3)  benzocaine/menthol Lozenge 1 Lozenge Oral two times a day PRN Sore Throat

## 2023-04-25 NOTE — DISCHARGE NOTE PROVIDER - HOSPITAL COURSE
HPI:  Sakina Dowell is a 58 year old male with PMH of HTN, and DM; who presented as a transfer from [facility] to [facility] for stroke on 3/29. Last known normal was the night before and he was outside the TKN window, and was intubated prior to [facility] transfer. Imaging was significant for R ICA occlusion and was immediately taken to angio where he underwent a TICI 2b thrombectomy of the R ICA and R MCA, with R ICA stent placement.  Postoperative CT head demonstrated new MLS with some hemorrhagic conversion. Neurosurgery was consulted but deemed no required intervention.  He was extubated on 4/7. His brain MRI showed improved midline shift and subsequent small frontal parietal SAH.  His hospital course was complicated by aspiration PNA (s/p Zosyn); and dysphagia (requiring PEG - placed 4/6). He is on Brilinta for stent placement. PM&R was consulted and deemed him an appropriate candidate for IRF. He was medically stabilized and cleared for discharge to [facility].  (11 Apr 2023 13:40)      Patient was evaluated by PM&R and therapy for gait/ADL impairments and recommended acute rehabilitation. Patient was medically optimized for discharge to [facility] on 4/11/23. Admitted with gait instability, ADL, and functional impairments.     Rehab course significant for:  4/12: Encourage PO fluids. Abdominal binder. TEDs. Aquaphor to BL feet.   4/13: Ab xray. Miralax BID. TEDs. Lisinopril DCd.   4/14: Ab xray with constipation. Lactulose BIDx3 days. Fleet enema if no BM by end of day. Orthostatic in therapy- Midodrine 2.5mg 0600 and 1400  4/18: Tylenol PRN. Melatonin @ HS  4/19: S/p unwitnessed fall out of wheelchair. CTH negative.  4/20: Tachycardia with WBC increase to 14. Await workup. UA positive. Zosyn started. IV bolus and maintenance IVF started. Await RVP, CXR, ab XR, bld cx x2, procal, and Urine Cx.  Hold therapies.   ---- Improved R atelectasis. Dilated loops of bowel in L abdomen, no obs. RVP negative.  4/21: Pyridium BID x3 days. NS at 100cc.   4/25: Add mercedes 100mg at HS. Consider increasing Doxazosin to 8 mg- will continue to monitor.       All other medical co-morbidities were stable. Patient tolerated course of inpatient PT/OT/SLP rehab with significant improvements and met rehab goals prior to discharge. Patient was medically cleared on 4/26/23 for discharge to [facility]. HPI:  Sakina Dowell is a 58 year old male with PMH of HTN, and DM; who presented as a transfer from [facility] to [facility] for stroke on 3/29. Last known normal was the night before and he was outside the TKN window, and was intubated prior to [facility] transfer. Imaging was significant for R ICA occlusion and was immediately taken to angio where he underwent a TICI 2b thrombectomy of the R ICA and R MCA, with R ICA stent placement.  Postoperative CT head demonstrated new MLS with some hemorrhagic conversion. Neurosurgery was consulted but deemed no required intervention.  He was extubated on 4/7. His brain MRI showed improved midline shift and subsequent small frontal parietal SAH.  His hospital course was complicated by aspiration PNA (s/p Zosyn); and dysphagia (requiring PEG - placed 4/6). He is on Brilinta for stent placement. PM&R was consulted and deemed him an appropriate candidate for IRF. He was medically stabilized and cleared for discharge to [facility].  (11 Apr 2023 13:40)      Patient was evaluated by PM&R and therapy for gait/ADL impairments and recommended acute rehabilitation. Patient was medically optimized for discharge to [facility] on 4/11/23. Admitted with gait instability, ADL, and functional impairments.     Rehab course significant for:  4/12: Encourage PO fluids. Abdominal binder. TEDs. Aquaphor to BL feet.   4/13: Ab xray. Miralax BID. TEDs. Lisinopril DCd.   4/14: Ab xray with constipation. Lactulose BIDx3 days. Fleet enema if no BM by end of day. Orthostatic in therapy- Midodrine 2.5mg 0600 and 1400  4/18: Tylenol PRN. Melatonin @ HS  4/19: S/p unwitnessed fall out of wheelchair. CTH negative.  4/20: Tachycardia with WBC increase to 14. Await workup. UA positive. Zosyn started. IV bolus and maintenance IVF started. Await RVP, CXR, ab XR, bld cx x2, procal, and Urine Cx.  Hold therapies.   ---- Improved R atelectasis. Dilated loops of bowel in L abdomen, no obs. RVP negative.  4/21: Pyridium BID x3 days. NS at 100cc.   4/25: Add mercedes 100mg at HS. Consider increasing Doxazosin to 8 mg- will continue to monitor.   4/26: Midodrine DCd.  IVF DCd   4/27: DC mercedes due to DC drowsiness. Lidocaine patch to BL ribs. Dysuria improving.    All other medical co-morbidities were stable. Patient tolerated course of inpatient PT/OT/SLP rehab with significant improvements and met rehab goals prior to discharge. Patient was medically cleared on 4/26/23 for discharge to [facility]. HPI:  Sakina Dowell is a 58 year old male with PMH of HTN, and DM; who presented as a transfer from [facility] to [facility] for stroke on 3/29. Last known normal was the night before and he was outside the TKN window, and was intubated prior to [facility] transfer. Imaging was significant for R ICA occlusion and was immediately taken to angio where he underwent a TICI 2b thrombectomy of the R ICA and R MCA, with R ICA stent placement.  Postoperative CT head demonstrated new MLS with some hemorrhagic conversion. Neurosurgery was consulted but deemed no required intervention.  He was extubated on 4/7. His brain MRI showed improved midline shift and subsequent small frontal parietal SAH.  His hospital course was complicated by aspiration PNA (s/p Zosyn); and dysphagia (requiring PEG - placed 4/6). He is on Brilinta for stent placement. PM&R was consulted and deemed him an appropriate candidate for IRF. He was medically stabilized and cleared for discharge to [facility].  (11 Apr 2023 13:40)      Patient was evaluated by PM&R and therapy for gait/ADL impairments and recommended acute rehabilitation. Patient was medically optimized for discharge to [facility] on 4/11/23. Admitted with gait instability, ADL, and functional impairments.     Rehab course significant for:  4/12: Encourage PO fluids. Abdominal binder. TEDs. Aquaphor to BL feet.   4/13: Ab xray. Miralax BID. TEDs. Lisinopril DCd.   4/14: Ab xray with constipation. Lactulose BIDx3 days. Fleet enema if no BM by end of day. Orthostatic in therapy- Midodrine 2.5mg 0600 and 1400  4/18: Tylenol PRN. Melatonin @ HS  4/19: S/p unwitnessed fall out of wheelchair. CTH negative.  4/20: Tachycardia with WBC increase to 14. Await workup. UA positive. Zosyn started. IV bolus and maintenance IVF started. Await RVP, CXR, ab XR, bld cx x2, procal, and Urine Cx.  Hold therapies.   ---- Improved R atelectasis. Dilated loops of bowel in L abdomen, no obs. RVP negative.  4/21: Pyridium BID x3 days. NS at 100cc.   4/25: Add mercedes 100mg at HS. Consider increasing Doxazosin to 8 mg- will continue to monitor.   4/26: Midodrine DCd.  IVF DCd   4/27: DC mercedes due to DC drowsiness. Lidocaine patch to BL ribs. Dysuria improving.  4/28: Plan for [facility] today.     All other medical co-morbidities were stable. Patient tolerated course of inpatient PT/OT/SLP rehab with significant improvements and met rehab goals prior to discharge. Patient was medically cleared on 4/28/23 for discharge to [facility]. HPI:  Sakina Dowell is a 58 year old male with PMH of HTN, and DM; who presented as a transfer from [facility] to [facility] for stroke on 3/29. Last known normal was the night before and he was outside the TKN window, and was intubated prior to [facility] transfer. Imaging was significant for R ICA occlusion and was immediately taken to angio where he underwent a TICI 2b thrombectomy of the R ICA and R MCA, with R ICA stent placement.  Postoperative CT head demonstrated new MLS with some hemorrhagic conversion. Neurosurgery was consulted but deemed no required intervention.  He was extubated on 4/7. His brain MRI showed improved midline shift and subsequent small frontal parietal SAH.  His hospital course was complicated by aspiration PNA (s/p Zosyn); and dysphagia (requiring PEG - placed 4/6). He is on Brilinta for stent placement. PM&R was consulted and deemed him an appropriate candidate for IRF. He was medically stabilized and cleared for discharge to [facility].  (11 Apr 2023 13:40)      Patient was evaluated by PM&R and therapy for gait/ADL impairments and recommended acute rehabilitation. Patient was medically optimized for discharge to [facility] on 4/11/23. Admitted with gait instability, ADL, and functional impairments.     Rehab course significant for:  4/12: Encourage PO fluids. Abdominal binder. TEDs. Aquaphor to BL feet.   4/13: Ab xray. Miralax BID. TEDs. Lisinopril DCd.   4/14: Ab xray with constipation. Lactulose BIDx3 days. Fleet enema if no BM by end of day. Orthostatic in therapy- Midodrine 2.5mg 0600 and 1400  4/18: Tylenol PRN. Melatonin @ HS  4/19: S/p unwitnessed fall out of wheelchair. CTH negative.  4/20: Tachycardia with WBC increase to 14. Await workup. UA positive. Zosyn started. IV bolus and maintenance IVF started. Await RVP, CXR, ab XR, bld cx x2, procal, and Urine Cx.  Hold therapies.   ---- Improved R atelectasis. Dilated loops of bowel in L abdomen, no obs. RVP negative.  4/21: Pyridium BID x3 days. NS at 100cc.   4/25: Add mercedes 100mg at HS. Consider increasing Doxazosin to 8 mg- will continue to monitor.   4/26: Midodrine DCd.  IVF DCd   4/27: DC mercedes due to DC drowsiness. Lidocaine patch to BL ribs. Dysuria improving. Midodrine restarted. Doxazosin to 2mg.   5/3: Awaiting [facility]. Midodrine to 2.5   5/5: Pyridium TID x3 days    All other medical co-morbidities were stable. Patient tolerated course of inpatient PT/OT/SLP rehab with significant improvements and met rehab goals prior to discharge. Patient was medically cleared on 5/5/23 for discharge to [facility]. HPI:  Sakina Dowell is a 58 year old male with PMH of HTN, and DM; who presented as a transfer from [facility] to [facility] for stroke on 3/29. Last known normal was the night before and he was outside the TKN window, and was intubated prior to [facility] transfer. Imaging was significant for R ICA occlusion and was immediately taken to angio where he underwent a TICI 2b thrombectomy of the R ICA and R MCA, with R ICA stent placement.  Postoperative CT head demonstrated new MLS with some hemorrhagic conversion. Neurosurgery was consulted but deemed no required intervention.  He was extubated on 4/7. His brain MRI showed improved midline shift and subsequent small frontal parietal SAH.  His hospital course was complicated by aspiration PNA (s/p Zosyn); and dysphagia (requiring PEG - placed 4/6). He is on Brilinta for stent placement. PM&R was consulted and deemed him an appropriate candidate for IRF. He was medically stabilized and cleared for discharge to [facility].  (11 Apr 2023 13:40)      Patient was evaluated by PM&R and therapy for gait/ADL impairments and recommended acute rehabilitation. Patient was medically optimized for discharge to [facility] on 4/11/23. Admitted with gait instability, ADL, and functional impairments.     Rehab course significant for:  4/12: Encourage PO fluids. Abdominal binder. TEDs. Aquaphor to BL feet.   4/13: Ab xray. Miralax BID. TEDs. Lisinopril DCd.   4/14: Ab xray with constipation. Lactulose BIDx3 days. Fleet enema if no BM by end of day. Orthostatic in therapy- Midodrine 2.5mg 0600 and 1400  4/18: Tylenol PRN. Melatonin @ HS  4/19: S/p unwitnessed fall out of wheelchair. CTH negative.  4/20: Tachycardia with WBC increase to 14. Await workup. UA positive. Zosyn started. IV bolus and maintenance IVF started. Await RVP, CXR, ab XR, bld cx x2, procal, and Urine Cx.  Hold therapies.   ---- Improved R atelectasis. Dilated loops of bowel in L abdomen, no obs. RVP negative.  4/21: Pyridium BID x3 days. NS at 100cc.   4/25: Add mercedes 100mg at HS. Consider increasing Doxazosin to 8 mg- will continue to monitor.   4/26: Midodrine DCd.  IVF DCd   4/27: DC mercedes due to DC drowsiness. Lidocaine patch to BL ribs. Dysuria improving. Midodrine restarted. Doxazosin to 2mg.   5/3: Awaiting [facility]. Midodrine to 2.5   5/5: Pyridium TID x3 days    All other medical co-morbidities were stable. Patient tolerated course of inpatient PT/OT/SLP rehab with significant improvements and met rehab goals prior to discharge.   Patient was medically cleared on 5/5/23 for discharge to [facility]. HPI:  Sakina Dowell is a 58 year old male with PMH of HTN, and DM; who presented as a transfer from [facility] to [facility] for stroke on 3/29. Last known normal was the night before and he was outside the TKN window, and was intubated prior to [facility] transfer. Imaging was significant for R ICA occlusion and was immediately taken to angio where he underwent a TICI 2b thrombectomy of the R ICA and R MCA, with R ICA stent placement.  Postoperative CT head demonstrated new MLS with some hemorrhagic conversion. Neurosurgery was consulted but deemed no required intervention.  He was extubated on 4/7. His brain MRI showed improved midline shift and subsequent small frontal parietal SAH.  His hospital course was complicated by aspiration PNA (s/p Zosyn); and dysphagia (requiring PEG - placed 4/6). He is on Brilinta for stent placement. PM&R was consulted and deemed him an appropriate candidate for IRF. He was medically stabilized and cleared for discharge to [facility].  (11 Apr 2023 13:40)      Patient was evaluated by PM&R and therapy for gait/ADL impairments and recommended acute rehabilitation. Patient was medically optimized for discharge to [facility] on 4/11/23. Admitted with gait instability, ADL, and functional impairments.     Rehab course significant for:  4/12: Encourage PO fluids. Abdominal binder. TEDs. Aquaphor to BL feet.   4/13: Ab xray. Miralax BID. TEDs. Lisinopril DCd.   4/14: Ab xray with constipation. Lactulose BIDx3 days. Fleet enema if no BM by end of day. Orthostatic in therapy- Midodrine 2.5mg 0600 and 1400  4/18: Tylenol PRN. Melatonin @ HS  4/19: S/p unwitnessed fall out of wheelchair. CTH negative.  4/20: Tachycardia with WBC increase to 14. Await workup. UA positive. Zosyn started. IV bolus and maintenance IVF started. Await RVP, CXR, ab XR, bld cx x2, procal, and Urine Cx.  Hold therapies.   ---- Improved R atelectasis. Dilated loops of bowel in L abdomen, no obs. RVP negative.  4/21: Pyridium BID x3 days. NS at 100cc.   4/25: Add mercedes 100mg at HS. Consider increasing Doxazosin to 8 mg- will continue to monitor.   4/26: Midodrine DCd.  IVF DCd   4/27: DC mercedes due to DC drowsiness. Lidocaine patch to BL ribs. Dysuria improving. Midodrine restarted. Doxazosin to 2mg.   5/3: Awaiting [facility]. Midodrine to 2.5  5/5: Pyridium TID x3 days    You made sustained clinical and functional progress during your acute rehab course  --Marked improvement of left sided motor deficits, achieving some functional goals as noted at last IDT meeding 5/4  --Responded to UTI treatment, and was commenced on short course of pyridium for residual dysuria  --BP maintained with low dose midodrine  --You still had marked functional deficits which will be addressed in [facility]    IDT conference on 5/4  TDD: 5/8 to home vs. [facility]  Barriers: Making significant gains.   Social Work: Lives in a basement apartment with spouse and 2 sons, 12 MEI. Patient is main source of income for family. Spouse does not work. (Originally [facility], but may be able to DC home now)  OT: CG for transfers. Min-mod A for self care.   PT: Supervision/CGA for transfers. Ambulated 150 ft with RW with CGA (& ambulated 150ft with no device). Negotiated 12 stairs with 1-2HRs.   SLP: Easy to chew with TLs. Mild cognitive and language deficits. ST memory improving.  Will need FT if DC home. Will need DME- commode, RW, tub bench      All other medical co-morbidities were stable. Patient tolerated course of inpatient PT/OT/SLP rehab with significant improvements and met rehab goals prior to discharge.   Patient was medically cleared on 5/5/23 for discharge to [facility].

## 2023-04-25 NOTE — PROGRESS NOTE ADULT - ASSESSMENT
Assessment/Plan:  CONG AGUAYO is a 58 year old male with PMH of HTN, and DM; who presented as a transfer from [facility] to [facility] for stroke on 3/29, where he was found to have a R ICA occlusion and he underwent a TICI 2b thromectomy of R ICA and R MCA with R ICA stent placement. Extubated on 4/7. His brain MRI was significant for improved midline shift and small frontal parietal SAH. His hospital course was complicated by aspiration PNA (s/p Zosyn); and dysphagia (requiring PEG - placed 4/6). Patient now admitted for a multidisciplinary rehab program. 04-11-23 @ 13:54    * Await orthotics review for left sided weakness   * Commenced Gabapentin for RLE discomfort/stiffness   * PEG-tTube--will confirm form acute care hosp, time of last PEG feeding,     Rehab Management/MEDICAL MANAGEMENT     #Functional deficits s/p CVA  -Continue  Comprehensive Rehab Program of PT/OT/SLP  - 3 hours a day, 5 days a week  - P&O as needed   - R ICA occlusion, s/p TICI 2b thrombectomy of R ICA and R MCA with R ICA stent placement  - ASA & Brilinta  - Outpt Neurology follow up- Dr. Reid  - Outpt IR follow up - Dr. Allen  --orthotics referral with some functional improvement  Left sided weakness, await review   --Rt leg discomfort/stiffness---commenced gabaptnin 4/25    #Dysphagia/aspiration PNA  - S/p Zosyn  - PEG tube last prior to Acute rehab admission  - Repeat CT chest in 1 month (~5/8)    * UTI (ESBL) 4/20 --on zosyn, switched to Entrapenem 4/23 continue ID f/u  resp viral panel -ve    * Incidental Small adrenal nodule--  will refer to Urology for f/u post DC     #HTN  - Lisinopril--D/CARSON 4/13  - Doxazosin  - Outpt Cardiology follow up - Dr. Song    # Orthostatic hypotension--midodrine 2.5mg bid, increased to 5mg bid 4/17, bp stable      #HLD  -Atorvastatin    #DM  - A1c: 7.3  - ISS  - FS ACHS    #Dry eye  - Occular lubricant ointment  - Artificial tears    #Sleep  - Melatonin 6mg nightly PRN, switched to standing 4/18, sleep improved    #Skin  - Skin-: intact, PEG in situ   - Pressure injury/Skin: OOB to Chair, PT/OT     #Pain Mgmt   - Tylenol PRN    #GI/Bowel Mgmt   - Pantoprazole  - Continent c/w Senna, Miralax   -lactulose    #/Bladder Mgmt   BPH c/w doxazosin, monitor residual dysuria/strainging while voiding, PVRs unremarkable  Adrenal nodule--refer to urology for f/u on dc      #FEN   - PEG--Last used for feeding while in acute care, not in use since acute rehab treatment   - Diet - Easy to Chew, halal  - Dysphagia  SLP - evaluation and treatment    #Precautions / PROPHYLAXIS:   - Falls  - ortho: Weight bearing status: WBAT   - Lungs: Aspiration, Incentive Spirometer   - DVT: Lovenox  ---------------------------------------------------------------  4/24--Discussed with wife present at bed side after initial review. I explained functional and clinical update and AMAN placement for further therapy. She agreed tto same    IDT conference on 4/20  TDD: 4/27 to home vs [facility]AN  Barriers: Distracted, tangentile, perseverative, requires cueing, R eye visual deficit. Poor insight, safety and memory.   Social Work: Lives in a basement apartment with spouse and 2 sons, 12 MEI. Patient is main source of income for family. Spouse does not work.  OT: Max A for UBD/LBD. Min-mod A for transfers.  PT: Mod A for transfers. Ambulated 60 ft with RW with min A and WCF.   SLP: Easy to chew diet. Easily distracted, tangentile, perseverative. Poor insight, safety and memory.   ---------------------------------------------------------------  FOLLOW UP/OUTPATIENT:    Bev Song)  Cardiology; Internal Medicine  [address] [place] [address], [address]  [place], [place] [address]  Phone: [phone]  Fax: [phone]    Ivan Reid (MD; PhD)  Neurology; Vascular Neurology  [address], [address] [address]  [place] [address]  Phone: [phone]  Fax: [phone]  Follow Up Time:     Primary doctor,   Phone: (   )    -  Fax: (   )    -  Follow Up Time:     Sanchez Allen)  Neurology; Vascular Neurology  [address] [place] [address]  Phone: [phone]  Fax: [phone]  Follow Up Time:     Lily Moon (DO)  Gastroenterology  [address] [facility] [facility], [address]  [place] [address]  Phone: [phone]  Fax: [phone]  Follow Up Time:    Urology f/u for   Small adrenal nodule--Urology referral for f/u post DC   ---------------------------------------------------------------

## 2023-04-25 NOTE — PROGRESS NOTE ADULT - SUBJECTIVE AND OBJECTIVE BOX
Subjective  Seen and examined, no acute distress  Last BM, he reports 2 days prior   No N/V  Still has residual dysuria, despite being on doxazocin  Hx of BPH from imaging (medial berry hypertrophy)  But bladder scan PVRs are minimal     Reports tightness Rt leg/spasticity    Tolerating oral feeds, eat all her meal this breakfast  Tolerating entapenem for UTI and on ID f/u    Therapy--Engaging in therapy, Rt left side motor function improving    ROS--No head ache or abd pain, no N/V   LBM 4/23      ICU Vital Signs Last 24 Hrs  T(C): 37.1 (24 Apr 2023 23:37), Max: 37.1 (24 Apr 2023 23:37)  T(F): 98.7 (24 Apr 2023 23:37), Max: 98.7 (24 Apr 2023 23:37)  HR: 90 (25 Apr 2023 06:10) (84 - 90)  BP: 129/91 (25 Apr 2023 06:10) (129/91 - 147/90)  RR: 16 (24 Apr 2023 23:37) (16 - 16)  SpO2: 98% (24 Apr 2023 23:37) (98% - 98%)  O2 Parameters below as of 24 Apr 2023 23:37  Patient On (Oxygen Delivery Method): room air      EXAM  Gen - Mild discomfort due to stiffness/discomfort Rt leg  HEENT - , right  eye vision loss   Neck - Supple, No limited ROM  Pulm -clear   Cardiovascular - RRR, S1S2  Chest - good chest expansion, good respiratory effort  Abdomen - Soft, non tender  +BS, + PEG   Extremities - No Cyanosis, no clubbing, no edema, no calf tenderness    Neuro-  Alert and oriented, energy level improved  Speech  dysarthric, improving  Cranial Nerves - Left facial weakness, left eye vision loss, absent left shoulder shrug   Right  eye vision loss Rt eye  Tongue deviation to left, Flat left nasolabial fold     Motor -  Left hemiparesis                    LEFT    UE - ShAB 1+/5, improvement with hand                     RIGHT UE - ShAB 5/5, EF 5/5, EE 5/5,   5/5                    LEFT    LE - 2/5                    RIGHT LE - HF 5/5, KE 5/5, DF 5/5, PF 5/5        Sensory - Intact to LT     Reflexes - DTR Intact     Coordination - FTN/HTS  impaired to left side     Tone - normal  Psychiatric - Mood stable, Affect WNL  Skin:  all skin intact, PEG in situ, not in use    RECENT LABS/IMAGING                        11.3   5.06  )-----------( 334      ( 24 Apr 2023 06:12 )             35.4   MEDICATIONS  (STANDING):  AQUAPHOR (petrolatum Ointment) 1 Application(s) Topical daily  artificial  tears Solution 1 Drop(s) Both EYES two times a day  aspirin  chewable 81 milliGRAM(s) Oral daily  atorvastatin 80 milliGRAM(s) Oral at bedtime  diclofenac sodium 1% Gel 2 Gram(s) Topical two times a day  doxazosin 4 milliGRAM(s) Oral at bedtime  enoxaparin Injectable 40 milliGRAM(s) SubCutaneous every 24 hours  ertapenem  IVPB 1000 milliGRAM(s) IV Intermittent every 24 hours  gabapentin 100 milliGRAM(s) Oral at bedtime  melatonin 6 milliGRAM(s) Oral at bedtime  midodrine. 5 milliGRAM(s) Oral <User Schedule>  pantoprazole    Tablet 40 milliGRAM(s) Oral before breakfast  petrolatum Ophthalmic Ointment 1 Application(s) Right EYE every 12 hours  polyethylene glycol 3350 17 Gram(s) Oral two times a day  senna Syrup 10 milliLiter(s) Oral at bedtime  sodium chloride 0.9%. 1000 milliLiter(s) (100 mL/Hr) IV Continuous <Continuous>  ticagrelor 90 milliGRAM(s) Oral every 12 hours    MEDICATIONS  (PRN):  acetaminophen     Tablet .. 650 milliGRAM(s) Oral every 6 hours PRN Mild Pain (1 - 3)  benzocaine/menthol Lozenge 1 Lozenge Oral two times a day PRN Sore Throat    04-24    143  |  105  |  5<L>  ----------------------------<  142<H>  3.1<L>   |  26  |  0.60    Ca    9.3      24 Apr 2023 06:12    TPro  7.2  /  Alb  2.6<L>  /  TBili  0.7  /  DBili  x   /  AST  17  /  ALT  29  /  AlkPhos  46  04-24

## 2023-04-25 NOTE — DISCHARGE NOTE PROVIDER - NSDCFUADDAPPT_GEN_ALL_CORE_FT
Please follow up with Dr. Lily Moon outpatient, to determine when PEG tube can be removed. Placed on 4/6. Last used on 4/11.     Please follow up with your PCP as soon as possible.    If you are in need of a PCP or a specialist in your area: contact the [facility] Physician Referral Service at [phone].

## 2023-04-25 NOTE — DISCHARGE NOTE PROVIDER - DETAILS OF MALNUTRITION DIAGNOSIS/DIAGNOSES
This patient has been assessed with a concern for Malnutrition and was treated during this hospitalization for the following Nutrition diagnosis/diagnoses:     -  04/12/2023: Severe protein-calorie malnutrition

## 2023-04-25 NOTE — DISCHARGE NOTE PROVIDER - NSDCMRMEDTOKEN_GEN_ALL_CORE_FT
acetaminophen 325 mg oral tablet: 2 tab(s) orally every 6 hours As needed Mild Pain (1 - 3)  aspirin 81 mg oral tablet, chewable: 1 tab(s) orally once a day  atorvastatin 80 mg oral tablet: 1 tab(s) orally once a day (at bedtime)  diclofenac 1% topical gel: Apply topically to affected area 2 times a day To affected area  doxazosin 4 mg oral tablet: 1 tab(s) orally once a day (at bedtime)  enoxaparin: 40 milligram(s) subcutaneous once a day DVT PPX  ertapenem 1 g injection: 1,000 milligram(s) injectable once a day Last dose 4/26 @ 10pm.  gabapentin 100 mg oral capsule: 1 cap(s) orally once a day (at bedtime)  melatonin 3 mg oral tablet: 2 tab(s) orally once a day (at bedtime)  menthol-benzocaine topical: 1 lozenge orally 2 times a day as needed for Sore Throat  midodrine 5 mg oral tablet: 1 tab(s) orally 2 times a day 0600 and 1400  ocular lubricant ophthalmic ointment: 1 application to each affected eye every 12 hours  ocular lubricant ophthalmic solution: 1 drop(s) to each affected eye 2 times a day  pantoprazole 40 mg oral delayed release tablet: 1 tab(s) orally once a day (before a meal)  petrolatum topical ointment: 1 Apply topically to affected area once a day  polyethylene glycol 3350 oral powder for reconstitution: 17 gram(s) orally 2 times a day  senna (sennosides) 8.8 mg/5 mL oral syrup: 10 milliliter(s) orally once a day (at bedtime)  ticagrelor 90 mg oral tablet: 1 tab(s) orally every 12 hours   acetaminophen 325 mg oral tablet: 2 tab(s) orally every 6 hours As needed Mild Pain (1 - 3)  aspirin 81 mg oral tablet, chewable: 1 tab(s) orally once a day  atorvastatin 80 mg oral tablet: 1 tab(s) orally once a day (at bedtime)  doxazosin 4 mg oral tablet: 1 tab(s) orally once a day (at bedtime)  enoxaparin: 40 milligram(s) subcutaneous once a day DVT PPX  gabapentin 100 mg oral capsule: 1 cap(s) orally once a day (at bedtime)  lidocaine 4% topical film: Apply topically to affected area once a day Apply to BL ribs. Okay to supplement OTC Salonpas patches instead.  melatonin 3 mg oral tablet: 2 tab(s) orally once a day (at bedtime)  menthol-benzocaine topical: 1 lozenge orally 2 times a day as needed for Sore Throat  midodrine 5 mg oral tablet: 1 tab(s) orally 2 times a day 0600 and 1400  ocular lubricant ophthalmic ointment: 1 application to each affected eye every 12 hours  ocular lubricant ophthalmic solution: 1 drop(s) to each affected eye 2 times a day  pantoprazole 40 mg oral delayed release tablet: 1 tab(s) orally once a day (before a meal)  petrolatum topical ointment: 1 Apply topically to affected area once a day  polyethylene glycol 3350 oral powder for reconstitution: 17 gram(s) orally 2 times a day  senna (sennosides) 8.8 mg/5 mL oral syrup: 10 milliliter(s) orally once a day (at bedtime)  ticagrelor 90 mg oral tablet: 1 tab(s) orally every 12 hours   acetaminophen 325 mg oral tablet: 2 tab(s) orally every 6 hours As needed Mild Pain (1 - 3)  aspirin 81 mg oral tablet, chewable: 1 tab(s) orally once a day  atorvastatin 80 mg oral tablet: 1 tab(s) orally once a day (at bedtime)  doxazosin 2 mg oral tablet: 1 tab(s) orally once a day (at bedtime)  enoxaparin: 40 milligram(s) subcutaneous once a day DVT PPX  gabapentin 100 mg oral capsule: 1 cap(s) orally once a day (at bedtime)  lidocaine 4% topical film: Apply topically to affected area once a day Apply to BL ribs. Okay to supplement OTC Salonpas patches instead.  melatonin 3 mg oral tablet: 2 tab(s) orally once a day (at bedtime)  menthol-benzocaine topical: 1 lozenge orally 2 times a day as needed for Sore Throat  midodrine 2.5 mg oral tablet: 1 tab(s) orally 2 times a day 06:00 and 14:00  ocular lubricant ophthalmic ointment: 1 application to each affected eye every 12 hours  pantoprazole 40 mg oral delayed release tablet: 1 tab(s) orally once a day (before a meal)  petrolatum topical ointment: 1 Apply topically to affected area once a day  phenazopyridine 100 mg oral tablet: 1 tab(s) orally every 8 hours  polyethylene glycol 3350 oral powder for reconstitution: 17 gram(s) orally 2 times a day  senna (sennosides) 8.8 mg/5 mL oral syrup: 10 milliliter(s) orally once a day (at bedtime)  ticagrelor 90 mg oral tablet: 1 tab(s) orally every 12 hours   acetaminophen 325 mg oral tablet: 2 tab(s) orally every 6 hours As needed Mild Pain (1 - 3)  aspirin 81 mg oral tablet, chewable: 1 tab(s) orally once a day  atorvastatin 80 mg oral tablet: 1 tab(s) orally once a day (at bedtime)  doxazosin 2 mg oral tablet: 1 tab(s) orally once a day (at bedtime)  enoxaparin: 40 milligram(s) subcutaneous once a day DVT PPX  lidocaine 4% topical film: Apply topically to affected area once a day Apply to BL ribs. Okay to supplement OTC Salonpas patches instead.  melatonin 3 mg oral tablet: 2 tab(s) orally once a day (at bedtime)  menthol-benzocaine topical: 1 lozenge orally 2 times a day as needed for Sore Throat  midodrine 2.5 mg oral tablet: 1 tab(s) orally 2 times a day 06:00 and 14:00  ocular lubricant ophthalmic ointment: 1 application to each affected eye every 12 hours  pantoprazole 40 mg oral delayed release tablet: 1 tab(s) orally once a day (before a meal)  petrolatum topical ointment: 1 Apply topically to affected area once a day  phenazopyridine 100 mg oral tablet: 1 tab(s) orally every 8 hours  polyethylene glycol 3350 oral powder for reconstitution: 17 gram(s) orally 2 times a day  senna (sennosides) 8.8 mg/5 mL oral syrup: 10 milliliter(s) orally once a day (at bedtime)  ticagrelor 90 mg oral tablet: 1 tab(s) orally every 12 hours

## 2023-04-25 NOTE — DISCHARGE NOTE PROVIDER - PROVIDER TOKENS
PROVIDER:[TOKEN:[[id number]],FOLLOWUP:[1 month]],PROVIDER:[TOKEN:[[id number]],FOLLOWUP:[1 week]],PROVIDER:[TOKEN:[[id number]],FOLLOWUP:[1 week]],PROVIDER:[TOKEN:[[id number]],FOLLOWUP:[1 week]],PROVIDER:[TOKEN:[[id number]],FOLLOWUP:[1 month]]

## 2023-04-25 NOTE — PROGRESS NOTE ADULT - ASSESSMENT
58M PMH HTN, DM2 who presented as a transfer from [facility] to [facility] for stroke on 3/29, s/p TICI 2b thrombectomy of the R ICA and R MCA, with R ICA stent placement.  Postoperative CT head demonstrated new MLS with some hemorrhagic conversion now admitted for rehab- pt/ot/dvt ppx    s/p sepsis 4/20/23, now resolved-  due to uti   treated with zosyn 4/20- 4/23 changed to ertapenem per ID 4/23- 4/26 for 3 days  urine culture noted   Culture Results: >100,000 CFU/ml Escherichia coli ESBL  clinically improving  [facility],   uro consult for concern of prostatitis   monitor    Stroke s/p thrombectomy of Right ICA and Right MCA and right ICA stent placement  - Left hemiparesis  - cont asa/brillinta, statin    Hypertension with OH   - off meds  monitor BP, wean off midodrine as possible  check Orthostatics     DM2  - HbA1C 7.3%  - diet controlled  - little meds- ? homeopathic medication    BPH  -doxazosin    DVT PPx  - Lovenox    will follow  d/w dr. jenkins

## 2023-04-25 NOTE — PROGRESS NOTE ADULT - SUBJECTIVE AND OBJECTIVE BOX
CC: f/u for polymicrobial UTI    Patient reports: he is incontinent. He still has mild dysuria    REVIEW OF SYSTEMS:  All other review of systems negative (Comprehensive ROS)    Antimicrobials Day #  :day 3  ertapenem  IVPB 1000 milliGRAM(s) IV Intermittent every 24 hours    Other Medications Reviewed    T(F): 98.7 (04-24-23 @ 23:37), Max: 98.7 (04-24-23 @ 23:37)  HR: 90 (04-25-23 @ 06:10)  BP: 129/91 (04-25-23 @ 06:10)  RR: 16 (04-24-23 @ 23:37)  SpO2: 98% (04-24-23 @ 23:37)  Wt(kg): --    PHYSICAL EXAM:  General: alert, no acute distress  Eyes:  anicteric, no conjunctival injection, no discharge  Oropharynx: no lesions or injection 	  Neck: supple, without adenopathy  Lungs: clear to auscultation  Heart: regular rate and rhythm; no murmur, rubs or gallops  Abdomen: soft, nondistended, nontender, without mass or organomegaly  Skin: no lesions  Extremities: no clubbing, cyanosis, or edema  Neurologic: alert, oriented, speech garbled, left hemiparesis    LAB RESULTS:                        11.3   5.06  )-----------( 334      ( 24 Apr 2023 06:12 )             35.4     04-24    143  |  105  |  5<L>  ----------------------------<  142<H>  3.1<L>   |  26  |  0.60    Ca    9.3      24 Apr 2023 06:12    TPro  7.2  /  Alb  2.6<L>  /  TBili  0.7  /  DBili  x   /  AST  17  /  ALT  29  /  AlkPhos  46  04-24    LIVER FUNCTIONS - ( 24 Apr 2023 06:12 )  Alb: 2.6 g/dL / Pro: 7.2 g/dL / ALK PHOS: 46 U/L / ALT: 29 U/L / AST: 17 U/L / GGT: x             MICROBIOLOGY:  RECENT CULTURES:      RADIOLOGY REVIEWED:    < from: US Kidney and Bladder (04.22.23 @ 12:57) >  IMPRESSION:  No renal mass, hydronephrosis or calculus is visualized sonographically.    Urinary bladder is partially collapsed, with a volume of 49 mL.    Enlarged prostate is partially visualized.  The hypertrophied median lobe   of the prostate indents the posterior bladder wall.  Lower urinary tract   symptoms (LUTS) should be excluded clinically.    A hypoechoic right adrenal nodule measures 1.1 x 1 x 1.1 cm    --- End of Report ---    < end of copied text >

## 2023-04-25 NOTE — DISCHARGE NOTE PROVIDER - CARE PROVIDER_API CALL
Aranza Irvin (MD; MPH)  Surgery  [facility]  [facility]  [address] [place] [address]  Phone: [phone]  Fax: [phone]  Follow Up Time: 1 month    Bev Song (MD)  Cardiology; Internal Medicine  [address], [address]  [place], [place] [id number]  Phone: [phone]  Fax: [phone]  Follow Up Time: 1 week    Ivan Reid; PhD)  Neurology; Vascular Neurology  [address], [address]  [place] [address]  Phone: [phone]  Fax: [phone]  Follow Up Time: 1 week    Sanchez Allen)  Neurology; Vascular Neurology  [address] [place] [address]  Phone: [phone]  Fax: [phone]  Follow Up Time: 1 week    Lily Moon (DO)  Gastroenterology  [address], [address]  [place] [address]  Phone: [phone]  Fax: [phone]  Follow Up Time: 1 month

## 2023-04-26 PROCEDURE — 99232 SBSQ HOSP IP/OBS MODERATE 35: CPT

## 2023-04-26 PROCEDURE — 99222 1ST HOSP IP/OBS MODERATE 55: CPT

## 2023-04-26 RX ORDER — TAMSULOSIN HYDROCHLORIDE 0.4 MG/1
0.4 CAPSULE ORAL AT BEDTIME
Refills: 0 | Status: DISCONTINUED | OUTPATIENT
Start: 2023-04-26 | End: 2023-04-26

## 2023-04-26 RX ADMIN — Medication 4 MILLIGRAM(S): at 22:46

## 2023-04-26 RX ADMIN — DICLOFENAC SODIUM 2 GRAM(S): 30 GEL TOPICAL at 17:47

## 2023-04-26 RX ADMIN — PANTOPRAZOLE SODIUM 40 MILLIGRAM(S): 20 TABLET, DELAYED RELEASE ORAL at 05:06

## 2023-04-26 RX ADMIN — GABAPENTIN 100 MILLIGRAM(S): 400 CAPSULE ORAL at 22:47

## 2023-04-26 RX ADMIN — SENNA PLUS 10 MILLILITER(S): 8.6 TABLET ORAL at 22:47

## 2023-04-26 RX ADMIN — ENOXAPARIN SODIUM 40 MILLIGRAM(S): 100 INJECTION SUBCUTANEOUS at 17:47

## 2023-04-26 RX ADMIN — ATORVASTATIN CALCIUM 80 MILLIGRAM(S): 80 TABLET, FILM COATED ORAL at 22:47

## 2023-04-26 RX ADMIN — Medication 81 MILLIGRAM(S): at 12:20

## 2023-04-26 RX ADMIN — Medication 6 MILLIGRAM(S): at 22:47

## 2023-04-26 RX ADMIN — MIDODRINE HYDROCHLORIDE 5 MILLIGRAM(S): 2.5 TABLET ORAL at 05:06

## 2023-04-26 RX ADMIN — TICAGRELOR 90 MILLIGRAM(S): 90 TABLET ORAL at 17:47

## 2023-04-26 RX ADMIN — TICAGRELOR 90 MILLIGRAM(S): 90 TABLET ORAL at 05:06

## 2023-04-26 RX ADMIN — Medication 1 DROP(S): at 17:48

## 2023-04-26 RX ADMIN — Medication 1 APPLICATION(S): at 17:49

## 2023-04-26 RX ADMIN — Medication 1 APPLICATION(S): at 17:48

## 2023-04-26 NOTE — PROGRESS NOTE ADULT - SUBJECTIVE AND OBJECTIVE BOX
Subjective  Seen and examined,   Reports no acute distress  Still has residual dysuria and suprapubic pressure during urine void  Awaiting urology review  Rt leg discomfort/spasticity responsive to gabapentin     Therapy--Engaging in therapy,   Left arm return of tone at end PROM, Elbow flexion    ROS--No head ache or abd pain, no N/V   LBM 4/25    ICU Vital Signs Last 24 Hrs  T(C): 36.9 (26 Apr 2023 08:51), Max: 36.9 (26 Apr 2023 08:51)  T(F): 98.4 (26 Apr 2023 08:51), Max: 98.4 (26 Apr 2023 08:51)  HR: 82 (26 Apr 2023 08:51) (82 - 93)  BP: 152/92 (26 Apr 2023 08:51) (133/88 - 152/92)  RR: 16 (26 Apr 2023 08:51) (16 - 16)  SpO2: 96% (26 Apr 2023 08:51) (96% - 96%)  O2 Parameters below as of 26 Apr 2023 08:51  Patient On (Oxygen Delivery Method): room air    EXAM  Gen - Comfortable  HEENT - , right  eye vision loss   Neck - Supple, No limited ROM  Pulm -clear   Cardiovascular - RRR, S1S2  Chest - good chest expansion, good respiratory effort  Abdomen - Soft, non tender  +BS, + PEG   Extremities - No Cyanosis, no clubbing, no edema, no calf tenderness    Neuro-  Alert and oriented, energy level improved  Speech  dysarthric, improving  Cranial Nerves - Left facial weakness, left eye vision loss, absent left shoulder shrug   Right  eye vision loss Rt eye  Tongue deviation to left, Flat left nasolabial fold     Motor -  Left hemiparesis                    LEFT    UE - ShAB 1+/5, improvement with hand , stiffness with terminal elbow flexion                    RIGHT UE - ShAB 5/5, EF 5/5, EE 5/5,   5/5                    LEFT    LE - 2/5                    RIGHT LE - HF 5/5, KE 5/5, DF 5/5, PF 5/5        Sensory - Intact to LT     Reflexes - DTR Intact     Coordination - FTN/HTS  impaired to left side     Tone - normal  Psychiatric - Mood stable, Affect WNL  Skin:  all skin intact, PEG in situ, not in use    4/24--Hb 11.3 Cr 0.60    MEDICATIONS  (STANDING):  AQUAPHOR (petrolatum Ointment) 1 Application(s) Topical daily  artificial  tears Solution 1 Drop(s) Both EYES two times a day  aspirin  chewable 81 milliGRAM(s) Oral daily  atorvastatin 80 milliGRAM(s) Oral at bedtime  diclofenac sodium 1% Gel 2 Gram(s) Topical two times a day  doxazosin 4 milliGRAM(s) Oral at bedtime  enoxaparin Injectable 40 milliGRAM(s) SubCutaneous every 24 hours  gabapentin 100 milliGRAM(s) Oral at bedtime  melatonin 6 milliGRAM(s) Oral at bedtime  pantoprazole    Tablet 40 milliGRAM(s) Oral before breakfast  petrolatum Ophthalmic Ointment 1 Application(s) Right EYE every 12 hours  polyethylene glycol 3350 17 Gram(s) Oral two times a day  senna Syrup 10 milliLiter(s) Oral at bedtime  sodium chloride 0.9%. 1000 milliLiter(s) (100 mL/Hr) IV Continuous <Continuous>  ticagrelor 90 milliGRAM(s) Oral every 12 hours    MEDICATIONS  (PRN):  acetaminophen     Tablet .. 650 milliGRAM(s) Oral every 6 hours PRN Mild Pain (1 - 3)  benzocaine/menthol Lozenge 1 Lozenge Oral two times a day PRN Sore Throat                       Subjective  Seen and examined,   Reports no acute distress  Still has residual dysuria and suprapubic pressure during urine void  Awaiting urology review  Rt leg discomfort/spasticity responsive to gabapentin     Therapy--Engaging in therapy,   Left arm return of tone at end PROM, Elbow flexion    ROS--No head ache or abd pain, no N/V   LBM 4/25    As requested by wife--I called her on phone and gave update on patient's investigation results--CTH, LE venous duplex  MBS and  Diet upgrade  She was happy with the explanation     ICU Vital Signs Last 24 Hrs  T(C): 36.9 (26 Apr 2023 08:51), Max: 36.9 (26 Apr 2023 08:51)  T(F): 98.4 (26 Apr 2023 08:51), Max: 98.4 (26 Apr 2023 08:51)  HR: 82 (26 Apr 2023 08:51) (82 - 93)  BP: 152/92 (26 Apr 2023 08:51) (133/88 - 152/92)  RR: 16 (26 Apr 2023 08:51) (16 - 16)  SpO2: 96% (26 Apr 2023 08:51) (96% - 96%)  O2 Parameters below as of 26 Apr 2023 08:51  Patient On (Oxygen Delivery Method): room air    EXAM  Gen - Comfortable  HEENT - , right  eye vision loss   Neck - Supple, No limited ROM  Pulm -clear   Cardiovascular - RRR, S1S2  Chest - good chest expansion, good respiratory effort  Abdomen - Soft, non tender  +BS, + PEG   Extremities - No Cyanosis, no clubbing, no edema, no calf tenderness    Neuro-  Alert and oriented, energy level improved  Speech  dysarthric, improving  Cranial Nerves - Left facial weakness, left eye vision loss, absent left shoulder shrug   Right  eye vision loss Rt eye  Tongue deviation to left, Flat left nasolabial fold     Motor -  Left hemiparesis                    LEFT    UE - ShAB 1+/5, improvement with hand , stiffness with terminal elbow flexion                    RIGHT UE - ShAB 5/5, EF 5/5, EE 5/5,   5/5                    LEFT    LE - 2/5                    RIGHT LE - HF 5/5, KE 5/5, DF 5/5, PF 5/5        Sensory - Intact to LT     Reflexes - DTR Intact     Coordination - FTN/HTS  impaired to left side     Tone - normal  Psychiatric - Mood stable, Affect WNL  Skin:  all skin intact, PEG in situ, not in use    4/24--Hb 11.3 Cr 0.60    MEDICATIONS  (STANDING):  AQUAPHOR (petrolatum Ointment) 1 Application(s) Topical daily  artificial  tears Solution 1 Drop(s) Both EYES two times a day  aspirin  chewable 81 milliGRAM(s) Oral daily  atorvastatin 80 milliGRAM(s) Oral at bedtime  diclofenac sodium 1% Gel 2 Gram(s) Topical two times a day  doxazosin 4 milliGRAM(s) Oral at bedtime  enoxaparin Injectable 40 milliGRAM(s) SubCutaneous every 24 hours  gabapentin 100 milliGRAM(s) Oral at bedtime  melatonin 6 milliGRAM(s) Oral at bedtime  pantoprazole    Tablet 40 milliGRAM(s) Oral before breakfast  petrolatum Ophthalmic Ointment 1 Application(s) Right EYE every 12 hours  polyethylene glycol 3350 17 Gram(s) Oral two times a day  senna Syrup 10 milliLiter(s) Oral at bedtime  sodium chloride 0.9%. 1000 milliLiter(s) (100 mL/Hr) IV Continuous <Continuous>  ticagrelor 90 milliGRAM(s) Oral every 12 hours    MEDICATIONS  (PRN):  acetaminophen     Tablet .. 650 milliGRAM(s) Oral every 6 hours PRN Mild Pain (1 - 3)  benzocaine/menthol Lozenge 1 Lozenge Oral two times a day PRN Sore Throat

## 2023-04-26 NOTE — PROGRESS NOTE ADULT - SUBJECTIVE AND OBJECTIVE BOX
CC: f/u for  infection    Patient reports: he is using a condom catheter at night, he still has incontinence.    REVIEW OF SYSTEMS:  All other review of systems negative (Comprehensive ROS)    Antimicrobials Day #  :s/p 3 days of zosyn and 3 days of ertapenem    Other Medications Reviewed    T(F): 97.5 (04-25-23 @ 19:40), Max: 97.5 (04-25-23 @ 19:40)  HR: 92 (04-26-23 @ 05:02)  BP: 133/88 (04-26-23 @ 05:02)  RR: 16 (04-25-23 @ 19:40)  SpO2: 96% (04-25-23 @ 19:40)  Wt(kg): --    PHYSICAL EXAM:  General: alert, no acute distress  Eyes:  anicteric, no conjunctival injection, no discharge  Oropharynx: no lesions or injection 	  Neck: supple, without adenopathy  Lungs: clear to auscultation  Heart: regular rate and rhythm; no murmur, rubs or gallops  Abdomen: soft, nondistended, nontender, without mass or organomegaly  Skin: no lesions  Extremities: no clubbing, cyanosis, or edema  Neurologic: alert, oriented, left side is weak   condom cath    LAB RESULTS:              MICROBIOLOGY:  RECENT CULTURES:      RADIOLOGY REVIEWED:

## 2023-04-26 NOTE — PROGRESS NOTE ADULT - ASSESSMENT
58M PMH HTN, DM2 who presented as a transfer from [facility] to [facility] for stroke on 3/29, s/p TICI 2b thrombectomy of the R ICA and R MCA, with R ICA stent placement.  Postoperative CT head demonstrated new MLS with some hemorrhagic conversion now admitted for rehab- pt/ot/dvt ppx    s/p sepsis 4/20/23, now resolved-  due to uti   treated with zosyn 4/20- 4/23 changed to ertapenem per ID 4/23- 4/26 for 3 days  urine culture noted   Culture Results: >100,000 CFU/ml Escherichia coli ESBL  clinically improving  [facility],   uro consult for concern of prostatitis   monitor    Stroke s/p thrombectomy of Right ICA and Right MCA and right ICA stent placement  - Left hemiparesis  - cont asa/brillinta, statin    Hypertension with OH   - off meds  monitor BP, wean off midodrine as possible  check Orthostatics     DM2  - HbA1C 7.3%  - diet controlled  - little meds- ? homeopathic medication    BPH  -doxazosin    DVT PPx  - Lovenox    will follow  d/w dr. jenkins   58M PMH HTN, DM2 who presented as a transfer from [facility] to [facility] for stroke on 3/29, s/p TICI 2b thrombectomy of the R ICA and R MCA, with R ICA stent placement.  Postoperative CT head demonstrated new MLS with some hemorrhagic conversion now admitted for rehab- pt/ot/dvt ppx    s/p sepsis 4/20/23, now resolved-  due to uti   treated with zosyn 4/20- 4/23 changed to ertapenem per ID 4/23- 4/26 for 3 days  Culture Results: >100,000 CFU/ml Escherichia coli ESBL  clinically improved,   ID following - recc noted- Ucx with ESBL,   uro consult recc noted  monitor    Stroke s/p thrombectomy of Right ICA and Right MCA and right ICA stent placement  - Left hemiparesis  - cont asa/brillinta, statin    Hypertension with OH   - off meds  monitor BP, wean off midodrine as possible  check Orthostatics     DM2  - HbA1C 7.3%  - diet controlled  - little meds- ? homeopathic medication    BPH  -doxazosin  uro suggesting  Flomax - will clarify with uro     DVT PPx  - Lovenox    dipso to [facility] when bed available   d/w dr. jenkins

## 2023-04-26 NOTE — CONSULT NOTE ADULT - ASSESSMENT
Patient is a 58y male with dm, htn who was admitted to [facility] with left sided weakness, found to have occluded ica and underwent thrombectomy 3/31. He required intubation but quickly improved. He had hemorrhagic transformation but did not need neurosurgical intervention. He has dysphagia, was given zosyn for aspiration pneumonia and had a peg placed. He had a ct showing some inflammation about the peg but no intervention needed. He came here for rehab on 4/11. He has improved and now takes po. Had low grade temp, tachycardia, leukocytosis and complains of dysuria with pyuria. Zosyn was started. Urology consulted for recs.    --follow ID recs  --start tamsulosin 0.4mg daily  --Urology follow up after discharge Patient is a 58y male with dm, htn who was admitted to [facility] with left sided weakness, found to have occluded ica and underwent thrombectomy 3/31. He required intubation but quickly improved. He had hemorrhagic transformation but did not need neurosurgical intervention. He has dysphagia, was given zosyn for aspiration pneumonia and had a peg placed. He had a ct showing some inflammation about the peg but no intervention needed. He came here for rehab on 4/11. He has improved and now takes po. Had low grade temp, tachycardia, leukocytosis and complains of dysuria with pyuria. Zosyn was started. Urology consulted for recs.    --follow ID recs  --Double doxazosin  --Urology follow up after discharge

## 2023-04-26 NOTE — PROGRESS NOTE ADULT - ASSESSMENT
57 y/o man with dm, s/p large right mca stroke with hemorrhagic transformation 3/31  , s/p thrombectomy, peg, came here on 4/11. He can now eat. He has had low grade temp, leukocytosis, tachy and dysuria .  He has received 3 days of zosyn for a zosyn sensitive ESBL E Coli and enterococcal UTI  His blood cultures have been negative  He has received 6 days of effective therapy. His leukocytosis  has resolved.  He has no signs of ongoing systemic infection.  Recommendations:  1. Okay to monitor off antibiotics  2. He should be monitored  for retention while in rehab  3. We could extend treatment with oral fosfomycin 3 grams q48 but this may also be a good juncture to simply observe.

## 2023-04-26 NOTE — CONSULT NOTE ADULT - SUBJECTIVE AND OBJECTIVE BOX
HPI:   Patient is a 58y male with dm, htn who was admitted to [facility] with left sided weakness, found to have occluded ica and underwent thrombectomy 3/31. He required intubation but quickly improved. He had hemorrhagic transformation but did not need neurosurgical intervention. He has dysphagia, was given zosyn for aspiration pneumonia and had a peg placed. He had a ct showing some inflammation about the peg but no intervention needed. He came here for rehab on . He has improved and now takes po. Over the past 24 hours he had low grade temp, tachycardia, leukocytosis and complains of dysuria with pyuria. Zosyn was started yesterday. He still has burning and the leukocytosis is worse so we are called.     REVIEW OF SYSTEMS:  All other review of systems negative (Comprehensive ROS)    PAST MEDICAL & SURGICAL HISTORY:  HTN (hypertension)      Type 2 diabetes mellitus          Allergies    No Known Allergies    Intolerances        Antimicrobials Day #  :2  piperacillin/tazobactam IVPB.. 3.375 Gram(s) IV Intermittent every 8 hours    Other Medications:  acetaminophen     Tablet .. 650 milliGRAM(s) Oral every 6 hours PRN  AQUAPHOR (petrolatum Ointment) 1 Application(s) Topical daily  artificial  tears Solution 1 Drop(s) Both EYES two times a day  aspirin  chewable 81 milliGRAM(s) Oral daily  atorvastatin 80 milliGRAM(s) Oral at bedtime  benzocaine/menthol Lozenge 1 Lozenge Oral two times a day PRN  doxazosin 4 milliGRAM(s) Oral at bedtime  enoxaparin Injectable 40 milliGRAM(s) SubCutaneous every 24 hours  melatonin 6 milliGRAM(s) Oral at bedtime  midodrine. 5 milliGRAM(s) Oral <User Schedule>  pantoprazole    Tablet 40 milliGRAM(s) Oral before breakfast  petrolatum Ophthalmic Ointment 1 Application(s) Right EYE every 12 hours  phenazopyridine 200 milliGRAM(s) Oral <User Schedule>  polyethylene glycol 3350 17 Gram(s) Oral two times a day  senna Syrup 10 milliLiter(s) Oral at bedtime  sodium chloride 0.9%. 1000 milliLiter(s) IV Continuous <Continuous>  ticagrelor 90 milliGRAM(s) Oral every 12 hours      FAMILY HISTORY:      SOCIAL HISTORY:  Smoking: [ ]Yes [ c]No  ETOH: [ ]Yes [ ]cNo  Drug Use: [ ]Yes [c ]No   [c ] Single[ ]    T(F): 98 (23 @ 15:44), Max: 99.2 (23 @ 22:47)  HR: 105 (23 @ 15:44)  BP: 132/84 (23 @ 15:44)  RR: 16 (23 @ 15:44)  SpO2: 96% (23 @ 15:44)  Wt(kg): --    PHYSICAL EXAM:  General: alert, no acute distress  Eyes:  anicteric, no conjunctival injection, no discharge  Oropharynx: no lesions or injection 	  Neck: supple, without adenopathy  Lungs: clear to auscultation  Heart: regular rate and rhythm; no murmur, rubs or gallops  Abdomen: soft, nondistended, nontender, without mass or organomegaly  Skin: no lesions  Extremities: no clubbing, cyanosis, or edema  Neurologic: alert, oriented, moves right  extremities  back no ulcer  scrotum no swelling  LAB RESULTS:                        11.8   16.77 )-----------( 343      ( 2023 13:00 )             36.4     -    137  |  100  |  11  ----------------------------<  251<H>  3.5   |  27  |  0.73    Ca    9.1      2023 13:00  Phos  3.9     -  Mg     2.0         TPro  7.1  /  Alb  2.9<L>  /  TBili  0.6  /  DBili  x   /  AST  14  /  ALT  37  /  AlkPhos  43  04-20    LIVER FUNCTIONS - ( 2023 06:23 )  Alb: 2.9 g/dL / Pro: 7.1 g/dL / ALK PHOS: 43 U/L / ALT: 37 U/L / AST: 14 U/L / GGT: x           Urinalysis Basic - ( 2023 08:00 )    Color: Yellow / Appearance: very cloudy / S.015 / pH: x  Gluc: x / Ketone: Negative  / Bili: Negative / Urobili: Negative   Blood: x / Protein: 100 / Nitrite: Negative   Leuk Esterase: Moderate / RBC: >50 /HPF / WBC >50 /HPF   Sq Epi: x / Non Sq Epi: x / Bacteria: Moderate /HPF        MICROBIOLOGY:  RECENT CULTURES:   @ 09:40 .Blood Blood     No growth to date.            RADIOLOGY REVIEWED:  PHYSICIAN INTERPRETATION:  Left Ventricle: The left ventricular internal cavity size isnormal.  Global LV systolic function was hyperdynamic. Left ventricular ejection   fraction, by visual estimation, is >75%. The left ventricular diastolic   function could not be assessed in this study.  Right Ventricle: Normal right ventricular sizeand function.  Left Atrium: The left atrium is normal in size.  Right Atrium: The right atrium is normal in size.  Pericardium: There is no evidence of pericardial effusion.  Mitral Valve: There is mild mitral annular calcification. Trace mitral   valve regurgitation is seen.  Tricuspid Valve: The tricuspid valve is not well seen. Trivial tricuspid   regurgitation is visualized. Adequate TR velocity was not obtained to   accurately assess RVSP.  Aortic Valve: The aortic valve was not well visualized. No evidence of   aortic valve regurgitation is seen.  Pulmonic Valve: The pulmonic valve was not well visualized.  Aorta: The aortic root is normal in size and structure.  Pulmonary Artery: The pulmonary artery is not well seen.  Venous: Patient on Mechanical ventilation unable to assess Right Atrial   pressure.      Summary:   1. Left ventricular ejection fraction, by visual estimation, is >75%.   2. Technically difficult study.   3. Hyperdynamic global left ventricular systolic function.   4. The left ventricular diastolic function could not be assessed in this   study.   5. There is mild concentric left ventricular hypertrophy.   6. There is no evidence of pericardial effusion.   7. Mild mitral annular calcification.   8. Trace mitral valve regurgitation.    MD Tangela Electronically signed on 3/30/2023 at 8:22:02 AM      ACC: [id number] EXAM:  XR ABDOMEN PORTABLE URGENT 1V   ORDERED BY: JAKE JORGE     ACC: [id number] EXAM:  XR CHEST PORTABLE IMMED 1V   ORDERED BY: JAKE JORGE     PROCEDURE DATE:  2023          INTERPRETATION:  Chest and abdomen. There is history of aspiration.   Patient has abdominal pain.    AP erect chest on 2023 at 7:27 AM.    Elevated right hemidiaphragm again noted.    Heart size normal.    Slight atelectasis at right base has improved from CAT scan .    Abdomen. Supine image.    There are dilated small bowel loops in the left mid abdomen. This is new   since  this year. G-tube again noted.    IMPRESSION: Improved right base atelectasis.    Present studies show dilated small bowel loops in the leftmid abdomen   without evidence of obstruction.    --- End of Report ---      IMPRESSION:  Large subacute right MCA infarct with hemorrhagic transformation in the   right basal ganglia and right temporal lobe. Mildly improving 5 mm   right-to-left midline shift.  FLAIR hyperintense signal along the right frontalparietal temporal sulci   suggesting small subarachnoid hemorrhage.    --- End of Report ---        Impression:  57 y/o man with dm, s/p large right mca stroke with hemorrhagic transformation 3/31  , s/p thrombectomy, peg, came here on . He can now eat. He has had low grade temp, leukocytosis, tachy and dysuria over the past 24 hours. He has been on zosyn and still feels unwell. It may be too early to consider this a zosyn failure, i do suspect a gu source of sepsis. He could have a zosyn resistant organism  Recommendations:  will continue zosyn but give a dose of amikacin in case he has an esbl or cre organism and a dose of vanco  f/u urine cx  monitor pvr  f/u blood cx
Patient is a 58y male with dm, htn who was admitted to [facility] with left sided weakness, found to have occluded ica and underwent thrombectomy 3/31. He required intubation but quickly improved. He had hemorrhagic transformation but did not need neurosurgical intervention. He has dysphagia, was given zosyn for aspiration pneumonia and had a peg placed. He had a ct showing some inflammation about the peg but no intervention needed. He came here for rehab on . He has improved and now takes po. Had low grade temp, tachycardia, leukocytosis and complains of dysuria with pyuria. Zosyn was started .     REVIEW OF SYSTEMS:  All other review of systems negative (Comprehensive ROS)    PAST MEDICAL & SURGICAL HISTORY:  HTN (hypertension)      Type 2 diabetes mellitus          Allergies    No Known Allergies    Intolerances        Antimicrobials Day #  :2  piperacillin/tazobactam IVPB.. 3.375 Gram(s) IV Intermittent every 8 hours    Other Medications:  acetaminophen     Tablet .. 650 milliGRAM(s) Oral every 6 hours PRN  AQUAPHOR (petrolatum Ointment) 1 Application(s) Topical daily  artificial  tears Solution 1 Drop(s) Both EYES two times a day  aspirin  chewable 81 milliGRAM(s) Oral daily  atorvastatin 80 milliGRAM(s) Oral at bedtime  benzocaine/menthol Lozenge 1 Lozenge Oral two times a day PRN  doxazosin 4 milliGRAM(s) Oral at bedtime  enoxaparin Injectable 40 milliGRAM(s) SubCutaneous every 24 hours  melatonin 6 milliGRAM(s) Oral at bedtime  midodrine. 5 milliGRAM(s) Oral <User Schedule>  pantoprazole    Tablet 40 milliGRAM(s) Oral before breakfast  petrolatum Ophthalmic Ointment 1 Application(s) Right EYE every 12 hours  phenazopyridine 200 milliGRAM(s) Oral <User Schedule>  polyethylene glycol 3350 17 Gram(s) Oral two times a day  senna Syrup 10 milliLiter(s) Oral at bedtime  sodium chloride 0.9%. 1000 milliLiter(s) IV Continuous <Continuous>  ticagrelor 90 milliGRAM(s) Oral every 12 hours      FAMILY HISTORY:      SOCIAL HISTORY:  Smoking: [ ]Yes [ c]No  ETOH: [ ]Yes [ ]cNo  Drug Use: [ ]Yes [c ]No   [c ] Single[ ]    T(C): 36.9 (2023 08:51), Max: 36.9 (2023 08:51)  T(F): 98.4 (2023 08:51), Max: 98.4 (2023 08:51)  HR: 82 (2023 08:51) (82 - 93)  BP: 152/92 (2023 08:51) (133/88 - 152/92)  RR: 16 (2023 08:51) (16 - 16)  SpO2: 96% (2023 08:51) (96% - 96%)  O2 Parameters below as of 2023 08:51  Patient On (Oxygen Delivery Method): room air    PHYSICAL EXAM:  General: alert, no acute distress  Eyes:  anicteric, no conjunctival injection, no discharge  Oropharynx: no lesions or injection 	  Neck: supple, without adenopathy  Lungs: clear to auscultation  Heart: regular rate and rhythm; no murmur, rubs or gallops  Abdomen: soft, nondistended, nontender, without mass or organomegaly  Skin: no lesions  Extremities: no clubbing, cyanosis, or edema  Neurologic: alert, oriented, moves right  extremities  back no ulcer  scrotum no swelling  LAB RESULTS:                        11.8   16.77 )-----------( 343      ( 2023 13:00 )             36.4     04-21    137  |  100  |  11  ----------------------------<  251<H>  3.5   |  27  |  0.73    Ca    9.1      2023 13:00  Phos  3.9     04-21  Mg     2.0     04-21    TPro  7.1  /  Alb  2.9<L>  /  TBili  0.6  /  DBili  x   /  AST  14  /  ALT  37  /  AlkPhos  43  04-20    LIVER FUNCTIONS - ( 2023 06:23 )  Alb: 2.9 g/dL / Pro: 7.1 g/dL / ALK PHOS: 43 U/L / ALT: 37 U/L / AST: 14 U/L / GGT: x           Urinalysis Basic - ( 2023 08:00 )    Color: Yellow / Appearance: very cloudy / S.015 / pH: x  Gluc: x / Ketone: Negative  / Bili: Negative / Urobili: Negative   Blood: x / Protein: 100 / Nitrite: Negative   Leuk Esterase: Moderate / RBC: >50 /HPF / WBC >50 /HPF   Sq Epi: x / Non Sq Epi: x / Bacteria: Moderate /HPF        MICROBIOLOGY:  RECENT CULTURES:   @ 09:40 .Blood Blood     No growth to date.            RADIOLOGY REVIEWED:  PHYSICIAN INTERPRETATION:  Left Ventricle: The left ventricular internal cavity size isnormal.  Global LV systolic function was hyperdynamic. Left ventricular ejection   fraction, by visual estimation, is >75%. The left ventricular diastolic   function could not be assessed in this study.  Right Ventricle: Normal right ventricular sizeand function.  Left Atrium: The left atrium is normal in size.  Right Atrium: The right atrium is normal in size.  Pericardium: There is no evidence of pericardial effusion.  Mitral Valve: There is mild mitral annular calcification. Trace mitral   valve regurgitation is seen.  Tricuspid Valve: The tricuspid valve is not well seen. Trivial tricuspid   regurgitation is visualized. Adequate TR velocity was not obtained to   accurately assess RVSP.  Aortic Valve: The aortic valve was not well visualized. No evidence of   aortic valve regurgitation is seen.  Pulmonic Valve: The pulmonic valve was not well visualized.  Aorta: The aortic root is normal in size and structure.  Pulmonary Artery: The pulmonary artery is not well seen.  Venous: Patient on Mechanical ventilation unable to assess Right Atrial   pressure.      Summary:   1. Left ventricular ejection fraction, by visual estimation, is >75%.   2. Technically difficult study.   3. Hyperdynamic global left ventricular systolic function.   4. The left ventricular diastolic function could not be assessed in this   study.   5. There is mild concentric left ventricular hypertrophy.   6. There is no evidence of pericardial effusion.   7. Mild mitral annular calcification.   8. Trace mitral valve regurgitation.    MD Tangela Electronically signed on 3/30/2023 at 8:22:02 AM      ACC: [id number] EXAM:  XR ABDOMEN PORTABLE URGENT 1V   ORDERED BY: JAKE JORGE     ACC: [id number] EXAM:  XR CHEST PORTABLE IMMED 1V   ORDERED BY: JAKE JORGE     PROCEDURE DATE:  2023          INTERPRETATION:  Chest and abdomen. There is history of aspiration.   Patient has abdominal pain.    AP erect chest on 2023 at 7:27 AM.    Elevated right hemidiaphragm again noted.    Heart size normal.    Slight atelectasis at right base has improved from CAT scan .    Abdomen. Supine image.    There are dilated small bowel loops in the left mid abdomen. This is new   since  this year. G-tube again noted.    IMPRESSION: Improved right base atelectasis.    Present studies show dilated small bowel loops in the leftmid abdomen   without evidence of obstruction.    --- End of Report ---      IMPRESSION:  Large subacute right MCA infarct with hemorrhagic transformation in the   right basal ganglia and right temporal lobe. Mildly improving 5 mm   right-to-left midline shift.  FLAIR hyperintense signal along the right frontalparietal temporal sulci   suggesting small subarachnoid hemorrhage.    --- End of Report ---          
Patient is a 58y old  Male who presents with a chief complaint of Functional deficits s/p CVA (11 Apr 2023 13:40)    HPI:  Sakina Dowell is a 58 year old male with PMH of HTN, and DM; who presented as a transfer from [facility] to [facility] for stroke on 3/29. Last known normal was the night before and he was outside the TKN window, and was intubated prior to [facility] transfer. Imaging was significant for R ICA occlusion and was immediately taken to angio where he underwent a TICI 2b thrombectomy of the R ICA and R MCA, with R ICA stent placement.  Postoperative CT head demonstrated new MLS with some hemorrhagic conversion. Neurosurgery was consulted but deemed no required intervention.  He was extubated on 4/7. His brain MRI showed improved midline shift and subsequent small frontal parietal SAH.  His hospital course was complicated by aspiration PNA (s/p Zosyn); and dysphagia (requiring PEG - placed 4/6). He is on Brilinta for stent placement. PM&R was consulted and deemed him an appropriate candidate for IRF. He was medically stabilized and cleared for discharge to [facility].  (11 Apr 2023 13:40)    Patient seen at bedside.  Feels okay.  States he has some tenderness around the PEG tube insertion site.   Denies chest pain, shortness of breath.        PAST MEDICAL & SURGICAL HISTORY:  HTN (hypertension)    Type 2 diabetes mellitus    Father and mother no history of CVA    Substance Use (street drugs): ( X ) never used  (  ) other:  Tobacco Usage:  (  X ) never smoked   Alcohol Usage: none  Sexual History:  with wife  Recent Travel: none    Allergies    No Known Allergies    Intolerances    AQUAPHOR (petrolatum Ointment) 1 Application(s) Topical daily  melatonin 6 milliGRAM(s) Oral at bedtime PRN    REVIEW OF SYSTEMS:  CONSTITUTIONAL: No fever, weight loss, or fatigue  EYES: No eye pain, visual disturbances, or discharge  ENMT:  No difficulty hearing, tinnitus, vertigo; No sinus or throat pain  NECK: No pain or stiffness  BREASTS: No pain, masses, or nipple discharge  RESPIRATORY: No cough, wheezing, chills or hemoptysis; No shortness of breath  CARDIOVASCULAR: No chest pain, palpitations, dizziness, or leg swelling  GASTROINTESTINAL: No abdominal or epigastric pain. No nausea, vomiting, or hematemesis; No diarrhea or constipation. No melena or hematochezia.  GENITOURINARY: No dysuria, frequency, hematuria, or incontinence  NEUROLOGICAL: No headaches, memory loss, loss of strength, numbness, or tremors  SKIN: No itching, burning, rashes, or lesions   LYMPH NODES: No enlarged glands  ENDOCRINE: No heat or cold intolerance; No hair loss  MUSCULOSKELETAL: No joint pain or swelling; No muscle, back, or extremity pain  PSYCHIATRIC: No depression, anxiety, mood swings, or difficulty sleeping  HEME/LYMPH: No easy bruising, or bleeding gums  ALLERY AND IMMUNOLOGIC: No hives or eczema    ALL ROS REVIEWED AND NORMAL EXCEPT AS STATED ABOVE    T(C): 36.8 (04-11-23 @ 19:58), Max: 37.1 (04-11-23 @ 15:15)  HR: 106 (04-12-23 @ 08:49) (83 - 106)  BP: 102/60 (04-12-23 @ 08:49) (102/60 - 126/75)  RR: 18 (04-12-23 @ 08:49) (16 - 18)  SpO2: 93% (04-12-23 @ 08:49) (93% - 95%)  Wt(kg): --Vital Signs Last 24 Hrs  T(C): 36.8 (11 Apr 2023 19:58), Max: 37.1 (11 Apr 2023 15:15)  T(F): 98.2 (11 Apr 2023 19:58), Max: 98.8 (11 Apr 2023 15:15)  HR: 106 (12 Apr 2023 08:49) (83 - 106)  BP: 102/60 (12 Apr 2023 08:49) (102/60 - 126/75)  BP(mean): --  RR: 18 (12 Apr 2023 08:49) (16 - 18)  SpO2: 93% (12 Apr 2023 08:49) (93% - 95%)    Parameters below as of 12 Apr 2023 08:49  Patient On (Oxygen Delivery Method): room air        PHYSICAL EXAM:  GENERAL: NAD, well-groomed, well-developed.   HEAD:  Atraumatic, Normocephalic, left facial droop noted  EYES: EOMI, PERRLA, conjunctiva and sclera clear  ENMT: No tonsillar erythema, exudates, or enlargement; Moist mucous membranes, Good dentition, No lesions  NECK: Supple, No JVD, Normal thyroid  NERVOUS SYSTEM:  Alert & Oriented X3, Good concentration; Motor Strength 5/5 B/L upper and lower extremities; DTRs 2+ intact and symmetric  CHEST/LUNG: Clear to percussion bilaterally; No rales, rhonchi, wheezing, or rubs  HEART: Regular rate and rhythm; No murmurs, rubs, or gallops  ABDOMEN: Soft, Nontender, Nondistended; Bowel sounds present  EXTREMITIES:  2+ Peripheral Pulses, No clubbing, cyanosis, or edema  LYMPH: No lymphadenopathy noted  SKIN: No rashes or lesions  Neuro: left sided paraplegia, very minimal movement in left hand and left foot, 5/5 in right UE and LE    LABS:             11.5   8.34  )-----------( 405      ( 12 Apr 2023 06:20 )             34.8     04-12  141  |  104  |  11  ----------------------------<  131<H>  3.7   |  26  |  0.78    Ca    8.6      12 Apr 2023 06:20    TPro  6.5  /  Alb  2.6<L>  /  TBili  0.6  /  DBili  x   /  AST  46<H>  /  ALT  96<H>  /  AlkPhos  52  04-12         CAPILLARY BLOOD GLUCOSE                RADIOLOGY & ADDITIONAL TESTS:    Consultant(s) Notes Reviewed:  [x ] YES  [ ] NO  Care Discussed with Consultants/Other Providers [ x] YES  [ ] NO  Imaging Personally Reviewed:  [ ] YES  [ ] NO

## 2023-04-26 NOTE — PROGRESS NOTE ADULT - SUBJECTIVE AND OBJECTIVE BOX
58y old  Male who presents with a chief complaint of Functional deficits s/p CVA     Patient seen and examined at bedside.  clinically appears well this am, eating, voiding  no pain, n/v, Denies sob,  dyspnea, abd pain, no dysuria    Vital Signs Last 24 Hrs  T(C): 36.9 (26 Apr 2023 08:51), Max: 36.9 (26 Apr 2023 08:51)  T(F): 98.4 (26 Apr 2023 08:51), Max: 98.4 (26 Apr 2023 08:51)  HR: 82 (26 Apr 2023 08:51) (82 - 93)  BP: 152/92 (26 Apr 2023 08:51) (133/88 - 152/92)  BP(mean): --  RR: 16 (26 Apr 2023 08:51) (16 - 16)  SpO2: 96% (26 Apr 2023 08:51) (96% - 96%)    Parameters below as of 26 Apr 2023 08:51  Patient On (Oxygen Delivery Method): room air      GENERAL- NAD  EAR/NOSE/MOUTH/THROAT - MMM  EYES- FIOR, conjunctiva and Sclera clear  NECK- supple  RESPIRATORY-  clear to auscultation bilaterally, non laboured breathing  CARDIOVASCULAR - SIS2, RRR  GI - soft NT BS present  EXTREMITIES- no pedal edema  NEUROLOGY- left sided weakness, facial droop  PSYCHIATRY- AAO X 3      MEDICATIONS  (STANDING):  AQUAPHOR (petrolatum Ointment) 1 Application(s) Topical daily  artificial  tears Solution 1 Drop(s) Both EYES two times a day  aspirin  chewable 81 milliGRAM(s) Oral daily  atorvastatin 80 milliGRAM(s) Oral at bedtime  diclofenac sodium 1% Gel 2 Gram(s) Topical two times a day  doxazosin 4 milliGRAM(s) Oral at bedtime  enoxaparin Injectable 40 milliGRAM(s) SubCutaneous every 24 hours  gabapentin 100 milliGRAM(s) Oral at bedtime  melatonin 6 milliGRAM(s) Oral at bedtime  pantoprazole    Tablet 40 milliGRAM(s) Oral before breakfast  petrolatum Ophthalmic Ointment 1 Application(s) Right EYE every 12 hours  polyethylene glycol 3350 17 Gram(s) Oral two times a day  senna Syrup 10 milliLiter(s) Oral at bedtime  tamsulosin 0.4 milliGRAM(s) Oral at bedtime  ticagrelor 90 milliGRAM(s) Oral every 12 hours    MEDICATIONS  (PRN):  acetaminophen     Tablet .. 650 milliGRAM(s) Oral every 6 hours PRN Mild Pain (1 - 3)  benzocaine/menthol Lozenge 1 Lozenge Oral two times a day PRN Sore Throat

## 2023-04-26 NOTE — PROGRESS NOTE ADULT - ASSESSMENT
Assessment/Plan:  CONG AGUAYO is a 58 year old male with PMH of HTN, and DM; who presented as a transfer from [facility] to [facility] for stroke on 3/29, where he was found to have a R ICA occlusion and he underwent a TICI 2b thromectomy of R ICA and R MCA with R ICA stent placement. Extubated on 4/7. His brain MRI was significant for improved midline shift and small frontal parietal SAH. His hospital course was complicated by aspiration PNA (s/p Zosyn); and dysphagia (requiring PEG - placed 4/6). Patient now admitted for a multidisciplinary rehab program. 04-11-23 @ 13:54    * Await orthotics review for left sided weakness  * Continue IV antibiotics for   * Await AMAN placement * COVID test today   * PEG- Tube--placeds 4/6, patient will f/u with GI post DC     Rehab Management/MEDICAL MANAGEMENT     #Functional deficits s/p CVA  -Continue  Comprehensive Rehab Program of PT/OT/SLP  - 3 hours a day, 5 days a week  - P&O as needed   - R ICA occlusion, s/p TICI 2b thrombectomy of R ICA and R MCA with R ICA stent placement  - ASA & Brilinta  - Outpt Neurology follow up- Dr. Reid  - Outpt IR follow up - Dr. Allen  --orthotics referral with some functional improvement  Left sided weakness, await review   --Rt leg discomfort/stiffness---commenced gabaptnin 4/25, responding     #Dysphagia/aspiration PNA  - S/p Zosyn  - PEG tube last prior to Acute rehab admission  - Repeat CT chest in 1 month (~5/8)    * UTI (ESBL) 4/20 --on zosyn, switched to Entrapenem 4/23 continue ID f/u  resp viral panel -ve    * Incidental Small adrenal nodule--  will refer to Urology for f/u post DC     #HTN  - Lisinopril--D/MONET 4/13  - Doxazosin  - Outpt Cardiology follow up - Dr. Song    # Orthostatic hypotension--midodrine 2.5mg bid, increased to 5mg bid 4/17, bp stable--d/monet 4/26      #HLD  -Atorvastatin    #DM  - A1c: 7.3  - ISS  - FS ACHS    #Dry eye  - Occular lubricant ointment  - Artificial tears    #Sleep  - Melatonin 6mg nightly PRN, switched to standing 4/18, sleep improved    #Skin  - Skin-: intact, PEG in situ   - Pressure injury/Skin: OOB to Chair, PT/OT     #Pain Mgmt   - Tylenol PRN    #GI/Bowel Mgmt   - Pantoprazole  - Continent c/w Senna, Miralax   -lactulose    #/Bladder Mgmt   BPH c/w doxazosin, monitor residual dysuria/strainging while voiding, PVRs unremarkable  Adrenal nodule  --Urology referral;      #FEN   PEG placed 4/6, in acute care, tolerating oral feeds since acute rehab placement, f/u with GI post dc    - Diet - Easy to Chew, halal  - Dysphagia  SLP - evaluation and treatment    #Precautions / PROPHYLAXIS:   - Falls  - ortho: Weight bearing status: WBAT   - Lungs: Aspiration, Incentive Spirometer   - DVT: Lovenox  ---------------------------------------------------------------  4/24--Discussed with wife present at bed side after initial review. I explained functional and clinical update and AMAN placement for further therapy. She agreed tto same    IDT conference on 4/20  TDD: 4/27 to home vs AMAN  Barriers: Distracted, tangentile, perseverative, requires cueing, R eye visual deficit. Poor insight, safety and memory.   Social Work: Lives in a basement apartment with spouse and 2 sons, 12 MEI. Patient is main source of income for family. Spouse does not work.  OT: Max A for UBD/LBD. Min-mod A for transfers.  PT: Mod A for transfers. Ambulated 60 ft with RW with min A and WCF.   SLP: Easy to chew diet. Easily distracted, tangentile, perseverative. Poor insight, safety and memory.   ---------------------------------------------------------------  FOLLOW UP/OUTPATIENT:    Bev Song)  Cardiology; Internal Medicine  [address] [facility] [address], [address]  [place], [place] [id number]  Phone: [phone]  Fax: [phone]    Ivan Reid (MD; PhD)  Neurology; Vascular Neurology  [address], [address]  [place] [address]  Phone: [phone]  Fax: [phone]  Follow Up Time:     Primary doctor,   Phone: (   )    -  Fax: (   )    -  Follow Up Time:     Sanchez Allen)  Neurology; Vascular Neurology  [address] [place] [address]  Phone: [phone]  Fax: [phone]  Follow Up Time:     Lily Moon (DO)  Gastroenterology  [address], [address]  [place] [address]  Phone: [phone]  Fax: [phone]  Follow Up Time:    Urology f/u for persistent dysuria,  Small adrenal nodule  ---------------------------------------------------------------     Assessment/Plan:  CONG AGUAYO is a 58 year old male with PMH of HTN, and DM; who presented as a transfer from [facility] to [facility] for stroke on 3/29, where he was found to have a R ICA occlusion and he underwent a TICI 2b thromectomy of R ICA and R MCA with R ICA stent placement. Extubated on 4/7. His brain MRI was significant for improved midline shift and small frontal parietal SAH. His hospital course was complicated by aspiration PNA (s/p Zosyn); and dysphagia (requiring PEG - placed 4/6). Patient now admitted for a multidisciplinary rehab program. 04-11-23 @ 13:54    * Await orthotics review for left sided weakness  * Continue IV antibiotics for   * Await AMAN placement * COVID test today   * PEG- Tube--placeds 4/6, patient will f/u with GI post DC     Rehab Management/MEDICAL MANAGEMENT     #Functional deficits s/p CVA  -Continue  Comprehensive Rehab Program of PT/OT/SLP  - 3 hours a day, 5 days a week  - P&O as needed   - R ICA occlusion, s/p TICI 2b thrombectomy of R ICA and R MCA with R ICA stent placement  - ASA & Brilinta  - Outpt Neurology follow up- Dr. Reid  - Outpt IR follow up - Dr. Allen  --orthotics referral with some functional improvement  Left sided weakness, await review   --Rt leg discomfort/stiffness---commenced gabaptnin 4/25, responding     #Dysphagia/aspiration PNA  - S/p Zosyn  - PEG tube last prior to Acute rehab admission  - Repeat CT chest in 1 month (~5/8)    * UTI (ESBL) 4/20 --on zosyn, switched to Entrapenem 4/23 continue ID f/u  resp viral panel -ve    * Incidental Small adrenal nodule--  will refer to Urology for f/u post DC     #HTN  - Lisinopril--D/MONET 4/13  - Doxazosin  - Outpt Cardiology follow up - Dr. Song    # Orthostatic hypotension--midodrine 2.5mg bid, increased to 5mg bid 4/17, bp stable--d/monet 4/26      #HLD  -Atorvastatin    #DM  - A1c: 7.3  - ISS  - FS ACHS    #Dry eye  - Occular lubricant ointment  - Artificial tears    #Sleep  - Melatonin 6mg nightly PRN, switched to standing 4/18, sleep improved    #Skin  - Skin-: intact, PEG in situ   - Pressure injury/Skin: OOB to Chair, PT/OT     #Pain Mgmt   - Tylenol PRN    #GI/Bowel Mgmt   - Pantoprazole  - Continent c/w Senna, Miralax   -lactulose    #/Bladder Mgmt   BPH c/w doxazosin, monitor residual dysuria/strainging while voiding, PVRs unremarkable  Adrenal nodule  --Urology referral;      #FEN   PEG placed 4/6, in acute care, tolerating oral feeds since acute rehab placement, f/u with GI post dc    - Diet - Easy to Chew, halal  - Dysphagia  SLP - evaluation and treatment    #Precautions / PROPHYLAXIS:   - Falls  - ortho: Weight bearing status: WBAT   - Lungs: Aspiration, Incentive Spirometer   - DVT: Lovenox  ---------------------------------------------------------------  4/24--Discussed with wife present at bed side after initial review. I explained functional and clinical update and AMAN placement for further therapy. She agreed tto same  4/25--I called patient's wife and gave her update on patient's condition and treatment    IDT conference on 4/20  TDD: 4/27 to home vs AMAN  Barriers: Distracted, tangentile, perseverative, requires cueing, R eye visual deficit. Poor insight, safety and memory.   Social Work: Lives in a basement apartment with spouse and 2 sons, 12 MEI. Patient is main source of income for family. Spouse does not work.  OT: Max A for UBD/LBD. Min-mod A for transfers.  PT: Mod A for transfers. Ambulated 60 ft with RW with min A and WCF.   SLP: Easy to chew diet. Easily distracted, tangentile, perseverative. Poor insight, safety and memory.   ---------------------------------------------------------------  FOLLOW UP/OUTPATIENT:    Bev Song (MD)  Cardiology; Internal Medicine  [address] [facility], [address]  [place], [place] [id number]  Phone: [phone]  Fax: [phone]    Ivan Reid (MD; PhD)  Neurology; Vascular Neurology  [address] [facility] [facility] [facility] [address]  [place] [address]  Phone: [phone]  Fax: [phone]  Follow Up Time:     Primary doctor,   Phone: (   )    -  Fax: (   )    -  Follow Up Time:     Sanchez Allen (MD)  Neurology; Vascular Neurology  [address] [place] [address]  Phone: [phone]  Fax: [phone]  Follow Up Time:     Lily Moon (DO)  Gastroenterology  [address] [facility], [address]  [place] [address]  Phone: [phone]  Fax: [phone]  Follow Up Time:    Urology f/u for persistent dysuria,  Small adrenal nodule  ---------------------------------------------------------------

## 2023-04-27 ENCOUNTER — TRANSCRIPTION ENCOUNTER (OUTPATIENT)
Age: 58
End: 2023-04-27

## 2023-04-27 LAB
ALBUMIN SERPL ELPH-MCNC: 2.7 G/DL — LOW (ref 3.3–5)
ALP SERPL-CCNC: 51 U/L — SIGNIFICANT CHANGE UP (ref 40–120)
ALT FLD-CCNC: 39 U/L — SIGNIFICANT CHANGE UP (ref 10–45)
ANION GAP SERPL CALC-SCNC: 7 MMOL/L — SIGNIFICANT CHANGE UP (ref 5–17)
AST SERPL-CCNC: 24 U/L — SIGNIFICANT CHANGE UP (ref 10–40)
BASOPHILS # BLD AUTO: 0.04 K/UL — SIGNIFICANT CHANGE UP (ref 0–0.2)
BASOPHILS NFR BLD AUTO: 0.6 % — SIGNIFICANT CHANGE UP (ref 0–2)
BILIRUB SERPL-MCNC: 0.4 MG/DL — SIGNIFICANT CHANGE UP (ref 0.2–1.2)
BUN SERPL-MCNC: 10 MG/DL — SIGNIFICANT CHANGE UP (ref 7–23)
CALCIUM SERPL-MCNC: 9 MG/DL — SIGNIFICANT CHANGE UP (ref 8.4–10.5)
CHLORIDE SERPL-SCNC: 106 MMOL/L — SIGNIFICANT CHANGE UP (ref 96–108)
CO2 SERPL-SCNC: 29 MMOL/L — SIGNIFICANT CHANGE UP (ref 22–31)
CREAT SERPL-MCNC: 0.58 MG/DL — SIGNIFICANT CHANGE UP (ref 0.5–1.3)
EGFR: 112 ML/MIN/1.73M2 — SIGNIFICANT CHANGE UP
EOSINOPHIL # BLD AUTO: 0.3 K/UL — SIGNIFICANT CHANGE UP (ref 0–0.5)
EOSINOPHIL NFR BLD AUTO: 4.2 % — SIGNIFICANT CHANGE UP (ref 0–6)
GLUCOSE SERPL-MCNC: 182 MG/DL — HIGH (ref 70–99)
HCT VFR BLD CALC: 35.6 % — LOW (ref 39–50)
HGB BLD-MCNC: 11.5 G/DL — LOW (ref 13–17)
IMM GRANULOCYTES NFR BLD AUTO: 0.7 % — SIGNIFICANT CHANGE UP (ref 0–0.9)
LYMPHOCYTES # BLD AUTO: 1.89 K/UL — SIGNIFICANT CHANGE UP (ref 1–3.3)
LYMPHOCYTES # BLD AUTO: 26.4 % — SIGNIFICANT CHANGE UP (ref 13–44)
MCHC RBC-ENTMCNC: 26.6 PG — LOW (ref 27–34)
MCHC RBC-ENTMCNC: 32.3 GM/DL — SIGNIFICANT CHANGE UP (ref 32–36)
MCV RBC AUTO: 82.4 FL — SIGNIFICANT CHANGE UP (ref 80–100)
MONOCYTES # BLD AUTO: 0.57 K/UL — SIGNIFICANT CHANGE UP (ref 0–0.9)
MONOCYTES NFR BLD AUTO: 8 % — SIGNIFICANT CHANGE UP (ref 2–14)
NEUTROPHILS # BLD AUTO: 4.3 K/UL — SIGNIFICANT CHANGE UP (ref 1.8–7.4)
NEUTROPHILS NFR BLD AUTO: 60.1 % — SIGNIFICANT CHANGE UP (ref 43–77)
NRBC # BLD: 0 /100 WBCS — SIGNIFICANT CHANGE UP (ref 0–0)
PLATELET # BLD AUTO: 318 K/UL — SIGNIFICANT CHANGE UP (ref 150–400)
POTASSIUM SERPL-MCNC: 3.7 MMOL/L — SIGNIFICANT CHANGE UP (ref 3.5–5.3)
POTASSIUM SERPL-SCNC: 3.7 MMOL/L — SIGNIFICANT CHANGE UP (ref 3.5–5.3)
PROT SERPL-MCNC: 6.7 G/DL — SIGNIFICANT CHANGE UP (ref 6–8.3)
RBC # BLD: 4.32 M/UL — SIGNIFICANT CHANGE UP (ref 4.2–5.8)
RBC # FLD: 13.8 % — SIGNIFICANT CHANGE UP (ref 10.3–14.5)
SARS-COV-2 RNA SPEC QL NAA+PROBE: SIGNIFICANT CHANGE UP
SODIUM SERPL-SCNC: 142 MMOL/L — SIGNIFICANT CHANGE UP (ref 135–145)
WBC # BLD: 7.15 K/UL — SIGNIFICANT CHANGE UP (ref 3.8–10.5)
WBC # FLD AUTO: 7.15 K/UL — SIGNIFICANT CHANGE UP (ref 3.8–10.5)

## 2023-04-27 PROCEDURE — 99232 SBSQ HOSP IP/OBS MODERATE 35: CPT

## 2023-04-27 RX ORDER — LIDOCAINE 4 G/100G
2 CREAM TOPICAL
Qty: 0 | Refills: 0 | DISCHARGE
Start: 2023-04-27

## 2023-04-27 RX ORDER — LIDOCAINE 4 G/100G
2 CREAM TOPICAL DAILY
Refills: 0 | Status: DISCONTINUED | OUTPATIENT
Start: 2023-04-27 | End: 2023-05-05

## 2023-04-27 RX ADMIN — TICAGRELOR 90 MILLIGRAM(S): 90 TABLET ORAL at 17:15

## 2023-04-27 RX ADMIN — TICAGRELOR 90 MILLIGRAM(S): 90 TABLET ORAL at 05:26

## 2023-04-27 RX ADMIN — PANTOPRAZOLE SODIUM 40 MILLIGRAM(S): 20 TABLET, DELAYED RELEASE ORAL at 05:32

## 2023-04-27 RX ADMIN — ATORVASTATIN CALCIUM 80 MILLIGRAM(S): 80 TABLET, FILM COATED ORAL at 21:52

## 2023-04-27 RX ADMIN — Medication 6 MILLIGRAM(S): at 21:52

## 2023-04-27 RX ADMIN — POLYETHYLENE GLYCOL 3350 17 GRAM(S): 17 POWDER, FOR SOLUTION ORAL at 17:15

## 2023-04-27 RX ADMIN — Medication 1 APPLICATION(S): at 12:50

## 2023-04-27 RX ADMIN — Medication 1 DROP(S): at 05:27

## 2023-04-27 RX ADMIN — LIDOCAINE 2 PATCH: 4 CREAM TOPICAL at 21:56

## 2023-04-27 RX ADMIN — Medication 1 APPLICATION(S): at 05:27

## 2023-04-27 RX ADMIN — Medication 81 MILLIGRAM(S): at 12:48

## 2023-04-27 RX ADMIN — Medication 1 APPLICATION(S): at 17:14

## 2023-04-27 RX ADMIN — Medication 1 DROP(S): at 17:14

## 2023-04-27 RX ADMIN — LIDOCAINE 2 PATCH: 4 CREAM TOPICAL at 12:48

## 2023-04-27 RX ADMIN — Medication 4 MILLIGRAM(S): at 21:52

## 2023-04-27 RX ADMIN — ENOXAPARIN SODIUM 40 MILLIGRAM(S): 100 INJECTION SUBCUTANEOUS at 17:16

## 2023-04-27 RX ADMIN — DICLOFENAC SODIUM 2 GRAM(S): 30 GEL TOPICAL at 05:28

## 2023-04-27 NOTE — PROGRESS NOTE ADULT - SUBJECTIVE AND OBJECTIVE BOX
Subjective  Seen and examined,  Reports reduced intensity of dysuria  Commenced increased dose doxazosin yesterday as recommended by urology  Rt leg spams resolved s/p gabapentin, but nursing staff noted AM sedation    Therapy--engaging, Left leg motor function improving  Rt knee ROM normal     ROS--No head ache or abd pain, no N/V   LBM 4/26    Vital Signs Last 24 Hrs  T(C): 36.6 (27 Apr 2023 08:51), Max: 36.6 (27 Apr 2023 08:51)  T(F): 97.9 (27 Apr 2023 08:51), Max: 97.9 (27 Apr 2023 08:51)  HR: 101 (27 Apr 2023 08:54) (101 - 107)  BP: 110/80 (27 Apr 2023 08:54) (110/80 - 171/119)  RR: 16 (27 Apr 2023 08:51) (16 - 16)  SpO2: 97% (27 Apr 2023 08:51) (97% - 97%)  O2 Parameters below as of 27 Apr 2023 08:51  Patient On (Oxygen Delivery Method): room air      EXAM  Gen - Comfortable  HEENT - , right  eye vision loss   Neck - Supple, No limited ROM  Pulm -clear   Cardiovascular - RRR, S1S2  Chest - good chest expansion, good respiratory effort  Abdomen - Soft, non tender  +BS, + PEG   Extremities - No Cyanosis, no clubbing, no edema, no calf tenderness    Neuro-  Alert and oriented, interactive  Speech  dysarthric, improving  Cranial Nerves - Left facial weakness, left eye vision loss, absent left shoulder shrug   Right  eye vision loss Rt eye  Tongue deviation to left, Flat left nasolabial fold  Motor -  Left hemiparesis                    LEFT    UE - ShAB 1+/5, improvement with hand , stiffness with terminal elbow flexion                    RIGHT UE - ShAB 5/5, EF 5/5, EE 5/5,   5/5                    LEFT    LE - 2/5 hip Knee 3/5, ankle 1/5                    RIGHT LE - HF 5/5, KE 5/5, DF 5/5, PF 5/5        Sensory - Intact to LT     Reflexes - DTR Intact     Coordination - FTN/HTS  impaired to left side     Tone - normal  Psychiatric - Mood stable, Affect WNL  Skin:  all skin intact, PEG in situ, not in use    RECENT LABS/IMAGING                        11.5   7.15  )-----------( 318      ( 27 Apr 2023 06:17 )             35.6     04-27    142  |  106  |  10  ----------------------------<  182<H>  3.7   |  29  |  0.58    Ca    9.0      27 Apr 2023 06:17    TPro  6.7  /  Alb  2.7<L>  /  TBili  0.4  /  DBili  x   /  AST  24  /  ALT  39  /  AlkPhos  51  04-27    MEDICATIONS  (STANDING):  AQUAPHOR (petrolatum Ointment) 1 Application(s) Topical daily  artificial  tears Solution 1 Drop(s) Both EYES two times a day  aspirin  chewable 81 milliGRAM(s) Oral daily  atorvastatin 80 milliGRAM(s) Oral at bedtime  diclofenac sodium 1% Gel 2 Gram(s) Topical two times a day  doxazosin 4 milliGRAM(s) Oral at bedtime  enoxaparin Injectable 40 milliGRAM(s) SubCutaneous every 24 hours  gabapentin 100 milliGRAM(s) Oral at bedtime  melatonin 6 milliGRAM(s) Oral at bedtime  pantoprazole    Tablet 40 milliGRAM(s) Oral before breakfast  petrolatum Ophthalmic Ointment 1 Application(s) Right EYE every 12 hours  polyethylene glycol 3350 17 Gram(s) Oral two times a day  senna Syrup 10 milliLiter(s) Oral at bedtime  ticagrelor 90 milliGRAM(s) Oral every 12 hours    MEDICATIONS  (PRN):  acetaminophen     Tablet .. 650 milliGRAM(s) Oral every 6 hours PRN Mild Pain (1 - 3)  benzocaine/menthol Lozenge 1 Lozenge Oral two times a day PRN Sore Throat

## 2023-04-27 NOTE — PROGRESS NOTE ADULT - SUBJECTIVE AND OBJECTIVE BOX
CC: f/u for  uti  Patient reports urinates fine now    REVIEW OF SYSTEMS:  All other review of systems negative (Comprehensive ROS)    Antimicrobials Day #  :    Other Medications Reviewed    T(F): 97.9 (04-27-23 @ 08:51), Max: 97.9 (04-27-23 @ 08:51)  HR: 101 (04-27-23 @ 08:54)  BP: 110/80 (04-27-23 @ 08:54)  RR: 16 (04-27-23 @ 08:51)  SpO2: 97% (04-27-23 @ 08:51)  Wt(kg): --    PHYSICAL EXAM:  General: alert, no acute distress  Eyes:  anicteric, no conjunctival injection, no discharge  Oropharynx: no lesions or injection 	  Neck: supple, without adenopathy  Lungs: clear to auscultation  Heart: regular rate and rhythm; no murmur, rubs or gallops  Abdomen: soft, nondistended, nontender, without mass or organomegaly, peg  Skin: no lesions  Extremities: no clubbing, cyanosis, or edema  Neurologic: alert, oriented, moves right extremities    LAB RESULTS:                        11.5   7.15  )-----------( 318      ( 27 Apr 2023 06:17 )             35.6     04-27    142  |  106  |  10  ----------------------------<  182<H>  3.7   |  29  |  0.58    Ca    9.0      27 Apr 2023 06:17    TPro  6.7  /  Alb  2.7<L>  /  TBili  0.4  /  DBili  x   /  AST  24  /  ALT  39  /  AlkPhos  51  04-27    LIVER FUNCTIONS - ( 27 Apr 2023 06:17 )  Alb: 2.7 g/dL / Pro: 6.7 g/dL / ALK PHOS: 51 U/L / ALT: 39 U/L / AST: 24 U/L / GGT: x             MICROBIOLOGY:  RECENT CULTURES:      RADIOLOGY REVIEWED:      ACC: [id number] EXAM:  US KIDNEYS AND BLADDER   ORDERED BY: ENEDELIA ELLIOTT     PROCEDURE DATE:  04/22/2023          INTERPRETATION:  CLINICAL INFORMATION: Urinary tract infection.  Evaluate   for urinary retention or renal stones.    COMPARISON: CT chest, abdomen pelvis from 04/08/2023.  Correlate    TECHNIQUE: Sonography of the kidneys and bladder.    FINDINGS:  Right kidney: 11.2 cm. No renal mass, hydronephrosis or calculi.    Left kidney: 12.0 cm. No renal mass, hydronephrosis or calculi.    Urinary bladder: Partially collapsed, with volume of 49 mL.  Ureteral   jets are visualized bilaterally.    Reproductive organs: Enlarged prostate is partially visualized.  Prostate   volume was not measured.  The hypertrophied median lobe of the prostate   indents the posterior bladder wall.    Additional findings:  A hypoechoic right adrenal nodule measures 1.1 x 1 x 1.1 cm    IMPRESSION:  No renal mass, hydronephrosis or calculus is visualized sonographically.    Urinary bladder is partially collapsed, with a volume of 49 mL.    Enlarged prostate is partially visualized.  The hypertrophied median lobe   of the prostate indents the posterior bladder wall.  Lower urinary tract   symptoms (LUTS) should be excluded clinically.    A hypoechoic right adrenal nodule measures 1.1 x 1 x 1.1 cm    --- End of Report ---            Assessment:  59 y/o man with dm, s/p large right mca stroke with hemorrhagic transformation 3/31  , s/p thrombectomy, peg, came here on 4/11. He can now eat. He  had low grade temp, leukocytosis, tachy and dysuria last week,  He had been on zosyn then given a dose of amikacin and ultimately changed to ertapenem to cover an esbl coliform in the urine. He finished 6 days of effective tx. He is feeling well now. US shows no hydronephrosis, no further fever, leukocytosis has resolved.     Plan:  monitor off further antibiotics  monitor for urinary retention

## 2023-04-27 NOTE — PROGRESS NOTE ADULT - ASSESSMENT
Assessment/Plan:  CONG AGUAYO is a 58 year old male with PMH of HTN, and DM; who presented as a transfer from [facility] to [facility] for stroke on 3/29, where he was found to have a R ICA occlusion and he underwent a TICI 2b thromectomy of R ICA and R MCA with R ICA stent placement. Extubated on 4/7. His brain MRI was significant for improved midline shift and small frontal parietal SAH. His hospital course was complicated by aspiration PNA (s/p Zosyn); and dysphagia (requiring PEG - placed 4/6). Patient now admitted for a multidisciplinary rehab program. 04-11-23 @ 13:54    * Await orthotics review for left sided weakness   * Await AMAN placement probably today, COVID -VE  * PEG- Tube--placeds 4/6, patient will f/u with GI post DC   * Urology review appreciated, dysuria less prominent    Rehab Management/MEDICAL MANAGEMENT     #Functional deficits s/p CVA  -Continue  Comprehensive Rehab Program of PT/OT/SLP  - 3 hours a day, 5 days a week  - P&O as needed   - R ICA occlusion, s/p TICI 2b thrombectomy of R ICA and R MCA with R ICA stent placement  - ASA & Brilinta  - Outpt Neurology follow up- Dr. Reid  - Outpt IR follow up - Dr. Allen  --orthotics referral with some functional improvement  Left sided weakness, await review   --Rt leg discomfort/stiffness---commenced gabaptnin 4/25, resolved, d/c gabapentin 4/27    #Dysphagia/aspiration PNA  - S/p Zosyn  - PEG tube last prior to Acute rehab admission  - Repeat CT chest in 1 month (~5/8)    * UTI (ESBL) 4/20 --on zosyn, switched to Entrapenem 4/23, completed treatment   resp viral panel -ve    * Incidental Small adrenal nodule--  will refer to Urology for f/u post DC     #HTN  - Lisinopril--D/MONET 4/13  - Doxazosin  - Outpt Cardiology follow up - Dr. Song    # Orthostatic hypotension--midodrine 2.5mg bid, increased to 5mg bid 4/17, bp stable--d/monet 4/26    # Myalgia B/L Chest--d/c diclofenac cream and commence lidocaine patch b/l chest  #HLD  -Atorvastatin    #DM  - A1c: 7.3  - ISS  - FS ACHS    #Dry eye  - Occular lubricant ointment  - Artificial tears    #Sleep  - Melatonin 6mg nightly PRN, switched to standing 4/18, sleep improved    #Skin  - Skin-: intact, PEG in situ   - Pressure injury/Skin: OOB to Chair, PT/OT     #Pain Mgmt   - Tylenol PRN    #GI/Bowel Mgmt   - Pantoprazole  - Continent c/w Senna, Miralax   -lactulose    #/Bladder Mgmt   BPH c/w doxazosin, monitor residual dysuria/strainging while voiding, PVRs unremarkable  Adrenal nodule  - 4/26 -Urology review appreciated, dysuria less prominent, c/w increased dose of doxazosin      #FEN   PEG placed 4/6, in acute care, tolerating oral feeds since acute rehab placement, f/u with GI post dc    - Diet - Easy to Chew, halal  - Dysphagia  SLP - evaluation and treatment    #Precautions / PROPHYLAXIS:   - Falls  - ortho: Weight bearing status: WBAT   - Lungs: Aspiration, Incentive Spirometer   - DVT: Lovenox  ---------------------------------------------------------------  4/24--Discussed with wife present at bed side after initial review. I explained functional and clinical update and AMAN placement for further therapy. She agreed tto same  4/25--I called patient's wife and gave her update on patient's condition and treatment    IDT conference on 4/20  TDD: 4/27 to home vs AMAN  Barriers: Distracted, tangentile, perseverative, requires cueing, R eye visual deficit. Poor insight, safety and memory.   Social Work: Lives in a basement apartment with spouse and 2 sons, 12 MEI. Patient is main source of income for family. Spouse does not work.  OT: Max A for UBD/LBD. Min-mod A for transfers.  PT: Mod A for transfers. Ambulated 60 ft with RW with min A and WCF.   SLP: Easy to chew diet. Easily distracted, tangentile, perseverative. Poor insight, safety and memory.   ---------------------------------------------------------------  FOLLOW UP/OUTPATIENT:    Bev Song (MD)  Cardiology; Internal Medicine  [address], [address]  [place], [place] [id number]  Phone: [phone]  Fax: [phone]    Ivan Reid (MD; PhD)  Neurology; Vascular Neurology  [address]  [place] [address]  Phone: [phone]  Fax: [phone]  Follow Up Time:     Primary doctor,   Phone: (   )    -  Fax: (   )    -  Follow Up Time:     Sanchez Allen (MD)  Neurology; Vascular Neurology  [address] [place] [address]  Phone: [phone]  Fax: [phone]  Follow Up Time:     Lily Moon (DO)  Gastroenterology  [address], [address]  [place] [address]  Phone: [phone]  Fax: [phone]  Follow Up Time:    Urology f/u for persistent dysuria,  Small adrenal nodule  ---------------------------------------------------------------     Assessment/Plan:  CONG AGUAYO is a 58 year old male with PMH of HTN, and DM; who presented as a transfer from [facility] to [facility] for stroke on 3/29, where he was found to have a R ICA occlusion and he underwent a TICI 2b thromectomy of R ICA and R MCA with R ICA stent placement. Extubated on 4/7. His brain MRI was significant for improved midline shift and small frontal parietal SAH. His hospital course was complicated by aspiration PNA (s/p Zosyn); and dysphagia (requiring PEG - placed 4/6). Patient now admitted for a multidisciplinary rehab program. 04-11-23 @ 13:54    * Await orthotics review for left sided weakness   * Await AMAN placement probably today, COVID -VE  * PEG- Tube--placeds 4/6, patient will f/u with GI post DC   * Urology review appreciated, dysuria less prominent    Rehab Management/MEDICAL MANAGEMENT     #Functional deficits s/p CVA  -Continue  Comprehensive Rehab Program of PT/OT/SLP  - 3 hours a day, 5 days a week  - P&O as needed   - R ICA occlusion, s/p TICI 2b thrombectomy of R ICA and R MCA with R ICA stent placement  - ASA & Brilinta  - Outpt Neurology follow up- Dr. Reid  - Outpt IR follow up - Dr. Allen  --orthotics referral with some functional improvement  Left sided weakness, await review   --Rt leg discomfort/stiffness---commenced gabaptnin 4/25, resolved, d/c gabapentin 4/27    #Dysphagia/aspiration PNA  - S/p Zosyn  - PEG tube last prior to Acute rehab admission  - Repeat CT chest in 1 month (~5/8)    * UTI (ESBL) 4/20 --on zosyn, switched to Entrapenem 4/23, completed treatment   resp viral panel -ve    * Incidental Small adrenal nodule--  will refer to Urology for f/u post DC     #HTN  - Lisinopril--D/MONET 4/13  - Doxazosin  - Outpt Cardiology follow up - Dr. Song    # Orthostatic hypotension--midodrine 2.5mg bid, increased to 5mg bid 4/17, bp stable--d/monet 4/26    # Myalgia B/L Chest--d/c diclofenac cream and commence lidocaine patch b/l chest  #HLD  -Atorvastatin    #DM  - A1c: 7.3  - ISS  - FS ACHS    #Dry eye  - Occular lubricant ointment  - Artificial tears    #Sleep  - Melatonin 6mg nightly PRN, switched to standing 4/18, sleep improved    #Skin  - Skin-: intact, PEG in situ   - Pressure injury/Skin: OOB to Chair, PT/OT     #Pain Mgmt   - Tylenol PRN    #GI/Bowel Mgmt   - Pantoprazole  - Continent c/w Senna, Miralax   -lactulose    #/Bladder Mgmt   BPH c/w doxazosin, monitor residual dysuria/strainging while voiding, PVRs unremarkable  Adrenal nodule  - 4/26 -Urology review appreciated, dysuria less prominent, c/w increased dose of doxazosin      #FEN   PEG placed 4/6, in acute care, tolerating oral feeds since acute rehab placement, f/u with GI post dc    - Diet - Easy to Chew, halal  - Dysphagia  SLP - evaluation and treatment    #Precautions / PROPHYLAXIS:   - Falls  - ortho: Weight bearing status: WBAT   - Lungs: Aspiration, Incentive Spirometer   - DVT: Lovenox  ---------------------------------------------------------------  4/24--Discussed with wife present at bed side after initial review. I explained functional and clinical update and AMAN placement for further therapy. She agreed tto same  4/25--I called patient's wife and gave her update on patient's condition and treatment  ---------------------------------------------------------------  IDT conference on 4/27  TDD: 4/28 to AMAN  Barriers: Distracted, tangentile, perseverative, requires cueing, R eye visual deficit. Poor insight, safety and memory. L side inattention/neglect.   Social Work: Lives in a basement apartment with spouse and 2 sons, 12 MEI. Patient is main source of income for family. Spouse does not work.  OT: Min A for UBD/toilet transfer. Mod A for LBD and tub transfer.   PT: Mod A for transfers. Ambulated 120 ft with RW with min A x2.   SLP: Easy to chew diet. Deficits with higher level cog- attention, and safety. Insight is improving.   ---------------------------------------------------------------  FOLLOW UP/OUTPATIENT:    Bev Song (MD)  Cardiology; Internal Medicine  [address], [address]  [place], [place] [id number]  Phone: [phone]  Fax: [phone]    Ivan Reid (MD; PhD)  Neurology; Vascular Neurology  [address] [facility] [address] [facility] [address]  [place] [address]  Phone: [phone]  Fax: [phone]  Follow Up Time:     Primary doctor,   Phone: (   )    -  Fax: (   )    -  Follow Up Time:     Sanchez Allen (MD)  Neurology; Vascular Neurology  [address] [place] [address]  Phone: [phone]  Fax: [phone]  Follow Up Time:     Lily Moon (DO)  Gastroenterology  [address], [address]  [place] [address]  Phone: [phone]  Fax: [phone]  Follow Up Time:    Urology f/u for persistent dysuria,  Small adrenal nodule  ---------------------------------------------------------------

## 2023-04-27 NOTE — PROGRESS NOTE ADULT - ASSESSMENT
58M PMH HTN, DM2 who presented as a transfer from [facility] to [facility] for stroke on 3/29, s/p TICI 2b thrombectomy of the R ICA and R MCA, with R ICA stent placement.  Postoperative CT head demonstrated new MLS with some hemorrhagic conversion now admitted for rehab- pt/ot/dvt ppx    s/p sepsis 4/20/23, now resolved-  due to uti   treated with zosyn 4/20- 4/23 changed to ertapenem per ID 4/23- 4/26 for 3 days  Culture Results: >100,000 CFU/ml Escherichia coli ESBL  clinically improved,   ID following - recc noted- Ucx with ESBL,   uro consult recc noted  monitor    Stroke s/p thrombectomy of Right ICA and Right MCA and right ICA stent placement  - Left hemiparesis  - cont asa/brillinta, statin    Hypertension with OH   - off meds  -monitor BP, off midodrine now since 4/26/23  -check Orthostatics     DM2  - HbA1C 7.3%  - diet controlled  - little meds- ? homeopathic medication    BPH  -doxazosin  - uro recc noted  - voiding well    DVT PPx  - Lovenox    dipso to AMAN when bed available   d/w dr. jenkins

## 2023-04-27 NOTE — PROGRESS NOTE ADULT - SUBJECTIVE AND OBJECTIVE BOX
58y old  Male who presents with a chief complaint of Functional deficits s/p CVA     Patient seen and examined at bedside.  clinically appears well this am, eating, voiding  no pain, n/v, Denies sob,  dyspnea, abd pain, no dysuria    Vital Signs Last 24 Hrs  T(C): 36.6 (27 Apr 2023 08:51), Max: 36.6 (27 Apr 2023 08:51)  T(F): 97.9 (27 Apr 2023 08:51), Max: 97.9 (27 Apr 2023 08:51)  HR: 101 (27 Apr 2023 08:54) (101 - 107)  BP: 110/80 (27 Apr 2023 08:54) (110/80 - 171/119)  BP(mean): --  RR: 16 (27 Apr 2023 08:51) (16 - 16)  SpO2: 97% (27 Apr 2023 08:51) (97% - 97%)    Parameters below as of 27 Apr 2023 08:51  Patient On (Oxygen Delivery Method): room air      GENERAL- NAD  EAR/NOSE/MOUTH/THROAT - MMM  EYES- FIOR, conjunctiva and Sclera clear  NECK- supple  RESPIRATORY-  clear to auscultation bilaterally, non laboured breathing  CARDIOVASCULAR - SIS2, RRR  GI - soft NT BS present  EXTREMITIES- no pedal edema  NEUROLOGY- left sided weakness, facial droop  PSYCHIATRY- AAO X 3                11.5                 142  | 29   | 10           7.15  >-----------< 318     ------------------------< 182                   35.6                 3.7  | 106  | 0.58                                         Ca 9.0   Mg x     Ph x        MEDICATIONS  (STANDING):  AQUAPHOR (petrolatum Ointment) 1 Application(s) Topical daily  artificial  tears Solution 1 Drop(s) Both EYES two times a day  aspirin  chewable 81 milliGRAM(s) Oral daily  atorvastatin 80 milliGRAM(s) Oral at bedtime  diclofenac sodium 1% Gel 2 Gram(s) Topical two times a day  doxazosin 4 milliGRAM(s) Oral at bedtime  enoxaparin Injectable 40 milliGRAM(s) SubCutaneous every 24 hours  lidocaine   4% Patch 2 Patch Transdermal daily  melatonin 6 milliGRAM(s) Oral at bedtime  pantoprazole    Tablet 40 milliGRAM(s) Oral before breakfast  petrolatum Ophthalmic Ointment 1 Application(s) Right EYE every 12 hours  polyethylene glycol 3350 17 Gram(s) Oral two times a day  senna Syrup 10 milliLiter(s) Oral at bedtime  ticagrelor 90 milliGRAM(s) Oral every 12 hours    MEDICATIONS  (PRN):  acetaminophen     Tablet .. 650 milliGRAM(s) Oral every 6 hours PRN Mild Pain (1 - 3)  benzocaine/menthol Lozenge 1 Lozenge Oral two times a day PRN Sore Throat

## 2023-04-27 NOTE — DISCHARGE NOTE NURSING/CASE MANAGEMENT/SOCIAL WORK - PATIENT PORTAL LINK FT
You can access the FollowMyHealth Patient Portal offered by [facility] by registering at the following website: [email or web address]. By joining [facility]’s FollowMyHealth portal, you will also be able to view your health information using other applications (apps) compatible with our system.

## 2023-04-27 NOTE — DISCHARGE NOTE NURSING/CASE MANAGEMENT/SOCIAL WORK - NSDCFUADDAPPT_GEN_ALL_CORE_FT
Please follow up with Dr. Lily Moon outpatient, to determine when PEG tube can be removed. Placed on 4/6. Last used on 4/11.     Please follow up with your PCP as soon as possible.    If you are in need of a PCP or a specialist in your area: contact the [facility] Physician Referral Service at [phone].

## 2023-04-28 PROCEDURE — 99232 SBSQ HOSP IP/OBS MODERATE 35: CPT

## 2023-04-28 RX ORDER — MIDODRINE HYDROCHLORIDE 2.5 MG/1
5 TABLET ORAL
Refills: 0 | Status: DISCONTINUED | OUTPATIENT
Start: 2023-04-28 | End: 2023-05-02

## 2023-04-28 RX ORDER — PETROLATUM,WHITE
1 JELLY (GRAM) TOPICAL DAILY
Refills: 0 | Status: DISCONTINUED | OUTPATIENT
Start: 2023-04-28 | End: 2023-05-01

## 2023-04-28 RX ADMIN — Medication 1 APPLICATION(S): at 11:45

## 2023-04-28 RX ADMIN — PANTOPRAZOLE SODIUM 40 MILLIGRAM(S): 20 TABLET, DELAYED RELEASE ORAL at 06:17

## 2023-04-28 RX ADMIN — ATORVASTATIN CALCIUM 80 MILLIGRAM(S): 80 TABLET, FILM COATED ORAL at 21:50

## 2023-04-28 RX ADMIN — Medication 1 APPLICATION(S): at 17:32

## 2023-04-28 RX ADMIN — POLYETHYLENE GLYCOL 3350 17 GRAM(S): 17 POWDER, FOR SOLUTION ORAL at 17:31

## 2023-04-28 RX ADMIN — Medication 1 DROP(S): at 17:31

## 2023-04-28 RX ADMIN — LIDOCAINE 2 PATCH: 4 CREAM TOPICAL at 00:53

## 2023-04-28 RX ADMIN — Medication 81 MILLIGRAM(S): at 11:44

## 2023-04-28 RX ADMIN — Medication 650 MILLIGRAM(S): at 02:10

## 2023-04-28 RX ADMIN — Medication 6 MILLIGRAM(S): at 21:50

## 2023-04-28 RX ADMIN — POLYETHYLENE GLYCOL 3350 17 GRAM(S): 17 POWDER, FOR SOLUTION ORAL at 06:18

## 2023-04-28 RX ADMIN — MIDODRINE HYDROCHLORIDE 5 MILLIGRAM(S): 2.5 TABLET ORAL at 14:15

## 2023-04-28 RX ADMIN — TICAGRELOR 90 MILLIGRAM(S): 90 TABLET ORAL at 06:16

## 2023-04-28 RX ADMIN — DICLOFENAC SODIUM 2 GRAM(S): 30 GEL TOPICAL at 17:31

## 2023-04-28 RX ADMIN — Medication 1 APPLICATION(S): at 06:17

## 2023-04-28 RX ADMIN — Medication 1 APPLICATION(S): at 17:30

## 2023-04-28 RX ADMIN — Medication 4 MILLIGRAM(S): at 21:50

## 2023-04-28 RX ADMIN — SENNA PLUS 10 MILLILITER(S): 8.6 TABLET ORAL at 21:52

## 2023-04-28 RX ADMIN — Medication 1 DROP(S): at 06:15

## 2023-04-28 RX ADMIN — Medication 650 MILLIGRAM(S): at 01:14

## 2023-04-28 RX ADMIN — ENOXAPARIN SODIUM 40 MILLIGRAM(S): 100 INJECTION SUBCUTANEOUS at 18:37

## 2023-04-28 RX ADMIN — TICAGRELOR 90 MILLIGRAM(S): 90 TABLET ORAL at 17:31

## 2023-04-28 NOTE — PROGRESS NOTE ADULT - SUBJECTIVE AND OBJECTIVE BOX
Subjective  Seen and examined  Reports much improvement  Dysuria is very minimal, no more pressure when urinating  Tolerating diet    Therapy--engaging, Left leg motor function improving  Rt knee ROM normal  Ambulating with walker/cane, minimal distance with assistance      ROS--No head ache or abd pain, no N/V   LBM 4/27    Vital Signs Last 24 Hrs  T(C): 36.4 (28 Apr 2023 08:13), Max: 36.7 (27 Apr 2023 20:42)  T(F): 97.5 (28 Apr 2023 08:13), Max: 98.1 (27 Apr 2023 20:42)  HR: 102 (28 Apr 2023 08:13) (91 - 102)  BP: 95/68 (28 Apr 2023 08:13) (95/68 - 141/94)  RR: 16 (28 Apr 2023 08:13) (16 - 16)  SpO2: 98% (28 Apr 2023 08:13) (95% - 98%)  O2 Parameters below as of 28 Apr 2023 08:13  Patient On (Oxygen Delivery Method): room air    EXAM  Gen - Comfortable, No distress  HEENT - , right  eye vision loss   Neck - Supple, No limited ROM  Pulm -clear   Cardiovascular - RRR, S1S2  Chest - good chest expansion, good respiratory effort  Abdomen - Soft, non tender  +BS, + PEG   Extremities - No Cyanosis, no clubbing, no edema, no calf tenderness    Neuro-  Alert and oriented, interactive  Speech  dysarthric, improving  Cranial Nerves - Left facial weakness, left eye vision loss, absent left shoulder shrug   Right  eye vision loss Rt eye  Tongue deviation to left, Flat left nasolabial fold  Motor -  Left hemiparesis                    LEFT    UE - ShAB 1+/5, improvement with hand , stiffness with terminal elbow flexion                    RIGHT UE - ShAB 5/5, EF 5/5, EE 5/5,   5/5                    LEFT    LE - 2/5 hip Knee 3/5, ankle 1/5                    RIGHT LE - HF 5/5, KE 5/5, DF 5/5, PF 5/5        Sensory - Intact to LT     Reflexes - DTR Intact     Coordination - FTN/HTS  impaired to left side     Tone - normal  Psychiatric - Mood stable, Affect WNL  Skin:  all skin intact, PEG in situ, not in use    RECENT LABS/IMAGING                        11.5   7.15  )-----------( 318      ( 27 Apr 2023 06:17 )             35.6     04-27    142  |  106  |  10  ----------------------------<  182<H>  3.7   |  29  |  0.58    Ca    9.0      27 Apr 2023 06:17    TPro  6.7  /  Alb  2.7<L>  /  TBili  0.4  /  DBili  x   /  AST  24  /  ALT  39  /  AlkPhos  51  04-27    MEDICATIONS  (STANDING):  AQUAPHOR (petrolatum Ointment) 1 Application(s) Topical daily  artificial  tears Solution 1 Drop(s) Both EYES two times a day  aspirin  chewable 81 milliGRAM(s) Oral daily  atorvastatin 80 milliGRAM(s) Oral at bedtime  diclofenac sodium 1% Gel 2 Gram(s) Topical two times a day  doxazosin 4 milliGRAM(s) Oral at bedtime  enoxaparin Injectable 40 milliGRAM(s) SubCutaneous every 24 hours  lidocaine   4% Patch 2 Patch Transdermal daily  melatonin 6 milliGRAM(s) Oral at bedtime  pantoprazole    Tablet 40 milliGRAM(s) Oral before breakfast  petrolatum Ophthalmic Ointment 1 Application(s) Right EYE every 12 hours  polyethylene glycol 3350 17 Gram(s) Oral two times a day  senna Syrup 10 milliLiter(s) Oral at bedtime  ticagrelor 90 milliGRAM(s) Oral every 12 hours    MEDICATIONS  (PRN):  acetaminophen     Tablet .. 650 milliGRAM(s) Oral every 6 hours PRN Mild Pain (1 - 3)  benzocaine/menthol Lozenge 1 Lozenge Oral two times a day PRN Sore Throat

## 2023-04-28 NOTE — PROGRESS NOTE ADULT - SUBJECTIVE AND OBJECTIVE BOX
CC: f/u for recent ESBL E Coli UTI    Patient reports: he appears comfortable, he reports no problems with voiding.    REVIEW OF SYSTEMS:  All other review of systems negative (Comprehensive ROS)    Antimicrobials Day #  :off    Other Medications Reviewed  MEDICATIONS  (STANDING):  AQUAPHOR (petrolatum Ointment) 1 Application(s) Topical daily  artificial  tears Solution 1 Drop(s) Both EYES two times a day  aspirin  chewable 81 milliGRAM(s) Oral daily  atorvastatin 80 milliGRAM(s) Oral at bedtime  diclofenac sodium 1% Gel 2 Gram(s) Topical two times a day  doxazosin 4 milliGRAM(s) Oral at bedtime  enoxaparin Injectable 40 milliGRAM(s) SubCutaneous every 24 hours  lidocaine   4% Patch 2 Patch Transdermal daily  melatonin 6 milliGRAM(s) Oral at bedtime  pantoprazole    Tablet 40 milliGRAM(s) Oral before breakfast  petrolatum Ophthalmic Ointment 1 Application(s) Right EYE every 12 hours  polyethylene glycol 3350 17 Gram(s) Oral two times a day  senna Syrup 10 milliLiter(s) Oral at bedtime  ticagrelor 90 milliGRAM(s) Oral every 12 hours    T(F): 98.1 (04-27-23 @ 20:42), Max: 98.1 (04-27-23 @ 20:42)  HR: 92 (04-27-23 @ 20:42)  BP: 141/94 (04-27-23 @ 20:42)  RR: 16 (04-27-23 @ 20:42)  SpO2: 95% (04-27-23 @ 20:42)  Wt(kg): --    PHYSICAL EXAM:  General: alert, no acute distress  Eyes:  anicteric, no conjunctival injection, no discharge  Oropharynx: no lesions or injection 	  Neck: supple, without adenopathy  Lungs: clear to auscultation  Heart: regular rate and rhythm; no murmur, rubs or gallops  Abdomen: soft, nondistended, nontender, without mass or organomegaly  Skin: no lesions  Extremities: no clubbing, cyanosis, or edema  Neurologic: alert, oriented,left hemiparesis    LAB RESULTS:                        11.5   7.15  )-----------( 318      ( 27 Apr 2023 06:17 )             35.6     04-27    142  |  106  |  10  ----------------------------<  182<H>  3.7   |  29  |  0.58    Ca    9.0      27 Apr 2023 06:17    TPro  6.7  /  Alb  2.7<L>  /  TBili  0.4  /  DBili  x   /  AST  24  /  ALT  39  /  AlkPhos  51  04-27    LIVER FUNCTIONS - ( 27 Apr 2023 06:17 )  Alb: 2.7 g/dL / Pro: 6.7 g/dL / ALK PHOS: 51 U/L / ALT: 39 U/L / AST: 24 U/L / GGT: x             MICROBIOLOGY:  RECENT CULTURES:      RADIOLOGY REVIEWED:

## 2023-04-28 NOTE — CHART NOTE - NSCHARTNOTEFT_GEN_A_CORE
noted per RN- low bp , hr 108, sats 85% in PT   midodrine was d/c yesterday  will restart  sats improved at rest  < from: Xray Chest 1 View-PORTABLE IMMEDIATE (Xray Chest 1 View-PORTABLE IMMEDIATE .) (04.20.23 @ 07:38) >    Slight atelectasis at right base has improved from CAT scan April 8.    < end of copied text >    recenlty treated for UTI with zosyn- ertapenem    will monitor

## 2023-04-28 NOTE — PROGRESS NOTE ADULT - ASSESSMENT
Assessment/Plan:  CONG AGUAYO is a 58 year old male with PMH of HTN, and DM; who presented as a transfer from [facility] to [facility] for stroke on 3/29, where he was found to have a R ICA occlusion and he underwent a TICI 2b thromectomy of R ICA and R MCA with R ICA stent placement. Extubated on 4/7. His brain MRI was significant for improved midline shift and small frontal parietal SAH. His hospital course was complicated by aspiration PNA (s/p Zosyn); and dysphagia (requiring PEG - placed 4/6). Patient now admitted for a multidisciplinary rehab program. 04-11-23 @ 13:54    * ID review appreciated  * Due DC to [facility] today   Left LE motor function markedly improved, no need for orthrotic review prior to dc     Rehab Management/MEDICAL MANAGEMENT     #Functional deficits s/p CVA  -Continue  Comprehensive Rehab Program of PT/OT/SLP  - 3 hours a day, 5 days a week  - P&O as needed   - R ICA occlusion, s/p TICI 2b thrombectomy of R ICA and R MCA with R ICA stent placement  - ASA & Brilinta  - Outpt Neurology follow up- Dr. Reid  - Outpt IR follow up - Dr. Allen  --Rt leg discomfort/stiffness---commenced gabaptnin 4/25, resolved, d/c gabapentin 4/27    #Dysphagia/aspiration PNA  - S/p Zosyn  - PEG tube last prior to Acute rehab admission  - Repeat CT chest in 1 month (~5/8)    * UTI (ESBL) 4/20 --on zosyn, switched to Entrapenem 4/23, completed treatment   resp viral panel -ve    * Incidental Small adrenal nodule--  will refer to Urology for f/u post DC     #HTN  - Lisinopril--D/MONET 4/13  - Doxazosin  - Outpt Cardiology follow up - Dr. Song    # Orthostatic hypotension--midodrine 2.5mg bid, increased to 5mg bid 4/17, bp stable--d/monet 4/26    # Myalgia B/L Chest--d/c diclofenac cream and commence lidocaine patch b/l chest  #HLD  -Atorvastatin    #DM  - A1c: 7.3  - ISS  - FS ACHS    #Dry eye  - Occular lubricant ointment  - Artificial tears    #Sleep  - Melatonin 6mg nightly PRN, switched to standing 4/18, sleep improved    #Skin  - Skin-: intact, PEG in situ   - Pressure injury/Skin: OOB to Chair, PT/OT     #Pain Mgmt   - Tylenol PRN    #GI/Bowel Mgmt   - Pantoprazole  - Continent c/w Senna, Miralax   -lactulose    #/Bladder Mgmt   BPH c/w doxazosin, dose increased with improvement  Adrenal nodule  - 4/26 -Urology review appreciated, dysuria less prominent, c/w increased dose of doxazosin, improved       #FEN   PEG placed 4/6, in acute care, tolerating oral feeds since acute rehab placement, f/u with GI post dc    - Diet - Easy to Chew, halal  - Dysphagia  SLP - evaluation and treatment    #Precautions / PROPHYLAXIS:   - Falls  - ortho: Weight bearing status: WBAT   - Lungs: Aspiration, Incentive Spirometer   - DVT: Lovenox  ---------------------------------------------------------------  4/24--Discussed with wife present at bed side after initial review. I explained functional and clinical update and [facility] placement for further therapy. She agreed tto same  4/25--I called patient's wife and gave her update on patient's condition and treatment  ---------------------------------------------------------------  IDT conference on 4/27  TDD: 4/28 to [facility]  Barriers: Distracted, tangentile, perseverative, requires cueing, R eye visual deficit. Poor insight, safety and memory. L side inattention/neglect.   Social Work: Lives in a basement apartment with spouse and 2 sons, 12 MEI. Patient is main source of income for family. Spouse does not work.  OT: Min A for UBD/toilet transfer. Mod A for LBD and tub transfer.   PT: Mod A for transfers. Ambulated 120 ft with RW with min A x2.   SLP: Easy to chew diet. Deficits with higher level cog- attention, and safety. Insight is improving.   ---------------------------------------------------------------  FOLLOW UP/OUTPATIENT:    Bev Song)  Cardiology; Internal Medicine  [address], [address]  [place], [place] [id number]  Phone: [phone]  Fax: [phone]    Ivan Reid (MD; PhD)  Neurology; Vascular Neurology  [address], [facility] [facility] [address]  [place] [address]  Phone: [phone]  Fax: [phone]  Follow Up Time:     Primary doctor,   Phone: (   )    -  Fax: (   )    -  Follow Up Time:     Sanchez Allen (MD)  Neurology; Vascular Neurology  [address] [place] [address]  Phone: [phone]  Fax: [phone]  Follow Up Time:     Lily Moon (DO)  Gastroenterology  [address], [address]  [place] [address]  Phone: [phone]  Fax: [phone]  Follow Up Time:    Urology f/u for persistent dysuria,  Small adrenal nodule  ---------------------------------------------------------------

## 2023-04-28 NOTE — PROGRESS NOTE ADULT - ASSESSMENT
57 y/o man with dm, s/p large right mca stroke with hemorrhagic transformation 3/31  , s/p thrombectomy, peg, came here on 4/11. He can now eat. He has had low grade temp, leukocytosis, tachy and dysuria .  He has received 3 days of zosyn for a zosyn sensitive ESBL E Coli and enterococcal UTI  His blood cultures have been negative  He has received 6 days of effective therapy. His leukocytosis  has resolved.  He has no signs of ongoing systemic infection.  He has been stable off antibiotics x 48 hours.  SNF discharge is being planned.  Recommendations:  1. Okay to monitor off antibiotics  2. He should be monitored  for retention while in rehab  3. We could extend treatment with oral fosfomycin 3 grams q48 but this may also be a good juncture to simply observe.  4. No ID objection to discharge. With stable course and no additional ID w/u planned we will stop actively following, Please call if ID issues arise.

## 2023-04-28 NOTE — CHART NOTE - NSCHARTNOTEFT_GEN_A_CORE
Nutrition Follow Up Note  Source: Medical Record [X] Patient [X] Family [ ]         Diet: Easy to chew, Halal, Ensure Plus High Protein (provides 350 kcal, 20 g protein/serving) BID  Pt tolerating diet with good PO intake per pt and nursing flow sheets (eating >75% of meals per documentation). Pt is not receiving concentrated sugars due to hx of DM. Discussed current diet.  Denies nausea, vomiting, diarrhea, constipation. Pt denies chewing/swallowing difficulties.     Enteral/Parenteral Nutrition: N/A    Current Weight: 169 lbs (4/18)    Pertinent Medications: MEDICATIONS  (STANDING):  AQUAPHOR (petrolatum Ointment) 1 Application(s) Topical daily  artificial  tears Solution 1 Drop(s) Both EYES two times a day  aspirin  chewable 81 milliGRAM(s) Oral daily  atorvastatin 80 milliGRAM(s) Oral at bedtime  diclofenac sodium 1% Gel 2 Gram(s) Topical two times a day  doxazosin 4 milliGRAM(s) Oral at bedtime  enoxaparin Injectable 40 milliGRAM(s) SubCutaneous every 24 hours  lidocaine   4% Patch 2 Patch Transdermal daily  melatonin 6 milliGRAM(s) Oral at bedtime  midodrine. 5 milliGRAM(s) Oral <User Schedule>  pantoprazole    Tablet 40 milliGRAM(s) Oral before breakfast  petrolatum Ophthalmic Ointment 1 Application(s) Right EYE every 12 hours  polyethylene glycol 3350 17 Gram(s) Oral two times a day  senna Syrup 10 milliLiter(s) Oral at bedtime  ticagrelor 90 milliGRAM(s) Oral every 12 hours    MEDICATIONS  (PRN):  acetaminophen     Tablet .. 650 milliGRAM(s) Oral every 6 hours PRN Mild Pain (1 - 3)  benzocaine/menthol Lozenge 1 Lozenge Oral two times a day PRN Sore Throat      Pertinent Labs:  04-27 Na142 mmol/L Glu 182 mg/dL<H> K+ 3.7 mmol/L Cr  0.58 mg/dL BUN 10 mg/dL 04-21 Phos 3.9 mg/dL 04-27 Alb 2.7 g/dL<L> 03-29 Chol 270 mg/dL<H> LDL --    HDL 58 mg/dL Trig 83 mg/dL        Skin: Skin intact per nursing flow sheets     Edema: No edema per nursing flow sheets     Last BM: on 4/28 Per nursing flowsheets     Estimated Needs:   [X] No Change since Previous Assessment  [ ] Recalculated:     Previous Nutrition Diagnosis:   Severe malnutrition    Nutrition Diagnosis is [X] Ongoing  - addressed with Ensure Plus High Protein (provides 350 kcal, 20 g protein/serving)      New Nutrition Diagnosis: [X] Not Applicable  [ ] Inadequate Protein Energy Intake   [ ] Inadequate Oral Intake   [ ] Excessive Energy Intake   [ ] Increased Nutrient Needs   [ ] Obesity   [ ] Altered GI Function   [ ] Unintended Weight Loss   [ ] Food & Nutrition Related Knowledge Deficit  [ ] Limited Adherence to nutrition related recommendations   [ ] Malnutrition      Interventions:   1. Recommend continuing with current plan of care  2. Encourage PO intake    Monitoring & Evaluation:   [X] Weights   [X] PO Intake   [X] Follow Up (Per Protocol)  [X] Tolerance to Diet Prescription   [X] Other: Labs     RD Remains Available.  Preeti Jolly RD

## 2023-04-28 NOTE — CHART NOTE - NSCHARTNOTESELECT_GEN_ALL_CORE
Event Note
FALL EPISODE/Event Note
IDT 4/13/Event Note
IPOC/Event Note
Nutrition Services

## 2023-04-29 PROCEDURE — 99232 SBSQ HOSP IP/OBS MODERATE 35: CPT

## 2023-04-29 RX ADMIN — DICLOFENAC SODIUM 2 GRAM(S): 30 GEL TOPICAL at 18:42

## 2023-04-29 RX ADMIN — Medication 1 APPLICATION(S): at 18:57

## 2023-04-29 RX ADMIN — DICLOFENAC SODIUM 2 GRAM(S): 30 GEL TOPICAL at 05:57

## 2023-04-29 RX ADMIN — TICAGRELOR 90 MILLIGRAM(S): 90 TABLET ORAL at 18:54

## 2023-04-29 RX ADMIN — MIDODRINE HYDROCHLORIDE 5 MILLIGRAM(S): 2.5 TABLET ORAL at 05:57

## 2023-04-29 RX ADMIN — Medication 1 APPLICATION(S): at 11:25

## 2023-04-29 RX ADMIN — ATORVASTATIN CALCIUM 80 MILLIGRAM(S): 80 TABLET, FILM COATED ORAL at 20:39

## 2023-04-29 RX ADMIN — POLYETHYLENE GLYCOL 3350 17 GRAM(S): 17 POWDER, FOR SOLUTION ORAL at 18:54

## 2023-04-29 RX ADMIN — Medication 1 DROP(S): at 18:57

## 2023-04-29 RX ADMIN — TICAGRELOR 90 MILLIGRAM(S): 90 TABLET ORAL at 05:58

## 2023-04-29 RX ADMIN — SENNA PLUS 10 MILLILITER(S): 8.6 TABLET ORAL at 20:40

## 2023-04-29 RX ADMIN — LIDOCAINE 2 PATCH: 4 CREAM TOPICAL at 11:24

## 2023-04-29 RX ADMIN — Medication 4 MILLIGRAM(S): at 20:38

## 2023-04-29 RX ADMIN — PANTOPRAZOLE SODIUM 40 MILLIGRAM(S): 20 TABLET, DELAYED RELEASE ORAL at 05:57

## 2023-04-29 RX ADMIN — Medication 81 MILLIGRAM(S): at 11:24

## 2023-04-29 RX ADMIN — MIDODRINE HYDROCHLORIDE 5 MILLIGRAM(S): 2.5 TABLET ORAL at 13:36

## 2023-04-29 RX ADMIN — LIDOCAINE 2 PATCH: 4 CREAM TOPICAL at 22:54

## 2023-04-29 RX ADMIN — LIDOCAINE 2 PATCH: 4 CREAM TOPICAL at 20:44

## 2023-04-29 RX ADMIN — Medication 1 APPLICATION(S): at 06:02

## 2023-04-29 RX ADMIN — Medication 6 MILLIGRAM(S): at 20:38

## 2023-04-29 RX ADMIN — ENOXAPARIN SODIUM 40 MILLIGRAM(S): 100 INJECTION SUBCUTANEOUS at 18:54

## 2023-04-29 RX ADMIN — Medication 1 DROP(S): at 05:56

## 2023-04-29 NOTE — PROVIDER CONTACT NOTE (OTHER) - BACKGROUND
58 year old s/p CVA. S/p PEG tube placement. Hx HTN.
Pt is 58 yr-old Male admitted with a  R CVA, HTN, and s/p fall yesterday morning on unit

## 2023-04-29 NOTE — PROGRESS NOTE ADULT - ASSESSMENT
58M PMH HTN, DM2 who presented as a transfer from [facility] to [facility] for stroke on 3/29, s/p TICI 2b thrombectomy of the R ICA and R MCA, with R ICA stent placement.  Postoperative CT head demonstrated new MLS with some hemorrhagic conversion now admitted for rehab- pt/ot/dvt ppx    s/p sepsis 4/20/23, now resolved-  due to uti   treated with zosyn 4/20- 4/23 changed to ertapenem per ID 4/23- 4/26 for 3 days  Culture Results: >100,000 CFU/ml Escherichia coli ESBL  clinically improved,   ID following - recc noted- Ucx with ESBL,   uro consult recc noted  monitor    Stroke s/p thrombectomy of Right ICA and Right MCA and right ICA stent placement  - Left hemiparesis  - cont asa/brillinta, statin    Hypertension with OH   - off meds  -monitor BP, off midodrine now since 4/26/23  -check Orthostatics     DM2  - HbA1C 7.3%  - diet controlled  - little meds- ? homeopathic medication    BPH  -doxazosin  - uro recc noted  - voiding well    DVT PPx  - Lovenox

## 2023-04-29 NOTE — PROVIDER CONTACT NOTE (OTHER) - ACTION/TREATMENT ORDERED:
Hold 6AM midodrine dose
MD notified and will follow up.
MD made aware of first set of vital signs was told to monitor then a followup of vital signs was done and contacted MD again to give update was to tell day team in the AM since Pt was asymptotic.

## 2023-04-29 NOTE — PROGRESS NOTE ADULT - SUBJECTIVE AND OBJECTIVE BOX
Cc: Gait dysfunction    HPI: Patient resting comfortably.  Denies any complaints.    Has been tolerating rehabilitation program.    acetaminophen     Tablet .. 650 milliGRAM(s) Oral every 6 hours PRN  AQUAPHOR (petrolatum Ointment) 1 Application(s) Topical daily  AQUAPHOR (petrolatum Ointment) 1 Application(s) Topical daily  artificial  tears Solution 1 Drop(s) Both EYES two times a day  aspirin  chewable 81 milliGRAM(s) Oral daily  atorvastatin 80 milliGRAM(s) Oral at bedtime  benzocaine/menthol Lozenge 1 Lozenge Oral two times a day PRN  diclofenac sodium 1% Gel 2 Gram(s) Topical two times a day  doxazosin 4 milliGRAM(s) Oral at bedtime  enoxaparin Injectable 40 milliGRAM(s) SubCutaneous every 24 hours  lidocaine   4% Patch 2 Patch Transdermal daily  melatonin 6 milliGRAM(s) Oral at bedtime  midodrine. 5 milliGRAM(s) Oral <User Schedule>  pantoprazole    Tablet 40 milliGRAM(s) Oral before breakfast  petrolatum Ophthalmic Ointment 1 Application(s) Right EYE every 12 hours  polyethylene glycol 3350 17 Gram(s) Oral two times a day  senna Syrup 10 milliLiter(s) Oral at bedtime  ticagrelor 90 milliGRAM(s) Oral every 12 hours      T(C): 36.9 (04-29-23 @ 09:06), Max: 36.9 (04-28-23 @ 20:48)  HR: 109 (04-29-23 @ 09:06) (90 - 109)  BP: 132/88 (04-29-23 @ 09:06) (110/73 - 135/80)  RR: 16 (04-29-23 @ 09:06) (16 - 16)  SpO2: 96% (04-29-23 @ 09:06) (94% - 96%)    In NAD  HEENT- EOMI  Heart- No cyanosis   Lungs- No respiratory distress   Abd- + BS, NT  Ext- No calf pain  Neuro- Exam unchanged      Imp: Patient with diagnosis of CVA admitted for comprehensive acute rehabilitation.    Plan:  Impaired mobility- Continue PT/OT  - DVT prophylaxis  - Lovenox   - Skin- Turn q2h, check skin daily  - Continue current medications; patient medically stable.   -Active issues-   -CVA  - aspirin, atorvastatin, brilinta   -Insomnia-  melatonin  - Patient is stable to continue current rehabilitation program.

## 2023-04-29 NOTE — PROGRESS NOTE ADULT - SUBJECTIVE AND OBJECTIVE BOX
Patient is a 58y old  Male who presents with a chief complaint of Functional deficits s/p CVA (29 Apr 2023 09:48)      Patient seen and examined at bedside.  Denies chest pain, dyspnea, abd pain.    ALLERGIES:  No Known Allergies    MEDICATIONS  (STANDING):  AQUAPHOR (petrolatum Ointment) 1 Application(s) Topical daily  AQUAPHOR (petrolatum Ointment) 1 Application(s) Topical daily  artificial  tears Solution 1 Drop(s) Both EYES two times a day  aspirin  chewable 81 milliGRAM(s) Oral daily  atorvastatin 80 milliGRAM(s) Oral at bedtime  diclofenac sodium 1% Gel 2 Gram(s) Topical two times a day  doxazosin 4 milliGRAM(s) Oral at bedtime  enoxaparin Injectable 40 milliGRAM(s) SubCutaneous every 24 hours  lidocaine   4% Patch 2 Patch Transdermal daily  melatonin 6 milliGRAM(s) Oral at bedtime  midodrine. 5 milliGRAM(s) Oral <User Schedule>  pantoprazole    Tablet 40 milliGRAM(s) Oral before breakfast  petrolatum Ophthalmic Ointment 1 Application(s) Right EYE every 12 hours  polyethylene glycol 3350 17 Gram(s) Oral two times a day  senna Syrup 10 milliLiter(s) Oral at bedtime  ticagrelor 90 milliGRAM(s) Oral every 12 hours    MEDICATIONS  (PRN):  acetaminophen     Tablet .. 650 milliGRAM(s) Oral every 6 hours PRN Mild Pain (1 - 3)  benzocaine/menthol Lozenge 1 Lozenge Oral two times a day PRN Sore Throat    Vital Signs Last 24 Hrs  T(F): 98.4 (29 Apr 2023 09:06), Max: 98.4 (28 Apr 2023 20:48)  HR: 109 (29 Apr 2023 09:06) (90 - 109)  BP: 132/88 (29 Apr 2023 09:06) (110/73 - 135/80)  RR: 16 (29 Apr 2023 09:06) (16 - 16)  SpO2: 96% (29 Apr 2023 09:06) (95% - 96%)  I&O's Summary    29 Apr 2023 07:01  -  29 Apr 2023 11:59  --------------------------------------------------------  IN: 0 mL / OUT: 300 mL / NET: -300 mL        PHYSICAL EXAM:  General: NAD, A/O x 3  ENT: MMM  Neck: Supple, No JVD  Lungs: Clear to auscultation bilaterally  Cardio: RRR, S1/S2, No murmurs  Abdomen: Soft, Nontender, Nondistended; Bowel sounds present  Extremities: No calf tenderness, No pitting edema    LABS:                        11.5   7.15  )-----------( 318      ( 27 Apr 2023 06:17 )             35.6       04-27    142  |  106  |  10  ----------------------------<  182  3.7   |  29  |  0.58    Ca    9.0      27 Apr 2023 06:17    TPro  6.7  /  Alb  2.7  /  TBili  0.4  /  DBili  x   /  AST  24  /  ALT  39  /  AlkPhos  51  04-27 03-29 Chol 270 mg/dL LDL -- HDL 58 mg/dL Trig 83 mg/dL                      COVID-19 PCR: NotDetec (04-26-23 @ 23:16)  COVID-19 PCR: NotDetec (04-12-23 @ 05:00)  COVID-19 PCR: NotDetec (04-11-23 @ 09:52)  COVID-19 PCR: NotDetec (04-05-23 @ 17:15)      RADIOLOGY & ADDITIONAL TESTS:    Care Discussed with Consultants/Other Providers:

## 2023-04-29 NOTE — PROVIDER CONTACT NOTE (OTHER) - SITUATION
Patient /95 and Midodrine order
Pt observed to have blood in stool and on toilet paper after having BM.
Pt is 58 yr-old Male admitted with a  R CVA, HTN, and s/p fall yesterday morning on unit

## 2023-04-30 PROCEDURE — 99232 SBSQ HOSP IP/OBS MODERATE 35: CPT

## 2023-04-30 RX ADMIN — Medication 1 APPLICATION(S): at 05:10

## 2023-04-30 RX ADMIN — POLYETHYLENE GLYCOL 3350 17 GRAM(S): 17 POWDER, FOR SOLUTION ORAL at 05:05

## 2023-04-30 RX ADMIN — POLYETHYLENE GLYCOL 3350 17 GRAM(S): 17 POWDER, FOR SOLUTION ORAL at 17:29

## 2023-04-30 RX ADMIN — SENNA PLUS 10 MILLILITER(S): 8.6 TABLET ORAL at 22:21

## 2023-04-30 RX ADMIN — ENOXAPARIN SODIUM 40 MILLIGRAM(S): 100 INJECTION SUBCUTANEOUS at 17:28

## 2023-04-30 RX ADMIN — DICLOFENAC SODIUM 2 GRAM(S): 30 GEL TOPICAL at 05:09

## 2023-04-30 RX ADMIN — TICAGRELOR 90 MILLIGRAM(S): 90 TABLET ORAL at 17:28

## 2023-04-30 RX ADMIN — Medication 1 APPLICATION(S): at 17:42

## 2023-04-30 RX ADMIN — ATORVASTATIN CALCIUM 80 MILLIGRAM(S): 80 TABLET, FILM COATED ORAL at 22:20

## 2023-04-30 RX ADMIN — Medication 81 MILLIGRAM(S): at 11:31

## 2023-04-30 RX ADMIN — Medication 4 MILLIGRAM(S): at 22:19

## 2023-04-30 RX ADMIN — Medication 1 APPLICATION(S): at 11:32

## 2023-04-30 RX ADMIN — Medication 6 MILLIGRAM(S): at 22:21

## 2023-04-30 RX ADMIN — MIDODRINE HYDROCHLORIDE 5 MILLIGRAM(S): 2.5 TABLET ORAL at 13:24

## 2023-04-30 RX ADMIN — TICAGRELOR 90 MILLIGRAM(S): 90 TABLET ORAL at 05:06

## 2023-04-30 RX ADMIN — Medication 1 DROP(S): at 05:08

## 2023-04-30 RX ADMIN — PANTOPRAZOLE SODIUM 40 MILLIGRAM(S): 20 TABLET, DELAYED RELEASE ORAL at 05:06

## 2023-04-30 RX ADMIN — MIDODRINE HYDROCHLORIDE 5 MILLIGRAM(S): 2.5 TABLET ORAL at 05:06

## 2023-04-30 RX ADMIN — Medication 1 DROP(S): at 17:27

## 2023-04-30 NOTE — PROGRESS NOTE ADULT - SUBJECTIVE AND OBJECTIVE BOX
Patient is a 58y old  Male who presents with a chief complaint of Functional deficits s/p CVA (30 Apr 2023 09:09)      Patient seen and examined at bedside.  Denies chest pain, dyspnea, abd pain.    ALLERGIES:  No Known Allergies    MEDICATIONS  (STANDING):  AQUAPHOR (petrolatum Ointment) 1 Application(s) Topical daily  AQUAPHOR (petrolatum Ointment) 1 Application(s) Topical daily  artificial  tears Solution 1 Drop(s) Both EYES two times a day  aspirin  chewable 81 milliGRAM(s) Oral daily  atorvastatin 80 milliGRAM(s) Oral at bedtime  diclofenac sodium 1% Gel 2 Gram(s) Topical two times a day  doxazosin 4 milliGRAM(s) Oral at bedtime  enoxaparin Injectable 40 milliGRAM(s) SubCutaneous every 24 hours  lidocaine   4% Patch 2 Patch Transdermal daily  melatonin 6 milliGRAM(s) Oral at bedtime  midodrine. 5 milliGRAM(s) Oral <User Schedule>  pantoprazole    Tablet 40 milliGRAM(s) Oral before breakfast  petrolatum Ophthalmic Ointment 1 Application(s) Right EYE every 12 hours  polyethylene glycol 3350 17 Gram(s) Oral two times a day  senna Syrup 10 milliLiter(s) Oral at bedtime  ticagrelor 90 milliGRAM(s) Oral every 12 hours    MEDICATIONS  (PRN):  acetaminophen     Tablet .. 650 milliGRAM(s) Oral every 6 hours PRN Mild Pain (1 - 3)  benzocaine/menthol Lozenge 1 Lozenge Oral two times a day PRN Sore Throat    Vital Signs Last 24 Hrs  T(F): 97.2 (30 Apr 2023 08:33), Max: 97.6 (29 Apr 2023 20:32)  HR: 88 (30 Apr 2023 08:33) (88 - 95)  BP: 134/81 (30 Apr 2023 08:33) (113/76 - 134/81)  RR: 16 (30 Apr 2023 08:33) (16 - 16)  SpO2: 95% (30 Apr 2023 08:33) (93% - 95%)  I&O's Summary    29 Apr 2023 07:01  -  30 Apr 2023 07:00  --------------------------------------------------------  IN: 0 mL / OUT: 300 mL / NET: -300 mL        PHYSICAL EXAM:  General: NAD, A/O x 3  ENT: MMM  Neck: Supple, No JVD  Lungs: Clear to auscultation bilaterally  Cardio: RRR, S1/S2, No murmurs  Abdomen: Soft, Nontender, Nondistended; Bowel sounds present  Extremities: No calf tenderness, No pitting edema    LABS:                        03-29 Chol 270 mg/dL LDL -- HDL 58 mg/dL Trig 83 mg/dL                      COVID-19 PCR: NotDetec (04-26-23 @ 23:16)  COVID-19 PCR: NotDetec (04-12-23 @ 05:00)  COVID-19 PCR: NotDetec (04-11-23 @ 09:52)  COVID-19 PCR: NotDetec (04-05-23 @ 17:15)      RADIOLOGY & ADDITIONAL TESTS:    Care Discussed with Consultants/Other Providers:

## 2023-04-30 NOTE — PROGRESS NOTE ADULT - ASSESSMENT
58M PMH HTN, DM2 who presented as a transfer from [facility] to [facility] for stroke on 3/29, s/p TICI 2b thrombectomy of the R ICA and R MCA, with R ICA stent placement.  Postoperative CT head demonstrated new MLS with some hemorrhagic conversion now admitted for rehab- pt/ot/dvt ppx    s/p sepsis 4/20/23, now resolved-  due to uti   treated with zosyn 4/20- 4/23 changed to ertapenem per ID 4/23- 4/26 for 3 days  Culture Results: >100,000 CFU/ml Escherichia coli ESBL  clinically improved,   ID following - recc noted- Ucx with ESBL,   uro consult recc noted  monitor    Stroke s/p thrombectomy of Right ICA and Right MCA and right ICA stent placement  - Left hemiparesis  - cont asa/brillinta, statin    Hypertension with OH   - off meds  -monitor BP, off midodrine now since 4/26/23  -check Orthostatics     DM2  - HbA1C 7.3%  - diet controlled  - little meds- ? homeopathic medication    BPH  -doxazosin  - uro recc noted  - voiding well    DVT PPx  - Lovenox

## 2023-04-30 NOTE — PROGRESS NOTE ADULT - SUBJECTIVE AND OBJECTIVE BOX
Cc: Gait dysfunction    HPI: Patient with no new medical issues today.  Resting comfortably.   Has been tolerating rehabilitation program.    acetaminophen     Tablet .. 650 milliGRAM(s) Oral every 6 hours PRN  AQUAPHOR (petrolatum Ointment) 1 Application(s) Topical daily  AQUAPHOR (petrolatum Ointment) 1 Application(s) Topical daily  artificial  tears Solution 1 Drop(s) Both EYES two times a day  aspirin  chewable 81 milliGRAM(s) Oral daily  atorvastatin 80 milliGRAM(s) Oral at bedtime  benzocaine/menthol Lozenge 1 Lozenge Oral two times a day PRN  diclofenac sodium 1% Gel 2 Gram(s) Topical two times a day  doxazosin 4 milliGRAM(s) Oral at bedtime  enoxaparin Injectable 40 milliGRAM(s) SubCutaneous every 24 hours  lidocaine   4% Patch 2 Patch Transdermal daily  melatonin 6 milliGRAM(s) Oral at bedtime  midodrine. 5 milliGRAM(s) Oral <User Schedule>  pantoprazole    Tablet 40 milliGRAM(s) Oral before breakfast  petrolatum Ophthalmic Ointment 1 Application(s) Right EYE every 12 hours  polyethylene glycol 3350 17 Gram(s) Oral two times a day  senna Syrup 10 milliLiter(s) Oral at bedtime  ticagrelor 90 milliGRAM(s) Oral every 12 hours      T(C): 36.2 (04-30-23 @ 08:33), Max: 36.4 (04-29-23 @ 20:32)  HR: 88 (04-30-23 @ 08:33) (88 - 95)  BP: 134/81 (04-30-23 @ 08:33) (113/76 - 134/81)  RR: 16 (04-30-23 @ 08:33) (16 - 16)  SpO2: 95% (04-30-23 @ 08:33) (93% - 95%)      In NAD  HEENT- EOMI  Heart- No cyanosis   Lungs- No respiratory distress   Abd- + BS, NT  Ext- No calf pain  Neuro- Exam unchanged      Imp: Patient with diagnosis of CVA admitted for comprehensive acute rehabilitation.    Plan:  Impaired mobility- Continue PT/OT  - DVT prophylaxis  - Lovenox   - Skin- Turn q2h, check skin daily  - Continue current medications; patient medically stable.   -Active issues-   -CVA  - aspirin, atorvastatin, Brilinta   -Insomnia-  melatonin  -Constipation/Bowel Program -  miralax, senna   - Patient is stable to continue current rehabilitation program.

## 2023-05-01 LAB
ALBUMIN SERPL ELPH-MCNC: 2.9 G/DL — LOW (ref 3.3–5)
ALP SERPL-CCNC: 45 U/L — SIGNIFICANT CHANGE UP (ref 40–120)
ALT FLD-CCNC: 39 U/L — SIGNIFICANT CHANGE UP (ref 10–45)
ANION GAP SERPL CALC-SCNC: 9 MMOL/L — SIGNIFICANT CHANGE UP (ref 5–17)
AST SERPL-CCNC: 15 U/L — SIGNIFICANT CHANGE UP (ref 10–40)
BASOPHILS # BLD AUTO: 0.05 K/UL — SIGNIFICANT CHANGE UP (ref 0–0.2)
BASOPHILS NFR BLD AUTO: 0.7 % — SIGNIFICANT CHANGE UP (ref 0–2)
BILIRUB SERPL-MCNC: 0.5 MG/DL — SIGNIFICANT CHANGE UP (ref 0.2–1.2)
BUN SERPL-MCNC: 8 MG/DL — SIGNIFICANT CHANGE UP (ref 7–23)
CALCIUM SERPL-MCNC: 9.3 MG/DL — SIGNIFICANT CHANGE UP (ref 8.4–10.5)
CHLORIDE SERPL-SCNC: 105 MMOL/L — SIGNIFICANT CHANGE UP (ref 96–108)
CO2 SERPL-SCNC: 28 MMOL/L — SIGNIFICANT CHANGE UP (ref 22–31)
CREAT SERPL-MCNC: 0.73 MG/DL — SIGNIFICANT CHANGE UP (ref 0.5–1.3)
EGFR: 104 ML/MIN/1.73M2 — SIGNIFICANT CHANGE UP
EOSINOPHIL # BLD AUTO: 0.24 K/UL — SIGNIFICANT CHANGE UP (ref 0–0.5)
EOSINOPHIL NFR BLD AUTO: 3.6 % — SIGNIFICANT CHANGE UP (ref 0–6)
GLUCOSE SERPL-MCNC: 146 MG/DL — HIGH (ref 70–99)
HCT VFR BLD CALC: 35.9 % — LOW (ref 39–50)
HGB BLD-MCNC: 11.6 G/DL — LOW (ref 13–17)
IMM GRANULOCYTES NFR BLD AUTO: 0.4 % — SIGNIFICANT CHANGE UP (ref 0–0.9)
LYMPHOCYTES # BLD AUTO: 1.99 K/UL — SIGNIFICANT CHANGE UP (ref 1–3.3)
LYMPHOCYTES # BLD AUTO: 29.8 % — SIGNIFICANT CHANGE UP (ref 13–44)
MCHC RBC-ENTMCNC: 26.6 PG — LOW (ref 27–34)
MCHC RBC-ENTMCNC: 32.3 GM/DL — SIGNIFICANT CHANGE UP (ref 32–36)
MCV RBC AUTO: 82.3 FL — SIGNIFICANT CHANGE UP (ref 80–100)
MONOCYTES # BLD AUTO: 0.54 K/UL — SIGNIFICANT CHANGE UP (ref 0–0.9)
MONOCYTES NFR BLD AUTO: 8.1 % — SIGNIFICANT CHANGE UP (ref 2–14)
NEUTROPHILS # BLD AUTO: 3.83 K/UL — SIGNIFICANT CHANGE UP (ref 1.8–7.4)
NEUTROPHILS NFR BLD AUTO: 57.4 % — SIGNIFICANT CHANGE UP (ref 43–77)
NRBC # BLD: 0 /100 WBCS — SIGNIFICANT CHANGE UP (ref 0–0)
PLATELET # BLD AUTO: 273 K/UL — SIGNIFICANT CHANGE UP (ref 150–400)
POTASSIUM SERPL-MCNC: 3.5 MMOL/L — SIGNIFICANT CHANGE UP (ref 3.5–5.3)
POTASSIUM SERPL-SCNC: 3.5 MMOL/L — SIGNIFICANT CHANGE UP (ref 3.5–5.3)
PROT SERPL-MCNC: 6.8 G/DL — SIGNIFICANT CHANGE UP (ref 6–8.3)
RBC # BLD: 4.36 M/UL — SIGNIFICANT CHANGE UP (ref 4.2–5.8)
RBC # FLD: 14.3 % — SIGNIFICANT CHANGE UP (ref 10.3–14.5)
SODIUM SERPL-SCNC: 142 MMOL/L — SIGNIFICANT CHANGE UP (ref 135–145)
WBC # BLD: 6.68 K/UL — SIGNIFICANT CHANGE UP (ref 3.8–10.5)
WBC # FLD AUTO: 6.68 K/UL — SIGNIFICANT CHANGE UP (ref 3.8–10.5)

## 2023-05-01 PROCEDURE — 99233 SBSQ HOSP IP/OBS HIGH 50: CPT

## 2023-05-01 RX ORDER — PETROLATUM,WHITE
1 JELLY (GRAM) TOPICAL DAILY
Refills: 0 | Status: DISCONTINUED | OUTPATIENT
Start: 2023-05-01 | End: 2023-05-05

## 2023-05-01 RX ADMIN — PANTOPRAZOLE SODIUM 40 MILLIGRAM(S): 20 TABLET, DELAYED RELEASE ORAL at 05:52

## 2023-05-01 RX ADMIN — Medication 1 DROP(S): at 18:03

## 2023-05-01 RX ADMIN — Medication 4 MILLIGRAM(S): at 22:23

## 2023-05-01 RX ADMIN — Medication 81 MILLIGRAM(S): at 11:58

## 2023-05-01 RX ADMIN — ATORVASTATIN CALCIUM 80 MILLIGRAM(S): 80 TABLET, FILM COATED ORAL at 22:23

## 2023-05-01 RX ADMIN — MIDODRINE HYDROCHLORIDE 5 MILLIGRAM(S): 2.5 TABLET ORAL at 14:26

## 2023-05-01 RX ADMIN — TICAGRELOR 90 MILLIGRAM(S): 90 TABLET ORAL at 05:52

## 2023-05-01 RX ADMIN — POLYETHYLENE GLYCOL 3350 17 GRAM(S): 17 POWDER, FOR SOLUTION ORAL at 05:52

## 2023-05-01 RX ADMIN — SENNA PLUS 10 MILLILITER(S): 8.6 TABLET ORAL at 22:23

## 2023-05-01 RX ADMIN — MIDODRINE HYDROCHLORIDE 5 MILLIGRAM(S): 2.5 TABLET ORAL at 06:10

## 2023-05-01 RX ADMIN — Medication 1 APPLICATION(S): at 05:53

## 2023-05-01 RX ADMIN — Medication 1 APPLICATION(S): at 11:58

## 2023-05-01 RX ADMIN — Medication 6 MILLIGRAM(S): at 22:22

## 2023-05-01 RX ADMIN — TICAGRELOR 90 MILLIGRAM(S): 90 TABLET ORAL at 18:04

## 2023-05-01 RX ADMIN — DICLOFENAC SODIUM 2 GRAM(S): 30 GEL TOPICAL at 18:04

## 2023-05-01 RX ADMIN — ENOXAPARIN SODIUM 40 MILLIGRAM(S): 100 INJECTION SUBCUTANEOUS at 18:03

## 2023-05-01 RX ADMIN — DICLOFENAC SODIUM 2 GRAM(S): 30 GEL TOPICAL at 05:53

## 2023-05-01 RX ADMIN — Medication 1 DROP(S): at 05:52

## 2023-05-01 NOTE — PROGRESS NOTE ADULT - SUBJECTIVE AND OBJECTIVE BOX
Subjective  Seen and examined  Reports disturbed sleep last night, partly due to intermittent itching right arm and upper back/post neck, which resolved afterwards, No rash  Tired and sedated, unable to engage well in therapy today  But rousable and interacting appropriately during review  Reports no acute chest or urinary symptoms  Dysuria post UTI is very minimal    Therapy--Tired, sedated engaged minimally first session  Rt knee ROM normal    ROS--No head ache or abd pain, no N/V   LBM 4/30    Vital Signs Last 24 Hrs  T(C): 36.8 (01 May 2023 08:00), Max: 37.1 (30 Apr 2023 21:00)  T(F): 98.3 (01 May 2023 08:00), Max: 98.8 (30 Apr 2023 21:00)  HR: 99 (01 May 2023 08:00) (94 - 99)  BP: 112/71 (01 May 2023 08:00) (112/71 - 125/75)  RR: 16 (01 May 2023 08:00) (16 - 16)  SpO2: 96% (01 May 2023 08:00) (95% - 96%)  O2 Parameters below as of 01 May 2023 08:00  Patient On (Oxygen Delivery Method): room air      EXAM  Gen - Comfortable,  HEENT - , right  eye vision loss   Neck - Supple, No limited ROM  Pulm -clear   Cardiovascular - RRR, S1S2  Chest - good chest expansion, good respiratory effort  Abdomen - Soft, non tender  +BS, + PEG   Extremities - No Cyanosis, no clubbing, no edema, no calf tenderness    Neuro-  Alert and oriented, interactive  Speech  dysarthric, improving  Cranial Nerves - Left facial weakness, left eye vision loss, absent left shoulder shrug   Right  eye vision loss Rt eye  Tongue deviation to left, Flat left nasolabial fold  Motor -  Left hemiparesis                    LEFT    UE - ShAB 1+/5, improvement with hand , stiffness with terminal elbow flexion                    RIGHT UE - ShAB 5/5, EF 5/5, EE 5/5,   5/5                    LEFT    LE - 2/5 hip Knee 3/5, ankle 1/5                    RIGHT LE - HF 5/5, KE 5/5, DF 5/5, PF 5/5        Sensory - Intact to LT     Reflexes - DTR Intact     Coordination - FTN/HTS  impaired to left side     Tone - normal  Psychiatric - Mood stable, Affect WNL  Skin:  all skin intact, PEG in situ, not in use    RECENT LABS/IMAGING                     11.6   6.68  )-----------( 273      ( 01 May 2023 06:41 )             35.9     05-01    142  |  105  |  8   ----------------------------<  146<H>  3.5   |  28  |  0.73    Ca    9.3      01 May 2023 06:41    TPro  6.8  /  Alb  2.9<L>  /  TBili  0.5  /  DBili  x   /  AST  15  /  ALT  39  /  AlkPhos  45  05-01    MEDICATIONS  (STANDING):  AQUAPHOR (petrolatum Ointment) 1 Application(s) Topical daily  artificial  tears Solution 1 Drop(s) Both EYES two times a day  aspirin  chewable 81 milliGRAM(s) Oral daily  atorvastatin 80 milliGRAM(s) Oral at bedtime  diclofenac sodium 1% Gel 2 Gram(s) Topical two times a day  doxazosin 4 milliGRAM(s) Oral at bedtime  enoxaparin Injectable 40 milliGRAM(s) SubCutaneous every 24 hours  lidocaine   4% Patch 2 Patch Transdermal daily  melatonin 6 milliGRAM(s) Oral at bedtime  midodrine. 5 milliGRAM(s) Oral <User Schedule>  pantoprazole    Tablet 40 milliGRAM(s) Oral before breakfast  petrolatum Ophthalmic Ointment 1 Application(s) Right EYE every 12 hours  polyethylene glycol 3350 17 Gram(s) Oral two times a day  senna Syrup 10 milliLiter(s) Oral at bedtime  ticagrelor 90 milliGRAM(s) Oral every 12 hours    MEDICATIONS  (PRN):  acetaminophen     Tablet .. 650 milliGRAM(s) Oral every 6 hours PRN Mild Pain (1 - 3)  benzocaine/menthol Lozenge 1 Lozenge Oral two times a day PRN Sore Throat

## 2023-05-01 NOTE — PROGRESS NOTE ADULT - ASSESSMENT
Assessment/Plan:  CONG AGUAYO is a 58 year old male with PMH of HTN, and DM; who presented as a transfer from [facility] to [facility] for stroke on 3/29, where he was found to have a R ICA occlusion and he underwent a TICI 2b thromectomy of R ICA and R MCA with R ICA stent placement. Extubated on 4/7. His brain MRI was significant for improved midline shift and small frontal parietal SAH. His hospital course was complicated by aspiration PNA (s/p Zosyn); and dysphagia (requiring PEG - placed 4/6). Patient now admitted for a multidisciplinary rehab program. 04-11-23 @ 13:54    * Due DC to [facility] today   * Transient sedation--monitor for fever or any progression of current fatigue     Rehab Management/MEDICAL MANAGEMENT     #Functional deficits s/p CVA  -Continue  Comprehensive Rehab Program of PT/OT/SLP  - 3 hours a day, 5 days a week  - P&O as needed   - R ICA occlusion, s/p TICI 2b thrombectomy of R ICA and R MCA with R ICA stent placement  - ASA & Brilinta  - Outpt Neurology follow up- Dr. Reid  - Outpt IR follow up - Dr. Allen  --Rt leg discomfort/stiffness---commenced gabaptnin 4/25, resolved, d/c gabapentin 4/27    # Fatigue--monitor   #Dysphagia/aspiration PNA  - S/p Zosyn  - PEG tube last prior to Acute rehab admission  - Repeat CT chest in 1 month (~5/8)    * UTI (ESBL) 4/20 --on zosyn, switched to Entrapenem 4/23, completed treatment   resp viral panel -ve    * Incidental Small adrenal nodule--  will refer to Urology for f/u post DC     #HTN  - Lisinopril--D/MONET 4/13  - Doxazosin  - Outpt Cardiology follow up - Dr. Song    # Orthostatic hypotension--midodrine 2.5mg bid, increased to 5mg bid 4/17, bp stable--d/monet 4/26    # Myalgia B/L Chest--d/c diclofenac cream and commence lidocaine patch b/l chest  #HLD  -Atorvastatin    #DM  - A1c: 7.3  - ISS  - FS ACHS    #Dry eye  - Occular lubricant ointment  - Artificial tears    #Sleep  - Melatonin 6mg nightly PRN, switched to standing 4/18, sleep improved    #Skin  - Skin-: intact, PEG in situ   - Pressure injury/Skin: OOB to Chair, PT/OT     #Pain Mgmt   - Tylenol PRN    #GI/Bowel Mgmt   - Pantoprazole  - Continent c/w Senna, Miralax   -lactulose    #/Bladder Mgmt   BPH c/w doxazosin, dose increased with improvement  Adrenal nodule  - 4/26 -Urology review appreciated, dysuria less prominent, c/w increased dose of doxazosin, improved       #FEN   PEG placed 4/6, in acute care, tolerating oral feeds since acute rehab placement, f/u with GI post dc    - Diet - Easy to Chew, halal  - Dysphagia  SLP - evaluation and treatment    #Precautions / PROPHYLAXIS:   - Falls  - ortho: Weight bearing status: WBAT   - Lungs: Aspiration, Incentive Spirometer   - DVT: Lovenox  ---------------------------------------------------------------  4/24--Discussed with wife present at bed side after initial review. I explained functional and clinical update and AMAN placement for further therapy. She agreed tto same  4/25--I called patient's wife and gave her update on patient's condition and treatment  ---------------------------------------------------------------  IDT conference on 4/27  TDD: 4/28 to AMAN  Barriers: Distracted, tangentile, perseverative, requires cueing, R eye visual deficit. Poor insight, safety and memory. L side inattention/neglect.   Social Work: Lives in a basement apartment with spouse and 2 sons, 12 MEI. Patient is main source of income for family. Spouse does not work.  OT: Min A for UBD/toilet transfer. Mod A for LBD and tub transfer.   PT: Mod A for transfers. Ambulated 120 ft with RW with min A x2.   SLP: Easy to chew diet. Deficits with higher level cog- attention, and safety. Insight is improving.   ---------------------------------------------------------------  FOLLOW UP/OUTPATIENT:    Bev Song)  Cardiology; Internal Medicine  [address], [address]  [place], [place] [address]  Phone: [phone]  Fax: [phone]    Ivan Reid (MD; PhD)  Neurology; Vascular Neurology  [address] [facility] [address]  [place] [address]  Phone: [phone]  Fax: [phone]  Follow Up Time:     Primary doctor,   Phone: (   )    -  Fax: (   )    -  Follow Up Time:     Sanchez Allen (MD)  Neurology; Vascular Neurology  [address] [place] [address]  Phone: [phone]  Fax: [phone]  Follow Up Time:     Lily Moon (DO)  Gastroenterology  [address], [address]  [place] [address]  Phone: [phone]  Fax: [phone]  Follow Up Time:    Urology f/u for persistent dysuria,  Small adrenal nodule  ---------------------------------------------------------------

## 2023-05-02 PROCEDURE — 99232 SBSQ HOSP IP/OBS MODERATE 35: CPT

## 2023-05-02 PROCEDURE — 99233 SBSQ HOSP IP/OBS HIGH 50: CPT

## 2023-05-02 RX ORDER — MIDODRINE HYDROCHLORIDE 2.5 MG/1
2.5 TABLET ORAL
Refills: 0 | Status: DISCONTINUED | OUTPATIENT
Start: 2023-05-03 | End: 2023-05-05

## 2023-05-02 RX ORDER — DOXAZOSIN MESYLATE 4 MG
2 TABLET ORAL AT BEDTIME
Refills: 0 | Status: DISCONTINUED | OUTPATIENT
Start: 2023-05-02 | End: 2023-05-05

## 2023-05-02 RX ADMIN — Medication 81 MILLIGRAM(S): at 11:03

## 2023-05-02 RX ADMIN — Medication 650 MILLIGRAM(S): at 11:03

## 2023-05-02 RX ADMIN — PANTOPRAZOLE SODIUM 40 MILLIGRAM(S): 20 TABLET, DELAYED RELEASE ORAL at 05:44

## 2023-05-02 RX ADMIN — Medication 2 MILLIGRAM(S): at 22:48

## 2023-05-02 RX ADMIN — DICLOFENAC SODIUM 2 GRAM(S): 30 GEL TOPICAL at 05:43

## 2023-05-02 RX ADMIN — POLYETHYLENE GLYCOL 3350 17 GRAM(S): 17 POWDER, FOR SOLUTION ORAL at 05:43

## 2023-05-02 RX ADMIN — Medication 650 MILLIGRAM(S): at 12:03

## 2023-05-02 RX ADMIN — Medication 1 APPLICATION(S): at 05:44

## 2023-05-02 RX ADMIN — ENOXAPARIN SODIUM 40 MILLIGRAM(S): 100 INJECTION SUBCUTANEOUS at 18:00

## 2023-05-02 RX ADMIN — TICAGRELOR 90 MILLIGRAM(S): 90 TABLET ORAL at 18:01

## 2023-05-02 RX ADMIN — Medication 1 DROP(S): at 05:43

## 2023-05-02 RX ADMIN — Medication 6 MILLIGRAM(S): at 22:40

## 2023-05-02 RX ADMIN — Medication 1 APPLICATION(S): at 18:24

## 2023-05-02 RX ADMIN — SENNA PLUS 10 MILLILITER(S): 8.6 TABLET ORAL at 22:41

## 2023-05-02 RX ADMIN — MIDODRINE HYDROCHLORIDE 5 MILLIGRAM(S): 2.5 TABLET ORAL at 05:53

## 2023-05-02 RX ADMIN — TICAGRELOR 90 MILLIGRAM(S): 90 TABLET ORAL at 05:45

## 2023-05-02 RX ADMIN — ATORVASTATIN CALCIUM 80 MILLIGRAM(S): 80 TABLET, FILM COATED ORAL at 22:40

## 2023-05-02 RX ADMIN — Medication 1 APPLICATION(S): at 11:04

## 2023-05-02 RX ADMIN — Medication 1 DROP(S): at 18:01

## 2023-05-02 RX ADMIN — MIDODRINE HYDROCHLORIDE 5 MILLIGRAM(S): 2.5 TABLET ORAL at 13:48

## 2023-05-02 NOTE — PROGRESS NOTE ADULT - SUBJECTIVE AND OBJECTIVE BOX
Medicine Progress Note    Patient is a 58y old  Male who presents with a chief complaint of Functional deficits s/p CVA (02 May 2023 09:26)      SUBJECTIVE / OVERNIGHT EVENTS:  seen and examined  Chart reviewed  No overnight events  Limb weakness improving with therapy  BM+  No pain  No complaints    ADDITIONAL REVIEW OF SYSTEMS:  denied fever/chills/CP/SOB/cough/palpitation/dizziness/abdominal pian/nausea/vomiting/diarrhoea/constipation/dysuria/leg or calf pain/headaches.all other ROS neg    MEDICATIONS  (STANDING):  AQUAPHOR (petrolatum Ointment) 1 Application(s) Topical daily  artificial  tears Solution 1 Drop(s) Both EYES two times a day  aspirin  chewable 81 milliGRAM(s) Oral daily  atorvastatin 80 milliGRAM(s) Oral at bedtime  diclofenac sodium 1% Gel 2 Gram(s) Topical two times a day  doxazosin 4 milliGRAM(s) Oral at bedtime  enoxaparin Injectable 40 milliGRAM(s) SubCutaneous every 24 hours  lidocaine   4% Patch 2 Patch Transdermal daily  melatonin 6 milliGRAM(s) Oral at bedtime  midodrine. 5 milliGRAM(s) Oral <User Schedule>  pantoprazole    Tablet 40 milliGRAM(s) Oral before breakfast  petrolatum Ophthalmic Ointment 1 Application(s) Right EYE every 12 hours  polyethylene glycol 3350 17 Gram(s) Oral two times a day  senna Syrup 10 milliLiter(s) Oral at bedtime  ticagrelor 90 milliGRAM(s) Oral every 12 hours    MEDICATIONS  (PRN):  acetaminophen     Tablet .. 650 milliGRAM(s) Oral every 6 hours PRN Mild Pain (1 - 3)  benzocaine/menthol Lozenge 1 Lozenge Oral two times a day PRN Sore Throat    CAPILLARY BLOOD GLUCOSE        I&O's Summary      PHYSICAL EXAM:  Vital Signs Last 24 Hrs  T(C): 36.7 (02 May 2023 11:14), Max: 36.7 (02 May 2023 11:14)  T(F): 98.1 (02 May 2023 11:14), Max: 98.1 (02 May 2023 11:14)  HR: 91 (02 May 2023 13:35) (79 - 95)  BP: 100/64 (02 May 2023 13:35) (100/64 - 123/76)  BP(mean): --  RR: 16 (02 May 2023 11:14) (16 - 16)  SpO2: 95% (02 May 2023 11:14) (95% - 95%)    Parameters below as of 02 May 2023 11:14  Patient On (Oxygen Delivery Method): room air    GENERAL: Not in distress. Alert    HEENT: clear conjuctiva, MMM. no pallor or icterus  CARDIOVASCULAR: RRR S1, S2. No murmur/rubs/gallop  LUNGS: BLAE+, no rales, no wheezing, no rhonchi.    ABDOMEN: ND. Soft,  NT, no guarding / rebound / rigidity. BS normoactive  BACK: No spine tenderness.  EXTREMITIES: no edema. no leg or calf TP.  SKIN: warm and dry.PEG site clean  PSYCHIATRIC: Calm.  No agitation.  CNS:AAO*3. left sided weakness    LABS:                        11.6   6.68  )-----------( 273      ( 01 May 2023 06:41 )             35.9     05-01    142  |  105  |  8   ----------------------------<  146<H>  3.5   |  28  |  0.73    Ca    9.3      01 May 2023 06:41    TPro  6.8  /  Alb  2.9<L>  /  TBili  0.5  /  DBili  x   /  AST  15  /  ALT  39  /  AlkPhos  45  05-01              COVID-19 PCR: NotDetec (26 Apr 2023 23:16)  SARS-CoV-2: NotDetec (20 Apr 2023 09:50)  COVID-19 PCR: NotDetec (12 Apr 2023 05:00)  COVID-19 PCR: NotDetec (11 Apr 2023 09:52)  COVID-19 PCR: NotDetec (05 Apr 2023 17:15)  COVID-19 PCR: NotDetec (29 Mar 2023 10:00)      RADIOLOGY & ADDITIONAL TESTS:  Imaging from Last 24 Hours:    Electrocardiogram/QTc Interval:    COORDINATION OF CARE:  Care Discussed with Consultants/Other Providers:

## 2023-05-02 NOTE — PROGRESS NOTE ADULT - ASSESSMENT
Assessment/Plan:  CONG AGUAYO is a 58 year old male with PMH of HTN, and DM; who presented as a transfer from [facility] to [facility] for stroke on 3/29, where he was found to have a R ICA occlusion and he underwent a TICI 2b thromectomy of R ICA and R MCA with R ICA stent placement. Extubated on 4/7. His brain MRI was significant for improved midline shift and small frontal parietal SAH. His hospital course was complicated by aspiration PNA (s/p Zosyn); and dysphagia (requiring PEG - placed 4/6). Patient now admitted for a multidisciplinary rehab program. 04-11-23 @ 13:54    * Await DC to [facility]   * Energy level normalized    Rehab Management/MEDICAL MANAGEMENT     #Functional deficits s/p CVA  -Continue  Comprehensive Rehab Program of PT/OT/SLP  - 3 hours a day, 5 days a week  - P&O as needed   - R ICA occlusion, s/p TICI 2b thrombectomy of R ICA and R MCA with R ICA stent placement  - ASA & Brilinta  - Outpt Neurology follow up- Dr. Reid  - Outpt IR follow up - Dr. Allen  --Rt leg discomfort/stiffness---commenced gabaptnin 4/25, resolved, d/c gabapentin 4/27    # Fatigue--resolved   #Dysphagia/aspiration PNA  - S/p Zosyn  - PEG tube last prior to Acute rehab admission  - Repeat CT chest in 1 month (~5/8)    * UTI (ESBL) 4/20 --on zosyn, switched to Entrapenem 4/23, completed treatment   resp viral panel -ve    * Incidental Small adrenal nodule--  will refer to Urology for f/u post DC     #HTN  - Lisinopril--D/MONET 4/13  - Doxazosin  - Outpt Cardiology follow up - Dr. Song    # Orthostatic hypotension--midodrine 2.5mg bid, increased to 5mg bid 4/17, bp stable--d/monet 4/26    # Myalgia B/L Chest--d/c diclofenac cream and commence lidocaine patch b/l chest  #HLD  -Atorvastatin    #DM  - A1c: 7.3  - ISS  - FS ACHS    #Dry eye  - Occular lubricant ointment  - Artificial tears    #Sleep  - Melatonin 6mg nightly PRN, switched to standing 4/18, sleep improved    #Skin  - Skin-: intact, PEG in situ   - Pressure injury/Skin: OOB to Chair, PT/OT     #Pain Mgmt   - Tylenol PRN    #GI/Bowel Mgmt   - Pantoprazole  - Continent c/w Senna, Miralax   -lactulose    #/Bladder Mgmt   BPH c/w doxazosin, dose increased with improvement  Adrenal nodule  - 4/26 -Urology review appreciated, dysuria less prominent, c/w increased dose of doxazosin, improved       #FEN   PEG placed 4/6, in acute care, tolerating oral feeds since acute rehab placement, f/u with GI post dc    - Diet - Easy to Chew, halal  - Dysphagia  SLP - evaluation and treatment    #Precautions / PROPHYLAXIS:   - Falls  - ortho: Weight bearing status: WBAT   - Lungs: Aspiration, Incentive Spirometer   - DVT: Lovenox  ---------------------------------------------------------------  4/24--Discussed with wife present at bed side after initial review. I explained functional and clinical update and AMAN placement for further therapy. She agreed tto same  4/25--I called patient's wife and gave her update on patient's condition and treatment  ---------------------------------------------------------------  IDT conference on 4/27  TDD: 4/28 to AMAN  Barriers: Distracted, tangentile, perseverative, requires cueing, R eye visual deficit. Poor insight, safety and memory. L side inattention/neglect.   Social Work: Lives in a basement apartment with spouse and 2 sons, 12 MEI. Patient is main source of income for family. Spouse does not work.  OT: Min A for UBD/toilet transfer. Mod A for LBD and tub transfer.   PT: Mod A for transfers. Ambulated 120 ft with RW with min A x2.   SLP: Easy to chew diet. Deficits with higher level cog- attention, and safety. Insight is improving.   ---------------------------------------------------------------  FOLLOW UP/OUTPATIENT:    eBv Song)  Cardiology; Internal Medicine  [address] [facility] [facility] [address], [address]  [place], [place] [address]  Phone: [phone]  Fax: [phone]    Ivan Reid (MD; PhD)  Neurology; Vascular Neurology  [address] [facility] [address] [facility], [place] [address]  Phone: [phone]  Fax: [phone]  Follow Up Time:     Primary doctor,   Phone: (   )    -  Fax: (   )    -  Follow Up Time:     Sanchez Allen (MD)  Neurology; Vascular Neurology  [address] [place] [address]  Phone: [phone]  Fax: [phone]  Follow Up Time:     Lily Moon (DO)  Gastroenterology  [address] [facility], [address] [place] [address]  Phone: [phone]  Fax: [phone]  Follow Up Time:    Urology f/u for persistent dysuria,  Small adrenal nodule  ---------------------------------------------------------------

## 2023-05-02 NOTE — PROGRESS NOTE ADULT - ASSESSMENT
58M PMH HTN, DM2 who presented as a transfer from [facility] to [facility] for stroke on 3/29, s/p TICI 2b thrombectomy of the R ICA and R MCA, with R ICA stent placement.  Postoperative CT head demonstrated new MLS with some hemorrhagic conversion now admitted for rehab- pt/ot/dvt ppx    s/p sepsis 4/20/23, now resolved-  due to uti   treated with zosyn 4/20- 4/23 changed to ertapenem per ID 4/23- 4/26 for 3 days  Culture Results: >100,000 CFU/ml Escherichia coli ESBL  clinically improved,   ID following - recc noted- Ucx with ESBL,   uro consult recc noted  monitor    Stroke s/p thrombectomy of Right ICA and Right MCA and right ICA stent placement  - Left hemiparesis  - cont asa/brillinta, statin  - tolerating comprehensive rehab. for AMAN on DC    Hypertension with OH   - off meds  - on midodrin 5 mg BID  - reduced cardura from 4 mg to 2 mg at h/s with holding <120 SBP.   - monitor UO. hold cardura if voiding well.   - wean o off midodrine as feasible  -check Orthostatics daily and adjust meds  - educated about postural hypotension and fall prevention    DM2  - HbA1C 7.3%  - diet controlled  - little meds- ? homeopathic medication    BPH  -doxazosin 4. reduce doxazosin to 2 mg at h/s for OH 5/2  - uro recc noted  - voiding well    DVT PPx  - Lovenox    d/w Dr. STEIN and Nicci CHAUDHARI

## 2023-05-02 NOTE — PROGRESS NOTE ADULT - SUBJECTIVE AND OBJECTIVE BOX
Subjective  Seen and examined  Reports restful sleep  Energetic.  Tolerating diet  No pain symptoms  No dysuria    Therapy--Good energy levels, will engage in full therapy session  Rt knee ROM normal    ROS--No head ache or abd pain, no N/V   Left sided weakness, LUE?LLE  LBM 4/30    Anticipate dc to AMAN     Vital Signs Last 24 Hrs  T(C): 36.6 (01 May 2023 21:30), Max: 36.6 (01 May 2023 21:30)  T(F): 97.8 (01 May 2023 21:30), Max: 97.8 (01 May 2023 21:30)  HR: 95 (01 May 2023 21:30) (95 - 95)  BP: 113/81 (01 May 2023 21:30) (113/81 - 113/81)  RR: 16 (01 May 2023 21:30) (16 - 16)  SpO2: 95% (01 May 2023 21:30) (95% - 95%)  O2 Parameters below as of 01 May 2023 21:30  Patient On (Oxygen Delivery Method): room air    EXAM  Gen - Comfortable, energetic   HEENT - , right  eye vision loss   Neck - Supple, No limited ROM  Pulm -clear   Cardiovascular - RRR, S1S2  Chest - good chest expansion, good respiratory effort  Abdomen - Soft, non tender  +BS, + PEG   Extremities - No Cyanosis, no clubbing, no edema, no calf tenderness    Neuro-  Alert and oriented, interactive  Speech  dysarthric, improving  Cranial Nerves - Left facial weakness, left eye vision loss, absent left shoulder shrug   Right  eye vision loss Rt eye  Tongue deviation to left, Flat left nasolabial fold  Motor -  Left hemiparesis                    LEFT    UE - ShAB 1+/5, improvement with hand , stiffness with terminal elbow flexion                    RIGHT UE - ShAB 5/5, EF 5/5, EE 5/5,   5/5                    LEFT    LE - 2/5 hip Knee 3/5, ankle 1/5                    RIGHT LE - HF 5/5, KE 5/5, DF 5/5, PF 5/5        Sensory - Intact to LT     Reflexes - DTR Intact     Coordination - FTN/HTS  impaired to left side     Tone - normal  Psychiatric - Mood stable, Affect WNL  Skin:  all skin intact, PEG in situ, not in use    RECENT LABS/IMAGING                     11.6   6.68  )-----------( 273      ( 01 May 2023 06:41 )             35.9     05-01    142  |  105  |  8   ----------------------------<  146<H>  3.5   |  28  |  0.73    Ca    9.3      01 May 2023 06:41    TPro  6.8  /  Alb  2.9<L>  /  TBili  0.5  /  DBili  x   /  AST  15  /  ALT  39  /  AlkPhos  45  05-01    MEDICATIONS  (STANDING):  AQUAPHOR (petrolatum Ointment) 1 Application(s) Topical daily  artificial  tears Solution 1 Drop(s) Both EYES two times a day  aspirin  chewable 81 milliGRAM(s) Oral daily  atorvastatin 80 milliGRAM(s) Oral at bedtime  diclofenac sodium 1% Gel 2 Gram(s) Topical two times a day  doxazosin 4 milliGRAM(s) Oral at bedtime  enoxaparin Injectable 40 milliGRAM(s) SubCutaneous every 24 hours  lidocaine   4% Patch 2 Patch Transdermal daily  melatonin 6 milliGRAM(s) Oral at bedtime  midodrine. 5 milliGRAM(s) Oral <User Schedule>  pantoprazole    Tablet 40 milliGRAM(s) Oral before breakfast  petrolatum Ophthalmic Ointment 1 Application(s) Right EYE every 12 hours  polyethylene glycol 3350 17 Gram(s) Oral two times a day  senna Syrup 10 milliLiter(s) Oral at bedtime  ticagrelor 90 milliGRAM(s) Oral every 12 hours    MEDICATIONS  (PRN):  acetaminophen     Tablet .. 650 milliGRAM(s) Oral every 6 hours PRN Mild Pain (1 - 3)  benzocaine/menthol Lozenge 1 Lozenge Oral two times a day PRN Sore Throat

## 2023-05-03 PROCEDURE — 99232 SBSQ HOSP IP/OBS MODERATE 35: CPT

## 2023-05-03 PROCEDURE — 99233 SBSQ HOSP IP/OBS HIGH 50: CPT

## 2023-05-03 RX ADMIN — Medication 81 MILLIGRAM(S): at 11:24

## 2023-05-03 RX ADMIN — SENNA PLUS 10 MILLILITER(S): 8.6 TABLET ORAL at 23:13

## 2023-05-03 RX ADMIN — Medication 1 APPLICATION(S): at 06:03

## 2023-05-03 RX ADMIN — MIDODRINE HYDROCHLORIDE 2.5 MILLIGRAM(S): 2.5 TABLET ORAL at 06:01

## 2023-05-03 RX ADMIN — TICAGRELOR 90 MILLIGRAM(S): 90 TABLET ORAL at 06:03

## 2023-05-03 RX ADMIN — POLYETHYLENE GLYCOL 3350 17 GRAM(S): 17 POWDER, FOR SOLUTION ORAL at 06:03

## 2023-05-03 RX ADMIN — ATORVASTATIN CALCIUM 80 MILLIGRAM(S): 80 TABLET, FILM COATED ORAL at 21:43

## 2023-05-03 RX ADMIN — Medication 2 MILLIGRAM(S): at 21:43

## 2023-05-03 RX ADMIN — Medication 6 MILLIGRAM(S): at 21:43

## 2023-05-03 RX ADMIN — PANTOPRAZOLE SODIUM 40 MILLIGRAM(S): 20 TABLET, DELAYED RELEASE ORAL at 06:02

## 2023-05-03 RX ADMIN — Medication 1 DROP(S): at 06:02

## 2023-05-03 RX ADMIN — MIDODRINE HYDROCHLORIDE 2.5 MILLIGRAM(S): 2.5 TABLET ORAL at 13:01

## 2023-05-03 RX ADMIN — ENOXAPARIN SODIUM 40 MILLIGRAM(S): 100 INJECTION SUBCUTANEOUS at 17:43

## 2023-05-03 RX ADMIN — DICLOFENAC SODIUM 2 GRAM(S): 30 GEL TOPICAL at 06:03

## 2023-05-03 RX ADMIN — TICAGRELOR 90 MILLIGRAM(S): 90 TABLET ORAL at 17:43

## 2023-05-03 RX ADMIN — Medication 1 APPLICATION(S): at 18:07

## 2023-05-03 NOTE — PROGRESS NOTE ADULT - SUBJECTIVE AND OBJECTIVE BOX
Subjective  Seen and examined, reports an uneventful night  Tolerating oral diet  No acute symptoms  No more dysuria  Reports that that he missed an appointment with his [employer] [facility] during hosp care  We told him, he can share his hosp dc details with them  as reason for his inability to attend  If needed, further documentation needed, we will support him    Therapy--Good energy levels, will engage in full therapy session  Rt knee ROM normal    ROS--No head ache or abd pain, no N/V   Left sided weakness, mainly left upper ext, LLE weakness significantly improved >3/5  LBM 5/2    Anticipate dc to [facility]    ICU Vital Signs Last 24 Hrs  T(C): 36.7 (03 May 2023 09:32), Max: 36.7 (02 May 2023 11:14)  T(F): 98.1 (03 May 2023 09:32), Max: 98.1 (02 May 2023 11:14)  HR: 89 (03 May 2023 09:32) (79 - 105)  BP: 101/72 (03 May 2023 09:32) (100/64 - 139/82)  RR: 16 (03 May 2023 09:32) (16 - 16)  SpO2: 96% (03 May 2023 09:32) (95% - 97%)  O2 Parameters below as of 03 May 2023 09:32  Patient On (Oxygen Delivery Method): room air    EXAM  Gen - Comfortable,  HEENT - , right  eye vision loss   Neck - Supple, No limited ROM  Pulm -clear   Cardiovascular - RRR, S1S2  Chest - good chest expansion, good respiratory effort  Abdomen - Soft, non tender  +BS, + PEG   Extremities - No Cyanosis, no clubbing, no edema, no calf tenderness    Neuro-  Alert and oriented, interactive  Speech  dysarthric, improving  Cranial Nerves - Left facial weakness, left eye vision loss, absent left shoulder shrug   Right  eye vision loss Rt eye  Tongue deviation to left, Flat left nasolabial fold  Motor -  Left hemiparesis                    LEFT    UE - ShAB 1+/5, improvement with hand , stiffness with terminal elbow flexion                    RIGHT UE - ShAB 5/5, EF 5/5, EE 5/5,   5/5                    LEFT    LE - 2/5 hip Knee 3/5, ankle 1/5                    RIGHT LE - HF 5/5, KE 5/5, DF 5/5, PF 5/5        Sensory - Intact to LT     Reflexes - 2+     Coordination - FTN/HTS  impaired to left side     Tone - normal  Psychiatric - Mood stable, Affect WNL  Skin:  all skin intact, PEG in situ, not in use    RECENT LABS/IMAGING                     11.6   6.68  )-----------( 273      ( 01 May 2023 06:41 )             35.9     05-01    142  |  105  |  8   ----------------------------<  146<H>  3.5   |  28  |  0.73    Ca    9.3      01 May 2023 06:41    TPro  6.8  /  Alb  2.9<L>  /  TBili  0.5  /  DBili  x   /  AST  15  /  ALT  39  /  AlkPhos  45  05-01    MEDICATIONS  (STANDING):  AQUAPHOR (petrolatum Ointment) 1 Application(s) Topical daily  artificial  tears Solution 1 Drop(s) Both EYES two times a day  aspirin  chewable 81 milliGRAM(s) Oral daily  atorvastatin 80 milliGRAM(s) Oral at bedtime  diclofenac sodium 1% Gel 2 Gram(s) Topical two times a day  doxazosin 2 milliGRAM(s) Oral at bedtime  enoxaparin Injectable 40 milliGRAM(s) SubCutaneous every 24 hours  lidocaine   4% Patch 2 Patch Transdermal daily  melatonin 6 milliGRAM(s) Oral at bedtime  midodrine. 2.5 milliGRAM(s) Oral <User Schedule>  pantoprazole    Tablet 40 milliGRAM(s) Oral before breakfast  petrolatum Ophthalmic Ointment 1 Application(s) Right EYE every 12 hours  polyethylene glycol 3350 17 Gram(s) Oral two times a day  senna Syrup 10 milliLiter(s) Oral at bedtime  ticagrelor 90 milliGRAM(s) Oral every 12 hours    MEDICATIONS  (PRN):  acetaminophen     Tablet .. 650 milliGRAM(s) Oral every 6 hours PRN Mild Pain (1 - 3)  benzocaine/menthol Lozenge 1 Lozenge Oral two times a day PRN Sore Throat

## 2023-05-03 NOTE — PROGRESS NOTE ADULT - ASSESSMENT
58M PMH HTN, DM2 who presented as a transfer from [facility] to [facility] for stroke on 3/29, s/p TICI 2b thrombectomy of the R ICA and R MCA, with R ICA stent placement.  Postoperative CT head demonstrated new MLS with some hemorrhagic conversion now admitted for rehab- pt/ot/dvt ppx    s/p sepsis 4/20/23, now resolved-  due to uti   treated with zosyn 4/20- 4/23 changed to ertapenem per ID 4/23- 4/26 for 3 days  Culture Results: >100,000 CFU/ml Escherichia coli ESBL  clinically improved,   ID following - recc noted- Ucx with ESBL,   uro consult recc noted  monitor    Stroke s/p thrombectomy of Right ICA and Right MCA and right ICA stent placement  - Left hemiparesis  - cont asa/brillinta, statin    Hypertension with OH   - off meds  -monitor BP,   - c/w midodrine  -check Orthostatics     DM2  - HbA1C 7.3%  - diet controlled  - little meds- ? homeopathic medication    BPH  -doxazosin  - uro recc noted  - voiding well    DVT PPx  - Lovenox    dipso to [facility] when bed available   d/w dr. jenkins

## 2023-05-03 NOTE — PROGRESS NOTE ADULT - SUBJECTIVE AND OBJECTIVE BOX
58y old  Male who presents with a chief complaint of Functional deficits s/p CVA     Patient seen and examined at bedside.  clinically appears well this am, eating, voiding  no pain, n/v, Denies sob,  dyspnea, abd pain, no dysuria      Vital Signs Last 24 Hrs  T(C): 36.7 (03 May 2023 09:32), Max: 36.7 (02 May 2023 11:14)  T(F): 98.1 (03 May 2023 09:32), Max: 98.1 (02 May 2023 11:14)  HR: 89 (03 May 2023 09:32) (79 - 105)  BP: 101/72 (03 May 2023 09:32) (100/64 - 139/82)  BP(mean): --  RR: 16 (03 May 2023 09:32) (16 - 16)  SpO2: 96% (03 May 2023 09:32) (95% - 97%)    Parameters below as of 03 May 2023 09:32  Patient On (Oxygen Delivery Method): room ai      GENERAL- NAD  EAR/NOSE/MOUTH/THROAT - MMM  EYES- FIOR, conjunctiva and Sclera clear  NECK- supple  RESPIRATORY-  clear to auscultation bilaterally, non laboured breathing  CARDIOVASCULAR - SIS2, RRR  GI - soft NT BS present  EXTREMITIES- no pedal edema  NEUROLOGY- left sided weakness, facial droop  PSYCHIATRY- AAO X 3          MEDICATIONS  (STANDING):  AQUAPHOR (petrolatum Ointment) 1 Application(s) Topical daily  artificial  tears Solution 1 Drop(s) Both EYES two times a day  aspirin  chewable 81 milliGRAM(s) Oral daily  atorvastatin 80 milliGRAM(s) Oral at bedtime  diclofenac sodium 1% Gel 2 Gram(s) Topical two times a day  doxazosin 2 milliGRAM(s) Oral at bedtime  enoxaparin Injectable 40 milliGRAM(s) SubCutaneous every 24 hours  lidocaine   4% Patch 2 Patch Transdermal daily  melatonin 6 milliGRAM(s) Oral at bedtime  midodrine. 2.5 milliGRAM(s) Oral <User Schedule>  pantoprazole    Tablet 40 milliGRAM(s) Oral before breakfast  petrolatum Ophthalmic Ointment 1 Application(s) Right EYE every 12 hours  polyethylene glycol 3350 17 Gram(s) Oral two times a day  senna Syrup 10 milliLiter(s) Oral at bedtime  ticagrelor 90 milliGRAM(s) Oral every 12 hours    MEDICATIONS  (PRN):  acetaminophen     Tablet .. 650 milliGRAM(s) Oral every 6 hours PRN Mild Pain (1 - 3)  benzocaine/menthol Lozenge 1 Lozenge Oral two times a day PRN Sore Throat

## 2023-05-03 NOTE — PROGRESS NOTE ADULT - ASSESSMENT
Assessment/Plan:  CONG AGUAYO is a 58 year old male with PMH of HTN, and DM; who presented as a transfer from [facility] to [facility] for stroke on 3/29, where he was found to have a R ICA occlusion and he underwent a TICI 2b thromectomy of R ICA and R MCA with R ICA stent placement. Extubated on 4/7. His brain MRI was significant for improved midline shift and small frontal parietal SAH. His hospital course was complicated by aspiration PNA (s/p Zosyn); and dysphagia (requiring PEG - placed 4/6). Patient now admitted for a multidisciplinary rehab program. 04-11-23 @ 13:54    * Awaiting DC to [facility]  * BP stable, reduced midodrine dose    Rehab Management/MEDICAL MANAGEMENT     #Functional deficits s/p CVA  -Continue  Comprehensive Rehab Program of PT/OT/SLP  - 3 hours a day, 5 days a week  - P&O as needed   - R ICA occlusion, s/p TICI 2b thrombectomy of R ICA and R MCA with R ICA stent placement  - ASA & Brilinta  - Outpt Neurology follow up- Dr. Reid  - Outpt IR follow up - Dr. Allen  --Rt leg discomfort/stiffness---commenced gabaptnin 4/25, resolved, d/c gabapentin 4/27    #Dysphagia/aspiration PNA  - S/p Zosyn  - PEG tube last prior to Acute rehab admission  - Repeat CT chest in 1 month (~5/8)    * UTI (ESBL) 4/20 --on zosyn, switched to Entrapenem 4/23, completed treatment   resp viral panel -ve    * Incidental Small adrenal nodule--  will refer to Urology for f/u post DC     #HTN  - Lisinopril--D/CARSON 4/13  - Doxazosin  - Outpt Cardiology follow up - Dr. Song    # Orthostatic hypotension--midodrine reduced dose 5/2    # Myalgia B/L Chest--d/c diclofenac cream and commence lidocaine patch b/l chest  #HLD  -Atorvastatin    #DM  - A1c: 7.3  - ISS  - FS ACHS    #Dry eye  - Occular lubricant ointment  - Artificial tears    #Sleep  - Melatonin 6mg nightly PRN, switched to standing 4/18, sleep improved    #Skin  - Skin-: intact, PEG in situ   - Pressure injury/Skin: OOB to Chair, PT/OT     #Pain Mgmt   - Tylenol PRN    #GI/Bowel Mgmt   - Pantoprazole  - Continent c/w Senna, Miralax   -lactulose    #/Bladder Mgmt   BPH c/w doxazosin, dose increased with improvement  Adrenal nodule  - 4/26 -Urology review appreciated, dysuria less prominent, c/w increased dose of doxazosin, improved       #FEN   PEG placed 4/6, in acute care, tolerating oral feeds since acute rehab placement, f/u with GI post dc    - Diet - Easy to Chew, halal  - Dysphagia  SLP - evaluation and treatment    #Precautions / PROPHYLAXIS:   - Falls  - ortho: Weight bearing status: WBAT   - Lungs: Aspiration, Incentive Spirometer   - DVT: Lovenox  ---------------------------------------------------------------  4/24--Discussed with wife present at bed side after initial review. I explained functional and clinical update and [facility] placement for further therapy. She agreed tto same  4/25--I called patient's wife and gave her update on patient's condition and treatment  ---------------------------------------------------------------  IDT conference on 4/27  TDD: 4/28 to [facility]  Barriers: Distracted, tangentile, perseverative, requires cueing, R eye visual deficit. Poor insight, safety and memory. L side inattention/neglect.   Social Work: Lives in a basement apartment with spouse and 2 sons, 12 MEI. Patient is main source of income for family. Spouse does not work.  OT: Min A for UBD/toilet transfer. Mod A for LBD and tub transfer.   PT: Mod A for transfers. Ambulated 120 ft with RW with min A x2.   SLP: Easy to chew diet. Deficits with higher level cog- attention, and safety. Insight is improving.   ---------------------------------------------------------------  FOLLOW UP/OUTPATIENT:    Bev Song)  Cardiology; Internal Medicine  [address], [address]  [place], [place] [id number]  Phone: [phone]  Fax: [phone]    Ivan Reid (MD; PhD)  Neurology; Vascular Neurology  [address], [facility] [address]  [place] [address]  Phone: [phone]  Fax: [phone]  Follow Up Time:     Primary doctor,   Phone: (   )    -  Fax: (   )    -  Follow Up Time:     Sanchez Allen)  Neurology; Vascular Neurology  [address] [place] [address]  Phone: [phone]  Fax: [phone]  Follow Up Time:     Lily Moon (DO)  Gastroenterology  [address], [address] [place] [address]  Phone: [phone]  Fax: [phone]  Follow Up Time:    Urology f/u for persistent dysuria,  Small adrenal nodule  ---------------------------------------------------------------

## 2023-05-04 LAB
ALBUMIN SERPL ELPH-MCNC: 2.9 G/DL — LOW (ref 3.3–5)
ALP SERPL-CCNC: 50 U/L — SIGNIFICANT CHANGE UP (ref 40–120)
ALT FLD-CCNC: 34 U/L — SIGNIFICANT CHANGE UP (ref 10–45)
ANION GAP SERPL CALC-SCNC: 6 MMOL/L — SIGNIFICANT CHANGE UP (ref 5–17)
AST SERPL-CCNC: 12 U/L — SIGNIFICANT CHANGE UP (ref 10–40)
BASOPHILS # BLD AUTO: 0.05 K/UL — SIGNIFICANT CHANGE UP (ref 0–0.2)
BASOPHILS NFR BLD AUTO: 0.6 % — SIGNIFICANT CHANGE UP (ref 0–2)
BILIRUB SERPL-MCNC: 0.5 MG/DL — SIGNIFICANT CHANGE UP (ref 0.2–1.2)
BUN SERPL-MCNC: 11 MG/DL — SIGNIFICANT CHANGE UP (ref 7–23)
CALCIUM SERPL-MCNC: 9.1 MG/DL — SIGNIFICANT CHANGE UP (ref 8.4–10.5)
CHLORIDE SERPL-SCNC: 105 MMOL/L — SIGNIFICANT CHANGE UP (ref 96–108)
CO2 SERPL-SCNC: 30 MMOL/L — SIGNIFICANT CHANGE UP (ref 22–31)
CREAT SERPL-MCNC: 0.74 MG/DL — SIGNIFICANT CHANGE UP (ref 0.5–1.3)
EGFR: 104 ML/MIN/1.73M2 — SIGNIFICANT CHANGE UP
EOSINOPHIL # BLD AUTO: 0.16 K/UL — SIGNIFICANT CHANGE UP (ref 0–0.5)
EOSINOPHIL NFR BLD AUTO: 1.9 % — SIGNIFICANT CHANGE UP (ref 0–6)
GLUCOSE SERPL-MCNC: 173 MG/DL — HIGH (ref 70–99)
HCT VFR BLD CALC: 37.6 % — LOW (ref 39–50)
HGB BLD-MCNC: 11.8 G/DL — LOW (ref 13–17)
IMM GRANULOCYTES NFR BLD AUTO: 0.4 % — SIGNIFICANT CHANGE UP (ref 0–0.9)
LYMPHOCYTES # BLD AUTO: 1.78 K/UL — SIGNIFICANT CHANGE UP (ref 1–3.3)
LYMPHOCYTES # BLD AUTO: 21.3 % — SIGNIFICANT CHANGE UP (ref 13–44)
MCHC RBC-ENTMCNC: 26.1 PG — LOW (ref 27–34)
MCHC RBC-ENTMCNC: 31.4 GM/DL — LOW (ref 32–36)
MCV RBC AUTO: 83.2 FL — SIGNIFICANT CHANGE UP (ref 80–100)
MONOCYTES # BLD AUTO: 0.51 K/UL — SIGNIFICANT CHANGE UP (ref 0–0.9)
MONOCYTES NFR BLD AUTO: 6.1 % — SIGNIFICANT CHANGE UP (ref 2–14)
NEUTROPHILS # BLD AUTO: 5.82 K/UL — SIGNIFICANT CHANGE UP (ref 1.8–7.4)
NEUTROPHILS NFR BLD AUTO: 69.7 % — SIGNIFICANT CHANGE UP (ref 43–77)
NRBC # BLD: 0 /100 WBCS — SIGNIFICANT CHANGE UP (ref 0–0)
PLATELET # BLD AUTO: 275 K/UL — SIGNIFICANT CHANGE UP (ref 150–400)
POTASSIUM SERPL-MCNC: 3.7 MMOL/L — SIGNIFICANT CHANGE UP (ref 3.5–5.3)
POTASSIUM SERPL-SCNC: 3.7 MMOL/L — SIGNIFICANT CHANGE UP (ref 3.5–5.3)
PROT SERPL-MCNC: 6.8 G/DL — SIGNIFICANT CHANGE UP (ref 6–8.3)
RBC # BLD: 4.52 M/UL — SIGNIFICANT CHANGE UP (ref 4.2–5.8)
RBC # FLD: 14.3 % — SIGNIFICANT CHANGE UP (ref 10.3–14.5)
SODIUM SERPL-SCNC: 141 MMOL/L — SIGNIFICANT CHANGE UP (ref 135–145)
WBC # BLD: 8.35 K/UL — SIGNIFICANT CHANGE UP (ref 3.8–10.5)
WBC # FLD AUTO: 8.35 K/UL — SIGNIFICANT CHANGE UP (ref 3.8–10.5)

## 2023-05-04 PROCEDURE — 99232 SBSQ HOSP IP/OBS MODERATE 35: CPT

## 2023-05-04 RX ADMIN — LIDOCAINE 2 PATCH: 4 CREAM TOPICAL at 11:34

## 2023-05-04 RX ADMIN — TICAGRELOR 90 MILLIGRAM(S): 90 TABLET ORAL at 06:13

## 2023-05-04 RX ADMIN — Medication 2 MILLIGRAM(S): at 21:53

## 2023-05-04 RX ADMIN — MIDODRINE HYDROCHLORIDE 2.5 MILLIGRAM(S): 2.5 TABLET ORAL at 06:12

## 2023-05-04 RX ADMIN — ATORVASTATIN CALCIUM 80 MILLIGRAM(S): 80 TABLET, FILM COATED ORAL at 21:32

## 2023-05-04 RX ADMIN — Medication 6 MILLIGRAM(S): at 21:32

## 2023-05-04 RX ADMIN — Medication 81 MILLIGRAM(S): at 11:33

## 2023-05-04 RX ADMIN — Medication 650 MILLIGRAM(S): at 12:38

## 2023-05-04 RX ADMIN — SENNA PLUS 10 MILLILITER(S): 8.6 TABLET ORAL at 21:33

## 2023-05-04 RX ADMIN — POLYETHYLENE GLYCOL 3350 17 GRAM(S): 17 POWDER, FOR SOLUTION ORAL at 06:13

## 2023-05-04 RX ADMIN — LIDOCAINE 2 PATCH: 4 CREAM TOPICAL at 23:12

## 2023-05-04 RX ADMIN — TICAGRELOR 90 MILLIGRAM(S): 90 TABLET ORAL at 18:36

## 2023-05-04 RX ADMIN — Medication 1 APPLICATION(S): at 11:33

## 2023-05-04 RX ADMIN — ENOXAPARIN SODIUM 40 MILLIGRAM(S): 100 INJECTION SUBCUTANEOUS at 17:19

## 2023-05-04 RX ADMIN — Medication 1 DROP(S): at 17:19

## 2023-05-04 RX ADMIN — Medication 1 DROP(S): at 06:12

## 2023-05-04 RX ADMIN — PANTOPRAZOLE SODIUM 40 MILLIGRAM(S): 20 TABLET, DELAYED RELEASE ORAL at 06:12

## 2023-05-04 RX ADMIN — LIDOCAINE 2 PATCH: 4 CREAM TOPICAL at 19:37

## 2023-05-04 RX ADMIN — Medication 1 APPLICATION(S): at 18:37

## 2023-05-04 RX ADMIN — Medication 650 MILLIGRAM(S): at 11:33

## 2023-05-04 NOTE — PROGRESS NOTE ADULT - SUBJECTIVE AND OBJECTIVE BOX
Subjective  Seen and examined, and observed in therapy  Reports good night rest  No acute pain symptoms  Letter to [facility] union given as requested, stating that patient is on admission and unable to attend a meeting at this time     Therapy--Good energy levels, will engage in full therapy session  Rt knee ROM normal  Ambulating with walker with assistance in therapy yesterday    ROS--No head ache or abd pain, no N/V   Left sided weakness, mainly left upper ext, LLE weakness significantly improved 3+/5  LBM 5/4    Anticipate dc to [facility],     Vital Signs Last 24 Hrs  T(C): 36.6 (04 May 2023 07:39), Max: 36.6 (04 May 2023 07:39)  T(F): 97.9 (04 May 2023 07:39), Max: 97.9 (04 May 2023 07:39)  HR: 92 (04 May 2023 07:39) (89 - 99)  BP: 120/71 (04 May 2023 07:39) (120/71 - 128/80)-  RR: 16 (04 May 2023 07:39) (16 - 16)  SpO2: 97% (04 May 2023 07:39) (95% - 97%)  O2 Parameters below as of 04 May 2023 07:39  Patient On (Oxygen Delivery Method): room air      EXAM  Gen - Comfortable,  HEENT - , right  eye vision loss   Neck - Supple, No limited ROM  Pulm -clear   Cardiovascular - RRR, S1S2  Chest - clear   Abdomen - Soft, non tender  +BS, + PEG   Extremities - No Cyanosis, no clubbing, no edema, no calf tenderness    Neuro-  Alert and oriented, interactive  Speech  dysarthric, improving  Cranial Nerves - Left facial weakness, left eye vision loss, absent left shoulder shrug   Right  eye vision loss Rt eye  Tongue deviation to left, Flat left nasolabial fold  Motor -  Left hemiparesis                    LEFT    UE - 2+/5.                    RIGHT UE - ShAB 5/5, EF 5/5, EE 5/5,   5/5                    LEFT    LE - 2/5 hip Knee 3/5, ankle 4/5                    RIGHT LE - HF 5/5, KE 5/5, DF 5/5, PF 5/5        Sensory - Intact to LT     Reflexes - 2+     Coordination - FTN/HTS  impaired to left side     Tone - normal  Psychiatric - Mood stable, Affect WNL  Skin:  all skin intact, PEG in situ, not in use    RECENT LABS/IMAGING                        11.8   8.35  )-----------( 275      ( 04 May 2023 06:48 )             37.6     05-04    141  |  105  |  11  ----------------------------<  173<H>  3.7   |  30  |  0.74    Ca    9.1      04 May 2023 06:48    TPro  6.8  /  Alb  2.9<L>  /  TBili  0.5  /  DBili  x   /  AST  12  /  ALT  34  /  AlkPhos  50  05-04    MEDICATIONS  (STANDING):  AQUAPHOR (petrolatum Ointment) 1 Application(s) Topical daily  artificial  tears Solution 1 Drop(s) Both EYES two times a day  aspirin  chewable 81 milliGRAM(s) Oral daily  atorvastatin 80 milliGRAM(s) Oral at bedtime  diclofenac sodium 1% Gel 2 Gram(s) Topical two times a day  doxazosin 2 milliGRAM(s) Oral at bedtime  enoxaparin Injectable 40 milliGRAM(s) SubCutaneous every 24 hours  lidocaine   4% Patch 2 Patch Transdermal daily  melatonin 6 milliGRAM(s) Oral at bedtime  midodrine. 2.5 milliGRAM(s) Oral <User Schedule>  pantoprazole    Tablet 40 milliGRAM(s) Oral before breakfast  petrolatum Ophthalmic Ointment 1 Application(s) Right EYE every 12 hours  polyethylene glycol 3350 17 Gram(s) Oral two times a day  senna Syrup 10 milliLiter(s) Oral at bedtime  ticagrelor 90 milliGRAM(s) Oral every 12 hours    MEDICATIONS  (PRN):  acetaminophen     Tablet .. 650 milliGRAM(s) Oral every 6 hours PRN Mild Pain (1 - 3)  benzocaine/menthol Lozenge 1 Lozenge Oral two times a day PRN Sore Throat

## 2023-05-04 NOTE — PROGRESS NOTE ADULT - ASSESSMENT
Assessment/Plan:  CONG AGUAYO is a 58 year old male with PMH of HTN, and DM; who presented as a transfer from [facility] to [facility] for stroke on 3/29, where he was found to have a R ICA occlusion and he underwent a TICI 2b thromectomy of R ICA and R MCA with R ICA stent placement. Extubated on 4/7. His brain MRI was significant for improved midline shift and small frontal parietal SAH. His hospital course was complicated by aspiration PNA (s/p Zosyn); and dysphagia (requiring PEG - placed 4/6). Patient now admitted for a multidisciplinary rehab program. 04-11-23 @ 13:54    * Awaiting DC to [facility]  * BP stable c/w low dose midodrine    Rehab Management/MEDICAL MANAGEMENT     #Functional deficits s/p CVA  -Continue  Comprehensive Rehab Program of PT/OT/SLP  - 3 hours a day, 5 days a week  - P&O as needed   - R ICA occlusion, s/p TICI 2b thrombectomy of R ICA and R MCA with R ICA stent placement  - ASA & Brilinta  - Outpt Neurology follow up- Dr. Reid  - Outpt IR follow up - Dr. Allen  --Rt leg discomfort/stiffness---commenced gabaptnin 4/25, resolved, d/c gabapentin 4/27    #Dysphagia/aspiration PNA  - S/p Zosyn  - PEG tube last prior to Acute rehab admission  - Repeat CT chest in 1 month (~5/8)    * UTI (ESBL) 4/20 --on zosyn, switched to Entrapenem 4/23, completed treatment   resp viral panel -ve    * Incidental Small adrenal nodule--  will refer to Urology for f/u post DC     #HTN  - Lisinopril--D/CARSON 4/13  - Doxazosin  - Outpt Cardiology follow up - Dr. Song    # Orthostatic hypotension--midodrine reduced dose 5/2, continue 2.5mg bid and f/u out patient    # Myalgia B/L Chest--d/c diclofenac cream and commence lidocaine patch b/l chest  #HLD  -Atorvastatin    #DM  - A1c: 7.3  - ISS  - FS ACHS    #Dry eye  - Occular lubricant ointment  - Artificial tears    #Sleep  - Melatonin 6mg nightly PRN, switched to standing 4/18, sleep improved    #Skin  - Skin-: intact, PEG in situ   - Pressure injury/Skin: OOB to Chair, PT/OT     #Pain Mgmt   - Tylenol PRN    #GI/Bowel Mgmt   - Pantoprazole  - Continent c/w Senna, Miralax   -lactulose    #/Bladder Mgmt   BPH c/w doxazosin, dose increased with improvement  Adrenal nodule  - 4/26 -Urology review appreciated, dysuria less prominent, c/w increased dose of doxazosin, improved       #FEN   PEG placed 4/6, in acute care, tolerating oral feeds since acute rehab placement, f/u with GI post dc    - Diet - Easy to Chew, halal  - Dysphagia  SLP - evaluation and treatment    #Precautions / PROPHYLAXIS:   - Falls  - ortho: Weight bearing status: WBAT   - Lungs: Aspiration, Incentive Spirometer   - DVT: Lovenox  ---------------------------------------------------------------  4/24--Discussed with wife present at bed side after initial review. I explained functional and clinical update and AMAN placement for further therapy. She agreed tto same  4/25--I called patient's wife and gave her update on patient's condition and treatment  ---------------------------------------------------------------  IDT conference on 4/27  TDD: 4/28 to AMAN  Barriers: Distracted, tangentile, perseverative, requires cueing, R eye visual deficit. Poor insight, safety and memory. L side inattention/neglect.   Social Work: Lives in a basement apartment with spouse and 2 sons, 12 MEI. Patient is main source of income for family. Spouse does not work.  OT: Min A for UBD/toilet transfer. Mod A for LBD and tub transfer.   PT: Mod A for transfers. Ambulated 120 ft with RW with min A x2.   SLP: Easy to chew diet. Deficits with higher level cog- attention, and safety. Insight is improving.   ---------------------------------------------------------------  FOLLOW UP/OUTPATIENT:    Bev Song (MD)  Cardiology; Internal Medicine  [address] [facility], [address]  [place], [place] [address]  Phone: [phone]  Fax: [phone]    Ivan Reid (MD; PhD)  Neurology; Vascular Neurology  [address] [facility] [facility]  [place] [address]  Phone: [phone]  Fax: [phone]  Follow Up Time:     Primary doctor,   Phone: (   )    -  Fax: (   )    -  Follow Up Time:     Sanchez Allen (MD)  Neurology; Vascular Neurology  [address] [place] [address]  Phone: [phone]  Fax: [phone]  Follow Up Time:     Lily Moon (DO)  Gastroenterology  [address] [facility], [address]  [place] [address]  Phone: [phone]  Fax: [phone]  Follow Up Time:    Urology f/u for persistent dysuria,  Small adrenal nodule  ---------------------------------------------------------------     Assessment/Plan:  CONG AGUAYO is a 58 year old male with PMH of HTN, and DM; who presented as a transfer from [facility] to [facility] for stroke on 3/29, where he was found to have a R ICA occlusion and he underwent a TICI 2b thromectomy of R ICA and R MCA with R ICA stent placement. Extubated on 4/7. His brain MRI was significant for improved midline shift and small frontal parietal SAH. His hospital course was complicated by aspiration PNA (s/p Zosyn); and dysphagia (requiring PEG - placed 4/6). Patient now admitted for a multidisciplinary rehab program. 04-11-23 @ 13:54    * Awaiting DC to [facility]  * BP stable c/w low dose midodrine    Rehab Management/MEDICAL MANAGEMENT     #Functional deficits s/p CVA  -Continue  Comprehensive Rehab Program of PT/OT/SLP  - 3 hours a day, 5 days a week  - P&O as needed   - R ICA occlusion, s/p TICI 2b thrombectomy of R ICA and R MCA with R ICA stent placement  - ASA & Brilinta  - Outpt Neurology follow up- Dr. Reid  - Outpt IR follow up - Dr. Allen  --Rt leg discomfort/stiffness---commenced gabaptnin 4/25, resolved, d/c gabapentin 4/27    #Dysphagia/aspiration PNA  - S/p Zosyn  - PEG tube last prior to Acute rehab admission  - Repeat CT chest in 1 month (~5/8)    * UTI (ESBL) 4/20 --on zosyn, switched to Entrapenem 4/23, completed treatment   resp viral panel -ve    * Incidental Small adrenal nodule--  will refer to Urology for f/u post DC     #HTN  - Lisinopril--D/CARSON 4/13  - Doxazosin  - Outpt Cardiology follow up - Dr. Song    # Orthostatic hypotension--midodrine reduced dose 5/2, continue 2.5mg bid and f/u out patient    # Myalgia B/L Chest--d/c diclofenac cream and commence lidocaine patch b/l chest  #HLD  -Atorvastatin    #DM  - A1c: 7.3  - ISS  - FS ACHS    #Dry eye  - Occular lubricant ointment  - Artificial tears    #Sleep  - Melatonin 6mg nightly PRN, switched to standing 4/18, sleep improved    #Skin  - Skin-: intact, PEG in situ   - Pressure injury/Skin: OOB to Chair, PT/OT     #Pain Mgmt   - Tylenol PRN    #GI/Bowel Mgmt   - Pantoprazole  - Continent c/w Senna, Miralax   -lactulose    #/Bladder Mgmt   BPH c/w doxazosin, dose increased with improvement  Adrenal nodule  - 4/26 -Urology review appreciated, dysuria less prominent, c/w increased dose of doxazosin, improved       #FEN   PEG placed 4/6, in acute care, tolerating oral feeds since acute rehab placement, f/u with GI post dc    - Diet - Easy to Chew, halal  - Dysphagia  SLP - evaluation and treatment    #Precautions / PROPHYLAXIS:   - Falls  - ortho: Weight bearing status: WBAT   - Lungs: Aspiration, Incentive Spirometer   - DVT: Lovenox  ---------------------------------------------------------------  4/24--Discussed with wife present at bed side after initial review. I explained functional and clinical update and AMAN placement for further therapy. She agreed tto same  4/25--I called patient's wife and gave her update on patient's condition and treatment  ---------------------------------------------------------------  IDT conference on 5/4  TDD: 5/8 to home vs. AMAN  Barriers: Making significant gains.   Social Work: Lives in a basement apartment with spouse and 2 sons, 12 MEI. Patient is main source of income for family. Spouse does not work. (Originally AMAN, but may be able to DC home now)  OT: CG for transfers. Min-mod A for self care.   PT: Supervision/CGA for transfers. Ambulated 150 ft with RW with CGA (& ambulated 150ft with no device). Negotiated 12 stairs with 1-2HRs.   SLP: Easy to chew with TLs. Mild cognitive and language deficits. ST memory improving.  Will need FT if DC home. Will need DME- shai, RW, tub bench.   ---------------------------------------------------------------  FOLLOW UP/OUTPATIENT:    Bev Song)  Cardiology; Internal Medicine  [address] [facility], [address]  [place], [place] [address]  Phone: [phone]  Fax: [phone]    Ivan Reid (MD; PhD)  Neurology; Vascular Neurology  [address] [facility], [place] [address]  Phone: [phone]  Fax: [phone]  Follow Up Time:     Primary doctor,   Phone: (   )    -  Fax: (   )    -  Follow Up Time:     Sanchez Allen)  Neurology; Vascular Neurology  [address] [place] [address]  Phone: [phone]  Fax: [phone]  Follow Up Time:     Lily Moon (DO)  Gastroenterology  [address], [address]  [place] [address]  Phone: [phone]  Fax: [phone]  Follow Up Time:    Urology f/u for persistent dysuria,  Small adrenal nodule  ---------------------------------------------------------------

## 2023-05-05 VITALS
HEART RATE: 100 BPM | TEMPERATURE: 98 F | DIASTOLIC BLOOD PRESSURE: 90 MMHG | OXYGEN SATURATION: 95 % | SYSTOLIC BLOOD PRESSURE: 134 MMHG | RESPIRATION RATE: 16 BRPM

## 2023-05-05 LAB — SARS-COV-2 RNA SPEC QL NAA+PROBE: SIGNIFICANT CHANGE UP

## 2023-05-05 PROCEDURE — 97110 THERAPEUTIC EXERCISES: CPT

## 2023-05-05 PROCEDURE — 97167 OT EVAL HIGH COMPLEX 60 MIN: CPT

## 2023-05-05 PROCEDURE — 87077 CULTURE AEROBIC IDENTIFY: CPT

## 2023-05-05 PROCEDURE — 87635 SARS-COV-2 COVID-19 AMP PRB: CPT

## 2023-05-05 PROCEDURE — 36415 COLL VENOUS BLD VENIPUNCTURE: CPT

## 2023-05-05 PROCEDURE — 92526 ORAL FUNCTION THERAPY: CPT

## 2023-05-05 PROCEDURE — 0225U NFCT DS DNA&RNA 21 SARSCOV2: CPT

## 2023-05-05 PROCEDURE — 74018 RADEX ABDOMEN 1 VIEW: CPT

## 2023-05-05 PROCEDURE — 84100 ASSAY OF PHOSPHORUS: CPT

## 2023-05-05 PROCEDURE — 83735 ASSAY OF MAGNESIUM: CPT

## 2023-05-05 PROCEDURE — 70450 CT HEAD/BRAIN W/O DYE: CPT

## 2023-05-05 PROCEDURE — 80048 BASIC METABOLIC PNL TOTAL CA: CPT

## 2023-05-05 PROCEDURE — 81001 URINALYSIS AUTO W/SCOPE: CPT

## 2023-05-05 PROCEDURE — 97530 THERAPEUTIC ACTIVITIES: CPT

## 2023-05-05 PROCEDURE — 83605 ASSAY OF LACTIC ACID: CPT

## 2023-05-05 PROCEDURE — 99232 SBSQ HOSP IP/OBS MODERATE 35: CPT

## 2023-05-05 PROCEDURE — 80053 COMPREHEN METABOLIC PANEL: CPT

## 2023-05-05 PROCEDURE — 97112 NEUROMUSCULAR REEDUCATION: CPT

## 2023-05-05 PROCEDURE — 87186 SC STD MICRODIL/AGAR DIL: CPT

## 2023-05-05 PROCEDURE — 97535 SELF CARE MNGMENT TRAINING: CPT

## 2023-05-05 PROCEDURE — 85027 COMPLETE CBC AUTOMATED: CPT

## 2023-05-05 PROCEDURE — 92522 EVALUATE SPEECH PRODUCTION: CPT

## 2023-05-05 PROCEDURE — 76770 US EXAM ABDO BACK WALL COMP: CPT

## 2023-05-05 PROCEDURE — 92507 TX SP LANG VOICE COMM INDIV: CPT

## 2023-05-05 PROCEDURE — 97163 PT EVAL HIGH COMPLEX 45 MIN: CPT

## 2023-05-05 PROCEDURE — 87040 BLOOD CULTURE FOR BACTERIA: CPT

## 2023-05-05 PROCEDURE — 87086 URINE CULTURE/COLONY COUNT: CPT

## 2023-05-05 PROCEDURE — 92610 EVALUATE SWALLOWING FUNCTION: CPT

## 2023-05-05 PROCEDURE — 93005 ELECTROCARDIOGRAM TRACING: CPT

## 2023-05-05 PROCEDURE — 84145 PROCALCITONIN (PCT): CPT

## 2023-05-05 PROCEDURE — 97116 GAIT TRAINING THERAPY: CPT

## 2023-05-05 PROCEDURE — 71045 X-RAY EXAM CHEST 1 VIEW: CPT

## 2023-05-05 PROCEDURE — 85025 COMPLETE CBC W/AUTO DIFF WBC: CPT

## 2023-05-05 RX ORDER — MIDODRINE HYDROCHLORIDE 2.5 MG/1
1 TABLET ORAL
Qty: 0 | Refills: 0 | DISCHARGE
Start: 2023-05-05

## 2023-05-05 RX ORDER — PHENAZOPYRIDINE HCL 100 MG
1 TABLET ORAL
Qty: 0 | Refills: 0 | DISCHARGE
Start: 2023-05-05

## 2023-05-05 RX ORDER — DOXAZOSIN MESYLATE 4 MG
1 TABLET ORAL
Qty: 0 | Refills: 0 | DISCHARGE
Start: 2023-05-05

## 2023-05-05 RX ORDER — PHENAZOPYRIDINE HCL 100 MG
100 TABLET ORAL EVERY 8 HOURS
Refills: 0 | Status: DISCONTINUED | OUTPATIENT
Start: 2023-05-05 | End: 2023-05-05

## 2023-05-05 RX ADMIN — Medication 81 MILLIGRAM(S): at 12:26

## 2023-05-05 RX ADMIN — DICLOFENAC SODIUM 2 GRAM(S): 30 GEL TOPICAL at 05:51

## 2023-05-05 RX ADMIN — PANTOPRAZOLE SODIUM 40 MILLIGRAM(S): 20 TABLET, DELAYED RELEASE ORAL at 05:49

## 2023-05-05 RX ADMIN — MIDODRINE HYDROCHLORIDE 2.5 MILLIGRAM(S): 2.5 TABLET ORAL at 05:49

## 2023-05-05 RX ADMIN — MIDODRINE HYDROCHLORIDE 2.5 MILLIGRAM(S): 2.5 TABLET ORAL at 14:17

## 2023-05-05 RX ADMIN — Medication 1 APPLICATION(S): at 05:49

## 2023-05-05 RX ADMIN — TICAGRELOR 90 MILLIGRAM(S): 90 TABLET ORAL at 05:50

## 2023-05-05 RX ADMIN — POLYETHYLENE GLYCOL 3350 17 GRAM(S): 17 POWDER, FOR SOLUTION ORAL at 05:51

## 2023-05-05 RX ADMIN — Medication 650 MILLIGRAM(S): at 16:12

## 2023-05-05 RX ADMIN — Medication 1 DROP(S): at 05:51

## 2023-05-05 RX ADMIN — Medication 100 MILLIGRAM(S): at 14:16

## 2023-05-05 NOTE — PROGRESS NOTE ADULT - PROVIDER SPECIALTY LIST ADULT
Hospitalist
Infectious Disease
Infectious Disease
Rehab Medicine
Hospitalist
Infectious Disease
Rehab Medicine
Hospitalist
Infectious Disease
Physiatry
Rehab Medicine
Hospitalist
Rehab Medicine
Physiatry
Rehab Medicine

## 2023-05-05 NOTE — PROGRESS NOTE ADULT - ASSESSMENT
58M PMH HTN, DM2 who presented as a transfer from [facility] to [facility] for stroke on 3/29, s/p TICI 2b thrombectomy of the R ICA and R MCA, with R ICA stent placement.  Postoperative CT head demonstrated new MLS with some hemorrhagic conversion now admitted for rehab- pt/ot/dvt ppx    s/p sepsis 4/20/23, now resolved-  due to uti   treated with zosyn 4/20- 4/23 changed to ertapenem per ID 4/23- 4/26 for 3 days  Culture Results: >100,000 CFU/ml Escherichia coli ESBL  clinically improved,   ID following - recc noted- Ucx with ESBL,   uro consult recc noted  monitor    Stroke s/p thrombectomy of Right ICA and Right MCA and right ICA stent placement  - Left hemiparesis  - cont asa/brillinta, statin    Hypertension with OH   - off meds  -monitor BP,   - c/w midodrine  -check Orthostatics     DM2  - HbA1C 7.3%  - diet controlled  - little meds- ? homeopathic medication    BPH  -doxazosin  - uro recc noted  - voiding well    DVT PPx  - Lovenox

## 2023-05-05 NOTE — PROGRESS NOTE ADULT - SUBJECTIVE AND OBJECTIVE BOX
Subjective  Seen and examined, and observed in therapy  Reports recurrence of some dysuria, when urinating, no burning  Agreed to re trial of pyridium  Recently completed treatment for UTI    Therapy--BP drop in therapy, but improved after application of TEDs  Rt knee ROM normal  Ambulating with walker with assistance in therapy yesterday    ROS--No head ache or abd pain, no nausea, or vomiting  Left sided weakness, mainly left upper ext, LLE weakness significantly improved 3+/5  LBM 5/5    Awaiting dc to [facility],     Vital Signs Last 24 Hrs  T(C): 36.6 (05 May 2023 09:44), Max: 37 (04 May 2023 21:50)  T(F): 97.9 (05 May 2023 09:44), Max: 98.6 (04 May 2023 21:50)  HR: 100 (05 May 2023 09:44) (98 - 100)  BP: 134/90 (05 May 2023 09:44) (130/83 - 145/89)  RR: 16 (05 May 2023 09:44) (16 - 16)  SpO2: 95% (05 May 2023 09:44) (95% - 97%)  O2 Parameters below as of 05 May 2023 09:44  Patient On (Oxygen Delivery Method): room air      EXAM  Gen - Comfortable,  HEENT - , right  eye vision loss   Neck - Supple, No limited ROM  Pulm -clear   Cardiovascular - RRR, S1S2  Chest - clear   Abdomen - Soft, mild pressure over suprapubic area +BS, + PEG   Extremities - No Cyanosis, no clubbing, no edema, no calf tenderness    Neuro-  Alert and oriented, interactive  Speech  dysarthric, improving  Cranial Nerves - Left facial weakness, left eye vision loss, absent left shoulder shrug   Right  eye vision loss Rt eye  Tongue deviation to left, Flat left nasolabial fold  Motor -  Left hemiparesis                    LEFT    UE - 2+/5.                    RIGHT UE - ShAB 5/5, EF 5/5, EE 5/5,   5/5                    LEFT    LE - 2/5 hip Knee 3/5, ankle 4/5                    RIGHT LE - HF 5/5, KE 5/5, DF 5/5, PF 5/5        Sensory - Intact to LT     Reflexes - 2+     Coordination - FTN/HTS  impaired to left side     Tone - normal  Psychiatric - Mood stable, Affect WNL  Skin:  all skin intact, PEG in situ, not in use    RECENT LABS/IMAGING                        11.8   8.35  )-----------( 275      ( 04 May 2023 06:48 )             37.6     05-04    141  |  105  |  11  ----------------------------<  173<H>  3.7   |  30  |  0.74    Ca    9.1      04 May 2023 06:48    TPro  6.8  /  Alb  2.9<L>  /  TBili  0.5  /  DBili  x   /  AST  12  /  ALT  34  /  AlkPhos  50  05-04      MEDICATIONS  (STANDING):  AQUAPHOR (petrolatum Ointment) 1 Application(s) Topical daily  artificial  tears Solution 1 Drop(s) Both EYES two times a day  aspirin  chewable 81 milliGRAM(s) Oral daily  atorvastatin 80 milliGRAM(s) Oral at bedtime  diclofenac sodium 1% Gel 2 Gram(s) Topical two times a day  doxazosin 2 milliGRAM(s) Oral at bedtime  enoxaparin Injectable 40 milliGRAM(s) SubCutaneous every 24 hours  lidocaine   4% Patch 2 Patch Transdermal daily  melatonin 6 milliGRAM(s) Oral at bedtime  midodrine. 2.5 milliGRAM(s) Oral <User Schedule>  pantoprazole    Tablet 40 milliGRAM(s) Oral before breakfast  petrolatum Ophthalmic Ointment 1 Application(s) Right EYE every 12 hours  phenazopyridine 100 milliGRAM(s) Oral every 8 hours  polyethylene glycol 3350 17 Gram(s) Oral two times a day  senna Syrup 10 milliLiter(s) Oral at bedtime  ticagrelor 90 milliGRAM(s) Oral every 12 hours    MEDICATIONS  (PRN):  acetaminophen     Tablet .. 650 milliGRAM(s) Oral every 6 hours PRN Mild Pain (1 - 3)  benzocaine/menthol Lozenge 1 Lozenge Oral two times a day PRN Sore Throat

## 2023-05-05 NOTE — PROGRESS NOTE ADULT - REASON FOR ADMISSION
CVA
CVA, sepsis
Functional deficits s/p CVA
CVA
CVA
Functional deficits s/p CVA
s/p CVA
CVA
Functional deficits s/p CVA
s/p CVA
Functional deficits s/p CVA

## 2023-05-05 NOTE — PROGRESS NOTE ADULT - NUTRITIONAL ASSESSMENT
This patient has been assessed with a concern for Malnutrition and has been determined to have a diagnosis/diagnoses of Severe protein-calorie malnutrition.    This patient is being managed with:   Diet Easy to Chew-  Halal  Supplement Feeding Modality:  Oral  Ensure Plus High Protein Cans or Servings Per Day:  1       Frequency:  Two Times a day  Entered: Apr 12 2023  2:51PM  

## 2023-05-05 NOTE — PROGRESS NOTE ADULT - SUBJECTIVE AND OBJECTIVE BOX
Patient is a 58y old  Male who presents with a chief complaint of Functional deficits s/p CVA (05 May 2023 10:12)      Patient seen and examined at bedside.  Denies chest pain, dyspnea, abd pain.    ALLERGIES:  No Known Allergies    MEDICATIONS  (STANDING):  AQUAPHOR (petrolatum Ointment) 1 Application(s) Topical daily  artificial  tears Solution 1 Drop(s) Both EYES two times a day  aspirin  chewable 81 milliGRAM(s) Oral daily  atorvastatin 80 milliGRAM(s) Oral at bedtime  diclofenac sodium 1% Gel 2 Gram(s) Topical two times a day  doxazosin 2 milliGRAM(s) Oral at bedtime  enoxaparin Injectable 40 milliGRAM(s) SubCutaneous every 24 hours  lidocaine   4% Patch 2 Patch Transdermal daily  melatonin 6 milliGRAM(s) Oral at bedtime  midodrine. 2.5 milliGRAM(s) Oral <User Schedule>  pantoprazole    Tablet 40 milliGRAM(s) Oral before breakfast  petrolatum Ophthalmic Ointment 1 Application(s) Right EYE every 12 hours  phenazopyridine 100 milliGRAM(s) Oral every 8 hours  polyethylene glycol 3350 17 Gram(s) Oral two times a day  senna Syrup 10 milliLiter(s) Oral at bedtime  ticagrelor 90 milliGRAM(s) Oral every 12 hours    MEDICATIONS  (PRN):  acetaminophen     Tablet .. 650 milliGRAM(s) Oral every 6 hours PRN Mild Pain (1 - 3)  benzocaine/menthol Lozenge 1 Lozenge Oral two times a day PRN Sore Throat    Vital Signs Last 24 Hrs  T(F): 97.9 (05 May 2023 09:44), Max: 98.6 (04 May 2023 21:50)  HR: 100 (05 May 2023 09:44) (98 - 100)  BP: 134/90 (05 May 2023 09:44) (130/83 - 145/89)  RR: 16 (05 May 2023 09:44) (16 - 16)  SpO2: 95% (05 May 2023 09:44) (95% - 97%)  I&O's Summary    04 May 2023 07:01  -  05 May 2023 07:00  --------------------------------------------------------  IN: 0 mL / OUT: 500 mL / NET: -500 mL        PHYSICAL EXAM:  General: NAD, A/O x 3  ENT: MMM  Neck: Supple, No JVD  Lungs: Clear to auscultation bilaterally  Cardio: RRR, S1/S2, No murmurs  Abdomen: Soft, Nontender, Nondistended; Bowel sounds present  Extremities: No calf tenderness, No pitting edema    LABS:                        11.8   8.35  )-----------( 275      ( 04 May 2023 06:48 )             37.6       05-04    141  |  105  |  11  ----------------------------<  173  3.7   |  30  |  0.74    Ca    9.1      04 May 2023 06:48    TPro  6.8  /  Alb  2.9  /  TBili  0.5  /  DBili  x   /  AST  12  /  ALT  34  /  AlkPhos  50  05-04                03-29 Chol 270 mg/dL LDL -- HDL 58 mg/dL Trig 83 mg/dL                      COVID-19 PCR: NotDetec (04-26-23 @ 23:16)  COVID-19 PCR: NotDetec (04-12-23 @ 05:00)  COVID-19 PCR: NotDetec (04-11-23 @ 09:52)  COVID-19 PCR: NotDetec (04-05-23 @ 17:15)      RADIOLOGY & ADDITIONAL TESTS:    Care Discussed with Consultants/Other Providers:

## 2023-05-05 NOTE — PROGRESS NOTE ADULT - ASSESSMENT
Assessment/Plan:  CONG AGUAYO is a 58 year old male with PMH of HTN, and DM; who presented as a transfer from [facility] to [facility] for stroke on 3/29, where he was found to have a R ICA occlusion and he underwent a TICI 2b thromectomy of R ICA and R MCA with R ICA stent placement. Extubated on . His brain MRI was significant for improved midline shift and small frontal parietal SAH. His hospital course was complicated by aspiration PNA (s/p Zosyn); and dysphagia (requiring PEG - placed ). Patient now admitted for a multidisciplinary rehab program. 23 @ 13:54    * Awaiting insurance authorization and DC to [facility] (previously obtained authorization )    * BP drop during therapy c/w low dose midodrine, recommence TEDs (this left to improved BP reading in therapy)      Rehab Management/MEDICAL MANAGEMENT     #Functional deficits s/p CVA  -Continue  Comprehensive Rehab Program of PT/OT/SLP  - 3 hours a day, 5 days a week  - P&O as needed   - R ICA occlusion, s/p TICI 2b thrombectomy of R ICA and R MCA with R ICA stent placement  - ASA & Brilinta  - Outpt Neurology follow up- Dr. Reid  - Outpt IR follow up - Dr. Allen  --Rt leg discomfort/stiffness---commenced gabaptnin , resolved, d/c gabapentin     #Dysphagia/aspiration PNA  - S/p Zosyn  - PEG tube last prior to Acute rehab admission  - Repeat CT chest in 1 month (~)    * UTI (ESBL)  --on zosyn, switched to Entrapenem , completed treatment   resp viral panel -ve    * Incidental Small adrenal nodule--  will refer to Urology for f/u post DC     #HTN  - Lisinopril--D/CARSON   - Doxazosin  - Outpt Cardiology follow up - Dr. Song    # Orthostatic hypotension-- TEDs and midodrine reduced dose , continue 2.5mg bid and f/u out patient    # Myalgia B/L Chest--d/c diclofenac cream and commence lidocaine patch b/l chest  #HLD  -Atorvastatin    #DM  - A1c: 7.3  - ISS  - FS ACHS    #Dry eye  - Occular lubricant ointment  - Artificial tears    #Sleep  - Melatonin 6mg nightly PRN, switched to standing , sleep improved    #Skin  - Skin-: intact, PEG in situ   - Pressure injury/Skin: OOB to Chair, PT/OT     #Pain Mgmt   - Tylenol PRN    #GI/Bowel Mgmt   - Pantoprazole  - Continent c/w Senna, Miralax   -lactulose    #/Bladder Mgmt   BPH c/w doxazosin, dose increased with improvement  Adrenal nodule  -  -Urology review appreciated, dysuria less prominent, c/w increased dose of doxazosin, improved       #FEN   PEG placed , in acute care, tolerating oral feeds since acute rehab placement, f/u with GI post dc    - Diet - Easy to Chew, halal  - Dysphagia  SLP - evaluation and treatment    #Precautions / PROPHYLAXIS:   - Falls  - ortho: Weight bearing status: WBAT   - Lungs: Aspiration, Incentive Spirometer   - DVT: Lovenox  ---------------------------------------------------------------  --Discussed with wife present at bed side after initial review. I explained functional and clinical update and AMAN placement for further therapy. She agreed tto same  --I called patient's wife and gave her update on patient's condition and treatment  ---------------------------------------------------------------  IDT conference on   TDD:  to home vs. AMAN  Barriers: Making significant gains.   Social Work: Lives in a basement apartment with spouse and 2 sons, 12 MEI. Patient is main source of income for family. Spouse does not work. (Originally AMAN, but may be able to DC home now)  OT: CG for transfers. Min-mod A for self care.   PT: Supervision/CGA for transfers. Ambulated 150 ft with RW with CGA (& ambulated 150ft with no device). Negotiated 12 stairs with 1-2HRs.   SLP: Easy to chew with TLs. Mild cognitive and language deficits. ST memory improving.  Will need FT if DC home. Will need DME- BRIGETTE gibbons, tub bench.   ---------------------------------------------------------------  FOLLOW UP/OUTPATIENT:    Bev Song)  Cardiology; Internal Medicine  [address] [facility] [facility], [address]  [place] [id number]  Phone: [phone]  Fax: [phone]    Ivan Reid (MD; PhD)  Neurology; Vascular Neurology  [address] [facility] [facility] [address]  [place] [address]  Phone: [phone]  Fax: [phone]  Follow Up Time:     Primary doctor,   Phone: (   )    -  Fax: (   )    -  Follow Up Time:     Sanchez Allen)  Neurology; Vascular Neurology  [address] [place] [address]  Phone: [phone]  Fax: [phone]  Follow Up Time:     Lily Moon (DO)  Gastroenterology  [address] [facility]  [place] [address]  Phone: [phone]  Fax: [phone]  Follow Up Time:    Urology f/u for persistent dysuria,  Small adrenal nodule  ---------------------------------------------------------------

## 2023-05-08 PROBLEM — E11.9 TYPE 2 DIABETES MELLITUS WITHOUT COMPLICATIONS: Chronic | Status: ACTIVE | Noted: 2023-04-11

## 2023-05-08 PROBLEM — I10 ESSENTIAL (PRIMARY) HYPERTENSION: Chronic | Status: ACTIVE | Noted: 2023-04-11

## 2023-05-15 ENCOUNTER — APPOINTMENT (OUTPATIENT)
Dept: CARDIOLOGY | Facility: CLINIC | Age: 58
End: 2023-05-15
Payer: MEDICAID

## 2023-05-15 ENCOUNTER — NON-APPOINTMENT (OUTPATIENT)
Age: 58
End: 2023-05-15

## 2023-05-15 VITALS
HEIGHT: 68 IN | DIASTOLIC BLOOD PRESSURE: 79 MMHG | HEART RATE: 93 BPM | BODY MASS INDEX: 23.34 KG/M2 | SYSTOLIC BLOOD PRESSURE: 117 MMHG | WEIGHT: 154 LBS | OXYGEN SATURATION: 98 % | TEMPERATURE: 98 F

## 2023-05-15 DIAGNOSIS — I10 ESSENTIAL (PRIMARY) HYPERTENSION: ICD-10-CM

## 2023-05-15 DIAGNOSIS — Z86.79 PERSONAL HISTORY OF OTHER DISEASES OF THE CIRCULATORY SYSTEM: ICD-10-CM

## 2023-05-15 DIAGNOSIS — L89.159 PRESSURE ULCER OF SACRAL REGION, UNSPECIFIED STAGE: ICD-10-CM

## 2023-05-15 DIAGNOSIS — N40.0 BENIGN PROSTATIC HYPERPLASIA WITHOUT LOWER URINARY TRACT SYMPMS: ICD-10-CM

## 2023-05-15 DIAGNOSIS — K21.9 GASTRO-ESOPHAGEAL REFLUX DISEASE W/OUT ESOPHAGITIS: ICD-10-CM

## 2023-05-15 PROCEDURE — 93000 ELECTROCARDIOGRAM COMPLETE: CPT

## 2023-05-15 PROCEDURE — 99214 OFFICE O/P EST MOD 30 MIN: CPT | Mod: 25

## 2023-05-15 RX ORDER — POLYETHYLENE GLYCOL 3350 17 G/17G
17 POWDER, FOR SOLUTION ORAL DAILY
Refills: 0 | Status: ACTIVE | COMMUNITY
Start: 2023-05-15

## 2023-05-15 RX ORDER — TICAGRELOR 90 MG/1
90 TABLET ORAL
Qty: 180 | Refills: 2 | Status: ACTIVE | COMMUNITY
Start: 2023-05-15

## 2023-05-15 RX ORDER — MIDODRINE HYDROCHLORIDE 2.5 MG/1
2.5 TABLET ORAL
Qty: 180 | Refills: 3 | Status: ACTIVE | COMMUNITY
Start: 2023-05-15

## 2023-05-15 RX ORDER — CHLORHEXIDINE GLUCONATE 4 %
325 (65 FE) LIQUID (ML) TOPICAL DAILY
Refills: 0 | Status: ACTIVE | COMMUNITY
Start: 2023-05-15

## 2023-05-15 RX ORDER — ASPIRIN 81 MG/1
81 TABLET, CHEWABLE ORAL
Qty: 90 | Refills: 3 | Status: ACTIVE | COMMUNITY
Start: 2023-05-15

## 2023-05-15 RX ORDER — ETHYL ALCOHOL 700 MG/ML
GEL TOPICAL
Refills: 0 | Status: ACTIVE | COMMUNITY
Start: 2023-05-15

## 2023-05-15 RX ORDER — GLUCOSAMINE HCL/CHONDROITIN SU 500-400 MG
3 CAPSULE ORAL AT BEDTIME
Refills: 0 | Status: ACTIVE | COMMUNITY
Start: 2023-05-15

## 2023-05-15 RX ORDER — PETROLATUM,WHITE
JELLY (GRAM) MISCELLANEOUS
Refills: 0 | Status: ACTIVE | COMMUNITY
Start: 2023-05-15

## 2023-05-15 RX ORDER — PANTOPRAZOLE 40 MG/1
40 TABLET, DELAYED RELEASE ORAL DAILY
Qty: 90 | Refills: 0 | Status: ACTIVE | COMMUNITY
Start: 2023-05-15

## 2023-05-15 RX ORDER — DOXAZOSIN 2 MG/1
2 TABLET ORAL DAILY
Refills: 0 | Status: ACTIVE | COMMUNITY
Start: 2023-05-15

## 2023-05-15 RX ORDER — ATORVASTATIN CALCIUM 80 MG/1
80 TABLET, FILM COATED ORAL
Qty: 90 | Refills: 3 | Status: ACTIVE | COMMUNITY
Start: 2023-05-15

## 2023-05-15 RX ORDER — ACETAMINOPHEN 325 MG/1
325 TABLET ORAL DAILY
Refills: 0 | Status: ACTIVE | COMMUNITY
Start: 2023-05-15

## 2023-05-15 RX ORDER — SENNOSIDES 8.6 MG TABLETS 8.6 MG/1
8.6 TABLET ORAL DAILY
Refills: 0 | Status: ACTIVE | COMMUNITY
Start: 2023-05-15

## 2023-05-15 RX ORDER — LACTULOSE 10 G/15ML
10 SOLUTION ORAL DAILY
Refills: 0 | Status: ACTIVE | COMMUNITY
Start: 2023-05-15

## 2023-05-16 ENCOUNTER — APPOINTMENT (OUTPATIENT)
Dept: NEUROLOGY | Facility: CLINIC | Age: 58
End: 2023-05-16
Payer: MEDICAID

## 2023-05-16 VITALS
WEIGHT: 154 LBS | HEIGHT: 68 IN | BODY MASS INDEX: 23.34 KG/M2 | HEART RATE: 89 BPM | SYSTOLIC BLOOD PRESSURE: 90 MMHG | DIASTOLIC BLOOD PRESSURE: 58 MMHG

## 2023-05-16 DIAGNOSIS — Z78.9 OTHER SPECIFIED HEALTH STATUS: ICD-10-CM

## 2023-05-16 PROCEDURE — 99215 OFFICE O/P EST HI 40 MIN: CPT | Mod: 24

## 2023-05-16 NOTE — HISTORY OF PRESENT ILLNESS
[FreeTextEntry1] : Sakina Dowell is a 58 year-old man with PMH who presented as a transfer to [facility] on 3/29/23 with left-sided weakness and right vision loss.  CTA head and neck showed left carotid occlusion. He was brought to the [facility] for thrombectomy with TICI 2b thrombectomy of the R ICA and R MCA, with R ICA stent placement. Follow-up carotid US confirmed right ICA stent patency. His hospital course was complicated by PNA, for which he was treated with IV Zosyn, and dysphagia. He had a PEG tube placed and was eventually transferred to  for acute rehab. From rehab he was transferred to [facility] where he currently resides. He is hoping to go home soon once family can help with his care. He is able to eat PO and no longer using PEG. He has residual left-sided weakness and complete vision loss in right eye. He walks with a walker unassisted and can manage his own ADLs. mRS is 3. He denies interval TIA or stroke like symptoms. He reports compliance with all medications. \par \par

## 2023-05-16 NOTE — DISCUSSION/SUMMARY
[FreeTextEntry1] : Sakina Dowell is a 58 year-old man with PMH who presented for hospital follow-up. He is 6 weeks post stroke 2/2 right tandem carotid and MCA occlusion with TICI 2b reperfusion post thrombectomy of the R ICA and R MCA, with R ICA stent placement. He is doing well. Post stent Doppler looks excellent. He has right eye vision blindness and left-sided weakness, which is improving. Plan to perform BL carotid US in 10/2023, 6 months post stent placement. For now he should continue DAPT with ASA and Brilinta and statin therapy for secondary stroke prevention. Follow-up with GI regarding PEG tube removal, as patient is currently tolerating PO. Follow-up with me in October after testing. All of his questions and concerns were addressed.

## 2023-05-16 NOTE — CONSULT LETTER
[Dear  ___] : Dear  [unfilled], [Consult Letter:] : I had the pleasure of evaluating your patient, [unfilled]. [Please see my note below.] : Please see my note below. [Consult Closing:] : Thank you very much for allowing me to participate in the care of this patient.  If you have any questions, please do not hesitate to contact me. [Sincerely,] : Sincerely, [FreeTextEntry3] : Sanchez Allen MD\par Chief, Vascular Neurology and Neurology Service , NeuroEndovascular Surgery\par  of Neurology and Radiology\par [facility] [employer] [facility]\par Director, Comprehensive Stroke Center and Stroke Unit\par [facility]\par Director, NeuroEndovascular Surgery\par [facility]\par

## 2023-05-16 NOTE — PHYSICAL EXAM
[FreeTextEntry1] : GENERAL PHYSICAL EXAM:\par GEN: no distress, normal affect\par EYES: sclera white, conjunctiva clear, no nystagmus\par PULM: no respiratory distress, normal rhythm and effort\par EXT: no edema, no cyanosis\par MSK: muscle tone and strength normal\par SKIN: warm, dry, no rash or lesion on exposed skin \par \par NEUROLOGICAL EXAM:\par Mental Status\par Orientation: alert and oriented to person, place, time, and situation\par Language: clear and fluent, intact comprehension and repetition\par \par Cranial Nerves\par II: vision loss in right eye\par III, IV, VI: EOMI on left, right eye deviated laterally\par V, VII: facial sensation intact, left-sided facial droop\par VIII: hearing intact \par IX, X: uvula midline, soft palate elevates normally \par XI: BL shoulder shrug intact \par XII: tongue midline\par \par Motor\par EF/EE: 5 (R), 4 (L)\par WF/WE: 5 (R), 4 (L)\par hand : 5 (R), 4 (L)\par HF/HE: 5 (R), 4+ (L)\par KF/KE: 5 (R), 4 (L)\par DF/PF: 5 (R), 4 (L) \par Pronator drift on left \par \par Sensation\par Intact to light touch in all 4 EXTs\par \par Coordination\par Normal FTN on right\par \par Gait\par In wheelchair

## 2023-06-27 ENCOUNTER — APPOINTMENT (OUTPATIENT)
Dept: CARDIOLOGY | Facility: CLINIC | Age: 58
End: 2023-06-27
Payer: MEDICAID

## 2023-06-27 VITALS
HEART RATE: 79 BPM | OXYGEN SATURATION: 97 % | WEIGHT: 149 LBS | TEMPERATURE: 97.3 F | DIASTOLIC BLOOD PRESSURE: 60 MMHG | HEIGHT: 68 IN | SYSTOLIC BLOOD PRESSURE: 112 MMHG | BODY MASS INDEX: 22.58 KG/M2

## 2023-06-27 DIAGNOSIS — E78.5 HYPERLIPIDEMIA, UNSPECIFIED: ICD-10-CM

## 2023-06-27 DIAGNOSIS — Z86.73 PERSONAL HISTORY OF TRANSIENT ISCHEMIC ATTACK (TIA), AND CEREBRAL INFARCTION W/OUT RESIDUAL DEFICITS: ICD-10-CM

## 2023-06-27 DIAGNOSIS — I77.9 DISORDER OF ARTERIES AND ARTERIOLES, UNSPECIFIED: ICD-10-CM

## 2023-06-27 DIAGNOSIS — E11.9 TYPE 2 DIABETES MELLITUS W/OUT COMPLICATIONS: ICD-10-CM

## 2023-06-27 PROCEDURE — 99214 OFFICE O/P EST MOD 30 MIN: CPT

## 2023-06-27 RX ORDER — METFORMIN HYDROCHLORIDE 500 MG/1
500 TABLET, COATED ORAL
Qty: 180 | Refills: 0 | Status: ACTIVE | COMMUNITY

## 2023-06-27 RX ORDER — ENOXAPARIN SODIUM 40 MG/.4ML
40 INJECTION, SOLUTION SUBCUTANEOUS DAILY
Refills: 0 | Status: DISCONTINUED | COMMUNITY
Start: 2023-05-15 | End: 2023-06-27

## 2023-07-20 DIAGNOSIS — Z93.1 GASTROSTOMY STATUS: ICD-10-CM

## 2023-07-21 ENCOUNTER — APPOINTMENT (OUTPATIENT)
Dept: GASTROENTEROLOGY | Facility: CLINIC | Age: 58
End: 2023-07-21

## 2023-07-25 DIAGNOSIS — I63.9 CEREBRAL INFARCTION, UNSPECIFIED: ICD-10-CM

## 2023-08-03 ENCOUNTER — OUTPATIENT (OUTPATIENT)
Dept: OUTPATIENT SERVICES | Facility: HOSPITAL | Age: 58
LOS: 1 days | End: 2023-08-03
Payer: MEDICAID

## 2023-08-03 ENCOUNTER — TRANSCRIPTION ENCOUNTER (OUTPATIENT)
Age: 58
End: 2023-08-03

## 2023-08-03 ENCOUNTER — APPOINTMENT (OUTPATIENT)
Dept: GASTROENTEROLOGY | Facility: HOSPITAL | Age: 58
End: 2023-08-03

## 2023-08-03 DIAGNOSIS — Z93.1 GASTROSTOMY STATUS: ICD-10-CM

## 2023-08-03 LAB — GLUCOSE BLDC GLUCOMTR-MCNC: 114 MG/DL — HIGH (ref 70–99)

## 2023-08-03 PROCEDURE — 43247 EGD REMOVE FOREIGN BODY: CPT

## 2023-08-03 PROCEDURE — 82962 GLUCOSE BLOOD TEST: CPT

## 2023-08-03 NOTE — PHYSICAL EXAM
[Alert] : alert [Normal Voice/Communication] : normal voice/communication [Healthy Appearing] : healthy appearing [No Acute Distress] : no acute distress [Sclera] : the sclera and conjunctiva were normal [Hearing Threshold Finger Rub Not [place]] : hearing was normal [Normal Lips/Gums] : the lips and gums were normal [Oropharynx] : the oropharynx was normal [Normal Appearance] : the appearance of the neck was normal [No Neck Mass] : no neck mass was observed [No Acc Muscle Use] : no accessory muscle use [No Respiratory Distress] : no respiratory distress [Respiration, Rhythm And Depth] : normal respiratory rhythm and effort [Auscultation Breath Sounds / Voice Sounds] : lungs were clear to auscultation bilaterally [Heart Rate And Rhythm] : heart rate was normal and rhythm regular [Murmurs] : no murmurs [Normal S1, S2] : normal S1 and S2 [Bowel Sounds] : normal bowel sounds [Abdomen Tenderness] : non-tender [No Masses] : no abdominal mass palpated [Abdomen Soft] : soft [Oriented To Time, Place, And Person] : oriented to person, place, and time [] : no hepatosplenomegaly [de-identified] : PEG tube in place.

## 2023-08-03 NOTE — HISTORY OF PRESENT ILLNESS
[FreeTextEntry1] : Pt presents for EGD with PEG removal, s/p PEG placement in 04/2023. [de-identified] : EGD with PEG tube placement 04/2023.

## 2023-08-14 ENCOUNTER — APPOINTMENT (OUTPATIENT)
Dept: GASTROENTEROLOGY | Facility: CLINIC | Age: 58
End: 2023-08-14
Payer: MEDICAID

## 2023-08-14 VITALS
RESPIRATION RATE: 14 BRPM | HEIGHT: 68 IN | DIASTOLIC BLOOD PRESSURE: 80 MMHG | BODY MASS INDEX: 22.43 KG/M2 | TEMPERATURE: 97.2 F | OXYGEN SATURATION: 98 % | HEART RATE: 86 BPM | SYSTOLIC BLOOD PRESSURE: 128 MMHG | WEIGHT: 148 LBS

## 2023-08-14 DIAGNOSIS — Z43.1 ENCOUNTER FOR ATTENTION TO GASTROSTOMY: ICD-10-CM

## 2023-08-14 DIAGNOSIS — T85.848A PAIN DUE TO OTHER INTERNAL PROSTHETIC DEVICES, IMPLANTS AND GRAFTS, INITIAL ENCOUNTER: ICD-10-CM

## 2023-08-14 PROCEDURE — 99214 OFFICE O/P EST MOD 30 MIN: CPT

## 2023-08-14 NOTE — ADDENDUM
[FreeTextEntry1] : I, Opal Singh NP, acted as scribe for Dr. Daljit Smith for this patient encounter

## 2023-08-14 NOTE — HISTORY OF PRESENT ILLNESS
[FreeTextEntry1] : CONG AGUAYO is a 58 year old male s/p CVA in April of this year which required PEG placement at [facility] for supplemental feeds. Tube removed endoscopically on 8/3 without issue. He presents today complaining of pain at the PEG site. He states that the pain is usually a 3/10 but can reach 7/10 at night. He will occasionally take Tylenol 325 mg 1-2 tabs QD-BID with mild relief. He is requesting pain medication. The pain is a soreness and is tender to touch. He denies severe pain, nausea, vomiting, fevers, redness, bleeding, or drainage to the area. He cleans it twice daily with alcohol and uses Bacitracin. He is tolerating a regular diet. Denies weight loss, loss of appetite, dysphagia, odynophagia.   No prior colonoscopy. Family history negative for colon cancer and polyps. He denies lower abdominal pain, constipation, diarrhea, black or bloody stools. He moves his bowels regularly.  [de-identified] : 8/3/23

## 2023-08-14 NOTE — PHYSICAL EXAM
[Alert] : alert [Normal Voice/Communication] : normal voice/communication [Healthy Appearing] : healthy appearing [No Acute Distress] : no acute distress [Sclera] : the sclera and conjunctiva were normal [Hearing Threshold Finger Rub Not [place]] : hearing was normal [Normal Lips/Gums] : the lips and gums were normal [Oropharynx] : the oropharynx was normal [Normal Appearance] : the appearance of the neck was normal [No Neck Mass] : no neck mass was observed [No Respiratory Distress] : no respiratory distress [No Acc Muscle Use] : no accessory muscle use [Respiration, Rhythm And Depth] : normal respiratory rhythm and effort [Auscultation Breath Sounds / Voice Sounds] : lungs were clear to auscultation bilaterally [Heart Rate And Rhythm] : heart rate was normal and rhythm regular [Normal S1, S2] : normal S1 and S2 [Murmurs] : no murmurs [Bowel Sounds] : normal bowel sounds [No Masses] : no abdominal mass palpated [Abdomen Soft] : soft [] : no hepatosplenomegaly [Oriented To Time, Place, And Person] : oriented to person, place, and time [de-identified] : Mid abdominal PEG site- clean and dry with no redness or drainage

## 2023-08-14 NOTE — REASON FOR VISIT
[Follow-up] : a follow-up of an existing diagnosis [Spouse] : spouse [Family Member] : family member [FreeTextEntry1] : pain at PEG site

## 2023-08-14 NOTE — ASSESSMENT
[FreeTextEntry1] : 58 year old male with recent CVA s/p PEG placement and subsequent removal complaining of pain at the drain site. Site is clean and dry with no redness, bleeding, or discharge noted. Site tenderness is expected post tube removal. e was encouraged to take Tylenol 500 mg 2 tablets Q 6 hours PRN for pain. He was made aware that we do not prescribe narcotic pain medications at this office and verbalized understanding. He will call the office if there is any severe pain, bleeding, discharge or signs of infection.   He will follow up in 6 months for routine colonoscopy consult. I evaluated this patient with my ACP and agree with the above assessment and management plan.  Patient is status post recent endoscopic removal of PEG tube and presents today as complaining of some pain at the gastrostomy site.  Gastrostomy site was examined and appears clean and dry with no evidence of infection or erythema or induration or any significant tenderness.  He was told to keep the area clean by placing a 4 x 4 gauze pad over the gastrostomy site daily until it closes.  It is already closing.  He was told to take Tylenol as needed for pain.  Screening colonoscopy was advised but presently declined at this time.  He is to return here in 6 months time for follow-up evaluation and appeared to understand all of the above instructions, information, and management plan.

## 2023-10-31 ENCOUNTER — APPOINTMENT (OUTPATIENT)
Dept: NEUROLOGY | Facility: CLINIC | Age: 58
End: 2023-10-31

## 2023-12-19 ENCOUNTER — APPOINTMENT (OUTPATIENT)
Dept: CARDIOLOGY | Facility: CLINIC | Age: 58
End: 2023-12-19

## 2023-12-28 LAB
SAO2 % BLDA: SIGNIFICANT CHANGE UP % (ref 94–98)
SAO2 % BLDA: SIGNIFICANT CHANGE UP % (ref 94–98)

## 2025-05-14 ENCOUNTER — APPOINTMENT (OUTPATIENT)
Dept: VASCULAR SURGERY | Facility: CLINIC | Age: 60
End: 2025-05-14
Payer: MEDICAID

## 2025-05-14 VITALS
SYSTOLIC BLOOD PRESSURE: 109 MMHG | BODY MASS INDEX: 26.36 KG/M2 | DIASTOLIC BLOOD PRESSURE: 79 MMHG | RESPIRATION RATE: 16 BRPM | WEIGHT: 164 LBS | OXYGEN SATURATION: 96 % | TEMPERATURE: 98.2 F | HEART RATE: 87 BPM | HEIGHT: 66 IN

## 2025-05-14 DIAGNOSIS — Z86.73 PERSONAL HISTORY OF TRANSIENT ISCHEMIC ATTACK (TIA), AND CEREBRAL INFARCTION W/OUT RESIDUAL DEFICITS: ICD-10-CM

## 2025-05-14 DIAGNOSIS — Z98.890 OTHER SPECIFIED POSTPROCEDURAL STATES: ICD-10-CM

## 2025-05-14 DIAGNOSIS — Z95.828 OTHER SPECIFIED POSTPROCEDURAL STATES: ICD-10-CM

## 2025-05-14 PROCEDURE — 93880 EXTRACRANIAL BILAT STUDY: CPT

## 2025-05-14 PROCEDURE — 99204 OFFICE O/P NEW MOD 45 MIN: CPT

## 2025-06-30 NOTE — DIETITIAN INITIAL EVALUATION ADULT - HEIGHT FOR BMI (INCHES)
Quality 226: Preventive Care And Screening: Tobacco Use: Screening And Cessation Intervention: Patient screened for tobacco use and is an ex/non-smoker Detail Level: Detailed 10
